# Patient Record
Sex: FEMALE | Race: WHITE | URBAN - METROPOLITAN AREA
[De-identification: names, ages, dates, MRNs, and addresses within clinical notes are randomized per-mention and may not be internally consistent; named-entity substitution may affect disease eponyms.]

---

## 2017-02-03 ENCOUNTER — OPTICAL OFFICE (OUTPATIENT)
Dept: URBAN - METROPOLITAN AREA CLINIC 146 | Facility: CLINIC | Age: 67
Setting detail: OPHTHALMOLOGY
End: 2017-02-03
Payer: COMMERCIAL

## 2017-02-03 ENCOUNTER — RX ONLY (RX ONLY)
Age: 67
End: 2017-02-03

## 2017-02-03 ENCOUNTER — DOCTOR'S OFFICE (OUTPATIENT)
Dept: URBAN - METROPOLITAN AREA CLINIC 137 | Facility: CLINIC | Age: 67
Setting detail: OPHTHALMOLOGY
End: 2017-02-03
Payer: COMMERCIAL

## 2017-02-03 DIAGNOSIS — H25.13: ICD-10-CM

## 2017-02-03 DIAGNOSIS — H52.4: ICD-10-CM

## 2017-02-03 DIAGNOSIS — E11.9: ICD-10-CM

## 2017-02-03 PROCEDURE — V2020 VISION SVCS FRAMES PURCHASES: HCPCS | Performed by: OPHTHALMOLOGY

## 2017-02-03 PROCEDURE — 92004 COMPRE OPH EXAM NEW PT 1/>: CPT | Performed by: OPHTHALMOLOGY

## 2017-02-03 PROCEDURE — V2718 FRESNELL PRISM PRESS-ON LENS: HCPCS | Performed by: OPHTHALMOLOGY

## 2017-02-03 PROCEDURE — V2103 SPHEROCYLINDR 4.00D/12-2.00D: HCPCS | Performed by: OPHTHALMOLOGY

## 2017-02-03 ASSESSMENT — REFRACTION_MANIFEST
OS_VA3: 20/
OD_VA3: 20/
OS_VA1: 20/
OS_VA2: 20/
OD_VA2: 20/
OD_VA3: 20/
OS_VA1: 20/
OU_VA: 20/
OD_VA1: 20/
OD_VA1: 20/
OU_VA: 20/
OD_VA2: 20/
OS_VA3: 20/
OS_VA2: 20/

## 2017-02-03 ASSESSMENT — REFRACTION_CURRENTRX
OS_SPHERE: +3.00
OD_OVR_VA: 20/
OS_VPRISM_DIRECTION: SV
OD_OVR_VA: 20/
OD_VPRISM_DIRECTION: SV
OS_CYLINDER: SPH
OS_OVR_VA: 20/
OD_SPHERE: +3.00
OD_CYLINDER: SPH
OD_OVR_VA: 20/
OS_OVR_VA: 20/
OS_OVR_VA: 20/

## 2017-02-03 ASSESSMENT — REFRACTION_AUTOREFRACTION
OD_CYLINDER: +1.50
OS_CYLINDER: SPH
OS_SPHERE: +3.75
OD_AXIS: 040
OD_SPHERE: +6.50

## 2017-02-03 ASSESSMENT — REFRACTION_OUTSIDERX
OS_VA2: 20/25(J1)
OD_AXIS: 030
OS_SPHERE: +2.75
OD_CYLINDER: +1.00
OS_VA3: 20/
OU_VA: 20/
OD_VA1: 20/70
OS_ADD: +2.75
OD_VA3: 20/
OD_SPHERE: +4.00
OS_CYLINDER: SPH
OD_VA2: 20/25(J1)
OS_VA1: 20/30-1
OD_ADD: +2.75

## 2017-02-03 ASSESSMENT — SPHEQUIV_DERIVED: OD_SPHEQUIV: 7.25

## 2017-02-03 ASSESSMENT — VISUAL ACUITY
OD_BCVA: 20/80
OS_BCVA: 20/200

## 2017-02-03 ASSESSMENT — CONFRONTATIONAL VISUAL FIELD TEST (CVF)
OD_FINDINGS: FULL
OS_FINDINGS: FULL

## 2017-02-27 ENCOUNTER — OPTICAL OFFICE (OUTPATIENT)
Dept: URBAN - METROPOLITAN AREA CLINIC 146 | Facility: CLINIC | Age: 67
Setting detail: OPHTHALMOLOGY
End: 2017-02-27

## 2017-02-27 DIAGNOSIS — H52.4: ICD-10-CM

## 2017-02-27 PROCEDURE — V2020 VISION SVCS FRAMES PURCHASES: HCPCS | Performed by: OPHTHALMOLOGY

## 2017-02-27 PROCEDURE — V2203 LENS SPHCYL BIFOCAL 4.00D/.1: HCPCS | Performed by: OPHTHALMOLOGY

## 2017-02-27 PROCEDURE — V2744 TINT PHOTOCHROMATIC LENS/ES: HCPCS | Performed by: OPHTHALMOLOGY

## 2018-01-05 ENCOUNTER — HOSPITAL ENCOUNTER (EMERGENCY)
Facility: HOSPITAL | Age: 68
Discharge: HOME | End: 2018-01-05
Attending: EMERGENCY MEDICINE | Admitting: EMERGENCY MEDICINE
Payer: MEDICARE

## 2018-01-05 VITALS
SYSTOLIC BLOOD PRESSURE: 161 MMHG | HEART RATE: 69 BPM | DIASTOLIC BLOOD PRESSURE: 69 MMHG | TEMPERATURE: 98.3 F | WEIGHT: 125 LBS | BODY MASS INDEX: 19.58 KG/M2 | OXYGEN SATURATION: 95 % | RESPIRATION RATE: 18 BRPM

## 2018-01-05 DIAGNOSIS — T69.9XXA: Primary | ICD-10-CM

## 2018-01-05 DIAGNOSIS — R68.89: ICD-10-CM

## 2018-01-05 LAB
ALBUMIN SERPL BCP-MCNC: 2.8 G/DL (ref 3.5–5)
ALP SERPL-CCNC: 67 U/L (ref 46–116)
ALT SERPL W P-5'-P-CCNC: 20 U/L (ref 12–78)
ANION GAP SERPL CALCULATED.3IONS-SCNC: 11 MMOL/L (ref 4–13)
AST SERPL W P-5'-P-CCNC: 20 U/L (ref 5–45)
ATRIAL RATE: 60 BPM
BASOPHILS # BLD AUTO: 0.03 THOUSANDS/ΜL (ref 0–0.1)
BASOPHILS NFR BLD AUTO: 0 % (ref 0–1)
BILIRUB SERPL-MCNC: 0.21 MG/DL (ref 0.2–1)
BUN SERPL-MCNC: 17 MG/DL (ref 5–25)
CALCIUM SERPL-MCNC: 6.8 MG/DL (ref 8.3–10.1)
CHLORIDE SERPL-SCNC: 110 MMOL/L (ref 100–108)
CK MB SERPL-MCNC: 4.4 % (ref 0–2.5)
CK MB SERPL-MCNC: 6 NG/ML (ref 0–5)
CK SERPL-CCNC: 136 U/L (ref 26–192)
CO2 SERPL-SCNC: 21 MMOL/L (ref 21–32)
CREAT SERPL-MCNC: 0.64 MG/DL (ref 0.6–1.3)
EOSINOPHIL # BLD AUTO: 0.1 THOUSAND/ΜL (ref 0–0.61)
EOSINOPHIL NFR BLD AUTO: 1 % (ref 0–6)
ERYTHROCYTE [DISTWIDTH] IN BLOOD BY AUTOMATED COUNT: 14.5 % (ref 11.6–15.1)
GFR SERPL CREATININE-BSD FRML MDRD: 94 ML/MIN/1.73SQ M
GLUCOSE SERPL-MCNC: 210 MG/DL (ref 65–140)
HCT VFR BLD AUTO: 38.3 % (ref 34.8–46.1)
HGB BLD-MCNC: 12.5 G/DL (ref 11.5–15.4)
LYMPHOCYTES # BLD AUTO: 1.36 THOUSANDS/ΜL (ref 0.6–4.47)
LYMPHOCYTES NFR BLD AUTO: 8 % (ref 14–44)
MCH RBC QN AUTO: 31.3 PG (ref 26.8–34.3)
MCHC RBC AUTO-ENTMCNC: 32.6 G/DL (ref 31.4–37.4)
MCV RBC AUTO: 96 FL (ref 82–98)
MONOCYTES # BLD AUTO: 0.57 THOUSAND/ΜL (ref 0.17–1.22)
MONOCYTES NFR BLD AUTO: 4 % (ref 4–12)
NEUTROPHILS # BLD AUTO: 14.33 THOUSANDS/ΜL (ref 1.85–7.62)
NEUTS SEG NFR BLD AUTO: 87 % (ref 43–75)
NRBC BLD AUTO-RTO: 0 /100 WBCS
P AXIS: 78 DEGREES
PLATELET # BLD AUTO: 174 THOUSANDS/UL (ref 149–390)
PMV BLD AUTO: 11 FL (ref 8.9–12.7)
POTASSIUM SERPL-SCNC: 3.5 MMOL/L (ref 3.5–5.3)
PR INTERVAL: 178 MS
PROT SERPL-MCNC: 5.6 G/DL (ref 6.4–8.2)
QRS AXIS: 81 DEGREES
QRSD INTERVAL: 84 MS
QT INTERVAL: 412 MS
QTC INTERVAL: 412 MS
RBC # BLD AUTO: 4 MILLION/UL (ref 3.81–5.12)
SODIUM SERPL-SCNC: 142 MMOL/L (ref 136–145)
T WAVE AXIS: 115 DEGREES
VENTRICULAR RATE: 60 BPM
WBC # BLD AUTO: 16.46 THOUSAND/UL (ref 4.31–10.16)

## 2018-01-05 PROCEDURE — 82553 CREATINE MB FRACTION: CPT | Performed by: EMERGENCY MEDICINE

## 2018-01-05 PROCEDURE — 36415 COLL VENOUS BLD VENIPUNCTURE: CPT | Performed by: EMERGENCY MEDICINE

## 2018-01-05 PROCEDURE — 80053 COMPREHEN METABOLIC PANEL: CPT | Performed by: EMERGENCY MEDICINE

## 2018-01-05 PROCEDURE — 99284 EMERGENCY DEPT VISIT MOD MDM: CPT

## 2018-01-05 PROCEDURE — 82550 ASSAY OF CK (CPK): CPT | Performed by: EMERGENCY MEDICINE

## 2018-01-05 PROCEDURE — 85025 COMPLETE CBC W/AUTO DIFF WBC: CPT | Performed by: EMERGENCY MEDICINE

## 2018-01-05 PROCEDURE — 93005 ELECTROCARDIOGRAM TRACING: CPT

## 2018-01-05 RX ORDER — QUETIAPINE FUMARATE 100 MG/1
100 TABLET, FILM COATED ORAL
COMMUNITY

## 2018-01-05 NOTE — ED PROCEDURE NOTE
Procedure  ECG 12 Lead Documentation  Date/Time: 1/5/2018 6:50 PM  Performed by: Byron Neumann by: DEBRA Curry     Indications / Diagnosis:  Cold exposure  ECG reviewed by me, the ED Provider: yes    Patient location:  ED and bedside  Previous ECG:     Previous ECG:  Compared to current  Interpretation:     Interpretation: non-specific    Rate:     ECG rate:  60    ECG rate assessment: normal    Rhythm:     Rhythm: sinus rhythm    Ectopy:     Ectopy: none    QRS:     QRS axis:  Normal    QRS intervals:  Normal  Conduction:     Conduction: normal    ST segments:     ST segments:  Normal  T waves:     T waves: inverted      Inverted:  AVL and aVR

## 2018-01-05 NOTE — ED PROVIDER NOTES
History  Chief Complaint   Patient presents with    Cold Exposure     Pt was found wondering around and police were called  Police called group home who stated pt was out shopping today  EMS states that they do not know the group home name because police spoke with them     74yo femalepmhx schizophrenia, hypothyroid, HTN, DMII, anemia, hypovitaminosis D  presents for evaluation of cold exposure  Patient is a poor historian, appears to have some degree of cognitive dysfunction, resident of [facility]  Patient was found by police wondering outside and appeared to be shivering  Patient currently complains cold feet  On arrival, patient's rectal temp was 96 3F and socks were soaked  She denies chest pain, shortness of breath, or abdominal pain  Prior to Admission Medications   Prescriptions Last Dose Informant Patient Reported? Taking? QUEtiapine (SEROquel) 100 mg tablet   Yes Yes   Sig: Take 100 mg by mouth daily at bedtime   cholecalciferol 1000 UNITS tablet   No Yes   Sig: Take 1 tablet (1,000 Units total) by mouth daily Indications: Vitamin D deficiency  escitalopram (LEXAPRO) 10 mg tablet   No Yes   Sig: Take 1 tablet (10 mg total) by mouth daily Indications: Depression  haloperidol (HALDOL) 5 mg tablet   No Yes   Sig: Take 1 tablet (5 mg total) by mouth daily at bedtime Indications: Schizophrenia    haloperidol decanoate (HALDOL DECANOATE) 50 mg/mL injection   No Yes   Sig: Inject 0 5 mL (25 mg total) into the shoulder, thigh, or buttocks every 28 days Indications: Schizophrenia    levothyroxine 100 mcg tablet   No Yes   Sig: Take 1 tablet (100 mcg total) by mouth daily in the early morning Indications: Underactive Thyroid  metFORMIN (GLUCOPHAGE) 1000 MG tablet   No Yes   Sig: Take 1 tablet (1,000 mg total) by mouth 2 (two) times a day with meals Indications: Type 2 Diabetes     metoprolol tartrate (LOPRESSOR) 25 mg tablet   No Yes   Sig: Take 0 5 tablets (12 5 mg total) by mouth 2 (two) times a day Indications: High Blood Pressure  sitaGLIPtin (JANUVIA) 100 mg tablet   Yes Yes   Sig: Take 100 mg by mouth daily      Facility-Administered Medications: None       Past Medical History:   Diagnosis Date    Hypertension     Hypothyroid 5/8/2016    Hypovitaminosis D 5/10/2016    Iron deficiency anemia 5/11/2016    [facility] 2[facility]61       History reviewed  No pertinent surgical history  Family History   Problem Relation Age of Onset    Family history unknown: Yes     I have reviewed and agree with the history as documented  Social History   Substance Use Topics    Smoking status: Current Every Day Smoker     Types: Cigarettes    Smokeless tobacco: Current User    Alcohol use No        Review of Systems   Unable to perform ROS: Other (Baseline consists of some degree of cognitive dysfunction)       Physical Exam  ED Triage Vitals [01/05/18 1728]   Temperature Pulse Respirations Blood Pressure SpO2   (!) 96 3 °F (35 7 °C) 68 18 138/79 96 %      Temp Source Heart Rate Source Patient Position - Orthostatic VS BP Location FiO2 (%)   Rectal Monitor Sitting Right arm --      Pain Score       No Pain           Orthostatic Vital Signs  Vitals:    01/05/18 1728 01/05/18 1822 01/05/18 1929   BP: 138/79 108/60 161/69   Pulse: 68 72 69   Patient Position - Orthostatic VS: Sitting Lying Sitting       Physical Exam   Constitutional: She is oriented to person, place, and time  She appears well-developed and well-nourished  No distress  Patient is alert and oriented, appears well and nontoxic, in no acute distress   HENT:   Head: Normocephalic and atraumatic  Mouth/Throat: Oropharynx is clear and moist  No oropharyngeal exudate  Eyes: Conjunctivae and EOM are normal  Pupils are equal, round, and reactive to light  Neck: Normal range of motion  Neck supple  Cardiovascular: Normal rate, regular rhythm, normal heart sounds and intact distal pulses  Pulmonary/Chest: Effort normal and breath sounds normal  No respiratory distress  She has no wheezes  She has no rales  Abdominal: Soft  Bowel sounds are normal  She exhibits no distension  There is no tenderness  Musculoskeletal: Normal range of motion  B/l feet are pallor, feel cold, cap refill delayed, full ROM, sensation and pulses intact   Lymphadenopathy:     She has no cervical adenopathy  Neurological: She is alert and oriented to person, place, and time  She displays normal reflexes  No cranial nerve deficit or sensory deficit  She exhibits normal muscle tone  No facial asymmetry noted, CN 2-12 intact, full ROM of upper and lower extremities, muscle strength 5/5 throughout, DTRs normal, sensation intact throughout, negative finger to nose/Crispin, negative Romberg's, negative Babinski, no gait disturbance noted   Skin: Skin is dry  Capillary refill takes 2 to 3 seconds  No rash noted  She is not diaphoretic  No erythema  No pallor  Psychiatric: She has a normal mood and affect  Her behavior is normal  Judgment and thought content normal    Nursing note and vitals reviewed        ED Medications  Medications - No data to display    Diagnostic Studies  Results Reviewed     Procedure Component Value Units Date/Time    CKMB [[id number]]  (Abnormal) Collected:  01/05/18 1745    Lab Status:  Final result Specimen:  Blood from Arm, Left Updated:  01/05/18 1928     CK-MB Index 4 4 (H) %      CK-MB FRACTION 6 0 (H) ng/mL     Comprehensive metabolic panel [[id number]]  (Abnormal) Collected:  01/05/18 1810    Lab Status:  Final result Specimen:  Blood from Arm, Left Updated:  01/05/18 1843     Sodium 142 mmol/L      Potassium 3 5 mmol/L      Chloride 110 (H) mmol/L      CO2 21 mmol/L      Anion Gap 11 mmol/L      BUN 17 mg/dL      Creatinine 0 64 mg/dL      Glucose 210 (H) mg/dL      Calcium 6 8 (L) mg/dL      AST 20 U/L      ALT 20 U/L      Alkaline Phosphatase 67 U/L      Total Protein 5 6 (L) g/dL      Albumin 2 8 (L) g/dL      Total Bilirubin 0 21 mg/dL      eGFR 94 ml/min/1 73sq m     Narrative:         National Kidney Disease Education Program recommendations are as follows:  GFR calculation is accurate only with a steady state creatinine  Chronic Kidney disease less than 60 ml/min/1 73 sq  meters  Kidney failure less than 15 ml/min/1 73 sq  meters  CK Total with Reflex CKMB [[id number]]  (Normal) Collected:  01/05/18 1745    Lab Status:  Final result Specimen:  Blood from Arm, Left Updated:  01/05/18 1836     Total  U/L     CBC and differential [[id number]]  (Abnormal) Collected:  01/05/18 1810    Lab Status:  Final result Specimen:  Blood from Arm, Left Updated:  01/05/18 1826     WBC 16 46 (H) Thousand/uL      RBC 4 00 Million/uL      Hemoglobin 12 5 g/dL      Hematocrit 38 3 %      MCV 96 fL      MCH 31 3 pg      MCHC 32 6 g/dL      RDW 14 5 %      MPV 11 0 fL      Platelets 887 Thousands/uL      nRBC 0 /100 WBCs      Neutrophils Relative 87 (H) %      Lymphocytes Relative 8 (L) %      Monocytes Relative 4 %      Eosinophils Relative 1 %      Basophils Relative 0 %      Neutrophils Absolute 14 33 (H) Thousands/µL      Lymphocytes Absolute 1 36 Thousands/µL      Monocytes Absolute 0 57 Thousand/µL      Eosinophils Absolute 0 10 Thousand/µL      Basophils Absolute 0 03 Thousands/µL                  No orders to display         Procedures  Procedures      Phone Consults  ED Phone Contact    ED Course  ED Course as of Jan 07 1641 Fri Jan 05, 2018   1833 I discussed case with manager Jocelyne at [facility] who confirmed patient does have a baseline of cognitive dysfunction  I informed her, pending lab results, patient will most likely be discharged once she is warm       1906 Rectal temp will be re-checked in half an hour, if above 98F, patient will be discharged back to [facility]            Identification of Seniors at [facility] Most Recent Value   (ISAR) Identification of Seniors at Risk   Before the illness or injury that brought you to the Emergency, did you need someone to help you on a regular basis? 1 Filed at: 01/05/2018 1730   In the last 24 hours, have you needed more help than usual?  0 Filed at: 01/05/2018 1730   Have you been hospitalized for one or more nights during the past 6 months? 0 Filed at: 01/05/2018 1730   In general, do you see well?  0 Filed at: 01/05/2018 1730   In general, do you have serious problems with your memory? 0 Filed at: 01/05/2018 1730   Do you take more than three different medications every day? 1 Filed at: 01/05/2018 1730   ISAR Score  2 Filed at: 01/05/2018 1730                          MDM  Number of Diagnoses or Management Options  Decreased body temperature:   Exposure to environmental cold:   Diagnosis management comments: Impression: 72yo female presents for evaluation of cold exposure  Ddx: hypothermia, rhabdo  Plan: warm fluids, bear hugger, ekg, cbc, bmp, ck    CritCare Time    Disposition  Final diagnoses:   Exposure to environmental cold   Decreased body temperature     Time reflects when diagnosis was documented in both MDM as applicable and the Disposition within this note     Time User Action Codes Description Comment    1/5/2018  7:00 PM Nicholas Cardenas  74  9XXA] Exposure to environmental cold     1/5/2018  7:01 PM Jah Gomes Add [R68 89] Decreased body temperature       ED Disposition     ED Disposition Condition Comment    Discharge  Ant Petty discharge to home/self care      Condition at discharge: Stable        Follow-up Information     Follow up With Specialties Details Why Edwin Frederick MD Family Medicine Go in 3 days As needed, If symptoms worsen [place] Allan [id number]  [phone]          Discharge Medication List as of 1/5/2018  7:02 PM      CONTINUE these medications which have NOT CHANGED    Details   cholecalciferol 1000 UNITS tablet Take 1 tablet (1,000 Units total) by mouth daily Indications: Vitamin D deficiency  , Starting 5/11/2016, Until Discontinued, Print      escitalopram (LEXAPRO) 10 mg tablet Take 1 tablet (10 mg total) by mouth daily Indications: Depression  , Starting 5/11/2016, Until Discontinued, Print      haloperidol (HALDOL) 5 mg tablet Take 1 tablet (5 mg total) by mouth daily at bedtime Indications: Schizophrenia , Starting 5/11/2016, Until Discontinued, Print      haloperidol decanoate (HALDOL DECANOATE) 50 mg/mL injection Inject 0 5 mL (25 mg total) into the shoulder, thigh, or buttocks every 28 days Indications: Schizophrenia , Starting 5/11/2016, Until Discontinued, Print      levothyroxine 100 mcg tablet Take 1 tablet (100 mcg total) by mouth daily in the early morning Indications: Underactive Thyroid  , Starting 5/11/2016, Until Discontinued, Print      metFORMIN (GLUCOPHAGE) 1000 MG tablet Take 1 tablet (1,000 mg total) by mouth 2 (two) times a day with meals Indications: Type 2 Diabetes  , Starting 5/11/2016, Until Discontinued, Print      metoprolol tartrate (LOPRESSOR) 25 mg tablet Take 0 5 tablets (12 5 mg total) by mouth 2 (two) times a day Indications: High Blood Pressure , Starting 5/11/2016, Until Discontinued, Print      QUEtiapine (SEROquel) 100 mg tablet Take 100 mg by mouth daily at bedtime, Historical Med      sitaGLIPtin (JANUVIA) 100 mg tablet Take 100 mg by mouth daily, Historical Med           No discharge procedures on file  ED Provider  Attending physically available and evaluated Nohelia Tyler  RADHA managed the patient along with the ED Attending      Electronically Signed by         Wilber Pinon MD  Resident  01/07/18 5954

## 2018-01-05 NOTE — ED ATTENDING ATTESTATION
Blessing Bennett MD, saw and evaluated the patient  All available labs and X-rays were ordered by me or the resident and have been reviewed by myself  I discussed the patient with the resident / non-physician and agree with the resident's / non-physician practitioner's findings and plan as documented in the resident's / non-physician practicitioner's note, except where noted  At this point, I agree with the current assessment done in the ED  Chief Complaint   Patient presents with    Cold Exposure     Pt was found wondering around and police were called  Police called group home who stated pt was out shopping today  EMS states that they do not know the group home name because police spoke with them     This is a 72-year-old female who is incredibly poor historian  The patient is a resident at [facility]  She was found outside wandering by police, complaining that she was feeling very cold, almost frozen due to the weather  The patient denies any fevers chills nausea vomiting  She does state that her feet hurt her and feel numb  It is unclear how long she was outside  We do not know which group home the patient is going to and the patient does not know for herself  She was seen immediately upon arrival, felt cold to the touch especially in her feet as she was only wearing simple shoes that barely covered her toes  Temperature 96 3° F but is cold to the touch  PMH:  - HTN  - Hypothyroidism  - Vit D deficiency  - DM2  - Schizophrenia  PSH:  - none  Smokes daily  No alcohol/drugs  PE:  Vitals:    01/05/18 1728 01/05/18 1822 01/05/18 1825 01/05/18 1929   BP: 138/79 108/60  161/69   Pulse: 68 72  69   Resp: 18 18 18   Temp: (!) 96 3 °F (35 7 °C)  (!) 97 3 °F (36 3 °C) 98 3 °F (36 8 °C)   TempSrc: Rectal  Rectal Rectal   SpO2: 96% 95%  95%   Weight: 56 7 kg (125 lb)      General: VSS, NAD, awake, alert  Well-nourished, well-developed  Appears stated age  Speaking normally in full sentences     Head: Normocephalic, atraumatic, nontender  Eyes: PERRL, EOM-I  No diplopia  No hyphema  No subconjunctival hemorrhages  Symmetrical lids  ENT: Atraumatic external nose and ears  MMM  No malocclusion  No stridor  Normal phonation  No drooling  Normal swallowing  Neck: Symmetric, trachea midline  No JVD  CV: RRR  +S1/S2  No murmurs or gallops  Peripheral pulses +2 throughout  No chest wall tenderness  Lungs:   Unlabored No retractions  CTAB, lungs sounds equal bilateral    No tachypnea  Abd: +BS, soft, NT/ND    MSK:   FROM   EHL FHL PF DF HF HE 5/5, moving bilaterally equal  In terms of sensation, decreased bilaterally  Plantar aspect has skin changes that are black but looks more like dirt than necrosis  Non-tender  No ulceration  Very cold to the touch  Back:   No rashes  Skin: Dry, intact  Neuro: AAOx3, GCS 15, CN II-XII grossly intact  Motor grossly intact  Psychiatric/Behavioral: Appropriate mood and affect   Exam: deferred  A/P:  - Cold exposure: Rewarm  - Get in touch with facility  - Admit obs if we cannot, not safe for discharge unless in the care of someone  - 13 point ROS was performed and all are normal unless stated in the history above  - Nursing note reviewed  Vitals reviewed  - Orders placed by myself and/or advanced practitioner / resident     - Previous chart was not reviewed  - No language barrier    - History obtained from patient  - There are no limitations to the history obtained  - Critical care time: Not applicable for this patient  Final Diagnosis:  1  Exposure to environmental cold    2   Decreased body temperature        ED Course      Medications - No data to display  No orders to display     Orders Placed This Encounter   Procedures    ED ECG Documentation Only    CK Total with Reflex CKMB    CBC and differential    Comprehensive metabolic panel    CKMB    ECG 12 lead     Labs Reviewed   CBC AND DIFFERENTIAL - Abnormal        Result Value Ref Range Status    WBC 16 46 (*) 4 31 - 10 16 Thousand/uL Final    RBC 4 00  3 81 - 5 12 Million/uL Final    Hemoglobin 12 5  11 5 - 15 4 g/dL Final    Hematocrit 38 3  34 8 - 46 1 % Final    MCV 96  82 - 98 fL Final    MCH 31 3  26 8 - 34 3 pg Final    MCHC 32 6  31 4 - 37 4 g/dL Final    RDW 14 5  11 6 - 15 1 % Final    MPV 11 0  8 9 - 12 7 fL Final    Platelets 178  057 - 390 Thousands/uL Final    nRBC 0  /100 WBCs Final    Neutrophils Relative 87 (*) 43 - 75 % Final    Lymphocytes Relative 8 (*) 14 - 44 % Final    Monocytes Relative 4  4 - 12 % Final    Eosinophils Relative 1  0 - 6 % Final    Basophils Relative 0  0 - 1 % Final    Neutrophils Absolute 14 33 (*) 1 85 - 7 62 Thousands/µL Final    Lymphocytes Absolute 1 36  0 60 - 4 47 Thousands/µL Final    Monocytes Absolute 0 57  0 17 - 1 22 Thousand/µL Final    Eosinophils Absolute 0 10  0 00 - 0 61 Thousand/µL Final    Basophils Absolute 0 03  0 00 - 0 10 Thousands/µL Final   COMPREHENSIVE METABOLIC PANEL - Abnormal     Sodium 142  136 - 145 mmol/L Final    Potassium 3 5  3 5 - 5 3 mmol/L Final    Chloride 110 (*) 100 - 108 mmol/L Final    CO2 21  21 - 32 mmol/L Final    Anion Gap 11  4 - 13 mmol/L Final    BUN 17  5 - 25 mg/dL Final    Creatinine 0 64  0 60 - 1 30 mg/dL Final    Comment: Standardized to IDMS reference method    Glucose 210 (*) 65 - 140 mg/dL Final    Comment:   If the patient is fasting, the ADA then defines impaired fasting glucose as > 100 mg/dL and diabetes as > or equal to 123 mg/dL  Specimen collection should occur prior to Sulfasalazine administration due to the potential for falsely depressed results  Specimen collection should occur prior to Sulfapyridine administration due to the potential for falsely elevated results  Calcium 6 8 (*) 8 3 - 10 1 mg/dL Final    AST 20  5 - 45 U/L Final    Comment:   Specimen collection should occur prior to Sulfasalazine administration due to the potential for falsely depressed results       ALT 20  12 - 78 U/L Final    Comment:   Specimen collection should occur prior to Sulfasalazine and/or Sulfapyridine administration due to the potential for falsely depressed results  Alkaline Phosphatase 67  46 - 116 U/L Final    Total Protein 5 6 (*) 6 4 - 8 2 g/dL Final    Albumin 2 8 (*) 3 5 - 5 0 g/dL Final    Total Bilirubin 0 21  0 20 - 1 00 mg/dL Final    eGFR 94  ml/min/1 73sq m Final    Narrative:     National Kidney Disease Education Program recommendations are as follows:  GFR calculation is accurate only with a steady state creatinine  Chronic Kidney disease less than 60 ml/min/1 73 sq  meters  Kidney failure less than 15 ml/min/1 73 sq  meters  CKMB - Abnormal     CK-MB Index 4 4 (*) 0 0 - 2 5 % Final    CK-MB FRACTION 6 0 (*) 0 0 - 5 0 ng/mL Final   CK - Normal    Total   26 - 192 U/L Final     Time reflects when diagnosis was documented in both MDM as applicable and the Disposition within this note     Time User Action Codes Description Comment    1/5/2018  7:00 PM Nicholas Cifuentes  74  9XXA] Exposure to environmental cold     1/5/2018  7:01 PM Jess Brar Add [R68 89] Decreased body temperature       ED Disposition     ED Disposition Condition Comment    Discharge  Jian Hernandez discharge to home/self care  Condition at discharge: Stable        Follow-up Information     Follow up With Specialties Details Why Anni Carlson MD Family Medicine Go in 3 days As needed, If symptoms worsen [place] [id number]  [phone]          Discharge Medication List as of 1/5/2018  7:02 PM      CONTINUE these medications which have NOT CHANGED    Details   cholecalciferol 1000 UNITS tablet Take 1 tablet (1,000 Units total) by mouth daily Indications: Vitamin D deficiency  , Starting 5/11/2016, Until Discontinued, Print      escitalopram (LEXAPRO) 10 mg tablet Take 1 tablet (10 mg total) by mouth daily Indications: Depression  , Starting 5/11/2016, Until Discontinued, Print haloperidol (HALDOL) 5 mg tablet Take 1 tablet (5 mg total) by mouth daily at bedtime Indications: Schizophrenia , Starting 5/11/2016, Until Discontinued, Print      haloperidol decanoate (HALDOL DECANOATE) 50 mg/mL injection Inject 0 5 mL (25 mg total) into the shoulder, thigh, or buttocks every 28 days Indications: Schizophrenia , Starting 5/11/2016, Until Discontinued, Print      levothyroxine 100 mcg tablet Take 1 tablet (100 mcg total) by mouth daily in the early morning Indications: Underactive Thyroid  , Starting 5/11/2016, Until Discontinued, Print      metFORMIN (GLUCOPHAGE) 1000 MG tablet Take 1 tablet (1,000 mg total) by mouth 2 (two) times a day with meals Indications: Type 2 Diabetes  , Starting 5/11/2016, Until Discontinued, Print      metoprolol tartrate (LOPRESSOR) 25 mg tablet Take 0 5 tablets (12 5 mg total) by mouth 2 (two) times a day Indications: High Blood Pressure , Starting 5/11/2016, Until Discontinued, Print      QUEtiapine (SEROquel) 100 mg tablet Take 100 mg by mouth daily at bedtime, Historical Med      sitaGLIPtin (JANUVIA) 100 mg tablet Take 100 mg by mouth daily, Historical Med           No discharge procedures on file  Prior to Admission Medications   Prescriptions Last Dose Informant Patient Reported? Taking? QUEtiapine (SEROquel) 100 mg tablet   Yes Yes   Sig: Take 100 mg by mouth daily at bedtime   cholecalciferol 1000 UNITS tablet   No Yes   Sig: Take 1 tablet (1,000 Units total) by mouth daily Indications: Vitamin D deficiency  escitalopram (LEXAPRO) 10 mg tablet   No Yes   Sig: Take 1 tablet (10 mg total) by mouth daily Indications: Depression  haloperidol (HALDOL) 5 mg tablet   No Yes   Sig: Take 1 tablet (5 mg total) by mouth daily at bedtime Indications: Schizophrenia    haloperidol decanoate (HALDOL DECANOATE) 50 mg/mL injection   No Yes   Sig: Inject 0 5 mL (25 mg total) into the shoulder, thigh, or buttocks every 28 days Indications: Schizophrenia  levothyroxine 100 mcg tablet   No Yes   Sig: Take 1 tablet (100 mcg total) by mouth daily in the early morning Indications: Underactive Thyroid  metFORMIN (GLUCOPHAGE) 1000 MG tablet   No Yes   Sig: Take 1 tablet (1,000 mg total) by mouth 2 (two) times a day with meals Indications: Type 2 Diabetes  metoprolol tartrate (LOPRESSOR) 25 mg tablet   No Yes   Sig: Take 0 5 tablets (12 5 mg total) by mouth 2 (two) times a day Indications: High Blood Pressure  sitaGLIPtin (JANUVIA) 100 mg tablet   Yes Yes   Sig: Take 100 mg by mouth daily      Facility-Administered Medications: None       Portions of the record may have been created with voice recognition software  Occasional wrong word or "sound a like" substitutions may have occurred due to the inherent limitations of voice recognition software  Read the chart carefully and recognize, using context, where substitutions have occurred      Electronically signed by:  Noni Warren

## 2018-01-05 NOTE — ED NOTES
Pt states that she lives [facility]     [place] [facility]  01/05/18 [phone]

## 2018-01-05 NOTE — ED NOTES
EKG completed at 1747   Viewed and signed by Dr Yuri Watts on chart     Danielle Arambula  01/05/18 6134

## 2018-01-05 NOTE — ED NOTES
Pt came in slip on shoes with wet socks on  Pt states her feet are "frozen"   Pt has no other complaints at this time     Meaghan José RN  01/05/18 7275

## 2018-01-06 NOTE — ED NOTES
As per Dr Frankey Holmes recheck pt temp in half hour  When pt temp is 98 she can be d/c to [facility]       Ino Little RN  01/05/18 5587

## 2018-01-06 NOTE — ED NOTES
Patient being d/c at this time  [facility] director called and per director they will be sending a cab for patient  Patient made aware and in agreement        Facundo Milian RN  01/05/18 0707

## 2019-12-03 ENCOUNTER — HOSPITAL ENCOUNTER (EMERGENCY)
Facility: HOSPITAL | Age: 69
Discharge: HOME | End: 2019-12-03
Attending: EMERGENCY MEDICINE
Payer: MEDICARE

## 2019-12-03 VITALS
RESPIRATION RATE: 20 BRPM | WEIGHT: 130 LBS | TEMPERATURE: 98.3 F | SYSTOLIC BLOOD PRESSURE: 150 MMHG | OXYGEN SATURATION: 99 % | HEART RATE: 88 BPM | BODY MASS INDEX: 20.36 KG/M2 | DIASTOLIC BLOOD PRESSURE: 89 MMHG

## 2019-12-03 DIAGNOSIS — R13.10 DYSPHAGIA: Primary | ICD-10-CM

## 2019-12-03 PROCEDURE — 99284 EMERGENCY DEPT VISIT MOD MDM: CPT | Performed by: EMERGENCY MEDICINE

## 2019-12-03 PROCEDURE — 99285 EMERGENCY DEPT VISIT HI MDM: CPT

## 2019-12-03 NOTE — ED NOTES
After getting medication for patient, patient was in room drinking water without difficulty with Drs Alberto Epley and Farrukh at bedside  Patient had dressed herself and wants to leave  Spoke with Leelee Montague at [facility] who will  patient in about 15 minutes       Bryant Arthur RN  12/03/19 1846

## 2019-12-03 NOTE — ED PROVIDER NOTES
History  Chief Complaint   Patient presents with    Difficulty Swallowing     Swallowed coffe grounds this morning and since havig issues swallowing   Does spit from time to time  No drooling noted  Normally she can not be convinced to come to the hospital but she was willing to come in     HPI   56-year-old woman presents [facility] for subjective dysphagia  Patient has psychiatric and behavioral issues  She frequently spits saliva into a bag  Earlier today patient said that she swallowed a scoop of coffee-grounds  Since then she has been spitting into her bag more frequently  Staff member here with her not sure if she has been tolerating p o   Group home staff were able to convince her to come to the emergency department because she was spitting out so much saliva and said her throat was bothering here  Patient's only complaint to me is pain in her throat with swallowing  She does not feel like she is spitting more than normal   No fever, sore throat, nausea, vomiting, chest pain, shortness of breath, abdominal pain  No known history of endoscopy  Prior to Admission Medications   Prescriptions Last Dose Informant Patient Reported? Taking? QUEtiapine (SEROquel) 100 mg tablet   Yes Yes   Sig: Take 100 mg by mouth daily at bedtime   cholecalciferol 1000 UNITS tablet   No Yes   Sig: Take 1 tablet (1,000 Units total) by mouth daily Indications: Vitamin D deficiency  escitalopram (LEXAPRO) 10 mg tablet   No No   Sig: Take 1 tablet (10 mg total) by mouth daily Indications: Depression     haloperidol (HALDOL) 5 mg tablet   No Yes   Sig: Take 1 tablet (5 mg total) by mouth daily at bedtime Indications: Schizophrenia    haloperidol decanoate (HALDOL DECANOATE) 50 mg/mL injection   No No   Sig: Inject 0 5 mL (25 mg total) into the shoulder, thigh, or buttocks every 28 days Indications: Schizophrenia    levothyroxine 100 mcg tablet   No No   Sig: Take 1 tablet (100 mcg total) by mouth daily in the early morning Indications: Underactive Thyroid  metFORMIN (GLUCOPHAGE) 1000 MG tablet   No No   Sig: Take 1 tablet (1,000 mg total) by mouth 2 (two) times a day with meals Indications: Type 2 Diabetes  metoprolol tartrate (LOPRESSOR) 25 mg tablet   No No   Sig: Take 0 5 tablets (12 5 mg total) by mouth 2 (two) times a day Indications: High Blood Pressure  sitaGLIPtin (JANUVIA) 100 mg tablet   Yes No   Sig: Take 100 mg by mouth daily      Facility-Administered Medications: None       Past Medical History:   Diagnosis Date    Hypertension     Hypothyroid 5/8/2016    Hypovitaminosis D 5/10/2016    Iron deficiency anemia 5/11/2016    [facility] 2/8[facility]66       History reviewed  No pertinent surgical history  Family History   Family history unknown: Yes     I have reviewed and agree with the history as documented  Social History     Tobacco Use    Smoking status: Current Every Day Smoker     Types: Cigarettes    Smokeless tobacco: Current User   Substance Use Topics    Alcohol use: No    Drug use: No        Review of Systems   Constitutional: Negative for chills and fever  HENT: Positive for trouble swallowing  Negative for drooling and sore throat  Respiratory: Negative for shortness of breath  Cardiovascular: Negative for chest pain  Gastrointestinal: Negative for abdominal pain, nausea and vomiting  Psychiatric/Behavioral: Positive for agitation and behavioral problems  Negative for self-injury and suicidal ideas  All other systems reviewed and are negative        Physical Exam  ED Triage Vitals [12/03/19 1422]   Temperature Pulse Respirations Blood Pressure SpO2   98 3 °F (36 8 °C) 91 20 (!) 149/118 100 %      Temp Source Heart Rate Source Patient Position - Orthostatic VS BP Location FiO2 (%)   Oral Monitor Sitting Left arm --      Pain Score       No Pain             Orthostatic Vital Signs  Vitals:    12/03/19 1422 12/03/19 1530   BP: Emi Nithyadonte Daniel 149/118 150/89   Pulse: 91 88   Patient Position - Orthostatic VS: Sitting Sitting       Physical Exam   Constitutional: She is oriented to person, place, and time  She appears well-developed and well-nourished  No distress  Patient speaks with a heavy accent and mumbled voice making her difficult to understand  She occasionally spits small amounts of clear saliva into a bag  HENT:   Head: Normocephalic and atraumatic  No pooled secretions  No pharyngeal erythema  No tonsillar hypertrophy  Uvula midline  Eyes: Pupils are equal, round, and reactive to light  Conjunctivae and EOM are normal  No scleral icterus  Neck: Normal range of motion  Neck supple  No palpable masses in the neck  Cardiovascular: Normal rate and regular rhythm  Exam reveals no gallop and no friction rub  No murmur heard  Pulmonary/Chest: Breath sounds normal  She has no wheezes  She has no rales  Abdominal: Soft  She exhibits no distension  There is no tenderness  There is no rebound and no guarding  Musculoskeletal: Normal range of motion  She exhibits no edema or tenderness  Neurological: She is alert and oriented to person, place, and time  No cranial nerve deficit or sensory deficit  She exhibits normal muscle tone  Skin: Skin is warm and dry  She is not diaphoretic  No erythema  No pallor  Psychiatric:   Pacing  Refuses to sit down  Nursing note and vitals reviewed  ED Medications  Medications - No data to display    Diagnostic Studies  Results Reviewed     None                 No orders to display         Procedures  Procedures      ED Course           Identification of Seniors at Risk      Most Recent Value   (ISAR) Identification of Seniors at Risk   Before the illness or injury that brought you to the Emergency, did you need someone to help you on a regular basis?   1 Filed at: 12/03/2019 3354   In the last 24 hours, have you needed more help than usual?  0 Filed at: 12/03/2019 1429   Have you been hospitalized for one or more nights during the past 6 months? 0 Filed at: 12/03/2019 1422   In general, do you see well? 1 Filed at: 12/03/2019 1420   In general, do you have serious problems with your memory? 0 Filed at: 12/03/2019 1420   Do you take more than three different medications every day? 1 Filed at: 12/03/2019 1425   ISAR Score  3 Filed at: 12/03/2019 1425          [facility]  Number of Diagnoses or Management Options  Dysphagia: new and does not require workup     Amount and/or Complexity of Data Reviewed  Decide to obtain previous medical records or to obtain history from someone other than the patient: yes  Obtain history from someone other than the patient: yes    Patient Progress  Patient progress: resolved     Patient was initially given sips of water but continued to spit out saliva and clear liquid shortly after swallowing  There was initial concern for an esophageal food impaction, possibly secondary to coffee-grounds patient swallowed earlier  Plan was glucagon and GI consult if patient continued to be symptomatic  On reassessment patient is refusing any treatment  She was witnessed to take multiple large sips of water without any subsequent difficulty swallowing or spitting  She has now finished almost the entire cup  She says her throat feels fine and "fixed itself "  She does not want any further evaluation and is refusing to stay  Group home aware that patient will be discharged  Referral information provided for GI follow-up as she would still benefit from nonemergent outpatient endoscopy  Return to the emergency department for any worsening        Disposition  Final diagnoses:   Dysphagia     Time reflects when diagnosis was documented in both MDM as applicable and the Disposition within this note     Time User Action Codes Description Comment    12/3/2019  3:40 PM Aparna Noyola Add [R13 10] Dysphagia       ED Disposition     ED Disposition Condition Date/Time Comment Discharge Good Tue Dec 3, 2019  3:41 PM Chiara Jimenes discharge to [facility]  Follow-up Information     Follow up With Specialties Details Why Michael Perrin MD Family Medicine Call  As needed [address]  [facility] [address]  [phone]      Yimi Valdivia MD Gastroenterology Schedule an appointment as soon as possible for a visit  GI follow-up [address] [place] [address]  [phone]            Discharge Medication List as of 12/3/2019  3:42 PM      CONTINUE these medications which have NOT CHANGED    Details   cholecalciferol 1000 UNITS tablet Take 1 tablet (1,000 Units total) by mouth daily Indications: Vitamin D deficiency  , Starting 5/11/2016, Until Discontinued, Print      haloperidol (HALDOL) 5 mg tablet Take 1 tablet (5 mg total) by mouth daily at bedtime Indications: Schizophrenia , Starting 5/11/2016, Until Discontinued, Print      QUEtiapine (SEROquel) 100 mg tablet Take 100 mg by mouth daily at bedtime, Historical Med      escitalopram (LEXAPRO) 10 mg tablet Take 1 tablet (10 mg total) by mouth daily Indications: Depression  , Starting 5/11/2016, Until Discontinued, Print      haloperidol decanoate (HALDOL DECANOATE) 50 mg/mL injection Inject 0 5 mL (25 mg total) into the shoulder, thigh, or buttocks every 28 days Indications: Schizophrenia , Starting 5/11/2016, Until Discontinued, Print      levothyroxine 100 mcg tablet Take 1 tablet (100 mcg total) by mouth daily in the early morning Indications: Underactive Thyroid  , Starting 5/11/2016, Until Discontinued, Print      metFORMIN (GLUCOPHAGE) 1000 MG tablet Take 1 tablet (1,000 mg total) by mouth 2 (two) times a day with meals Indications: Type 2 Diabetes  , Starting 5/11/2016, Until Discontinued, Print      metoprolol tartrate (LOPRESSOR) 25 mg tablet Take 0 5 tablets (12 5 mg total) by mouth 2 (two) times a day Indications: High Blood Pressure , Starting 5/11/2016, Until Discontinued, Print sitaGLIPtin (JANUVIA) 100 mg tablet Take 100 mg by mouth daily, Historical Med           No discharge procedures on file  ED Provider  Attending physically available and evaluated Lorna Main I managed the patient along with the ED Attending      Electronically Signed by         Jose Camargo MD  12/03/19 2127

## 2019-12-03 NOTE — SOCIAL WORK
CM met with pt at bedside  Pt was unable to communicate to CM  CM asked pt if there was anyone she could contact  Pt said "yes" and gave CM phone # for [facility] [phone]  Pt responded "yes" when CM asked if she lived at the facility  CM contacted [facility] and spoke to [facility] reported that pt does live there, she is independent with adl's/ambulation no DME  Pt has PCP and she follows up with transport from the facility  Pt has language barrier, [facility] reported that they have a hard time using translators  [facility] reported that pt can write and it is legible  [facility] told CM that they can transport back to facility anytime before 6PM, after 6 PM they can authorize a cab for pt  Pt does have supervision with her medications at the facility  Pt takes her psych medications but others she refuses  Pt is encouraged to go to PCP appointments but often refuses medical tx  [facility] reported that pt has suzie eyesight although she refuses eye tx  Pt has supervision and support in the group home  [facility] reported that the pt is quick and they are able to manage her well at the group home  [facility] reported pt can return at d/c  CM requested fax # for additional information to be sent  [facility]; [place]  F: [phone]  P: Sixto Quintana open 24hrs to accept pt back

## 2019-12-03 NOTE — ED ATTENDING ATTESTATION
Kendall Nixon MD, saw and evaluated the patient  All available labs and X-rays were ordered by me or the resident and have been reviewed by myself  I discussed the patient with the resident / non-physician and agree with the resident's / non-physician practitioner's findings and plan as documented in the resident's / non-physician practicitioner's note, except where noted  At this point, I agree with the current assessment done in the ED  I was present during key portions of all procedures performed unless otherwise stated  Chief Complaint   Patient presents with    Difficulty Swallowing     Swallowed coffe grounds this morning and since havig issues swallowing   Does spit from time to time  No drooling noted  Normally she can not be convinced to come to the hospital but she was willing to come in     This is a 80 y/o F from group home ([facility])  She has a tendency to spit  Today, it's worse than normal  She ate coffee grounds (no idea why) but doing it is not unusual   Since then, she is spitting a lot of saliva  Can't swallow  No eating/drinking due to throat pain since this morning  She complains of throat pain and can't swallow  No actual vomiting occurring  That being said, between the resident evaluating the patient and then myself going in to the room, she has had resolution of symptoms and is swallowing normally, drinking multiple gulps of water without issue  Her swallowing weird things (e g  Coffee grounds) isn't unusual    PE:  Vitals:    12/03/19 1422 12/03/19 1530   BP: (!) 149/118 150/89   BP Location: Left arm Right arm   Pulse: 91 88   Resp: 20 20   Temp: 98 3 °F (36 8 °C)    TempSrc: Oral    SpO2: 100% 99%   Weight: 59 kg (130 lb)    General: VS reviewed  Appears in NAD  awake, alert  Well-nourished, well-developed  Appears stated age     Speaking abnormally in full sentences, difficult to understand (baseline per worker from facility)   Head: Normocephalic, atraumatic  Eyes: EOM-I  No diplopia  No hyphema  No subconjunctival hemorrhages  Symmetrical lids  ENT: Atraumatic external nose and ears  MMM  No malocclusion  No stridor  Normal phonation  No drooling  Normal swallowing  Neck: No JVD  CV: No pallor noted  Lungs:   No tachypnea  No respiratory distress  MSK:   FROM spontaneously  Skin: Dry, intact  Neuro: Awake, alert, GCS15, CN II-XII grossly intact  Motor grossly intact  Psychiatric/Behavioral: Appropriate mood and affect   Exam: deferred  A:  - Food bolus? resolved  P:  - resolved  Will DC  F/u GI  - 13 point ROS was performed and all are normal unless stated in the history above  - Nursing note reviewed  Vitals reviewed  - Orders placed by myself and/or advanced practitioner / resident     - Previous chart was reviewed  - No language barrier    - History obtained from patient, staff worker    - There are limitations to the history obtained: I D   - Critical care time: Not applicable for this patient  Code Status: Prior  Advance Directive and Living Will:      Power of :    POLST:      Final Diagnosis:  1  Dysphagia        ED Course as of Dec 04 1250   Tue Dec 03, 2019   1555 Continues to [address]  Medications - No data to display  No orders to display     No orders of the defined types were placed in this encounter  Labs Reviewed - No data to display  Time reflects when diagnosis was documented in both MDM as applicable and the Disposition within this note     Time User Action Codes Description Comment    12/3/2019  3:40 PM Renelda Schaumann Add [R13 10] Dysphagia       ED Disposition     ED Disposition Condition Date/Time Comment    Discharge Good Tue Dec 3, 2019  3:41 PM Gwenetta Bernheim discharge to [facility]  Follow-up Information     Follow up With Specialties Details Why Zuleika Cazares MD Family Medicine Call  As needed [address]  [phone] Yimi Valdivia MD Gastroenterology Schedule an appointment as soon as possible for a visit  GI follow-up [facility] [address]iz Sanders [address] [place] [address]  [phone]          Discharge Medication List as of 12/3/2019  3:42 PM      CONTINUE these medications which have NOT CHANGED    Details   cholecalciferol 1000 UNITS tablet Take 1 tablet (1,000 Units total) by mouth daily Indications: Vitamin D deficiency  , Starting 5/11/2016, Until Discontinued, Print      haloperidol (HALDOL) 5 mg tablet Take 1 tablet (5 mg total) by mouth daily at bedtime Indications: Schizophrenia , Starting 5/11/2016, Until Discontinued, Print      QUEtiapine (SEROquel) 100 mg tablet Take 100 mg by mouth daily at bedtime, Historical Med      escitalopram (LEXAPRO) 10 mg tablet Take 1 tablet (10 mg total) by mouth daily Indications: Depression  , Starting 5/11/2016, Until Discontinued, Print      haloperidol decanoate (HALDOL DECANOATE) 50 mg/mL injection Inject 0 5 mL (25 mg total) into the shoulder, thigh, or buttocks every 28 days Indications: Schizophrenia , Starting 5/11/2016, Until Discontinued, Print      levothyroxine 100 mcg tablet Take 1 tablet (100 mcg total) by mouth daily in the early morning Indications: Underactive Thyroid  , Starting 5/11/2016, Until Discontinued, Print      metFORMIN (GLUCOPHAGE) 1000 MG tablet Take 1 tablet (1,000 mg total) by mouth 2 (two) times a day with meals Indications: Type 2 Diabetes  , Starting 5/11/2016, Until Discontinued, Print      metoprolol tartrate (LOPRESSOR) 25 mg tablet Take 0 5 tablets (12 5 mg total) by mouth 2 (two) times a day Indications: High Blood Pressure , Starting 5/11/2016, Until Discontinued, Print      sitaGLIPtin (JANUVIA) 100 mg tablet Take 100 mg by mouth daily, Historical Med           No discharge procedures on file  Prior to Admission Medications   Prescriptions Last Dose Informant Patient Reported? Taking?    QUEtiapine (SEROquel) 100 mg tablet   Yes Yes   Sig: Take 100 mg by mouth daily at bedtime   cholecalciferol 1000 UNITS tablet   No Yes   Sig: Take 1 tablet (1,000 Units total) by mouth daily Indications: Vitamin D deficiency  escitalopram (LEXAPRO) 10 mg tablet   No No   Sig: Take 1 tablet (10 mg total) by mouth daily Indications: Depression  haloperidol (HALDOL) 5 mg tablet   No Yes   Sig: Take 1 tablet (5 mg total) by mouth daily at bedtime Indications: Schizophrenia    haloperidol decanoate (HALDOL DECANOATE) 50 mg/mL injection   No No   Sig: Inject 0 5 mL (25 mg total) into the shoulder, thigh, or buttocks every 28 days Indications: Schizophrenia    levothyroxine 100 mcg tablet   No No   Sig: Take 1 tablet (100 mcg total) by mouth daily in the early morning Indications: Underactive Thyroid  metFORMIN (GLUCOPHAGE) 1000 MG tablet   No No   Sig: Take 1 tablet (1,000 mg total) by mouth 2 (two) times a day with meals Indications: Type 2 Diabetes  metoprolol tartrate (LOPRESSOR) 25 mg tablet   No No   Sig: Take 0 5 tablets (12 5 mg total) by mouth 2 (two) times a day Indications: High Blood Pressure  sitaGLIPtin (JANUVIA) 100 mg tablet   Yes No   Sig: Take 100 mg by mouth daily      Facility-Administered Medications: None       Portions of the record may have been created with voice recognition software  Occasional wrong word or "sound a like" substitutions may have occurred due to the inherent limitations of voice recognition software  Read the chart carefully and recognize, using context, where substitutions have occurred      Electronically signed by:  Teodora Guthrie

## 2019-12-05 ENCOUNTER — TELEPHONE (OUTPATIENT)
Dept: GASTROENTEROLOGY | Facility: CLINIC | Age: 69
End: 2019-12-05

## 2019-12-05 NOTE — TELEPHONE ENCOUNTER
Patients GI provider:  New pt    Number to return call: [phone]    Reason for call: Mar Jesse from [facility] called wanting to know if pt can be sched for sooner appt for Dysphagia    Scheduled procedure/appointment date if applicable: Appt - 64/63/40

## 2020-12-22 ENCOUNTER — HOSPITAL ENCOUNTER (EMERGENCY)
Facility: HOSPITAL | Age: 70
Discharge: HOME | End: 2020-12-22
Attending: EMERGENCY MEDICINE
Payer: MEDICARE

## 2020-12-22 ENCOUNTER — APPOINTMENT (EMERGENCY)
Dept: RADIOLOGY | Facility: HOSPITAL | Age: 70
End: 2020-12-22
Payer: MEDICARE

## 2020-12-22 VITALS
SYSTOLIC BLOOD PRESSURE: 144 MMHG | DIASTOLIC BLOOD PRESSURE: 88 MMHG | HEART RATE: 84 BPM | RESPIRATION RATE: 17 BRPM | TEMPERATURE: 97.5 F | OXYGEN SATURATION: 97 %

## 2020-12-22 DIAGNOSIS — R79.89 ELEVATED TSH: ICD-10-CM

## 2020-12-22 DIAGNOSIS — R07.9 CHEST PAIN, UNSPECIFIED: Primary | ICD-10-CM

## 2020-12-22 LAB
ANION GAP SERPL CALCULATED.3IONS-SCNC: 7 MMOL/L (ref 4–13)
BASOPHILS # BLD AUTO: 0.04 THOUSANDS/ΜL (ref 0–0.1)
BASOPHILS NFR BLD AUTO: 0 % (ref 0–1)
BUN SERPL-MCNC: 14 MG/DL (ref 6–20)
CALCIUM SERPL-MCNC: 9.5 MG/DL (ref 8.4–10.2)
CHLORIDE SERPL-SCNC: 102 MMOL/L (ref 96–108)
CO2 SERPL-SCNC: 27 MMOL/L (ref 22–33)
CREAT SERPL-MCNC: 0.8 MG/DL (ref 0.4–1.1)
EOSINOPHIL # BLD AUTO: 0.14 THOUSAND/ΜL (ref 0–0.61)
EOSINOPHIL NFR BLD AUTO: 1 % (ref 0–6)
ERYTHROCYTE [DISTWIDTH] IN BLOOD BY AUTOMATED COUNT: 16 % (ref 11.6–15.1)
GFR SERPL CREATININE-BSD FRML MDRD: 76 ML/MIN/1.73SQ M
GLUCOSE SERPL-MCNC: 247 MG/DL (ref 65–140)
HCT VFR BLD AUTO: 41.3 % (ref 34.8–46.1)
HGB BLD-MCNC: 13.4 G/DL (ref 11.5–15.4)
IMM GRANULOCYTES # BLD AUTO: 0.01 THOUSAND/UL (ref 0–0.2)
IMM GRANULOCYTES NFR BLD AUTO: 0 % (ref 0–2)
LYMPHOCYTES # BLD AUTO: 1.3 THOUSANDS/ΜL (ref 0.6–4.47)
LYMPHOCYTES NFR BLD AUTO: 13 % (ref 14–44)
MAGNESIUM SERPL-MCNC: 1.8 MG/DL (ref 1.6–2.6)
MCH RBC QN AUTO: 28.6 PG (ref 26.8–34.3)
MCHC RBC AUTO-ENTMCNC: 32.4 G/DL (ref 31.4–37.4)
MCV RBC AUTO: 88 FL (ref 82–98)
MONOCYTES # BLD AUTO: 0.75 THOUSAND/ΜL (ref 0.17–1.22)
MONOCYTES NFR BLD AUTO: 8 % (ref 4–12)
NEUTROPHILS # BLD AUTO: 7.62 THOUSANDS/ΜL (ref 1.85–7.62)
NEUTS SEG NFR BLD AUTO: 78 % (ref 43–75)
PLATELET # BLD AUTO: 215 THOUSANDS/UL (ref 149–390)
PMV BLD AUTO: 10 FL (ref 8.9–12.7)
POTASSIUM SERPL-SCNC: 4.5 MMOL/L (ref 3.5–5)
RBC # BLD AUTO: 4.69 MILLION/UL (ref 3.81–5.12)
SODIUM SERPL-SCNC: 136 MMOL/L (ref 133–145)
TROPONIN I SERPL-MCNC: <0.03 NG/ML (ref 0–0.07)
TSH SERPL DL<=0.05 MIU/L-ACNC: 15.25 UIU/ML (ref 0.34–5.6)
WBC # BLD AUTO: 9.86 THOUSAND/UL (ref 4.31–10.16)

## 2020-12-22 PROCEDURE — 80048 BASIC METABOLIC PNL TOTAL CA: CPT | Performed by: PHYSICIAN ASSISTANT

## 2020-12-22 PROCEDURE — 84484 ASSAY OF TROPONIN QUANT: CPT | Performed by: PHYSICIAN ASSISTANT

## 2020-12-22 PROCEDURE — 71046 X-RAY EXAM CHEST 2 VIEWS: CPT

## 2020-12-22 PROCEDURE — 83735 ASSAY OF MAGNESIUM: CPT | Performed by: PHYSICIAN ASSISTANT

## 2020-12-22 PROCEDURE — 99285 EMERGENCY DEPT VISIT HI MDM: CPT | Performed by: PHYSICIAN ASSISTANT

## 2020-12-22 PROCEDURE — 99285 EMERGENCY DEPT VISIT HI MDM: CPT

## 2020-12-22 PROCEDURE — 94760 N-INVAS EAR/PLS OXIMETRY 1: CPT

## 2020-12-22 PROCEDURE — 94644 CONT INHLJ TX 1ST HOUR: CPT

## 2020-12-22 PROCEDURE — 93005 ELECTROCARDIOGRAM TRACING: CPT

## 2020-12-22 PROCEDURE — 85025 COMPLETE CBC W/AUTO DIFF WBC: CPT | Performed by: PHYSICIAN ASSISTANT

## 2020-12-22 PROCEDURE — 84443 ASSAY THYROID STIM HORMONE: CPT | Performed by: PHYSICIAN ASSISTANT

## 2020-12-22 PROCEDURE — 36415 COLL VENOUS BLD VENIPUNCTURE: CPT | Performed by: PHYSICIAN ASSISTANT

## 2020-12-22 RX ORDER — SODIUM CHLORIDE FOR INHALATION 0.9 %
3 VIAL, NEBULIZER (ML) INHALATION ONCE
Status: COMPLETED | OUTPATIENT
Start: 2020-12-22 | End: 2020-12-22

## 2020-12-22 RX ORDER — SODIUM CHLORIDE 9 MG/ML
3 INJECTION INTRAVENOUS
Status: DISCONTINUED | OUTPATIENT
Start: 2020-12-22 | End: 2020-12-22 | Stop reason: HOSPADM

## 2020-12-22 RX ORDER — NITROGLYCERIN 0.4 MG/1
0.4 TABLET SUBLINGUAL
Status: DISCONTINUED | OUTPATIENT
Start: 2020-12-22 | End: 2020-12-22 | Stop reason: HOSPADM

## 2020-12-22 RX ORDER — ASPIRIN 81 MG/1
324 TABLET, CHEWABLE ORAL ONCE
Status: COMPLETED | OUTPATIENT
Start: 2020-12-22 | End: 2020-12-22

## 2020-12-22 RX ADMIN — ALBUTEROL SULFATE 10 MG: 2.5 SOLUTION RESPIRATORY (INHALATION) at 11:52

## 2020-12-22 RX ADMIN — ISODIUM CHLORIDE 3 ML: 0.03 SOLUTION RESPIRATORY (INHALATION) at 11:52

## 2020-12-22 RX ADMIN — IPRATROPIUM BROMIDE 1 MG: 0.5 SOLUTION RESPIRATORY (INHALATION) at 11:53

## 2020-12-22 RX ADMIN — ASPIRIN 81 MG CHEWABLE TABLET 324 MG: 81 TABLET CHEWABLE at 12:41

## 2020-12-22 NOTE — DISCHARGE INSTRUCTIONS
You can return to the emergency department at any time if you wish to pursue treatment and evaluation of your chest pain

## 2020-12-22 NOTE — ED NOTES
Pt screaming at me," I want this needle out immediately"  Explained to patient the IV is not a needle but a plastic tube and necessary for her care, she continues to yell at me " Take it out now"  Dr Aria Ventura that if the IV comes out you are refusing any more care  Pt screams "I don't care, take it out", IV removed and patient currently getting dressed without d/c orders       Torito Palma RN  12/22/20 5667

## 2020-12-22 NOTE — ED PROVIDER NOTES
History  Chief Complaint   Patient presents with    Chest Pain     71-year-old female comes in today complaining of left-sided chest pain that has been intermittent for the last 3 weeks  She reports that the pain is sharp and occasionally associated with shortness of breath  She reports a history of chronic bronchitis  She also has a history of a heart attack in 1984  She has a history of hypertension and diabetes  She lives in a group home setting  Reports that the pain does not radiate anywhere  Not associated with any nausea  She denies any fever increased cough  Prior to Admission Medications   Prescriptions Last Dose Informant Patient Reported? Taking? QUEtiapine (SEROquel) 100 mg tablet   Yes No   Sig: Take 100 mg by mouth daily at bedtime   cholecalciferol 1000 UNITS tablet   No No   Sig: Take 1 tablet (1,000 Units total) by mouth daily Indications: Vitamin D deficiency  escitalopram (LEXAPRO) 10 mg tablet   No No   Sig: Take 1 tablet (10 mg total) by mouth daily Indications: Depression  haloperidol (HALDOL) 5 mg tablet   No No   Sig: Take 1 tablet (5 mg total) by mouth daily at bedtime Indications: Schizophrenia    haloperidol decanoate (HALDOL DECANOATE) 50 mg/mL injection   No No   Sig: Inject 0 5 mL (25 mg total) into the shoulder, thigh, or buttocks every 28 days Indications: Schizophrenia    levothyroxine 100 mcg tablet   No No   Sig: Take 1 tablet (100 mcg total) by mouth daily in the early morning Indications: Underactive Thyroid  metFORMIN (GLUCOPHAGE) 1000 MG tablet   No No   Sig: Take 1 tablet (1,000 mg total) by mouth 2 (two) times a day with meals Indications: Type 2 Diabetes  metoprolol tartrate (LOPRESSOR) 25 mg tablet   No No   Sig: Take 0 5 tablets (12 5 mg total) by mouth 2 (two) times a day Indications: High Blood Pressure     sitaGLIPtin (JANUVIA) 100 mg tablet   Yes No   Sig: Take 100 mg by mouth daily      Facility-Administered Medications: None       Past Medical History:   Diagnosis Date    Hypertension     Hypothyroid 5/8/2016    Hypovitaminosis D 5/10/2016    Iron deficiency anemia 5/11/2016    [facility] 1/5[facility]128       History reviewed  No pertinent surgical history  Family History   Family history unknown: Yes     I have reviewed and agree with the history as documented  E-Cigarette/Vaping     E-Cigarette/Vaping Substances     Social History     Tobacco Use    Smoking status: Current Every Day Smoker     Types: Cigarettes    Smokeless tobacco: Current User   Substance Use Topics    Alcohol use: No    Drug use: No       Review of Systems   Constitutional: Negative for fever  HENT: Negative for nosebleeds  Eyes: Negative for redness  Respiratory: Positive for shortness of breath  Cardiovascular: Positive for chest pain  Gastrointestinal: Negative for blood in stool and nausea  Genitourinary: Negative for hematuria  Musculoskeletal: Negative for gait problem  Skin: Negative for rash  Neurological: Negative for dizziness and seizures  Psychiatric/Behavioral: Negative for behavioral problems  Physical Exam  Physical Exam  Constitutional:       Appearance: Normal appearance  HENT:      Head: Normocephalic and atraumatic  Nose: No rhinorrhea  Comments: Few ulcers on the bulb of the nose     Mouth/Throat:      Mouth: Mucous membranes are moist       Comments: Edentulous  Eyes:      Extraocular Movements: Extraocular movements intact  Neck:      Musculoskeletal: Normal range of motion  Cardiovascular:      Rate and Rhythm: Normal rate and regular rhythm  Pulmonary:      Effort: Pulmonary effort is normal  No tachypnea or respiratory distress  Breath sounds: Examination of the left-upper field reveals rhonchi  Decreased breath sounds and rhonchi present  Musculoskeletal: Normal range of motion  Skin:     General: Skin is warm and dry     Neurological: General: No focal deficit present  Mental Status: She is alert  Psychiatric:         Mood and Affect: Mood normal          Behavior: Behavior normal          Vital Signs  ED Triage Vitals [12/22/20 1118]   Temperature Pulse Respirations Blood Pressure SpO2   97 5 °F (36 4 °C) 84 17 144/88 98 %      Temp Source Heart Rate Source Patient Position - Orthostatic VS BP Location FiO2 (%)   Tympanic Monitor Lying Right arm --      Pain Score       --           Vitals:    12/22/20 1118   BP: 144/88   Pulse: 84   Patient Position - Orthostatic VS: Lying         Visual Acuity      ED Medications  Medications   sodium chloride (PF) 0 9 % injection 3 mL (has no administration in time range)   nitroglycerin (NITROSTAT) SL tablet 0 4 mg (has no administration in time range)   sodium chloride 0 9 % bolus 1,000 mL (has no administration in time range)   aspirin chewable tablet 324 mg (324 mg Oral Given 12/22/20 1241)   albuterol inhalation solution 10 mg (10 mg Nebulization Given 12/22/20 1152)     And   ipratropium (ATROVENT) 0 02 % inhalation solution 1 mg (1 mg Nebulization Given 12/22/20 1153)     And   sodium chloride 0 9 % inhalation solution 3 mL (3 mL Nebulization Given 12/22/20 1152)       Diagnostic Studies  Results Reviewed     Procedure Component Value Units Date/Time    Troponin I [[id number]]  (Normal) Collected: 12/22/20 1204    Lab Status: Final result Specimen: Blood from Arm, Right Updated: 12/22/20 1249     Troponin I <0 03 ng/mL     TSH, 3rd generation [[id number]]  (Abnormal) Collected: 12/22/20 1204    Lab Status: Final result Specimen: Blood from Arm, Right Updated: 12/22/20 1243     TSH 3RD GENERATON 15 253 uIU/mL     Narrative:      Patients undergoing fluorescein dye angiography may retain small amounts of fluorescein in the body for 48-72 hours post procedure  Samples containing fluorescein can produce falsely depressed TSH values   If the patient had this procedure,a specimen should be resubmitted post fluorescein clearance        Basic metabolic panel [[id number]]  (Abnormal) Collected: 12/22/20 1204    Lab Status: Final result Specimen: Blood from Arm, Right Updated: 12/22/20 1227     Sodium 136 mmol/L      Potassium 4 5 mmol/L      Chloride 102 mmol/L      CO2 27 mmol/L      ANION GAP 7 mmol/L      BUN 14 mg/dL      Creatinine 0 80 mg/dL      Glucose 247 mg/dL      Calcium 9 5 mg/dL      eGFR 76 ml/min/1 73sq m     Narrative:      [place] guidelines for Chronic Kidney Disease (CKD):     Stage 1 with normal or high GFR (GFR > 90 mL/min/1 73 square meters)    Stage 2 Mild CKD (GFR = 60-89 mL/min/1 73 square meters)    Stage 3A Moderate CKD (GFR = 45-59 mL/min/1 73 square meters)    Stage 3B Moderate CKD (GFR = 30-44 mL/min/1 73 square meters)    Stage 4 Severe CKD (GFR = 15-29 mL/min/1 73 square meters)    Stage 5 End Stage CKD (GFR <15 mL/min/1 73 square meters)  Note: GFR calculation is accurate only with a steady state creatinine    Magnesium [[id number]]  (Normal) Collected: 12/22/20 1204    Lab Status: Final result Specimen: Blood from Arm, Right Updated: 12/22/20 1227     Magnesium 1 8 mg/dL     CBC and differential [[id number]]  (Abnormal) Collected: 12/22/20 1204    Lab Status: Final result Specimen: Blood from Arm, Right Updated: 12/22/20 1210     WBC 9 86 Thousand/uL      RBC 4 69 Million/uL      Hemoglobin 13 4 g/dL      Hematocrit 41 3 %      MCV 88 fL      MCH 28 6 pg      MCHC 32 4 g/dL      RDW 16 0 %      MPV 10 0 fL      Platelets 739 Thousands/uL      Neutrophils Relative 78 %      Immat GRANS % 0 %      Lymphocytes Relative 13 %      Monocytes Relative 8 %      Eosinophils Relative 1 %      Basophils Relative 0 %      Neutrophils Absolute 7 62 Thousands/µL      Immature Grans Absolute 0 01 Thousand/uL      Lymphocytes Absolute 1 30 Thousands/µL      Monocytes Absolute 0 75 Thousand/µL      Eosinophils Absolute 0 14 Thousand/µL      Basophils Absolute 0 04 Thousands/µL     Troponin I repeat in 3 hrs [[id number]]     Lab Status: No result Specimen: Blood                  X-ray chest 2 views   Final Result by Larita Duane, MD (12/22 1202)      No acute cardiopulmonary disease  Workstation performed: [id number]                    Procedures  Procedures         ED Course  ED Course as of Dec 22 1418   Tue Dec 22, 2020   1212 EKG performed at 11:29 a m  Interpreted by me shows normal sinus rhythm with a rate of 74, normal axis, no ectopy, no significant ST elevations                HEART Risk Score      Most Recent Value   Heart Score Risk Calculator   History  0 Filed at: 12/22/2020 1413   ECG  0 Filed at: 12/22/2020 1413   Age  2 Filed at: 12/22/2020 1413   Risk Factors  2 Filed at: 12/22/2020 1413   Troponin  0 Filed at: 12/22/2020 1413   HEART Score  4 Filed at: 12/22/2020 1413                      SBIRT 22yo+      Most Recent Value   SBIRT (23 yo +)   In order to provide better care to our patients, we are screening all of our patients for alcohol and drug use  Would it be okay to ask you these screening questions? Yes Filed at: 12/22/2020 1210   Initial Alcohol Screen: US AUDIT-C    1  How often do you have a drink containing alcohol?  0 Filed at: 12/22/2020 1210   2  How many drinks containing alcohol do you have on a typical day you are drinking? 0 Filed at: 12/22/2020 1210   3a  Male UNDER 65: How often do you have five or more drinks on one occasion? 0 Filed at: 12/22/2020 1210   3b  FEMALE Any Age, or MALE 65+: How often do you have 4 or more drinks on one occassion? 0 Filed at: 12/22/2020 1210   Audit-C Score  0 Filed at: 12/22/2020 1210   FAUSTO: How many times in the past year have you    Used an illegal drug or used a prescription medication for non-medical reasons?   Never Filed at: 12/22/2020 1210                    MDM  Number of Diagnoses or Management Options  Diagnosis management comments: 53-year-old female comes in with left-sided chest pain for 3 weeks  Initial troponin and EKG are negative, however her heart score is a 4  Recommended patient stay for observation, however patient's wishes to have her IV pulled and she no longer wants any medical care  Patient will sign out against medical advice at this time  Disposition  Final diagnoses:   Chest pain, unspecified   Elevated TSH     Time reflects when diagnosis was documented in both MDM as applicable and the Disposition within this note     Time User Action Codes Description Comment    12/22/2020  2:15 PM Douglas Duhamel Add [R07 9] Chest pain, unspecified     12/22/2020  2:15 PM Douglas Duhamel Remove [R07 9] Chest pain, unspecified     12/22/2020  2:15 PM Douglas Duhamel Add [R07 89] Atypical chest pain     12/22/2020  2:15 PM Douglas Duhamel Remove [R07 89] Atypical chest pain     12/22/2020  2:15 PM Douglas Duhamel Add [R07 9] Chest pain, unspecified     12/22/2020  2:15 PM Douglas Duhamel Add [R79 89] Elevated TSH       ED Disposition     ED Disposition Condition Date/Time Comment    KUN Alejandro Dec 22, 2020  2:17 PM Date: 12/22/2020  Patient: Neris Hernandez  Admitted: 12/22/2020 11:13 AM  Attending Provider: Florence Rae MD    Neris Hernandez or her authorized caregiver has made the decision for the patient to leave the emergency department against the advice of  the emergency department staff  She or her authorized caregiver has been informed and understands the inherent risks, including death, heart attack  She or her authorized caregiver has decided to accept the responsibility for this decision  Ruben preston and all necessary parties have been advised that she may return for further evaluation or treatment  Her condition at time of discharge was stable    Neris Hernandez had current vital signs as follows:  /88 (BP Location: Right arm)   Pulse 84    Temp 97 5 °F (36 4 °C) (Tympanic)   Resp 17         Follow-up Information     Follow up With Specialties Details Why José Miguel Claros MD Family Medicine Call in 1 day Your thyroid hormone was elevated today, you need your levothyroxine adjusted  You should also have follow-up with a cardiologist regarding your chest pain since you have a history of a heart attack  [address]  [phone]            Patient's Medications   Discharge Prescriptions    No medications on file     No discharge procedures on file      PDMP Review     None          ED Provider  Electronically Signed by           Jaquelin Austin PA-C  12/22/20 6054

## 2020-12-22 NOTE — ED NOTES
Pt ripped dressing off IV site, arm started to bleed, dripping on floor  Patient looking at me screaming, "Get me tape now", Explained to the patient that she should of left dressing on till the wound clotted  Patient insisting to leave, provider made aware        Azalea Vaughan RN  12/22/20 0359

## 2020-12-23 LAB
ATRIAL RATE: 74 BPM
P AXIS: 72 DEGREES
PR INTERVAL: 162 MS
QRS AXIS: 55 DEGREES
QRSD INTERVAL: 85 MS
QT INTERVAL: 407 MS
QTC INTERVAL: 452 MS
T WAVE AXIS: 63 DEGREES
VENTRICULAR RATE: 74 BPM

## 2020-12-23 PROCEDURE — 93010 ELECTROCARDIOGRAM REPORT: CPT | Performed by: INTERNAL MEDICINE

## 2023-12-18 ENCOUNTER — APPOINTMENT (EMERGENCY)
Dept: RADIOLOGY | Facility: HOSPITAL | Age: 73
End: 2023-12-18
Payer: MEDICARE

## 2023-12-18 ENCOUNTER — HOSPITAL ENCOUNTER (EMERGENCY)
Facility: HOSPITAL | Age: 73
Discharge: HOME | End: 2023-12-18
Attending: EMERGENCY MEDICINE | Admitting: EMERGENCY MEDICINE
Payer: MEDICARE

## 2023-12-18 VITALS
HEART RATE: 78 BPM | OXYGEN SATURATION: 97 % | DIASTOLIC BLOOD PRESSURE: 77 MMHG | RESPIRATION RATE: 18 BRPM | TEMPERATURE: 97.7 F | SYSTOLIC BLOOD PRESSURE: 163 MMHG

## 2023-12-18 DIAGNOSIS — S52.509A DISTAL RADIUS FRACTURE: Primary | ICD-10-CM

## 2023-12-18 LAB
ATRIAL RATE: 71 BPM
P AXIS: 74 DEGREES
PR INTERVAL: 170 MS
QRS AXIS: 78 DEGREES
QRSD INTERVAL: 84 MS
QT INTERVAL: 382 MS
QTC INTERVAL: 415 MS
T WAVE AXIS: 114 DEGREES
VENTRICULAR RATE: 71 BPM

## 2023-12-18 PROCEDURE — 25605 CLTX DST RDL FX/EPHYS SEP W/: CPT | Performed by: EMERGENCY MEDICINE

## 2023-12-18 PROCEDURE — 73110 X-RAY EXAM OF WRIST: CPT

## 2023-12-18 PROCEDURE — 72125 CT NECK SPINE W/O DYE: CPT

## 2023-12-18 PROCEDURE — 90715 TDAP VACCINE 7 YRS/> IM: CPT

## 2023-12-18 PROCEDURE — 73100 X-RAY EXAM OF WRIST: CPT

## 2023-12-18 PROCEDURE — 73070 X-RAY EXAM OF ELBOW: CPT

## 2023-12-18 PROCEDURE — 70450 CT HEAD/BRAIN W/O DYE: CPT

## 2023-12-18 PROCEDURE — 90471 IMMUNIZATION ADMIN: CPT

## 2023-12-18 PROCEDURE — NC001 PR NO CHARGE

## 2023-12-18 PROCEDURE — G1004 CDSM NDSC: HCPCS

## 2023-12-18 PROCEDURE — 99284 EMERGENCY DEPT VISIT MOD MDM: CPT

## 2023-12-18 PROCEDURE — 73090 X-RAY EXAM OF FOREARM: CPT

## 2023-12-18 PROCEDURE — NC001 PR NO CHARGE: Performed by: ORTHOPAEDIC SURGERY

## 2023-12-18 PROCEDURE — 99284 EMERGENCY DEPT VISIT MOD MDM: CPT | Performed by: EMERGENCY MEDICINE

## 2023-12-18 PROCEDURE — 93005 ELECTROCARDIOGRAM TRACING: CPT

## 2023-12-18 RX ORDER — ROPIVACAINE HYDROCHLORIDE 5 MG/ML
20 INJECTION, SOLUTION EPIDURAL; INFILTRATION; PERINEURAL ONCE
Status: COMPLETED | OUTPATIENT
Start: 2023-12-18 | End: 2023-12-18

## 2023-12-18 RX ADMIN — TETANUS TOXOID, REDUCED DIPHTHERIA TOXOID AND ACELLULAR PERTUSSIS VACCINE, ADSORBED 0.5 ML: 5; 2.5; 8; 8; 2.5 SUSPENSION INTRAMUSCULAR at 16:08

## 2023-12-18 RX ADMIN — ROPIVACAINE HYDROCHLORIDE 20 ML: 5 INJECTION, SOLUTION EPIDURAL; INFILTRATION; PERINEURAL at 17:23

## 2023-12-18 NOTE — ED PROVIDER NOTES
History  Chief Complaint   Patient presents with    Fall     Per ems pt was walking to the store and slipped and fell forward hitting face and right wrist on sidewalk, -LOC -thinners, ems called [facility] where pt is from and [facility] reported this is her speech is at baseline. Pt denies dizziness      73-year-old female with no pertinent past medical history presents emergency department due to wrist pain after a fall.  She was walking in the store when she slipped, fell forward and struck her head against the ground as well as her right wrist on the sidewalk.  She had immediate pain in the right wrist.  She denies any LOC or blood thinners.  She otherwise is a poor historian and does not provide further complaints at this time.    Prior to Admission Medications   Prescriptions Last Dose Informant Patient Reported? Taking?   QUEtiapine (SEROquel) 100 mg tablet   Yes No   Sig: Take 100 mg by mouth daily at bedtime   cholecalciferol 1000 UNITS tablet   No No   Sig: Take 1 tablet (1,000 Units total) by mouth daily Indications: Vitamin D deficiency.   escitalopram (LEXAPRO) 10 mg tablet   No No   Sig: Take 1 tablet (10 mg total) by mouth daily Indications: Depression.   haloperidol (HALDOL) 5 mg tablet   No No   Sig: Take 1 tablet (5 mg total) by mouth daily at bedtime Indications: Schizophrenia.   haloperidol decanoate (HALDOL DECANOATE) 50 mg/mL injection   No No   Sig: Inject 0.5 mL (25 mg total) into the shoulder, thigh, or buttocks every 28 days Indications: Schizophrenia.   levothyroxine 100 mcg tablet   No No   Sig: Take 1 tablet (100 mcg total) by mouth daily in the early morning Indications: Underactive Thyroid.   metFORMIN (GLUCOPHAGE) 1000 MG tablet   No No   Sig: Take 1 tablet (1,000 mg total) by mouth 2 (two) times a day with meals Indications: Type 2 Diabetes.   metoprolol tartrate (LOPRESSOR) 25 mg tablet   No No   Sig: Take 0.5 tablets (12.5 mg total) by mouth 2 (two) times a day Indications:  High Blood Pressure.   sitaGLIPtin (JANUVIA) 100 mg tablet   Yes No   Sig: Take 100 mg by mouth daily      Facility-Administered Medications: None       Past Medical History:   Diagnosis Date    Hypertension     Hypothyroid 5/8/2016    Hypovitaminosis D 5/10/2016    Iron deficiency anemia 5/11/2016    Type 2 diabetes mellitus without complication (HCC) 5/8/2016       History reviewed. No pertinent surgical history.    Family History   Family history unknown: Yes     I have reviewed and agree with the history as documented.    E-Cigarette/Vaping     E-Cigarette/Vaping Substances     Social History     Tobacco Use    Smoking status: Every Day     Types: Cigarettes    Smokeless tobacco: Current   Substance Use Topics    Alcohol use: No    Drug use: No        Review of Systems   All other systems reviewed and are negative.      Physical Exam  ED Triage Vitals   Temperature Pulse Respirations Blood Pressure SpO2   12/18/23 1421 12/18/23 1418 12/18/23 1418 12/18/23 1418 12/18/23 1418   97.7 °F (36.5 °C) 69 18 154/72 99 %      Temp Source Heart Rate Source Patient Position - Orthostatic VS BP Location FiO2 (%)   12/18/23 1421 -- -- -- --   Oral          Pain Score       --                    Orthostatic Vital Signs  Vitals:    12/18/23 1530 12/18/23 1600 12/18/23 1630 12/18/23 1745   BP: (!) 178/79 150/69 163/77    Pulse: 76 76 78 78       Physical Exam  Vitals and nursing note reviewed.   Constitutional:       General: She is not in acute distress.     Appearance: She is well-developed.   HENT:      Head: Normocephalic and atraumatic.   Eyes:      Conjunctiva/sclera: Conjunctivae normal.   Cardiovascular:      Rate and Rhythm: Normal rate and regular rhythm.      Heart sounds: No murmur heard.  Pulmonary:      Effort: Pulmonary effort is normal. No respiratory distress.      Breath sounds: Normal breath sounds.   Abdominal:      Palpations: Abdomen is soft.      Tenderness: There is no abdominal tenderness.    Musculoskeletal:         General: Swelling and deformity present.      Cervical back: Neck supple.      Comments: Right wrist   Skin:     General: Skin is warm and dry.      Capillary Refill: Capillary refill takes less than 2 seconds.   Neurological:      Mental Status: She is alert.   Psychiatric:         Mood and Affect: Mood normal.         ED Medications  Medications   tetanus-diphtheria-acellular pertussis (BOOSTRIX) IM injection 0.5 mL (0.5 mL Intramuscular Given 12/18/23 1608)   ropivacaine (NAROPIN) 0.5 % injection 20 mL (20 mL Infiltration Given by Other 12/18/23 1723)       Diagnostic Studies  Results Reviewed       None                   XR elbow 2 vw right   Final Result by Zachariah Thurman DO (12/19 0946)      No acute fracture or dislocation within limitations of two-view elbow imaging and overlying splint material.         Resident: BARON WYATT I, the attending radiologist, have reviewed the images and agree with the final report above.      Workstation performed: [id number]         XR wrist 2 vw right   Final Result by Zachariah Thurman DO (12/19 0943)      Unchanged alignment of distal radius fracture compared with post reduction imaging.            Resident: BARON WYATT I, the attending radiologist, have reviewed the images and agree with the final report above.      Workstation performed: [id number]         XR wrist 2 vw right   Final Result by Zachariah Thurman DO (12/19 0933)      Improved alignment of distal radius fracture as above.            Resident: BARON WYATT I, the attending radiologist, have reviewed the images and agree with the final report above.      Workstation performed: [id number]         XR wrist 3+ views RIGHT   Final Result by Zachariah Thurman DO (12/18 0957)      No significant change in alignment of impacted, posteriorly displaced distal radius fracture.            Workstation performed: [id number]         XR forearm 2  "views RIGHT   ED Interpretation by Vargas Powers MD (12/18 1726)   Dorsally displaced Distal radius fracture      Final Result by Zachariah Thurman DO (12/18 1739)      Dorsally displaced distal radius fracture.            Workstation performed: [id number]         CT head without contrast   Final Result by Hugo Lara MD (12/18 8229)      No acute intracranial abnormality.  Chronic microangiopathic changes.                  Workstation performed: [id number]         CT cervical spine without contrast   Final Result by Hugo Lara MD (12/18 3953)      No cervical spine fracture or traumatic malalignment.                  Workstation performed: [id number]               Procedures  Orthopedic injury treatment    Date/Time: 12/18/2023 4:44 PM    Performed by: Vargas Powers MD  Authorized by: Vargas Powers MD    Patient Location:  ED  [place] Protocol:  Procedure performed by: (Kay)  Consent: Verbal consent obtained.  Risks and benefits: risks, benefits and alternatives were discussed  Consent given by: patient  Time out: Immediately prior to procedure a \"time out\" was called to verify the correct patient, procedure, equipment, support staff and site/side marked as required.  Patient understanding: patient states understanding of the procedure being performed  Radiology Images displayed and confirmed. If images not available, report reviewed: imaging studies available  Required items: required blood products, implants, devices, and special equipment available  Patient identity confirmed: verbally with patient    Injury location:  Wrist  Location details:  Right wrist  Injury type:  Fracture  Fracture type: distal radius    Fracture type: distal radius    Neurovascular status: Neurovascularly intact    Distal perfusion: normal    Neurological function: normal    Range of motion: normal    Anesthesia:  Hematoma block  Local anesthetic: ropivacaine.  Anesthetic total (ml):  " 10  Manipulation performed?: Yes    Skeletal traction used?: Yes    Reduction successful?: No    Immobilization:  Splint  Splint type:  Thumb spica  Supplies used:  Ortho-Glass  Neurovascular status: Neurovascularly intact    Distal perfusion: normal    Neurological function: normal    Range of motion: normal     Attempted reduction twice with no improvement on xray. Will consult ortho        ED Course  ED Course as of 12/20/23 1647   Mon Dec 18, 2023   1606 CTH/C-spine negative                                       Medical Decision Making  73-year-old female present emergency department due to fall with head strike and right wrist deformity concerning for fracture versus dislocation.  X-ray confirming distal radius fracture.  Attempted to reduce in the emergency department without improvement on follow-up x-ray.  Consulted orthopedics who also attempted reduction and then splinted the wrist with some residual deformity.  CT head and C-spine obtained to rule out intracranial injury or neck injury.  Patient discharged with recommendations to follow-up with orthopedics, remain in splint, symptomatic control with OTC meds.  Discharged with return precautions.    Amount and/or Complexity of Data Reviewed  Radiology: ordered and independent interpretation performed.    Risk  Prescription drug management.          Disposition  Final diagnoses:   Distal radius fracture     Time reflects when diagnosis was documented in both MDM as applicable and the Disposition within this note       Time User Action Codes Description Comment    12/18/2023  5:51 PM Vargas Powers Add [S52.509A] Distal radius fracture           ED Disposition       ED Disposition   Discharge    Condition   Stable    Date/Time   Mon Dec 18, 2023  7:08 PM    Comment   Joslyn Cantu discharge to home/self care.                   Follow-up Information       Follow up With Specialties Details Why Contact Info Additional Information    [facility]  [facility] [place] [facility]   [address]  [facility] [id number] [phone] [facility] [place], [address] [facility], [place], [place], [id number] [phone]  Use Entrance A             Discharge Medication List as of 12/18/2023  7:08 PM        CONTINUE these medications which have NOT CHANGED    Details   cholecalciferol 1000 UNITS tablet Take 1 tablet (1,000 Units total) by mouth daily Indications: Vitamin D deficiency., Starting 5/11/2016, Until Discontinued, Print      escitalopram (LEXAPRO) 10 mg tablet Take 1 tablet (10 mg total) by mouth daily Indications: Depression., Starting 5/11/2016, Until Discontinued, Print      haloperidol (HALDOL) 5 mg tablet Take 1 tablet (5 mg total) by mouth daily at bedtime Indications: Schizophrenia., Starting 5/11/2016, Until Discontinued, Print      haloperidol decanoate (HALDOL DECANOATE) 50 mg/mL injection Inject 0.5 mL (25 mg total) into the shoulder, thigh, or buttocks every 28 days Indications: Schizophrenia., Starting 5/11/2016, Until Discontinued, Print      levothyroxine 100 mcg tablet Take 1 tablet (100 mcg total) by mouth daily in the early morning Indications: Underactive Thyroid., Starting 5/11/2016, Until Discontinued, Print      metFORMIN (GLUCOPHAGE) 1000 MG tablet Take 1 tablet (1,000 mg total) by mouth 2 (two) times a day with meals Indications: Type 2 Diabetes., Starting 5/11/2016, Until Discontinued, Print      metoprolol tartrate (LOPRESSOR) 25 mg tablet Take 0.5 tablets (12.5 mg total) by mouth 2 (two) times a day Indications: High Blood Pressure., Starting 5/11/2016, Until Discontinued, Print      QUEtiapine (SEROquel) 100 mg tablet Take 100 mg by mouth daily at bedtime, Historical Med      sitaGLIPtin (JANUVIA) 100 mg tablet Take 100 mg by mouth daily, Historical Med               PDMP Review       None             ED Provider  Attending physically available and evaluated Joslyn Cantu. I  managed the patient along with the ED Attending.    Electronically Signed by           Vargas Powers MD  12/20/23 0906

## 2023-12-18 NOTE — ED ATTENDING ATTESTATION
I, Ken Villanueva MD, saw and evaluated the patient. I have discussed the patient with the resident and agree with the resident's findings, Plan of Care, and MDM as documented in the resident's note, except where noted. All available labs and Radiology studies were reviewed.  I was present for key portions of any procedure(s) performed by the resident and I was immediately available to provide assistance.    At this point I agree with the current assessment done in the Emergency Department.  I have conducted an independent evaluation of this patient a history and physical is as follows:    74 yo female with a history of schizophrenia, hypothyroidism, HTN, and DM brought to the ED by EMS for evaluation s/p a mechanical fall. The patient slipped on a wet floor while walking into a store and fell forward, striking her face and right wrist on the ground. No LOC. She is not on any blood thinners. The patient is only complaining of pain in the wrist. (+) Several small abrasions to the bridge of the nose. No neck or back pain. (+) Garbled speech but this is baseline per medical record. She denies dizziness, lightheadedness, and headache. Last tetanus unknown. No other injuries or complaints.    ROS: per resident physician note    Gen: NAD, AA&Ox3  HEENT: PERRL, EOMI, no hemotympanum  Neck: supple, no midline bony tenderness   CV: RRR  Lungs: CTA B/L  Abdomen: soft, NT/ND  Right Wrist: (+) mild diffuse ttp, (+) deformity, (+) minimal swelling, pulses/sensation intact  Neuro: 5/5 strength all extremities, sensation grossly intact  Skin: (+) small abrasion/skin avulsion to bridge of nose    ED Course  The patient is well appearing with stable vital signs. (+) External signs of facial trauma and right wrist deformity. Fall seems entirely mechanical. Garbled speech is at baseline per staff at her assisted living facility. Will obtain CT head, CT cervical spine, and x-ray of the right forearm. She declined pain medications.  Tetanus updated, will continue to monitor in the ED. Disposition/further treatment per imaging and reassessment.      Critical Care Time  Procedures

## 2023-12-19 ENCOUNTER — TELEPHONE (OUTPATIENT)
Age: 73
End: 2023-12-19

## 2023-12-19 NOTE — TELEPHONE ENCOUNTER
Caller: Antonio/[facility]    Doctor: Virgilio/Audra    Reason for call: Supervisor asked her to call to verify that it was ok to wait until 1/5/24 for appt? Verified w/Ginny that Dr Zhu stated 2 week f/u    Call back#: [phone]

## 2023-12-19 NOTE — ED NOTES
[facility] called by this RN. Staff stated a staff member named haresh will be coming to  pt upon discharge.      Yandy Mart RN  12/18/23 1919

## 2023-12-19 NOTE — CONSULTS
Orthopedics   Joslyn Cantu 73 y.o. female MRN: [id number]  Unit/Bed#: Cat Scan      Chief Complaint:   right wrist pain    HPI:   73 y.o. right hand dominant female status post mechanical fall complaining of right wrist pain. Patient was reportedly walking to the store when she had a mechanical fall whereby she tripped over a curb and fell onto her outstretched right hand. Denies headstrike, Denies LOC, Does not take blood thinners. Pain is sharp in character, Located right wrist, acute in onset, constant in duration, severe in intensity. Exacerbating factors ROM, remitting factors rest. No open wounds noted. Patient with incomprehensible speech at baseline. She lives in [facility] group [facility] at [facility]. PMH significant for HTN, hypothyroidism, T2DM.       Review Of Systems:   Skin: See HPI  Neuro: See HPI  Musculoskeletal: See HPI  14 point review of systems negative except as stated above     Past Medical History:   Past Medical History:   Diagnosis Date    Hypertension     Hypothyroid 5/8/2016    Hypovitaminosis D 5/10/2016    Iron deficiency anemia 5/11/2016    Type 2 diabetes mellitus without complication (HCC) 5/8/2016       Past Surgical History:   History reviewed. No pertinent surgical history.    Family History:  Family history reviewed and non-contributory  Family History   Family history unknown: Yes       Social History:  Social History     Socioeconomic History    Marital status: Single     Spouse name: None    Number of children: None    Years of education: None    Highest education level: None   Occupational History    None   Tobacco Use    Smoking status: Every Day     Types: Cigarettes    Smokeless tobacco: Current   Substance and Sexual Activity    Alcohol use: No    Drug use: No    Sexual activity: Not Currently   Other Topics Concern    None   Social History Narrative    None     Social Determinants of Health     Financial Resource Strain: Not on file   Food Insecurity: Not on file   Transportation  Needs: Not on file   Physical Activity: Not on file   Stress: Not on file   Social Connections: Not on file   Intimate Partner Violence: Not on file   Housing Stability: Not on file       Allergies:   No Known Allergies        Labs:  0   Lab Value Date/Time    HCT 41.3 12/22/2020 1204    HCT 38.3 01/05/2018 1810    HCT 35.1 05/12/2016 0649    HCT 40.1 04/15/2014 0627    HCT 39.2 04/12/2014 0703    HCT 35.9 04/11/2014 2228    HGB 13.4 12/22/2020 1204    HGB 12.5 01/05/2018 1810    HGB 10.8 (L) 05/12/2016 0649    HGB 12.8 04/15/2014 0627    HGB 12.6 04/12/2014 0703    HGB 11.7 04/11/2014 2228    WBC 9.86 12/22/2020 1204    WBC 16.46 (H) 01/05/2018 1810    WBC 12.84 (H) 05/12/2016 0649    WBC 10.45 (H) 04/15/2014 0627    WBC 10.62 (H) 04/12/2014 0703    WBC 15.10 (H) 04/11/2014 2228       Meds:  No current facility-administered medications for this encounter.    Current Outpatient Medications:     cholecalciferol 1000 UNITS tablet, Take 1 tablet (1,000 Units total) by mouth daily Indications: Vitamin D deficiency., Disp: 30 tablet, Rfl: 0    escitalopram (LEXAPRO) 10 mg tablet, Take 1 tablet (10 mg total) by mouth daily Indications: Depression., Disp: 30 tablet, Rfl: 1    haloperidol (HALDOL) 5 mg tablet, Take 1 tablet (5 mg total) by mouth daily at bedtime Indications: Schizophrenia., Disp: 30 tablet, Rfl: 1    haloperidol decanoate (HALDOL DECANOATE) 50 mg/mL injection, Inject 0.5 mL (25 mg total) into the shoulder, thigh, or buttocks every 28 days Indications: Schizophrenia., Disp: 0.5 mL, Rfl: 1    levothyroxine 100 mcg tablet, Take 1 tablet (100 mcg total) by mouth daily in the early morning Indications: Underactive Thyroid., Disp: 30 tablet, Rfl: 0    metFORMIN (GLUCOPHAGE) 1000 MG tablet, Take 1 tablet (1,000 mg total) by mouth 2 (two) times a day with meals Indications: Type 2 Diabetes., Disp: 60 tablet, Rfl: 0    metoprolol tartrate (LOPRESSOR) 25 mg tablet, Take 0.5 tablets (12.5 mg total) by mouth 2 (two)  "times a day Indications: High Blood Pressure., Disp: 30 tablet, Rfl: 0    QUEtiapine (SEROquel) 100 mg tablet, Take 100 mg by mouth daily at bedtime, Disp: , Rfl:     sitaGLIPtin (JANUVIA) 100 mg tablet, Take 100 mg by mouth daily, Disp: , Rfl:     Blood Culture:   No results found for: \"BLOODCX\"    Wound Culture:   No results found for: \"WOUNDCULT\"    Ins and Outs:  No intake/output data recorded.          Physical Exam:   /77   Pulse 78   Temp 97.7 °F (36.5 °C) (Oral)   Resp 18   SpO2 97%   Gen: No acute distress, resting comfortably in bed  HEENT: Eyes clear, moist mucus membranes, hearing intact  Respiratory: No audible wheezing or stridor  Cardiovascular: Well Perfused peripherally, 2+ distal pulse  Abdomen: nondistended, no peritoneal signs  Musculoskeletal: right upper extremity  Skin intact  Obvious deformity  Moderate edema to affected area   Tender to palpation over forearm  Sensation intact to median, radial, and ulnar distributions - patient seems to say yes when asked if she can feel touch in these distributions  Motor intact to ain/pin/m/r/u  Finger tips warm and well perfused, 2+ radial and ulnar pulse  Musculature is soft and compressible, no pain with passive stretch    Radiology:   I personally reviewed the films.  X-rays AP/Lateral and oblique views right wrist shows distal radius fracture with shortening and dorsal angulation.    Procedure: Distal radius reduction and splint application    A hematoma block was given with 20cc of 1% lidocaine without epi. Once adequate anesthesia was obtained, a gentle closed reduction maneuver was performed and pt was placed in a well padded sugartong splint. Pt tolerated the procedure well and was neurovascularly intact both pre and post procedure. Post reduction orthogonal x rays will be reviewed upon completion    Assessment:  73 y.o.female S/P mechanical fall with right distal radius fracture s/p closed reduction.    Plan:   Non weight bearing right " upper extremity in sugartong splint  Discharged from ED with oral pain meds and instruction to f/u with hand surgery in 1 weeks   Instructed to return to ED if new numbness or tingling in fingers, pain that is out of control despite oral pain meds, or if fingers turn pale and cold  Analgesics for pain   There is no height or weight on file to calculate BMI.   Dispo: Ok for discharge from ortho perspective    Rosa Dickinson MD

## 2023-12-20 ENCOUNTER — HOSPITAL ENCOUNTER (EMERGENCY)
Facility: HOSPITAL | Age: 73
Discharge: HOME | End: 2023-12-21
Attending: EMERGENCY MEDICINE
Payer: MEDICARE

## 2023-12-20 ENCOUNTER — TELEPHONE (OUTPATIENT)
Age: 73
End: 2023-12-20

## 2023-12-20 VITALS
RESPIRATION RATE: 20 BRPM | SYSTOLIC BLOOD PRESSURE: 125 MMHG | TEMPERATURE: 98.3 F | HEART RATE: 73 BPM | DIASTOLIC BLOOD PRESSURE: 60 MMHG | OXYGEN SATURATION: 98 %

## 2023-12-20 DIAGNOSIS — S52.501A DISTAL RADIUS FRACTURE, RIGHT: Primary | ICD-10-CM

## 2023-12-20 PROCEDURE — 99283 EMERGENCY DEPT VISIT LOW MDM: CPT

## 2023-12-20 RX ORDER — OXYCODONE HYDROCHLORIDE 5 MG/1
5 TABLET ORAL ONCE
Status: COMPLETED | OUTPATIENT
Start: 2023-12-21 | End: 2023-12-21

## 2023-12-20 NOTE — TELEPHONE ENCOUNTER
Caller: [facility]    Doctor: Miquel    Reason for call: Patient was seen in ED 12/18/23 and a cast placed on patient's right arm, due for follow-up 1/5/24. Patient care facility called to indicate that patient has been experiencing discomfort around cast.    Nurse on staff examined patient and indicated the sharp edges of the cast are digging into patient's skin harshly, causing pain and discomfort.  She also noted swelling around the cast and by the fingers.  Nurse on staff concerned about circulation.    Facility would appreciate a call back to see if she can be seen for a follow-up sooner, or how to address the situation concerning the cast.    Call back#: [phone]

## 2023-12-21 ENCOUNTER — APPOINTMENT (EMERGENCY)
Dept: RADIOLOGY | Facility: HOSPITAL | Age: 73
End: 2023-12-21
Payer: MEDICARE

## 2023-12-21 ENCOUNTER — OFFICE VISIT (OUTPATIENT)
Dept: OBGYN CLINIC | Facility: CLINIC | Age: 73
End: 2023-12-21
Payer: COMMERCIAL

## 2023-12-21 VITALS — WEIGHT: 130 LBS | HEIGHT: 67 IN | BODY MASS INDEX: 20.4 KG/M2

## 2023-12-21 DIAGNOSIS — S52.509A DISTAL RADIUS FRACTURE: ICD-10-CM

## 2023-12-21 PROCEDURE — 99203 OFFICE O/P NEW LOW 30 MIN: CPT | Performed by: SURGERY

## 2023-12-21 PROCEDURE — 73110 X-RAY EXAM OF WRIST: CPT

## 2023-12-21 RX ADMIN — OXYCODONE HYDROCHLORIDE 5 MG: 5 TABLET ORAL at 00:02

## 2023-12-21 NOTE — ED PROVIDER NOTES
Chief Complaint   Patient presents with    Arm Pain     Pt coming from [facility] c/o R arm pain. Pt states she broke her R arm sometime last week and it has not been healing properly and has been increasing pain.     History of Present Illness and Review of Systems   This is a 73 y.o. female with PMH significant for intellectual disability coming in today with complaint of arm pain.  2 days ago, the patient experienced a fall and had a distal right radius fracture.  She was reduced and splinted successfully and recommended outpatient Ortho follow-up in 2 weeks.  She now represents because she is having intractable pain to her right arm.  Reports associated swelling as well.  Remainder of history is limited as the patients baseline verbal status and intellectual disability    Chart review notable for call from her group home to the Ortho facility concerns for swelling and pain at the site.    Past Medical, Past Surgical History:    has a past medical history of Hypertension, Hypothyroid (5/8/2016), Hypovitaminosis D (5/10/2016), Iron deficiency anemia (5/11/2016), and Type 2 diabetes mellitus without complication (HCC) (5/8/2016).   has no past surgical history on file.     Allergies:   No Known Allergies    Social and Family History:     Social History     Substance and Sexual Activity   Alcohol Use No     Social History     Tobacco Use   Smoking Status Every Day    Types: Cigarettes   Smokeless Tobacco Current     Social History     Substance and Sexual Activity   Drug Use No       Physical Examination     Vitals:    12/20/23 2332   BP: 125/60   BP Location: Left arm   Pulse: 73   Resp: 20   Temp: 98.3 °F (36.8 °C)   TempSrc: Oral   SpO2: 98%       Physical Exam  Vitals and nursing note reviewed.   Constitutional:       General: She is not in acute distress.     Appearance: She is well-developed.   HENT:      Head: Normocephalic and atraumatic.   Eyes:      Conjunctiva/sclera: Conjunctivae normal.    Cardiovascular:      Rate and Rhythm: Normal rate and regular rhythm.      Heart sounds: No murmur heard.  Pulmonary:      Effort: Pulmonary effort is normal. No respiratory distress.      Breath sounds: Normal breath sounds.   Abdominal:      Palpations: Abdomen is soft.      Tenderness: There is no abdominal tenderness.   Musculoskeletal:         General: Swelling and tenderness present.      Cervical back: Neck supple.      Comments: Ecchymoses and swelling noted to the right wrist, unable to determine sensory deficits given the patient's baseline status, compartments are soft, strong radial pulse appreciated   Skin:     General: Skin is warm and dry.      Capillary Refill: Capillary refill takes less than 2 seconds.   Neurological:      Mental Status: She is alert.   Psychiatric:         Mood and Affect: Mood normal.           Procedures   Splint application    Date/Time: 12/21/2023 1:30 AM    Performed by: Miguel Lennon MD  Authorized by: Miguel Lennon MD  Universal Protocol:  Procedure performed by: (Amos Patrick)  Consent: The procedure was performed in an emergent situation.  Required items: required blood products, implants, devices, and special equipment available  Patient identity confirmed: arm band    Pre-procedure details:     Sensation:  Unchanged  Procedure details:     Laterality:  Right    Location:  Wrist    Wrist:  R wrist    Splint type:  Sugar tong    Supplies:  Cotton padding, 2 layer wrap, fiberglass and sling        MDM:   Medical Decision Making  Joslyn Cantu is a 73 y.o. who presents with complaints of wrist pain    Vital signs are stable, physical exam shows edema noted distally to the right wrist with associated swelling, otherwise neurovascular intact and she does have evidence of bony injury    Dx: Clinically appears related to septic compression secondary to her splints, her compartments are soft, doubtful to have compartment syndrome or neurovascular compromise.   Will evaluate for anatomic alignment of her radius, likely related to her fractured bone.    Plan: Plain films, potentially Ortho consultation, pain control    Reassessment: Ultimately the patient had minimal displacement from her previous plain films upon my review.  Therefore reapplied sugar-tong splint which occurred without complication.  Recommended outpatient follow-up and advised on return precautions.  Discharged without complication.      Amount and/or Complexity of Data Reviewed  Radiology: ordered.    Risk  Prescription drug management.        - Reviewed relevant past office visits/hospitalizations/procedures  -Obtained pertinent history that influenced decision making from the pts caregiver    ED Course as of 12/21/23 0156   Thu Dec 21, 2023   0100 Reviewed x-ray films, shows only mild displacement, no indication for further reduction at this point.  Will place in a looser fitting sugar-tong splint and recommend outpatient Ortho follow-up.   0135 Splinted without complication   0135 Will send back to facility      Final Dispo   Final Diagnosis:  1. Distal radius fracture, right      Time reflects when diagnosis was documented in both MDM as applicable and the Disposition within this note       Time User Action Codes Description Comment    12/21/2023  1:36 AM Miguel Lennon Add [S52.501A] Distal radius fracture, right           ED Disposition       ED Disposition   Discharge    Condition   Stable    Date/Time   u Dec 21, 2023  1:35 AM    Comment   Joslyn Cantu discharge to home/self care.                   Follow-up Information    None       Medications   oxyCODONE (ROXICODONE) IR tablet 5 mg (5 mg Oral Given 12/21/23 0002)       Risk Stratification Tools                Orders Placed This Encounter   Procedures    Splint application    XR wrist 3+ views RIGHT    Apply sling       Labs:   Labs Reviewed - No data to display    Imaging:     XR wrist 3+ views RIGHT    (Results Pending)      All details of  "the evaluation and treatment plan were made clear and additionally all questions and concerns were addressed while under my care.    Portions of the record may have been created with voice recognition software. Occasional wrong word or \"sound a like\" substitutions may have occurred due to the inherent limitations of voice recognition software. Read the chart carefully and recognize, using context, where substitutions have occurred.    The attending physician physically available and evaluated the above patient alongside myself.      Miguel Lennon MD  12/21/23 0156    "

## 2023-12-21 NOTE — TELEPHONE ENCOUNTER
Notified mom of results and recommendations. Mom verbalized understanding.   Patient was already seen by Dr. Quintana today.

## 2023-12-21 NOTE — ED ATTENDING ATTESTATION
12/20/2023  I, Stas Patrick MD, saw and evaluated the patient. I have discussed the patient with the resident/non-physician practitioner and agree with the resident's/non-physician practitioner's findings, Plan of Care, and MDM as documented in the resident's/non-physician practitioner's note, except where noted. All available labs and Radiology studies were reviewed.  I was present for key portions of any procedure(s) performed by the resident/non-physician practitioner and I was immediately available to provide assistance.       At this point I agree with the current assessment done in the Emergency Department.  I have conducted an independent evaluation of this patient a history and physical is as follows:    73-year-old female presents from [facility] for evaluation of right wrist pain and swelling.  Patient was seen here on 12/18 and diagnosed with a distal radius fracture which was reduced and splinted.  She has been having worsening pain per her report as well as associated swelling.  History is limited given patient's intellectual disability.    On exam, patient was comfortably in bed in no acute distress, head is normocephalic atraumatic, pupils equal round reactive to light, neck is supple without meningismus signs, heart is regular rate and rhythm with intact distal pulses, no increased work of breathing, respiratory distress, or stridor.  There is swelling and ecchymosis in the right hand, palpable pulses present, compartments are soft.    Symptoms may be secondary to worsening displacement of the fracture, the splint being too tight, a pressure point from the splint.  Low suspicion for compartment syndrome.  Will remove the splint, get x-ray, and likely resplinted.    X-ray appears stable per my interpretation compared to prior x-ray.  Patient was placed in a new sugar-tong splint and tolerated the procedure well without complication.  Continues to have intact pulses and is able to move the fingers.   Sensation appears to be intact although is limited given intellectual disability.  Patient will be discharged to follow-up with orthopedics        ED Course         Critical Care Time  Procedures

## 2023-12-21 NOTE — TELEPHONE ENCOUNTER
Pt was seen in the ER yesterday. Cast was taken off, xrays obtained, and new cast applied.   Will keep pt scheduled for previous f/u on 1/5.

## 2023-12-21 NOTE — DISCHARGE INSTRUCTIONS
You need to follow-up with an orthopedic surgeon.  Return to the ER for any worsening pain or swelling or numbness

## 2024-01-01 ENCOUNTER — ANESTHESIA EVENT (EMERGENCY)
Dept: RADIOLOGY | Facility: HOSPITAL | Age: 74
DRG: 034 | End: 2024-01-01
Payer: MEDICARE

## 2024-01-01 ENCOUNTER — APPOINTMENT (EMERGENCY)
Dept: RADIOLOGY | Facility: HOSPITAL | Age: 74
DRG: 034 | End: 2024-01-01
Payer: MEDICARE

## 2024-01-01 ENCOUNTER — APPOINTMENT (INPATIENT)
Dept: RADIOLOGY | Facility: HOSPITAL | Age: 74
DRG: 034 | End: 2024-01-01
Payer: MEDICARE

## 2024-01-01 ENCOUNTER — APPOINTMENT (INPATIENT)
Dept: NEUROLOGY | Facility: CLINIC | Age: 74
DRG: 034 | End: 2024-01-01
Payer: MEDICARE

## 2024-01-01 ENCOUNTER — APPOINTMENT (INPATIENT)
Dept: NON INVASIVE DIAGNOSTICS | Facility: HOSPITAL | Age: 74
DRG: 034 | End: 2024-01-01
Payer: MEDICARE

## 2024-01-01 ENCOUNTER — ANESTHESIA EVENT (OUTPATIENT)
Dept: ANESTHESIOLOGY | Facility: HOSPITAL | Age: 74
End: 2024-01-01

## 2024-01-01 ENCOUNTER — TELEPHONE (OUTPATIENT)
Dept: NEUROSURGERY | Facility: CLINIC | Age: 74
End: 2024-01-01

## 2024-01-01 ENCOUNTER — ANESTHESIA (EMERGENCY)
Dept: RADIOLOGY | Facility: HOSPITAL | Age: 74
DRG: 034 | End: 2024-01-01
Payer: MEDICARE

## 2024-01-01 ENCOUNTER — HOSPITAL ENCOUNTER (INPATIENT)
Facility: HOSPITAL | Age: 74
LOS: 5 days | DRG: 034 | End: 2024-10-30
Attending: EMERGENCY MEDICINE | Admitting: PSYCHIATRY & NEUROLOGY
Payer: MEDICARE

## 2024-01-01 ENCOUNTER — ANESTHESIA (OUTPATIENT)
Dept: ANESTHESIOLOGY | Facility: HOSPITAL | Age: 74
End: 2024-01-01

## 2024-01-01 VITALS
HEIGHT: 62 IN | HEART RATE: 158 BPM | SYSTOLIC BLOOD PRESSURE: 136 MMHG | WEIGHT: 136.47 LBS | TEMPERATURE: 97.2 F | BODY MASS INDEX: 25.11 KG/M2 | RESPIRATION RATE: 13 BRPM | OXYGEN SATURATION: 76 % | DIASTOLIC BLOOD PRESSURE: 89 MMHG

## 2024-01-01 DIAGNOSIS — I63.412 CEREBRAL INFARCTION DUE TO EMBOLISM OF LEFT MIDDLE CEREBRAL ARTERY (HCC): ICD-10-CM

## 2024-01-01 DIAGNOSIS — F20.9 SCHIZOPHRENIA, UNSPECIFIED TYPE (HCC): Chronic | ICD-10-CM

## 2024-01-01 DIAGNOSIS — R94.31 ST ELEVATION: ICD-10-CM

## 2024-01-01 DIAGNOSIS — I63.9 STROKE (HCC): Primary | ICD-10-CM

## 2024-01-01 LAB
2HR DELTA HS TROPONIN: -114 NG/L
2HR DELTA HS TROPONIN: 2029 NG/L
2HR DELTA HS TROPONIN: 517 NG/L
4HR DELTA HS TROPONIN: 844 NG/L
ALBUMIN SERPL BCG-MCNC: 3 G/DL (ref 3.5–5)
ALBUMIN SERPL BCG-MCNC: 3.3 G/DL (ref 3.5–5)
ALBUMIN SERPL BCG-MCNC: 3.5 G/DL (ref 3.5–5)
ALL TARGETS: NOT DETECTED
ALL TARGETS: NOT DETECTED
ALP SERPL-CCNC: 70 U/L (ref 34–104)
ALP SERPL-CCNC: 72 U/L (ref 34–104)
ALP SERPL-CCNC: 83 U/L (ref 34–104)
ALT SERPL W P-5'-P-CCNC: 25 U/L (ref 7–52)
ALT SERPL W P-5'-P-CCNC: 26 U/L (ref 7–52)
ALT SERPL W P-5'-P-CCNC: 27 U/L (ref 7–52)
AMMONIA PLAS-SCNC: 40 UMOL/L (ref 18–72)
ANION GAP SERPL CALCULATED.3IONS-SCNC: 10 MMOL/L (ref 4–13)
ANION GAP SERPL CALCULATED.3IONS-SCNC: 13 MMOL/L (ref 4–13)
ANION GAP SERPL CALCULATED.3IONS-SCNC: 4 MMOL/L (ref 4–13)
ANION GAP SERPL CALCULATED.3IONS-SCNC: 5 MMOL/L (ref 4–13)
ANION GAP SERPL CALCULATED.3IONS-SCNC: 6 MMOL/L (ref 4–13)
ANION GAP SERPL CALCULATED.3IONS-SCNC: 6 MMOL/L (ref 4–13)
ANION GAP SERPL CALCULATED.3IONS-SCNC: 7 MMOL/L (ref 4–13)
ANION GAP SERPL CALCULATED.3IONS-SCNC: 8 MMOL/L (ref 4–13)
ANION GAP SERPL CALCULATED.3IONS-SCNC: 9 MMOL/L (ref 4–13)
AORTIC ROOT: 2.3 CM
AORTIC VALVE MEAN VELOCITY: 11.6 M/S
APICAL FOUR CHAMBER EJECTION FRACTION: 35 %
APTT PPP: 21 SECONDS (ref 23–34)
AST SERPL W P-5'-P-CCNC: 34 U/L (ref 13–39)
AST SERPL W P-5'-P-CCNC: 36 U/L (ref 13–39)
AST SERPL W P-5'-P-CCNC: 37 U/L (ref 13–39)
ATRIAL RATE: 118 BPM
ATRIAL RATE: 135 BPM
ATRIAL RATE: 64 BPM
ATRIAL RATE: 81 BPM
ATRIAL RATE: 83 BPM
ATRIAL RATE: 87 BPM
AV AREA BY CONTINUOUS VTI: 1.8 CM2
AV AREA PEAK VELOCITY: 1.6 CM2
AV LVOT MEAN GRADIENT: 1 MMHG
AV LVOT PEAK GRADIENT: 3 MMHG
AV MEAN GRADIENT: 6 MMHG
AV PEAK GRADIENT: 12 MMHG
AV VALVE AREA: 1.76 CM2
AV VELOCITY RATIO: 0.51
BACTERIA BLD CULT: ABNORMAL
BACTERIA BLD CULT: ABNORMAL
BACTERIA SPT RESP CULT: NO GROWTH
BACTERIA UR QL AUTO: ABNORMAL /HPF
BACTERIA UR QL AUTO: ABNORMAL /HPF
BACTERIA UR QL AUTO: NORMAL /HPF
BASE EX.OXY STD BLDV CALC-SCNC: 58.9 % (ref 60–80)
BASE EX.OXY STD BLDV CALC-SCNC: 75 % (ref 60–80)
BASE EXCESS BLDA CALC-SCNC: -6.3 MMOL/L
BASE EXCESS BLDA CALC-SCNC: -7.6 MMOL/L
BASE EXCESS BLDA CALC-SCNC: -7.8 MMOL/L
BASE EXCESS BLDA CALC-SCNC: 1 MMOL/L (ref -2–3)
BASE EXCESS BLDV CALC-SCNC: -3.9 MMOL/L
BASE EXCESS BLDV CALC-SCNC: -7.2 MMOL/L
BASOPHILS # BLD AUTO: 0.03 THOUSANDS/ÂΜL (ref 0–0.1)
BASOPHILS # BLD AUTO: 0.04 THOUSANDS/ÂΜL (ref 0–0.1)
BASOPHILS # BLD AUTO: 0.05 THOUSANDS/ÂΜL (ref 0–0.1)
BASOPHILS NFR BLD AUTO: 0 % (ref 0–1)
BILIRUB DIRECT SERPL-MCNC: 0 MG/DL (ref 0–0.2)
BILIRUB DIRECT SERPL-MCNC: 0.11 MG/DL (ref 0–0.2)
BILIRUB SERPL-MCNC: 0.25 MG/DL (ref 0.2–1)
BILIRUB SERPL-MCNC: 0.29 MG/DL (ref 0.2–1)
BILIRUB SERPL-MCNC: 0.31 MG/DL (ref 0.2–1)
BILIRUB UR QL STRIP: NEGATIVE
BSA FOR ECHO PROCEDURE: 1.6 M2
BUN SERPL-MCNC: 10 MG/DL (ref 5–25)
BUN SERPL-MCNC: 11 MG/DL (ref 5–25)
BUN SERPL-MCNC: 11 MG/DL (ref 5–25)
BUN SERPL-MCNC: 12 MG/DL (ref 5–25)
BUN SERPL-MCNC: 12 MG/DL (ref 5–25)
BUN SERPL-MCNC: 17 MG/DL (ref 5–25)
BUN SERPL-MCNC: 7 MG/DL (ref 5–25)
BUN SERPL-MCNC: 8 MG/DL (ref 5–25)
BUN SERPL-MCNC: 9 MG/DL (ref 5–25)
CA-I BLD-SCNC: 1.11 MMOL/L (ref 1.12–1.32)
CA-I BLD-SCNC: 1.2 MMOL/L (ref 1.12–1.32)
CA-I BLD-SCNC: 1.4 MMOL/L (ref 1.12–1.32)
CALCIUM ALBUM COR SERPL-MCNC: 8.6 MG/DL (ref 8.3–10.1)
CALCIUM SERPL-MCNC: 7.3 MG/DL (ref 8.4–10.2)
CALCIUM SERPL-MCNC: 7.5 MG/DL (ref 8.4–10.2)
CALCIUM SERPL-MCNC: 7.6 MG/DL (ref 8.4–10.2)
CALCIUM SERPL-MCNC: 7.7 MG/DL (ref 8.4–10.2)
CALCIUM SERPL-MCNC: 7.8 MG/DL (ref 8.4–10.2)
CALCIUM SERPL-MCNC: 7.9 MG/DL (ref 8.4–10.2)
CALCIUM SERPL-MCNC: 8 MG/DL (ref 8.4–10.2)
CALCIUM SERPL-MCNC: 8 MG/DL (ref 8.4–10.2)
CALCIUM SERPL-MCNC: 8.1 MG/DL (ref 8.4–10.2)
CALCIUM SERPL-MCNC: 8.3 MG/DL (ref 8.4–10.2)
CALCIUM SERPL-MCNC: 8.4 MG/DL (ref 8.4–10.2)
CALCIUM SERPL-MCNC: 8.5 MG/DL (ref 8.4–10.2)
CALCIUM SERPL-MCNC: 8.5 MG/DL (ref 8.4–10.2)
CALCIUM SERPL-MCNC: 8.6 MG/DL (ref 8.4–10.2)
CALCIUM SERPL-MCNC: 8.8 MG/DL (ref 8.4–10.2)
CALCIUM SERPL-MCNC: 9 MG/DL (ref 8.4–10.2)
CALCIUM SERPL-MCNC: 9.1 MG/DL (ref 8.4–10.2)
CALCIUM SERPL-MCNC: 9.2 MG/DL (ref 8.4–10.2)
CARDIAC TROPONIN I PNL SERPL HS: 1373 NG/L
CARDIAC TROPONIN I PNL SERPL HS: 1487 NG/L
CARDIAC TROPONIN I PNL SERPL HS: 2643 NG/L
CARDIAC TROPONIN I PNL SERPL HS: 2674 NG/L (ref 8–18)
CARDIAC TROPONIN I PNL SERPL HS: 3206 NG/L
CARDIAC TROPONIN I PNL SERPL HS: 3723 NG/L
CARDIAC TROPONIN I PNL SERPL HS: 4050 NG/L
CARDIAC TROPONIN I PNL SERPL HS: 614 NG/L
CHLORIDE SERPL-SCNC: 111 MMOL/L (ref 96–108)
CHLORIDE SERPL-SCNC: 111 MMOL/L (ref 96–108)
CHLORIDE SERPL-SCNC: 112 MMOL/L (ref 96–108)
CHLORIDE SERPL-SCNC: 113 MMOL/L (ref 96–108)
CHLORIDE SERPL-SCNC: 119 MMOL/L (ref 96–108)
CHLORIDE SERPL-SCNC: 120 MMOL/L (ref 96–108)
CHLORIDE SERPL-SCNC: 121 MMOL/L (ref 96–108)
CHLORIDE SERPL-SCNC: 122 MMOL/L (ref 96–108)
CHLORIDE SERPL-SCNC: 124 MMOL/L (ref 96–108)
CHLORIDE SERPL-SCNC: 125 MMOL/L (ref 96–108)
CHLORIDE SERPL-SCNC: 126 MMOL/L (ref 96–108)
CHLORIDE SERPL-SCNC: 127 MMOL/L (ref 96–108)
CHLORIDE SERPL-SCNC: 127 MMOL/L (ref 96–108)
CHLORIDE SERPL-SCNC: 130 MMOL/L (ref 96–108)
CHLORIDE SERPL-SCNC: 131 MMOL/L (ref 96–108)
CHLORIDE SERPL-SCNC: 135 MMOL/L (ref 96–108)
CHLORIDE SERPL-SCNC: 141 MMOL/L (ref 96–108)
CHLORIDE SERPL-SCNC: 98 MMOL/L (ref 96–108)
CHOLEST SERPL-MCNC: 113 MG/DL
CK SERPL-CCNC: 172 U/L (ref 26–192)
CLARITY UR: CLEAR
CO2 SERPL-SCNC: 20 MMOL/L (ref 21–32)
CO2 SERPL-SCNC: 21 MMOL/L (ref 21–32)
CO2 SERPL-SCNC: 22 MMOL/L (ref 21–32)
CO2 SERPL-SCNC: 23 MMOL/L (ref 21–32)
CO2 SERPL-SCNC: 24 MMOL/L (ref 21–32)
CO2 SERPL-SCNC: 25 MMOL/L (ref 21–32)
CO2 SERPL-SCNC: 26 MMOL/L (ref 21–32)
CO2 SERPL-SCNC: 26 MMOL/L (ref 21–32)
CO2 SERPL-SCNC: 27 MMOL/L (ref 21–32)
CO2 SERPL-SCNC: 28 MMOL/L (ref 21–32)
COLOR UR: ABNORMAL
COLOR UR: ABNORMAL
COLOR UR: COLORLESS
CORTIS SERPL-MCNC: 28.3 UG/DL
CREAT SERPL-MCNC: 0.35 MG/DL (ref 0.6–1.3)
CREAT SERPL-MCNC: 0.41 MG/DL (ref 0.6–1.3)
CREAT SERPL-MCNC: 0.42 MG/DL (ref 0.6–1.3)
CREAT SERPL-MCNC: 0.43 MG/DL (ref 0.6–1.3)
CREAT SERPL-MCNC: 0.45 MG/DL (ref 0.6–1.3)
CREAT SERPL-MCNC: 0.47 MG/DL (ref 0.6–1.3)
CREAT SERPL-MCNC: 0.47 MG/DL (ref 0.6–1.3)
CREAT SERPL-MCNC: 0.48 MG/DL (ref 0.6–1.3)
CREAT SERPL-MCNC: 0.5 MG/DL (ref 0.6–1.3)
CREAT SERPL-MCNC: 0.52 MG/DL (ref 0.6–1.3)
CREAT SERPL-MCNC: 0.53 MG/DL (ref 0.6–1.3)
CREAT SERPL-MCNC: 0.53 MG/DL (ref 0.6–1.3)
CREAT SERPL-MCNC: 0.57 MG/DL (ref 0.6–1.3)
CREAT SERPL-MCNC: 0.6 MG/DL (ref 0.6–1.3)
CREAT SERPL-MCNC: 0.62 MG/DL (ref 0.6–1.3)
CREAT SERPL-MCNC: 0.63 MG/DL (ref 0.6–1.3)
CREAT SERPL-MCNC: 0.66 MG/DL (ref 0.6–1.3)
CREAT SERPL-MCNC: 0.78 MG/DL (ref 0.6–1.3)
DOP CALC AO PEAK VEL: 1.7 M/S
DOP CALC AO VTI: 26.41 CM
DOP CALC LVOT AREA: 3.14 CM2
DOP CALC LVOT CARDIAC INDEX: 3.48 L/MIN/M2
DOP CALC LVOT CARDIAC OUTPUT: 5.61 L/MIN
DOP CALC LVOT DIAMETER: 2 CM
DOP CALC LVOT PEAK VEL VTI: 14.77 CM
DOP CALC LVOT PEAK VEL: 0.87 M/S
DOP CALC LVOT STROKE INDEX: 29.8 ML/M2
DOP CALC LVOT STROKE VOLUME: 48
EOSINOPHIL # BLD AUTO: 0.03 THOUSAND/ÂΜL (ref 0–0.61)
EOSINOPHIL # BLD AUTO: 0.04 THOUSAND/ÂΜL (ref 0–0.61)
EOSINOPHIL # BLD AUTO: 0.32 THOUSAND/ÂΜL (ref 0–0.61)
EOSINOPHIL NFR BLD AUTO: 0 % (ref 0–6)
EOSINOPHIL NFR BLD AUTO: 0 % (ref 0–6)
EOSINOPHIL NFR BLD AUTO: 2 % (ref 0–6)
ERYTHROCYTE [DISTWIDTH] IN BLOOD BY AUTOMATED COUNT: 13.2 % (ref 11.6–15.1)
ERYTHROCYTE [DISTWIDTH] IN BLOOD BY AUTOMATED COUNT: 13.4 % (ref 11.6–15.1)
ERYTHROCYTE [DISTWIDTH] IN BLOOD BY AUTOMATED COUNT: 14 % (ref 11.6–15.1)
ERYTHROCYTE [DISTWIDTH] IN BLOOD BY AUTOMATED COUNT: 14.6 % (ref 11.6–15.1)
ERYTHROCYTE [DISTWIDTH] IN BLOOD BY AUTOMATED COUNT: 14.7 % (ref 11.6–15.1)
ERYTHROCYTE [DISTWIDTH] IN BLOOD BY AUTOMATED COUNT: 14.8 % (ref 11.6–15.1)
EST. AVERAGE GLUCOSE BLD GHB EST-MCNC: 206 MG/DL
ETHANOL SERPL-MCNC: <10 MG/DL
FLUAV RNA RESP QL NAA+PROBE: NEGATIVE
FLUBV RNA RESP QL NAA+PROBE: NEGATIVE
FRACTIONAL SHORTENING: 17 (ref 28–44)
GFR SERPL CREATININE-BSD FRML MDRD: 100 ML/MIN/1.73SQ M
GFR SERPL CREATININE-BSD FRML MDRD: 101 ML/MIN/1.73SQ M
GFR SERPL CREATININE-BSD FRML MDRD: 102 ML/MIN/1.73SQ M
GFR SERPL CREATININE-BSD FRML MDRD: 103 ML/MIN/1.73SQ M
GFR SERPL CREATININE-BSD FRML MDRD: 109 ML/MIN/1.73SQ M
GFR SERPL CREATININE-BSD FRML MDRD: 76 ML/MIN/1.73SQ M
GFR SERPL CREATININE-BSD FRML MDRD: 88 ML/MIN/1.73SQ M
GFR SERPL CREATININE-BSD FRML MDRD: 89 ML/MIN/1.73SQ M
GFR SERPL CREATININE-BSD FRML MDRD: 90 ML/MIN/1.73SQ M
GFR SERPL CREATININE-BSD FRML MDRD: 91 ML/MIN/1.73SQ M
GFR SERPL CREATININE-BSD FRML MDRD: 92 ML/MIN/1.73SQ M
GFR SERPL CREATININE-BSD FRML MDRD: 95 ML/MIN/1.73SQ M
GFR SERPL CREATININE-BSD FRML MDRD: 97 ML/MIN/1.73SQ M
GFR SERPL CREATININE-BSD FRML MDRD: 98 ML/MIN/1.73SQ M
GLUCOSE SERPL-MCNC: 100 MG/DL (ref 65–140)
GLUCOSE SERPL-MCNC: 104 MG/DL (ref 65–140)
GLUCOSE SERPL-MCNC: 106 MG/DL (ref 65–140)
GLUCOSE SERPL-MCNC: 106 MG/DL (ref 65–140)
GLUCOSE SERPL-MCNC: 121 MG/DL (ref 65–140)
GLUCOSE SERPL-MCNC: 124 MG/DL (ref 65–140)
GLUCOSE SERPL-MCNC: 132 MG/DL (ref 65–140)
GLUCOSE SERPL-MCNC: 136 MG/DL (ref 65–140)
GLUCOSE SERPL-MCNC: 141 MG/DL (ref 65–140)
GLUCOSE SERPL-MCNC: 147 MG/DL (ref 65–140)
GLUCOSE SERPL-MCNC: 148 MG/DL (ref 65–140)
GLUCOSE SERPL-MCNC: 150 MG/DL (ref 65–140)
GLUCOSE SERPL-MCNC: 152 MG/DL (ref 65–140)
GLUCOSE SERPL-MCNC: 153 MG/DL (ref 65–140)
GLUCOSE SERPL-MCNC: 156 MG/DL (ref 65–140)
GLUCOSE SERPL-MCNC: 157 MG/DL (ref 65–140)
GLUCOSE SERPL-MCNC: 161 MG/DL (ref 65–140)
GLUCOSE SERPL-MCNC: 163 MG/DL (ref 65–140)
GLUCOSE SERPL-MCNC: 163 MG/DL (ref 65–140)
GLUCOSE SERPL-MCNC: 164 MG/DL (ref 65–140)
GLUCOSE SERPL-MCNC: 167 MG/DL (ref 65–140)
GLUCOSE SERPL-MCNC: 169 MG/DL (ref 65–140)
GLUCOSE SERPL-MCNC: 179 MG/DL (ref 65–140)
GLUCOSE SERPL-MCNC: 180 MG/DL (ref 65–140)
GLUCOSE SERPL-MCNC: 182 MG/DL (ref 65–140)
GLUCOSE SERPL-MCNC: 184 MG/DL (ref 65–140)
GLUCOSE SERPL-MCNC: 186 MG/DL (ref 65–140)
GLUCOSE SERPL-MCNC: 188 MG/DL (ref 65–140)
GLUCOSE SERPL-MCNC: 190 MG/DL (ref 65–140)
GLUCOSE SERPL-MCNC: 204 MG/DL (ref 65–140)
GLUCOSE SERPL-MCNC: 210 MG/DL (ref 65–140)
GLUCOSE SERPL-MCNC: 213 MG/DL (ref 65–140)
GLUCOSE SERPL-MCNC: 214 MG/DL (ref 65–140)
GLUCOSE SERPL-MCNC: 216 MG/DL (ref 65–140)
GLUCOSE SERPL-MCNC: 216 MG/DL (ref 65–140)
GLUCOSE SERPL-MCNC: 220 MG/DL (ref 65–140)
GLUCOSE SERPL-MCNC: 225 MG/DL (ref 65–140)
GLUCOSE SERPL-MCNC: 227 MG/DL (ref 65–140)
GLUCOSE SERPL-MCNC: 234 MG/DL (ref 65–140)
GLUCOSE SERPL-MCNC: 242 MG/DL (ref 65–140)
GLUCOSE SERPL-MCNC: 246 MG/DL (ref 65–140)
GLUCOSE SERPL-MCNC: 247 MG/DL (ref 65–140)
GLUCOSE SERPL-MCNC: 249 MG/DL (ref 65–140)
GLUCOSE SERPL-MCNC: 254 MG/DL (ref 65–140)
GLUCOSE SERPL-MCNC: 256 MG/DL (ref 65–140)
GLUCOSE SERPL-MCNC: 265 MG/DL (ref 65–140)
GLUCOSE SERPL-MCNC: 266 MG/DL (ref 65–140)
GLUCOSE SERPL-MCNC: 266 MG/DL (ref 65–140)
GLUCOSE SERPL-MCNC: 274 MG/DL (ref 65–140)
GLUCOSE SERPL-MCNC: 276 MG/DL (ref 65–140)
GLUCOSE SERPL-MCNC: 276 MG/DL (ref 65–140)
GLUCOSE SERPL-MCNC: 285 MG/DL (ref 65–140)
GLUCOSE SERPL-MCNC: 294 MG/DL (ref 65–140)
GLUCOSE SERPL-MCNC: 310 MG/DL (ref 65–140)
GLUCOSE SERPL-MCNC: 312 MG/DL (ref 65–140)
GLUCOSE SERPL-MCNC: 318 MG/DL (ref 65–140)
GLUCOSE SERPL-MCNC: 321 MG/DL (ref 65–140)
GLUCOSE SERPL-MCNC: 325 MG/DL (ref 65–140)
GLUCOSE SERPL-MCNC: 327 MG/DL (ref 65–140)
GLUCOSE SERPL-MCNC: 39 MG/DL (ref 65–140)
GLUCOSE SERPL-MCNC: 409 MG/DL (ref 65–140)
GLUCOSE SERPL-MCNC: 485 MG/DL (ref 65–140)
GLUCOSE SERPL-MCNC: 505 MG/DL (ref 65–140)
GLUCOSE SERPL-MCNC: 79 MG/DL (ref 65–140)
GLUCOSE SERPL-MCNC: 82 MG/DL (ref 65–140)
GLUCOSE SERPL-MCNC: 87 MG/DL (ref 65–140)
GLUCOSE SERPL-MCNC: 91 MG/DL (ref 65–140)
GLUCOSE SERPL-MCNC: 93 MG/DL (ref 65–140)
GLUCOSE SERPL-MCNC: 96 MG/DL (ref 65–140)
GLUCOSE UR STRIP-MCNC: ABNORMAL MG/DL
GLUCOSE UR STRIP-MCNC: ABNORMAL MG/DL
GLUCOSE UR STRIP-MCNC: NEGATIVE MG/DL
GRAM STN SPEC: ABNORMAL
GRAM STN SPEC: ABNORMAL
GRAM STN SPEC: NORMAL
HBA1C MFR BLD: 8.8 %
HCO3 BLDA-SCNC: 18.3 MMOL/L (ref 22–28)
HCO3 BLDA-SCNC: 18.4 MMOL/L (ref 22–28)
HCO3 BLDA-SCNC: 20 MMOL/L (ref 22–28)
HCO3 BLDA-SCNC: 25.8 MMOL/L (ref 22–28)
HCO3 BLDV-SCNC: 20.7 MMOL/L (ref 24–30)
HCO3 BLDV-SCNC: 21.2 MMOL/L (ref 24–30)
HCT VFR BLD AUTO: 27.7 % (ref 34.8–46.1)
HCT VFR BLD AUTO: 30.8 % (ref 34.8–46.1)
HCT VFR BLD AUTO: 31.1 % (ref 34.8–46.1)
HCT VFR BLD AUTO: 31.8 % (ref 34.8–46.1)
HCT VFR BLD AUTO: 32.9 % (ref 34.8–46.1)
HCT VFR BLD AUTO: 40 % (ref 34.8–46.1)
HCT VFR BLD CALC: 27 % (ref 34.8–46.1)
HDLC SERPL-MCNC: 43 MG/DL
HGB BLD-MCNC: 10.5 G/DL (ref 11.5–15.4)
HGB BLD-MCNC: 12.6 G/DL (ref 11.5–15.4)
HGB BLD-MCNC: 8.7 G/DL (ref 11.5–15.4)
HGB BLD-MCNC: 9.3 G/DL (ref 11.5–15.4)
HGB BLD-MCNC: 9.4 G/DL (ref 11.5–15.4)
HGB BLD-MCNC: 9.6 G/DL (ref 11.5–15.4)
HGB BLDA-MCNC: 9.2 G/DL (ref 11.5–15.4)
HGB UR QL STRIP.AUTO: ABNORMAL
HGB UR QL STRIP.AUTO: NEGATIVE
HGB UR QL STRIP.AUTO: NEGATIVE
IMM GRANULOCYTES # BLD AUTO: 0.08 THOUSAND/UL (ref 0–0.2)
IMM GRANULOCYTES # BLD AUTO: 0.1 THOUSAND/UL (ref 0–0.2)
IMM GRANULOCYTES # BLD AUTO: 0.14 THOUSAND/UL (ref 0–0.2)
IMM GRANULOCYTES NFR BLD AUTO: 1 % (ref 0–2)
INR PPP: 0.94 (ref 0.85–1.19)
INTERVENTRICULAR SEPTUM IN DIASTOLE (PARASTERNAL SHORT AXIS VIEW): 0.6 CM
INTERVENTRICULAR SEPTUM: 0.6 CM (ref 0.6–1.1)
KETONES UR STRIP-MCNC: NEGATIVE MG/DL
LAAS-AP2: 17.8 CM2
LAAS-AP4: 14.8 CM2
LACTATE SERPL-SCNC: 1.3 MMOL/L (ref 0.5–2)
LACTATE SERPL-SCNC: 2.5 MMOL/L (ref 0.5–2)
LACTATE SERPL-SCNC: 3 MMOL/L (ref 0.5–2)
LACTATE SERPL-SCNC: 3.2 MMOL/L (ref 0.5–2)
LDLC SERPL CALC-MCNC: 45 MG/DL (ref 0–100)
LEFT ATRIUM SIZE: 3.6 CM
LEFT ATRIUM VOLUME (MOD BIPLANE): 50 ML
LEFT ATRIUM VOLUME INDEX (MOD BIPLANE): 31.1 ML/M2
LEFT INTERNAL DIMENSION IN SYSTOLE: 4.3 CM (ref 2.1–4)
LEFT VENTRICLE DIASTOLIC VOLUME (MOD BIPLANE): 127 ML
LEFT VENTRICLE DIASTOLIC VOLUME INDEX (MOD BIPLANE): 79.4 ML/M2
LEFT VENTRICLE SYSTOLIC VOLUME (MOD BIPLANE): 84 ML
LEFT VENTRICLE SYSTOLIC VOLUME INDEX (MOD BIPLANE): 52.5 ML/M2
LEFT VENTRICULAR INTERNAL DIMENSION IN DIASTOLE: 5.2 CM (ref 3.5–6)
LEFT VENTRICULAR POSTERIOR WALL IN END DIASTOLE: 0.9 CM
LEFT VENTRICULAR STROKE VOLUME: 45 ML
LEUKOCYTE ESTERASE UR QL STRIP: ABNORMAL
LEUKOCYTE ESTERASE UR QL STRIP: ABNORMAL
LEUKOCYTE ESTERASE UR QL STRIP: NEGATIVE
LEVETIRACETAM SERPL-MCNC: 19.5 UG/ML (ref 12–46)
LV EF: 34 %
LVSV (TEICH): 45 ML
LYMPHOCYTES # BLD AUTO: 1.67 THOUSANDS/ÂΜL (ref 0.6–4.47)
LYMPHOCYTES # BLD AUTO: 2.23 THOUSANDS/ÂΜL (ref 0.6–4.47)
LYMPHOCYTES # BLD AUTO: 2.26 THOUSANDS/ÂΜL (ref 0.6–4.47)
LYMPHOCYTES NFR BLD AUTO: 10 % (ref 14–44)
LYMPHOCYTES NFR BLD AUTO: 13 % (ref 14–44)
LYMPHOCYTES NFR BLD AUTO: 15 % (ref 14–44)
MAGNESIUM SERPL-MCNC: 1.9 MG/DL (ref 1.9–2.7)
MAGNESIUM SERPL-MCNC: 2 MG/DL (ref 1.9–2.7)
MAGNESIUM SERPL-MCNC: 2.1 MG/DL (ref 1.9–2.7)
MAGNESIUM SERPL-MCNC: 2.3 MG/DL (ref 1.9–2.7)
MAGNESIUM SERPL-MCNC: 2.8 MG/DL (ref 1.9–2.7)
MCH RBC QN AUTO: 30.2 PG (ref 26.8–34.3)
MCH RBC QN AUTO: 30.3 PG (ref 26.8–34.3)
MCH RBC QN AUTO: 30.6 PG (ref 26.8–34.3)
MCH RBC QN AUTO: 30.8 PG (ref 26.8–34.3)
MCH RBC QN AUTO: 31 PG (ref 26.8–34.3)
MCH RBC QN AUTO: 31 PG (ref 26.8–34.3)
MCHC RBC AUTO-ENTMCNC: 29.6 G/DL (ref 31.4–37.4)
MCHC RBC AUTO-ENTMCNC: 30.2 G/DL (ref 31.4–37.4)
MCHC RBC AUTO-ENTMCNC: 30.9 G/DL (ref 31.4–37.4)
MCHC RBC AUTO-ENTMCNC: 31.4 G/DL (ref 31.4–37.4)
MCHC RBC AUTO-ENTMCNC: 31.5 G/DL (ref 31.4–37.4)
MCHC RBC AUTO-ENTMCNC: 31.9 G/DL (ref 31.4–37.4)
MCV RBC AUTO: 101 FL (ref 82–98)
MCV RBC AUTO: 105 FL (ref 82–98)
MCV RBC AUTO: 96 FL (ref 82–98)
MCV RBC AUTO: 97 FL (ref 82–98)
MCV RBC AUTO: 98 FL (ref 82–98)
MCV RBC AUTO: 99 FL (ref 82–98)
MONOCYTES # BLD AUTO: 1.2 THOUSAND/ÂΜL (ref 0.17–1.22)
MONOCYTES # BLD AUTO: 1.33 THOUSAND/ÂΜL (ref 0.17–1.22)
MONOCYTES # BLD AUTO: 1.85 THOUSAND/ÂΜL (ref 0.17–1.22)
MONOCYTES NFR BLD AUTO: 11 % (ref 4–12)
MONOCYTES NFR BLD AUTO: 8 % (ref 4–12)
MONOCYTES NFR BLD AUTO: 8 % (ref 4–12)
MRSA NOSE QL CULT: NORMAL
MUCOUS THREADS UR QL AUTO: ABNORMAL
MV E'TISSUE VEL-SEP: 10 CM/S
NEUTROPHILS # BLD AUTO: 10.86 THOUSANDS/ÂΜL (ref 1.85–7.62)
NEUTROPHILS # BLD AUTO: 13.06 THOUSANDS/ÂΜL (ref 1.85–7.62)
NEUTROPHILS # BLD AUTO: 13.3 THOUSANDS/ÂΜL (ref 1.85–7.62)
NEUTS SEG NFR BLD AUTO: 74 % (ref 43–75)
NEUTS SEG NFR BLD AUTO: 78 % (ref 43–75)
NEUTS SEG NFR BLD AUTO: 78 % (ref 43–75)
NITRITE UR QL STRIP: NEGATIVE
NON-SQ EPI CELLS URNS QL MICRO: ABNORMAL /HPF
NON-SQ EPI CELLS URNS QL MICRO: ABNORMAL /HPF
NON-SQ EPI CELLS URNS QL MICRO: NORMAL /HPF
NRBC BLD AUTO-RTO: 0 /100 WBCS
O2 CT BLDA-SCNC: 16.2 ML/DL (ref 16–23)
O2 CT BLDA-SCNC: 17.3 ML/DL (ref 16–23)
O2 CT BLDA-SCNC: 17.4 ML/DL (ref 16–23)
O2 CT BLDV-SCNC: 10.6 ML/DL
O2 CT BLDV-SCNC: 8.6 ML/DL
OSMOLALITY UR/SERPL-RTO: 288 MMOL/KG (ref 282–298)
OSMOLALITY UR/SERPL-RTO: 288 MMOL/KG (ref 282–298)
OSMOLALITY UR/SERPL-RTO: 295 MMOL/KG (ref 282–298)
OSMOLALITY UR/SERPL-RTO: 302 MMOL/KG (ref 282–298)
OSMOLALITY UR/SERPL-RTO: 304 MMOL/KG (ref 282–298)
OSMOLALITY UR/SERPL-RTO: 315 MMOL/KG (ref 282–298)
OSMOLALITY UR/SERPL-RTO: 316 MMOL/KG (ref 282–298)
OSMOLALITY UR/SERPL-RTO: 316 MMOL/KG (ref 282–298)
OSMOLALITY UR/SERPL-RTO: 317 MMOL/KG (ref 282–298)
OSMOLALITY UR/SERPL-RTO: 319 MMOL/KG (ref 282–298)
OSMOLALITY UR/SERPL-RTO: 328 MMOL/KG (ref 282–298)
OSMOLALITY UR/SERPL-RTO: 328 MMOL/KG (ref 282–298)
OSMOLALITY UR/SERPL-RTO: 329 MMOL/KG (ref 282–298)
OSMOLALITY UR/SERPL-RTO: 341 MMOL/KG (ref 282–298)
OSMOLALITY UR/SERPL-RTO: 341 MMOL/KG (ref 282–298)
OSMOLALITY UR/SERPL-RTO: 345 MMOL/KG (ref 282–298)
OSMOLALITY UR/SERPL-RTO: 356 MMOL/KG (ref 282–298)
OSMOLALITY UR/SERPL-RTO: 364 MMOL/KG (ref 282–298)
OSMOLALITY UR: 73 MMOL/KG
OXYHGB MFR BLDA: 96.1 % (ref 94–97)
OXYHGB MFR BLDA: 96.5 % (ref 94–97)
OXYHGB MFR BLDA: 97.9 % (ref 94–97)
P AXIS: 49 DEGREES
P AXIS: 72 DEGREES
P AXIS: 75 DEGREES
P AXIS: 76 DEGREES
P AXIS: 82 DEGREES
P AXIS: 85 DEGREES
PA ADP BLD-ACNC: 186 PRU (ref 194–418)
PCO2 BLD: 27 MMOL/L (ref 21–32)
PCO2 BLD: 41.9 MM HG (ref 36–44)
PCO2 BLDA: 33.8 MM HG (ref 36–44)
PCO2 BLDA: 38.5 MM HG (ref 36–44)
PCO2 BLDA: 50.3 MM HG (ref 36–44)
PCO2 BLDV: 35.7 MM HG (ref 42–50)
PCO2 BLDV: 57.8 MM HG (ref 42–50)
PH BLD: 7.4 [PH] (ref 7.35–7.45)
PH BLDA: 7.22 [PH] (ref 7.35–7.45)
PH BLDA: 7.29 [PH] (ref 7.35–7.45)
PH BLDA: 7.35 [PH] (ref 7.35–7.45)
PH BLDV: 7.18 [PH] (ref 7.3–7.4)
PH BLDV: 7.38 [PH] (ref 7.3–7.4)
PH UR STRIP.AUTO: 5 [PH]
PH UR STRIP.AUTO: 5.5 [PH]
PH UR STRIP.AUTO: 6 [PH]
PHOSPHATE SERPL-MCNC: 2 MG/DL (ref 2.3–4.1)
PHOSPHATE SERPL-MCNC: 2.2 MG/DL (ref 2.3–4.1)
PHOSPHATE SERPL-MCNC: 2.6 MG/DL (ref 2.3–4.1)
PHOSPHATE SERPL-MCNC: 3.3 MG/DL (ref 2.3–4.1)
PHOSPHATE SERPL-MCNC: 3.3 MG/DL (ref 2.3–4.1)
PLATELET # BLD AUTO: 132 THOUSANDS/UL (ref 149–390)
PLATELET # BLD AUTO: 134 THOUSANDS/UL (ref 149–390)
PLATELET # BLD AUTO: 158 THOUSANDS/UL (ref 149–390)
PLATELET # BLD AUTO: 171 THOUSANDS/UL (ref 149–390)
PLATELET # BLD AUTO: 178 THOUSANDS/UL (ref 149–390)
PLATELET # BLD AUTO: 243 THOUSANDS/UL (ref 149–390)
PMV BLD AUTO: 10.4 FL (ref 8.9–12.7)
PMV BLD AUTO: 10.6 FL (ref 8.9–12.7)
PMV BLD AUTO: 11 FL (ref 8.9–12.7)
PMV BLD AUTO: 11 FL (ref 8.9–12.7)
PMV BLD AUTO: 11.5 FL (ref 8.9–12.7)
PMV BLD AUTO: 11.8 FL (ref 8.9–12.7)
PO2 BLD: 103 MM HG (ref 75–129)
PO2 BLDA: 100.1 MM HG (ref 75–129)
PO2 BLDA: 113 MM HG (ref 75–129)
PO2 BLDA: 126 MM HG (ref 75–129)
PO2 BLDV: 40.1 MM HG (ref 35–45)
PO2 BLDV: 41.1 MM HG (ref 35–45)
POTASSIUM BLD-SCNC: 3.4 MMOL/L (ref 3.5–5.3)
POTASSIUM SERPL-SCNC: 2.9 MMOL/L (ref 3.5–5.3)
POTASSIUM SERPL-SCNC: 3.3 MMOL/L (ref 3.5–5.3)
POTASSIUM SERPL-SCNC: 3.5 MMOL/L (ref 3.5–5.3)
POTASSIUM SERPL-SCNC: 3.6 MMOL/L (ref 3.5–5.3)
POTASSIUM SERPL-SCNC: 3.6 MMOL/L (ref 3.5–5.3)
POTASSIUM SERPL-SCNC: 3.7 MMOL/L (ref 3.5–5.3)
POTASSIUM SERPL-SCNC: 3.7 MMOL/L (ref 3.5–5.3)
POTASSIUM SERPL-SCNC: 3.8 MMOL/L (ref 3.5–5.3)
POTASSIUM SERPL-SCNC: 3.9 MMOL/L (ref 3.5–5.3)
POTASSIUM SERPL-SCNC: 3.9 MMOL/L (ref 3.5–5.3)
POTASSIUM SERPL-SCNC: 4.1 MMOL/L (ref 3.5–5.3)
POTASSIUM SERPL-SCNC: 4.2 MMOL/L (ref 3.5–5.3)
POTASSIUM SERPL-SCNC: 4.3 MMOL/L (ref 3.5–5.3)
POTASSIUM SERPL-SCNC: 4.5 MMOL/L (ref 3.5–5.3)
POTASSIUM SERPL-SCNC: 4.5 MMOL/L (ref 3.5–5.3)
POTASSIUM SERPL-SCNC: 4.8 MMOL/L (ref 3.5–5.3)
POTASSIUM SERPL-SCNC: 5.7 MMOL/L (ref 3.5–5.3)
PR INTERVAL: 138 MS
PR INTERVAL: 150 MS
PR INTERVAL: 163 MS
PR INTERVAL: 172 MS
PR INTERVAL: 175 MS
PR INTERVAL: 179 MS
PROT SERPL-MCNC: 5.4 G/DL (ref 6.4–8.4)
PROT SERPL-MCNC: 5.6 G/DL (ref 6.4–8.4)
PROT SERPL-MCNC: 5.7 G/DL (ref 6.4–8.4)
PROT UR STRIP-MCNC: ABNORMAL MG/DL
PROT UR STRIP-MCNC: ABNORMAL MG/DL
PROT UR STRIP-MCNC: NEGATIVE MG/DL
PROTHROMBIN TIME: 12.9 SECONDS (ref 12.3–15)
PS VENT FIO2: 30
PS VENT PEEP: 6
QRS AXIS: 104 DEGREES
QRS AXIS: 105 DEGREES
QRS AXIS: 105 DEGREES
QRS AXIS: 107 DEGREES
QRS AXIS: 116 DEGREES
QRS AXIS: 78 DEGREES
QRSD INTERVAL: 82 MS
QRSD INTERVAL: 83 MS
QRSD INTERVAL: 88 MS
QRSD INTERVAL: 88 MS
QRSD INTERVAL: 92 MS
QRSD INTERVAL: 96 MS
QT INTERVAL: 308 MS
QT INTERVAL: 350 MS
QT INTERVAL: 408 MS
QT INTERVAL: 417 MS
QT INTERVAL: 442 MS
QT INTERVAL: 450 MS
QTC INTERVAL: 456 MS
QTC INTERVAL: 462 MS
QTC INTERVAL: 490 MS
QTC INTERVAL: 490 MS
QTC INTERVAL: 491 MS
QTC INTERVAL: 523 MS
RBC # BLD AUTO: 2.81 MILLION/UL (ref 3.81–5.12)
RBC # BLD AUTO: 3.03 MILLION/UL (ref 3.81–5.12)
RBC # BLD AUTO: 3.04 MILLION/UL (ref 3.81–5.12)
RBC # BLD AUTO: 3.17 MILLION/UL (ref 3.81–5.12)
RBC # BLD AUTO: 3.41 MILLION/UL (ref 3.81–5.12)
RBC # BLD AUTO: 4.17 MILLION/UL (ref 3.81–5.12)
RBC #/AREA URNS AUTO: ABNORMAL /HPF
RBC #/AREA URNS AUTO: ABNORMAL /HPF
RBC #/AREA URNS AUTO: NORMAL /HPF
RIGHT ATRIUM AREA SYSTOLE A4C: 9.2 CM2
RIGHT VENTRICLE ID DIMENSION: 2.9 CM
RSV RNA RESP QL NAA+PROBE: NEGATIVE
SAO2 % BLD FROM PO2: 98 % (ref 60–85)
SARS-COV-2 RNA RESP QL NAA+PROBE: NEGATIVE
SIMV VENT INSPIRED AIR FIO2: 30
SIMV VENT PEEP: 6
SIMV VENT TIDAL VOLUME: 500
SIMV VENT: ABNORMAL
SL CV LEFT ATRIUM LENGTH A2C: 5.3 CM
SL CV LV EF: 30
SL CV PED ECHO LEFT VENTRICLE DIASTOLIC VOLUME (MOD BIPLANE) 2D: 128 ML
SL CV PED ECHO LEFT VENTRICLE SYSTOLIC VOLUME (MOD BIPLANE) 2D: 83 ML
SODIUM 24H UR-SCNC: 18 MOL/L
SODIUM BLD-SCNC: 172 MMOL/L (ref 136–145)
SODIUM SERPL-SCNC: 132 MMOL/L (ref 135–147)
SODIUM SERPL-SCNC: 139 MMOL/L (ref 135–147)
SODIUM SERPL-SCNC: 140 MMOL/L (ref 135–147)
SODIUM SERPL-SCNC: 142 MMOL/L (ref 135–147)
SODIUM SERPL-SCNC: 142 MMOL/L (ref 135–147)
SODIUM SERPL-SCNC: 146 MMOL/L (ref 135–147)
SODIUM SERPL-SCNC: 147 MMOL/L (ref 135–147)
SODIUM SERPL-SCNC: 149 MMOL/L (ref 135–147)
SODIUM SERPL-SCNC: 150 MMOL/L (ref 135–147)
SODIUM SERPL-SCNC: 152 MMOL/L (ref 135–147)
SODIUM SERPL-SCNC: 152 MMOL/L (ref 135–147)
SODIUM SERPL-SCNC: 153 MMOL/L (ref 135–147)
SODIUM SERPL-SCNC: 154 MMOL/L (ref 135–147)
SODIUM SERPL-SCNC: 156 MMOL/L (ref 135–147)
SODIUM SERPL-SCNC: 161 MMOL/L (ref 135–147)
SODIUM SERPL-SCNC: 163 MMOL/L (ref 135–147)
SODIUM SERPL-SCNC: 169 MMOL/L (ref 135–147)
SODIUM SERPL-SCNC: 173 MMOL/L (ref 135–147)
SP GR UR STRIP.AUTO: 1 (ref 1–1.03)
SP GR UR STRIP.AUTO: 1.02 (ref 1–1.03)
SP GR UR STRIP.AUTO: >=1.05 (ref 1–1.03)
SPECIMEN SOURCE: ABNORMAL
T WAVE AXIS: -85 DEGREES
T WAVE AXIS: 265 DEGREES
T WAVE AXIS: 266 DEGREES
T WAVE AXIS: 75 DEGREES
T WAVE AXIS: 79 DEGREES
T WAVE AXIS: 88 DEGREES
T3FREE SERPL-MCNC: 2.78 PG/ML (ref 2.5–3.9)
T4 FREE SERPL-MCNC: 1.99 NG/DL (ref 0.61–1.12)
TRICUSPID ANNULAR PLANE SYSTOLIC EXCURSION: 1.7 CM
TRIGL SERPL-MCNC: 125 MG/DL
TSH SERPL DL<=0.05 MIU/L-ACNC: 0.01 UIU/ML (ref 0.45–4.5)
UROBILINOGEN UR STRIP-ACNC: <2 MG/DL
VENT - PS: ABNORMAL
VENT SIMV: 14
VENTRICULAR RATE: 118 BPM
VENTRICULAR RATE: 135 BPM
VENTRICULAR RATE: 64 BPM
VENTRICULAR RATE: 81 BPM
VENTRICULAR RATE: 83 BPM
VENTRICULAR RATE: 87 BPM
WBC # BLD AUTO: 12.74 THOUSAND/UL (ref 4.31–10.16)
WBC # BLD AUTO: 14.76 THOUSAND/UL (ref 4.31–10.16)
WBC # BLD AUTO: 14.77 THOUSAND/UL (ref 4.31–10.16)
WBC # BLD AUTO: 16.79 THOUSAND/UL (ref 4.31–10.16)
WBC # BLD AUTO: 17.04 THOUSAND/UL (ref 4.31–10.16)
WBC # BLD AUTO: 20.95 THOUSAND/UL (ref 4.31–10.16)
WBC #/AREA URNS AUTO: ABNORMAL /HPF
WBC #/AREA URNS AUTO: ABNORMAL /HPF
WBC #/AREA URNS AUTO: NORMAL /HPF

## 2024-01-01 PROCEDURE — 82140 ASSAY OF AMMONIA: CPT | Performed by: PSYCHIATRY & NEUROLOGY

## 2024-01-01 PROCEDURE — 94640 AIRWAY INHALATION TREATMENT: CPT

## 2024-01-01 PROCEDURE — 99232 SBSQ HOSP IP/OBS MODERATE 35: CPT | Performed by: INTERNAL MEDICINE

## 2024-01-01 PROCEDURE — 99291 CRITICAL CARE FIRST HOUR: CPT | Performed by: PSYCHIATRY & NEUROLOGY

## 2024-01-01 PROCEDURE — 94760 N-INVAS EAR/PLS OXIMETRY 1: CPT

## 2024-01-01 PROCEDURE — 87070 CULTURE OTHR SPECIMN AEROBIC: CPT

## 2024-01-01 PROCEDURE — 95715 VEEG EA 12-26HR INTMT MNTR: CPT

## 2024-01-01 PROCEDURE — 84100 ASSAY OF PHOSPHORUS: CPT | Performed by: REGISTERED NURSE

## 2024-01-01 PROCEDURE — 3E06317 INTRODUCTION OF OTHER THROMBOLYTIC INTO CENTRAL ARTERY, PERCUTANEOUS APPROACH: ICD-10-PCS | Performed by: NEUROLOGICAL SURGERY

## 2024-01-01 PROCEDURE — 87040 BLOOD CULTURE FOR BACTERIA: CPT

## 2024-01-01 PROCEDURE — 36224 PLACE CATH CAROTD ART: CPT

## 2024-01-01 PROCEDURE — 83520 IMMUNOASSAY QUANT NOS NONAB: CPT | Performed by: PSYCHIATRY & NEUROLOGY

## 2024-01-01 PROCEDURE — 99233 SBSQ HOSP IP/OBS HIGH 50: CPT | Performed by: PSYCHIATRY & NEUROLOGY

## 2024-01-01 PROCEDURE — NC001 PR NO CHARGE: Performed by: REGISTERED NURSE

## 2024-01-01 PROCEDURE — 82948 REAGENT STRIP/BLOOD GLUCOSE: CPT

## 2024-01-01 PROCEDURE — 82805 BLOOD GASES W/O2 SATURATION: CPT | Performed by: EMERGENCY MEDICINE

## 2024-01-01 PROCEDURE — 99291 CRITICAL CARE FIRST HOUR: CPT | Performed by: EMERGENCY MEDICINE

## 2024-01-01 PROCEDURE — 95720 EEG PHY/QHP EA INCR W/VEEG: CPT | Performed by: STUDENT IN AN ORGANIZED HEALTH CARE EDUCATION/TRAINING PROGRAM

## 2024-01-01 PROCEDURE — 96374 THER/PROPH/DIAG INJ IV PUSH: CPT

## 2024-01-01 PROCEDURE — 70496 CT ANGIOGRAPHY HEAD: CPT

## 2024-01-01 PROCEDURE — 84100 ASSAY OF PHOSPHORUS: CPT

## 2024-01-01 PROCEDURE — NC001 PR NO CHARGE: Performed by: PHYSICAL MEDICINE & REHABILITATION

## 2024-01-01 PROCEDURE — 87081 CULTURE SCREEN ONLY: CPT | Performed by: REGISTERED NURSE

## 2024-01-01 PROCEDURE — 82330 ASSAY OF CALCIUM: CPT

## 2024-01-01 PROCEDURE — 99291 CRITICAL CARE FIRST HOUR: CPT

## 2024-01-01 PROCEDURE — 99024 POSTOP FOLLOW-UP VISIT: CPT | Performed by: NEUROLOGICAL SURGERY

## 2024-01-01 PROCEDURE — 0042T HB CT PERFUSION W/CONTRAST CBF: CPT

## 2024-01-01 PROCEDURE — 70450 CT HEAD/BRAIN W/O DYE: CPT

## 2024-01-01 PROCEDURE — 93306 TTE W/DOPPLER COMPLETE: CPT | Performed by: INTERNAL MEDICINE

## 2024-01-01 PROCEDURE — 84484 ASSAY OF TROPONIN QUANT: CPT

## 2024-01-01 PROCEDURE — 80076 HEPATIC FUNCTION PANEL: CPT

## 2024-01-01 PROCEDURE — 80048 BASIC METABOLIC PNL TOTAL CA: CPT

## 2024-01-01 PROCEDURE — 82803 BLOOD GASES ANY COMBINATION: CPT

## 2024-01-01 PROCEDURE — 87205 SMEAR GRAM STAIN: CPT

## 2024-01-01 PROCEDURE — 71275 CT ANGIOGRAPHY CHEST: CPT

## 2024-01-01 PROCEDURE — 83605 ASSAY OF LACTIC ACID: CPT

## 2024-01-01 PROCEDURE — NC001 PR NO CHARGE: Performed by: EMERGENCY MEDICINE

## 2024-01-01 PROCEDURE — 83735 ASSAY OF MAGNESIUM: CPT

## 2024-01-01 PROCEDURE — 93005 ELECTROCARDIOGRAM TRACING: CPT

## 2024-01-01 PROCEDURE — C1769 GUIDE WIRE: HCPCS

## 2024-01-01 PROCEDURE — 85025 COMPLETE CBC W/AUTO DIFF WBC: CPT

## 2024-01-01 PROCEDURE — 94664 DEMO&/EVAL PT USE INHALER: CPT

## 2024-01-01 PROCEDURE — 85027 COMPLETE CBC AUTOMATED: CPT

## 2024-01-01 PROCEDURE — 61635 INTRACRAN ANGIOPLSTY W/STENT: CPT

## 2024-01-01 PROCEDURE — 71045 X-RAY EXAM CHEST 1 VIEW: CPT

## 2024-01-01 PROCEDURE — 94003 VENT MGMT INPAT SUBQ DAY: CPT

## 2024-01-01 PROCEDURE — 4A133B1 MONITORING OF ARTERIAL PRESSURE, PERIPHERAL, PERCUTANEOUS APPROACH: ICD-10-PCS | Performed by: PSYCHIATRY & NEUROLOGY

## 2024-01-01 PROCEDURE — C1760 CLOSURE DEV, VASC: HCPCS

## 2024-01-01 PROCEDURE — 84443 ASSAY THYROID STIM HORMONE: CPT

## 2024-01-01 PROCEDURE — 87077 CULTURE AEROBIC IDENTIFY: CPT | Performed by: REGISTERED NURSE

## 2024-01-01 PROCEDURE — B41F1ZZ FLUOROSCOPY OF RIGHT LOWER EXTREMITY ARTERIES USING LOW OSMOLAR CONTRAST: ICD-10-PCS | Performed by: NEUROLOGICAL SURGERY

## 2024-01-01 PROCEDURE — NC001 PR NO CHARGE: Performed by: PSYCHIATRY & NEUROLOGY

## 2024-01-01 PROCEDURE — C1894 INTRO/SHEATH, NON-LASER: HCPCS

## 2024-01-01 PROCEDURE — 82805 BLOOD GASES W/O2 SATURATION: CPT | Performed by: PSYCHIATRY & NEUROLOGY

## 2024-01-01 PROCEDURE — 037L3DZ DILATION OF LEFT INTERNAL CAROTID ARTERY WITH INTRALUMINAL DEVICE, PERCUTANEOUS APPROACH: ICD-10-PCS | Performed by: NEUROLOGICAL SURGERY

## 2024-01-01 PROCEDURE — 85576 BLOOD PLATELET AGGREGATION: CPT | Performed by: NURSE PRACTITIONER

## 2024-01-01 PROCEDURE — 99292 CRITICAL CARE ADDL 30 MIN: CPT | Performed by: PSYCHIATRY & NEUROLOGY

## 2024-01-01 PROCEDURE — 84439 ASSAY OF FREE THYROXINE: CPT

## 2024-01-01 PROCEDURE — 81001 URINALYSIS AUTO W/SCOPE: CPT

## 2024-01-01 PROCEDURE — 82533 TOTAL CORTISOL: CPT

## 2024-01-01 PROCEDURE — 70498 CT ANGIOGRAPHY NECK: CPT

## 2024-01-01 PROCEDURE — 85014 HEMATOCRIT: CPT

## 2024-01-01 PROCEDURE — 83930 ASSAY OF BLOOD OSMOLALITY: CPT

## 2024-01-01 PROCEDURE — 82077 ASSAY SPEC XCP UR&BREATH IA: CPT | Performed by: PSYCHIATRY & NEUROLOGY

## 2024-01-01 PROCEDURE — 5A2204Z RESTORATION OF CARDIAC RHYTHM, SINGLE: ICD-10-PCS | Performed by: PSYCHIATRY & NEUROLOGY

## 2024-01-01 PROCEDURE — 85027 COMPLETE CBC AUTOMATED: CPT | Performed by: REGISTERED NURSE

## 2024-01-01 PROCEDURE — 36223 PLACE CATH CAROTID/INOM ART: CPT

## 2024-01-01 PROCEDURE — 76937 US GUIDE VASCULAR ACCESS: CPT | Performed by: REGISTERED NURSE

## 2024-01-01 PROCEDURE — 03HY32Z INSERTION OF MONITORING DEVICE INTO UPPER ARTERY, PERCUTANEOUS APPROACH: ICD-10-PCS | Performed by: EMERGENCY MEDICINE

## 2024-01-01 PROCEDURE — 80053 COMPREHEN METABOLIC PANEL: CPT | Performed by: REGISTERED NURSE

## 2024-01-01 PROCEDURE — 94002 VENT MGMT INPAT INIT DAY: CPT

## 2024-01-01 PROCEDURE — 82947 ASSAY GLUCOSE BLOOD QUANT: CPT

## 2024-01-01 PROCEDURE — 83735 ASSAY OF MAGNESIUM: CPT | Performed by: REGISTERED NURSE

## 2024-01-01 PROCEDURE — 4A133J1 MONITORING OF ARTERIAL PULSE, PERIPHERAL, PERCUTANEOUS APPROACH: ICD-10-PCS | Performed by: PSYCHIATRY & NEUROLOGY

## 2024-01-01 PROCEDURE — 85730 THROMBOPLASTIN TIME PARTIAL: CPT

## 2024-01-01 PROCEDURE — 82805 BLOOD GASES W/O2 SATURATION: CPT | Performed by: REGISTERED NURSE

## 2024-01-01 PROCEDURE — 95712 VEEG 2-12 HR INTMT MNTR: CPT

## 2024-01-01 PROCEDURE — 5A1955Z RESPIRATORY VENTILATION, GREATER THAN 96 CONSECUTIVE HOURS: ICD-10-PCS | Performed by: EMERGENCY MEDICINE

## 2024-01-01 PROCEDURE — 84100 ASSAY OF PHOSPHORUS: CPT | Performed by: EMERGENCY MEDICINE

## 2024-01-01 PROCEDURE — C1887 CATHETER, GUIDING: HCPCS

## 2024-01-01 PROCEDURE — 84484 ASSAY OF TROPONIN QUANT: CPT | Performed by: REGISTERED NURSE

## 2024-01-01 PROCEDURE — 95700 EEG CONT REC W/VID EEG TECH: CPT

## 2024-01-01 PROCEDURE — 36556 INSERT NON-TUNNEL CV CATH: CPT | Performed by: REGISTERED NURSE

## 2024-01-01 PROCEDURE — 80061 LIPID PANEL: CPT | Performed by: REGISTERED NURSE

## 2024-01-01 PROCEDURE — 84295 ASSAY OF SERUM SODIUM: CPT

## 2024-01-01 PROCEDURE — 4A133B1 MONITORING OF ARTERIAL PRESSURE, PERIPHERAL, PERCUTANEOUS APPROACH: ICD-10-PCS | Performed by: EMERGENCY MEDICINE

## 2024-01-01 PROCEDURE — C8929 TTE W OR WO FOL WCON,DOPPLER: HCPCS

## 2024-01-01 PROCEDURE — 83605 ASSAY OF LACTIC ACID: CPT | Performed by: PSYCHIATRY & NEUROLOGY

## 2024-01-01 PROCEDURE — 99223 1ST HOSP IP/OBS HIGH 75: CPT | Performed by: PSYCHIATRY & NEUROLOGY

## 2024-01-01 PROCEDURE — B3141ZZ FLUOROSCOPY OF LEFT COMMON CAROTID ARTERY USING LOW OSMOLAR CONTRAST: ICD-10-PCS | Performed by: NEUROLOGICAL SURGERY

## 2024-01-01 PROCEDURE — 99223 1ST HOSP IP/OBS HIGH 75: CPT | Performed by: INTERNAL MEDICINE

## 2024-01-01 PROCEDURE — 02HV33Z INSERTION OF INFUSION DEVICE INTO SUPERIOR VENA CAVA, PERCUTANEOUS APPROACH: ICD-10-PCS | Performed by: EMERGENCY MEDICINE

## 2024-01-01 PROCEDURE — 85610 PROTHROMBIN TIME: CPT

## 2024-01-01 PROCEDURE — 99255 IP/OBS CONSLTJ NEW/EST HI 80: CPT | Performed by: NEUROLOGICAL SURGERY

## 2024-01-01 PROCEDURE — 37216 TRANSCATH STENT CCA W/O EPS: CPT | Performed by: NEUROLOGICAL SURGERY

## 2024-01-01 PROCEDURE — 61645 PERQ ART M-THROMBECT &/NFS: CPT | Performed by: NEUROLOGICAL SURGERY

## 2024-01-01 PROCEDURE — 3E03317 INTRODUCTION OF OTHER THROMBOLYTIC INTO PERIPHERAL VEIN, PERCUTANEOUS APPROACH: ICD-10-PCS

## 2024-01-01 PROCEDURE — 0241U HB NFCT DS VIR RESP RNA 4 TRGT: CPT

## 2024-01-01 PROCEDURE — 83935 ASSAY OF URINE OSMOLALITY: CPT

## 2024-01-01 PROCEDURE — 93010 ELECTROCARDIOGRAM REPORT: CPT | Performed by: INTERNAL MEDICINE

## 2024-01-01 PROCEDURE — 83036 HEMOGLOBIN GLYCOSYLATED A1C: CPT | Performed by: REGISTERED NURSE

## 2024-01-01 PROCEDURE — 36620 INSERTION CATHETER ARTERY: CPT | Performed by: PSYCHIATRY & NEUROLOGY

## 2024-01-01 PROCEDURE — 36620 INSERTION CATHETER ARTERY: CPT | Performed by: REGISTERED NURSE

## 2024-01-01 PROCEDURE — 87040 BLOOD CULTURE FOR BACTERIA: CPT | Performed by: REGISTERED NURSE

## 2024-01-01 PROCEDURE — 84300 ASSAY OF URINE SODIUM: CPT

## 2024-01-01 PROCEDURE — 84481 FREE ASSAY (FT-3): CPT | Performed by: PSYCHIATRY & NEUROLOGY

## 2024-01-01 PROCEDURE — 74174 CTA ABD&PLVS W/CONTRAST: CPT

## 2024-01-01 PROCEDURE — 4A133J1 MONITORING OF ARTERIAL PULSE, PERIPHERAL, PERCUTANEOUS APPROACH: ICD-10-PCS | Performed by: EMERGENCY MEDICINE

## 2024-01-01 PROCEDURE — 03HY32Z INSERTION OF MONITORING DEVICE INTO UPPER ARTERY, PERCUTANEOUS APPROACH: ICD-10-PCS | Performed by: PSYCHIATRY & NEUROLOGY

## 2024-01-01 PROCEDURE — B3171ZZ FLUOROSCOPY OF LEFT INTERNAL CAROTID ARTERY USING LOW OSMOLAR CONTRAST: ICD-10-PCS | Performed by: NEUROLOGICAL SURGERY

## 2024-01-01 PROCEDURE — 87154 CUL TYP ID BLD PTHGN 6+ TRGT: CPT | Performed by: REGISTERED NURSE

## 2024-01-01 PROCEDURE — 83735 ASSAY OF MAGNESIUM: CPT | Performed by: EMERGENCY MEDICINE

## 2024-01-01 PROCEDURE — 82550 ASSAY OF CK (CPK): CPT

## 2024-01-01 PROCEDURE — 81001 URINALYSIS AUTO W/SCOPE: CPT | Performed by: REGISTERED NURSE

## 2024-01-01 PROCEDURE — C1876 STENT, NON-COA/NON-COV W/DEL: HCPCS

## 2024-01-01 PROCEDURE — 84132 ASSAY OF SERUM POTASSIUM: CPT

## 2024-01-01 RX ORDER — SODIUM CHLORIDE 3 G/100ML
250 INJECTION, SOLUTION INTRAVENOUS ONCE
Status: COMPLETED | OUTPATIENT
Start: 2024-01-01 | End: 2024-01-01

## 2024-01-01 RX ORDER — 3% SODIUM CHLORIDE 3 G/100ML
40 INJECTION, SOLUTION INTRAVENOUS CONTINUOUS
Status: DISCONTINUED | OUTPATIENT
Start: 2024-01-01 | End: 2024-01-01

## 2024-01-01 RX ORDER — MAGNESIUM SULFATE HEPTAHYDRATE 40 MG/ML
2 INJECTION, SOLUTION INTRAVENOUS ONCE
Status: COMPLETED | OUTPATIENT
Start: 2024-01-01 | End: 2024-01-01

## 2024-01-01 RX ORDER — POTASSIUM CHLORIDE 20MEQ/15ML
40 LIQUID (ML) ORAL ONCE
Status: COMPLETED | OUTPATIENT
Start: 2024-01-01 | End: 2024-01-01

## 2024-01-01 RX ORDER — DOBUTAMINE HYDROCHLORIDE 200 MG/100ML
10 INJECTION INTRAVENOUS CONTINUOUS
Status: DISCONTINUED | OUTPATIENT
Start: 2024-01-01 | End: 2024-01-01

## 2024-01-01 RX ORDER — INSULIN LISPRO 100 [IU]/ML
2-12 INJECTION, SOLUTION INTRAVENOUS; SUBCUTANEOUS EVERY 6 HOURS SCHEDULED
Status: DISCONTINUED | OUTPATIENT
Start: 2024-01-01 | End: 2024-01-01

## 2024-01-01 RX ORDER — SODIUM CHLORIDE, SODIUM GLUCONATE, SODIUM ACETATE, POTASSIUM CHLORIDE, MAGNESIUM CHLORIDE, SODIUM PHOSPHATE, DIBASIC, AND POTASSIUM PHOSPHATE .53; .5; .37; .037; .03; .012; .00082 G/100ML; G/100ML; G/100ML; G/100ML; G/100ML; G/100ML; G/100ML
2000 INJECTION, SOLUTION INTRAVENOUS ONCE
Status: COMPLETED | OUTPATIENT
Start: 2024-01-01 | End: 2024-01-01

## 2024-01-01 RX ORDER — ONDANSETRON 2 MG/ML
4 INJECTION INTRAMUSCULAR; INTRAVENOUS EVERY 6 HOURS PRN
Status: DISCONTINUED | OUTPATIENT
Start: 2024-01-01 | End: 2024-01-01 | Stop reason: HOSPADM

## 2024-01-01 RX ORDER — NOREPINEPHRINE BITARTRATE 1 MG/ML
INJECTION, SOLUTION INTRAVENOUS
Status: DISPENSED
Start: 2024-01-01 | End: 2024-01-01

## 2024-01-01 RX ORDER — LEVETIRACETAM 500 MG/5ML
750 INJECTION, SOLUTION, CONCENTRATE INTRAVENOUS EVERY 12 HOURS
Status: DISCONTINUED | OUTPATIENT
Start: 2024-01-01 | End: 2024-01-01

## 2024-01-01 RX ORDER — MAGNESIUM SULFATE HEPTAHYDRATE 40 MG/ML
INJECTION, SOLUTION INTRAVENOUS
Status: DISPENSED
Start: 2024-01-01 | End: 2024-01-01

## 2024-01-01 RX ORDER — LEVETIRACETAM 500 MG/5ML
1500 INJECTION, SOLUTION, CONCENTRATE INTRAVENOUS ONCE
Status: COMPLETED | OUTPATIENT
Start: 2024-01-01 | End: 2024-01-01

## 2024-01-01 RX ORDER — ACETAMINOPHEN 325 MG/1
650 TABLET ORAL EVERY 6 HOURS PRN
Status: DISCONTINUED | OUTPATIENT
Start: 2024-01-01 | End: 2024-01-01

## 2024-01-01 RX ORDER — LEVOTHYROXINE SODIUM 75 UG/1
150 TABLET ORAL
Status: DISCONTINUED | OUTPATIENT
Start: 2024-01-01 | End: 2024-01-01

## 2024-01-01 RX ORDER — METOPROLOL SUCCINATE 25 MG/1
12.5 TABLET, EXTENDED RELEASE ORAL DAILY
Status: DISCONTINUED | OUTPATIENT
Start: 2024-01-01 | End: 2024-01-01

## 2024-01-01 RX ORDER — SODIUM CHLORIDE 9 MG/ML
75 INJECTION, SOLUTION INTRAVENOUS CONTINUOUS
Status: DISCONTINUED | OUTPATIENT
Start: 2024-01-01 | End: 2024-01-01

## 2024-01-01 RX ORDER — CLOPIDOGREL BISULFATE 75 MG/1
300 TABLET ORAL ONCE
Status: COMPLETED | OUTPATIENT
Start: 2024-01-01 | End: 2024-01-01

## 2024-01-01 RX ORDER — SCOLOPAMINE TRANSDERMAL SYSTEM 1 MG/1
1 PATCH, EXTENDED RELEASE TRANSDERMAL
Status: DISCONTINUED | OUTPATIENT
Start: 2024-01-01 | End: 2024-01-01 | Stop reason: HOSPADM

## 2024-01-01 RX ORDER — CLOPIDOGREL BISULFATE 75 MG/1
75 TABLET ORAL DAILY
Status: DISCONTINUED | OUTPATIENT
Start: 2024-01-01 | End: 2024-01-01

## 2024-01-01 RX ORDER — PROPOFOL 10 MG/ML
5-50 INJECTION, EMULSION INTRAVENOUS
Status: DISCONTINUED | OUTPATIENT
Start: 2024-01-01 | End: 2024-01-01

## 2024-01-01 RX ORDER — POTASSIUM CHLORIDE 14.9 MG/ML
20 INJECTION INTRAVENOUS ONCE
Status: COMPLETED | OUTPATIENT
Start: 2024-01-01 | End: 2024-01-01

## 2024-01-01 RX ORDER — CALCIUM GLUCONATE 20 MG/ML
2 INJECTION, SOLUTION INTRAVENOUS ONCE
Status: COMPLETED | OUTPATIENT
Start: 2024-01-01 | End: 2024-01-01

## 2024-01-01 RX ORDER — ASPIRIN 325 MG
325 TABLET ORAL DAILY
Status: DISCONTINUED | OUTPATIENT
Start: 2024-01-01 | End: 2024-01-01

## 2024-01-01 RX ORDER — NOREPINEPHRINE BITARTRATE 1 MG/ML
INJECTION, SOLUTION INTRAVENOUS
Status: COMPLETED
Start: 2024-01-01 | End: 2024-01-01

## 2024-01-01 RX ORDER — IODIXANOL 320 MG/ML
200 INJECTION, SOLUTION INTRAVASCULAR
Status: COMPLETED | OUTPATIENT
Start: 2024-01-01 | End: 2024-01-01

## 2024-01-01 RX ORDER — PHENYLEPHRINE HYDROCHLORIDE 10 MG/ML
INJECTION INTRAVENOUS
Status: DISCONTINUED
Start: 2024-01-01 | End: 2024-01-01 | Stop reason: HOSPADM

## 2024-01-01 RX ORDER — INSULIN LISPRO 100 [IU]/ML
8 INJECTION, SOLUTION INTRAVENOUS; SUBCUTANEOUS 3 TIMES DAILY
Status: DISCONTINUED | OUTPATIENT
Start: 2024-01-01 | End: 2024-01-01

## 2024-01-01 RX ORDER — ATORVASTATIN CALCIUM 40 MG/1
40 TABLET, FILM COATED ORAL EVERY EVENING
Status: DISCONTINUED | OUTPATIENT
Start: 2024-01-01 | End: 2024-01-01

## 2024-01-01 RX ORDER — LEVALBUTEROL INHALATION SOLUTION 1.25 MG/3ML
1.25 SOLUTION RESPIRATORY (INHALATION)
Status: DISCONTINUED | OUTPATIENT
Start: 2024-01-01 | End: 2024-01-01

## 2024-01-01 RX ORDER — LORAZEPAM 2 MG/ML
1 INJECTION INTRAMUSCULAR
Status: DISCONTINUED | OUTPATIENT
Start: 2024-01-01 | End: 2024-01-01

## 2024-01-01 RX ORDER — CHLORHEXIDINE GLUCONATE ORAL RINSE 1.2 MG/ML
15 SOLUTION DENTAL EVERY 12 HOURS SCHEDULED
Status: DISCONTINUED | OUTPATIENT
Start: 2024-01-01 | End: 2024-01-01

## 2024-01-01 RX ORDER — LORAZEPAM 2 MG/ML
2 INJECTION INTRAMUSCULAR ONCE
Status: COMPLETED | OUTPATIENT
Start: 2024-01-01 | End: 2024-01-01

## 2024-01-01 RX ORDER — ROCURONIUM BROMIDE 10 MG/ML
INJECTION, SOLUTION INTRAVENOUS AS NEEDED
Status: DISCONTINUED | OUTPATIENT
Start: 2024-01-01 | End: 2024-01-01

## 2024-01-01 RX ORDER — HEPARIN SODIUM 5000 [USP'U]/ML
5000 INJECTION, SOLUTION INTRAVENOUS; SUBCUTANEOUS EVERY 8 HOURS SCHEDULED
Status: DISCONTINUED | OUTPATIENT
Start: 2024-01-01 | End: 2024-01-01

## 2024-01-01 RX ORDER — EPTIFIBATIDE 0.75 MG/ML
INJECTION, SOLUTION INTRAVENOUS
Status: COMPLETED | OUTPATIENT
Start: 2024-01-01 | End: 2024-01-01

## 2024-01-01 RX ORDER — FENTANYL CITRATE 50 UG/ML
50 INJECTION, SOLUTION INTRAMUSCULAR; INTRAVENOUS
Status: DISCONTINUED | OUTPATIENT
Start: 2024-01-01 | End: 2024-01-01

## 2024-01-01 RX ORDER — LORAZEPAM 2 MG/ML
2 INJECTION INTRAMUSCULAR
Status: DISCONTINUED | OUTPATIENT
Start: 2024-01-01 | End: 2024-01-01 | Stop reason: HOSPADM

## 2024-01-01 RX ORDER — LEVALBUTEROL INHALATION SOLUTION 1.25 MG/3ML
SOLUTION RESPIRATORY (INHALATION)
Status: COMPLETED
Start: 2024-01-01 | End: 2024-01-01

## 2024-01-01 RX ORDER — SODIUM CHLORIDE, SODIUM LACTATE, POTASSIUM CHLORIDE, CALCIUM CHLORIDE 600; 310; 30; 20 MG/100ML; MG/100ML; MG/100ML; MG/100ML
75 INJECTION, SOLUTION INTRAVENOUS CONTINUOUS
Status: DISCONTINUED | OUTPATIENT
Start: 2024-01-01 | End: 2024-01-01

## 2024-01-01 RX ORDER — LISINOPRIL 10 MG/1
10 TABLET ORAL DAILY
Status: DISCONTINUED | OUTPATIENT
Start: 2024-01-01 | End: 2024-01-01

## 2024-01-01 RX ORDER — LABETALOL HYDROCHLORIDE 5 MG/ML
10 INJECTION, SOLUTION INTRAVENOUS EVERY 4 HOURS PRN
Status: DISCONTINUED | OUTPATIENT
Start: 2024-01-01 | End: 2024-01-01

## 2024-01-01 RX ORDER — INSULIN LISPRO 100 [IU]/ML
1-5 INJECTION, SOLUTION INTRAVENOUS; SUBCUTANEOUS EVERY 6 HOURS SCHEDULED
Status: DISCONTINUED | OUTPATIENT
Start: 2024-01-01 | End: 2024-01-01

## 2024-01-01 RX ORDER — LORAZEPAM 2 MG/ML
2 INJECTION INTRAMUSCULAR
Status: DISCONTINUED | OUTPATIENT
Start: 2024-01-01 | End: 2024-01-01

## 2024-01-01 RX ORDER — LEVALBUTEROL INHALATION SOLUTION 0.63 MG/3ML
0.31 SOLUTION RESPIRATORY (INHALATION) EVERY 6 HOURS PRN
Status: DISCONTINUED | OUTPATIENT
Start: 2024-01-01 | End: 2024-01-01

## 2024-01-01 RX ORDER — MAGNESIUM SULFATE HEPTAHYDRATE 40 MG/ML
4 INJECTION, SOLUTION INTRAVENOUS ONCE
Status: COMPLETED | OUTPATIENT
Start: 2024-01-01 | End: 2024-01-01

## 2024-01-01 RX ORDER — INSULIN GLARGINE 100 [IU]/ML
20 INJECTION, SOLUTION SUBCUTANEOUS EVERY MORNING
Status: DISCONTINUED | OUTPATIENT
Start: 2024-01-01 | End: 2024-01-01

## 2024-01-01 RX ORDER — AMIODARONE HYDROCHLORIDE 150 MG/3ML
INJECTION, SOLUTION INTRAVENOUS
Status: COMPLETED
Start: 2024-01-01 | End: 2024-01-01

## 2024-01-01 RX ORDER — ACETAMINOPHEN 325 MG/1
650 TABLET ORAL EVERY 6 HOURS PRN
Status: DISCONTINUED | OUTPATIENT
Start: 2024-01-01 | End: 2024-01-01 | Stop reason: HOSPADM

## 2024-01-01 RX ORDER — SODIUM CHLORIDE, SODIUM GLUCONATE, SODIUM ACETATE, POTASSIUM CHLORIDE, MAGNESIUM CHLORIDE, SODIUM PHOSPHATE, DIBASIC, AND POTASSIUM PHOSPHATE .53; .5; .37; .037; .03; .012; .00082 G/100ML; G/100ML; G/100ML; G/100ML; G/100ML; G/100ML; G/100ML
75 INJECTION, SOLUTION INTRAVENOUS CONTINUOUS
Status: DISCONTINUED | OUTPATIENT
Start: 2024-01-01 | End: 2024-01-01

## 2024-01-01 RX ORDER — FENTANYL CITRATE 50 UG/ML
INJECTION, SOLUTION INTRAMUSCULAR; INTRAVENOUS AS NEEDED
Status: DISCONTINUED | OUTPATIENT
Start: 2024-01-01 | End: 2024-01-01

## 2024-01-01 RX ORDER — LIDOCAINE HYDROCHLORIDE 10 MG/ML
INJECTION, SOLUTION EPIDURAL; INFILTRATION; INTRACAUDAL; PERINEURAL AS NEEDED
Status: COMPLETED | OUTPATIENT
Start: 2024-01-01 | End: 2024-01-01

## 2024-01-01 RX ORDER — LORAZEPAM 2 MG/ML
INJECTION INTRAMUSCULAR
Status: COMPLETED
Start: 2024-01-01 | End: 2024-01-01

## 2024-01-01 RX ORDER — AMOXICILLIN 250 MG
2 CAPSULE ORAL 2 TIMES DAILY
Status: DISCONTINUED | OUTPATIENT
Start: 2024-01-01 | End: 2024-01-01

## 2024-01-01 RX ORDER — EPTIFIBATIDE 0.75 MG/ML
0.5 INJECTION, SOLUTION INTRAVENOUS CONTINUOUS
Status: DISCONTINUED | OUTPATIENT
Start: 2024-01-01 | End: 2024-01-01

## 2024-01-01 RX ORDER — POTASSIUM CHLORIDE 14.9 MG/ML
INJECTION INTRAVENOUS
Status: DISCONTINUED
Start: 2024-01-01 | End: 2024-01-01 | Stop reason: HOSPADM

## 2024-01-01 RX ORDER — ALBUMIN HUMAN 50 G/1000ML
25 SOLUTION INTRAVENOUS ONCE
Status: COMPLETED | OUTPATIENT
Start: 2024-01-01 | End: 2024-01-01

## 2024-01-01 RX ORDER — POTASSIUM CHLORIDE 29.8 MG/ML
40 INJECTION INTRAVENOUS ONCE
Status: COMPLETED | OUTPATIENT
Start: 2024-01-01 | End: 2024-01-01

## 2024-01-01 RX ORDER — POTASSIUM CHLORIDE 20MEQ/15ML
LIQUID (ML) ORAL
Status: DISCONTINUED
Start: 2024-01-01 | End: 2024-01-01 | Stop reason: HOSPADM

## 2024-01-01 RX ORDER — EPTIFIBATIDE 2 MG/ML
INJECTION, SOLUTION INTRAVENOUS
Status: COMPLETED | OUTPATIENT
Start: 2024-01-01 | End: 2024-01-01

## 2024-01-01 RX ORDER — HALOPERIDOL 5 MG/1
5 TABLET ORAL
Status: DISCONTINUED | OUTPATIENT
Start: 2024-01-01 | End: 2024-01-01

## 2024-01-01 RX ADMIN — EPINEPHRINE 8 MCG/MIN: 1 INJECTION INTRAMUSCULAR; INTRAVENOUS; SUBCUTANEOUS at 12:10

## 2024-01-01 RX ADMIN — HEPARIN SODIUM 5000 UNITS: 5000 INJECTION INTRAVENOUS; SUBCUTANEOUS at 22:03

## 2024-01-01 RX ADMIN — ATORVASTATIN CALCIUM 40 MG: 40 TABLET, FILM COATED ORAL at 17:04

## 2024-01-01 RX ADMIN — NOREPINEPHRINE BITARTRATE 16 MCG: 1 INJECTION, SOLUTION, CONCENTRATE INTRAVENOUS at 21:56

## 2024-01-01 RX ADMIN — HEPARIN SODIUM 5000 UNITS: 5000 INJECTION INTRAVENOUS; SUBCUTANEOUS at 05:30

## 2024-01-01 RX ADMIN — CLOPIDOGREL BISULFATE 75 MG: 75 TABLET, FILM COATED ORAL at 08:48

## 2024-01-01 RX ADMIN — POTASSIUM CHLORIDE 40 MEQ: 29.8 INJECTION, SOLUTION INTRAVENOUS at 00:29

## 2024-01-01 RX ADMIN — IOHEXOL 75 ML: 350 INJECTION, SOLUTION INTRAVENOUS at 20:52

## 2024-01-01 RX ADMIN — IPRATROPIUM BROMIDE 0.5 MG: 0.5 SOLUTION RESPIRATORY (INHALATION) at 20:13

## 2024-01-01 RX ADMIN — INSULIN LISPRO 6 UNITS: 100 INJECTION, SOLUTION INTRAVENOUS; SUBCUTANEOUS at 00:13

## 2024-01-01 RX ADMIN — LORAZEPAM 2 MG: 2 INJECTION INTRAMUSCULAR; INTRAVENOUS at 12:51

## 2024-01-01 RX ADMIN — SODIUM CHLORIDE 250 ML: 3 INJECTION, SOLUTION INTRAVENOUS at 03:29

## 2024-01-01 RX ADMIN — SODIUM CHLORIDE 250 ML: 3 INJECTION, SOLUTION INTRAVENOUS at 12:43

## 2024-01-01 RX ADMIN — AMIODARONE HYDROCHLORIDE 1 MG/MIN: 50 INJECTION, SOLUTION INTRAVENOUS at 05:52

## 2024-01-01 RX ADMIN — POTASSIUM CHLORIDE 20 MEQ: 14.9 INJECTION, SOLUTION INTRAVENOUS at 05:29

## 2024-01-01 RX ADMIN — LEVETIRACETAM 1500 MG: 100 INJECTION, SOLUTION INTRAVENOUS at 12:50

## 2024-01-01 RX ADMIN — NOREPINEPHRINE BITARTRATE 32 MCG: 1 INJECTION, SOLUTION, CONCENTRATE INTRAVENOUS at 21:58

## 2024-01-01 RX ADMIN — HEPARIN SODIUM 5000 UNITS: 5000 INJECTION INTRAVENOUS; SUBCUTANEOUS at 13:29

## 2024-01-01 RX ADMIN — SENNOSIDES AND DOCUSATE SODIUM 2 TABLET: 50; 8.6 TABLET ORAL at 08:48

## 2024-01-01 RX ADMIN — EPTIFIBATIDE 11466 MCG: 2 INJECTION INTRAVENOUS at 22:14

## 2024-01-01 RX ADMIN — LEVETIRACETAM 750 MG: 100 INJECTION, SOLUTION INTRAVENOUS at 00:30

## 2024-01-01 RX ADMIN — PERFLUTREN 0.4 ML/MIN: 6.52 INJECTION, SUSPENSION INTRAVENOUS at 14:38

## 2024-01-01 RX ADMIN — EPTIFIBATIDE 0.5 MCG/KG/MIN: 0.75 INJECTION INTRAVENOUS at 00:14

## 2024-01-01 RX ADMIN — SODIUM CHLORIDE 3 UNITS/HR: 9 INJECTION, SOLUTION INTRAVENOUS at 02:20

## 2024-01-01 RX ADMIN — Medication 16 MG: at 21:11

## 2024-01-01 RX ADMIN — IPRATROPIUM BROMIDE 0.5 MG: 0.5 SOLUTION RESPIRATORY (INHALATION) at 13:48

## 2024-01-01 RX ADMIN — SODIUM CHLORIDE, SODIUM LACTATE, POTASSIUM CHLORIDE, AND CALCIUM CHLORIDE 75 ML/HR: .6; .31; .03; .02 INJECTION, SOLUTION INTRAVENOUS at 15:21

## 2024-01-01 RX ADMIN — CEFTRIAXONE SODIUM 1000 MG: 10 INJECTION, POWDER, FOR SOLUTION INTRAVENOUS at 13:29

## 2024-01-01 RX ADMIN — IODIXANOL 108 ML: 320 INJECTION, SOLUTION INTRAVASCULAR at 23:22

## 2024-01-01 RX ADMIN — PHENYLEPHRINE HYDROCHLORIDE 50 MCG/MIN: 10 INJECTION INTRAVENOUS at 21:51

## 2024-01-01 RX ADMIN — LEVETIRACETAM 750 MG: 100 INJECTION, SOLUTION INTRAVENOUS at 01:04

## 2024-01-01 RX ADMIN — MAGNESIUM SULFATE HEPTAHYDRATE 2 G: 40 INJECTION, SOLUTION INTRAVENOUS at 09:55

## 2024-01-01 RX ADMIN — PHENYLEPHRINE HYDROCHLORIDE 160 MCG/MIN: 50 INJECTION INTRAVENOUS at 11:31

## 2024-01-01 RX ADMIN — LEVALBUTEROL HYDROCHLORIDE 1.25 MG: 1.25 SOLUTION RESPIRATORY (INHALATION) at 13:48

## 2024-01-01 RX ADMIN — LEVOTHYROXINE SODIUM 150 MCG: 75 TABLET ORAL at 05:30

## 2024-01-01 RX ADMIN — CHLORHEXIDINE GLUCONATE 0.12% ORAL RINSE 15 ML: 1.2 LIQUID ORAL at 22:23

## 2024-01-01 RX ADMIN — NOREPINEPHRINE BITARTRATE 4 MCG/MIN: 1 SOLUTION INTRAVENOUS at 06:08

## 2024-01-01 RX ADMIN — EPTIFIBATIDE 0.5 MCG/KG/MIN: 0.75 INJECTION INTRAVENOUS at 22:18

## 2024-01-01 RX ADMIN — ASPIRIN 325 MG ORAL TABLET 325 MG: 325 PILL ORAL at 08:31

## 2024-01-01 RX ADMIN — POTASSIUM CHLORIDE 40 MEQ: 1.5 SOLUTION ORAL at 05:28

## 2024-01-01 RX ADMIN — ACETAMINOPHEN 650 MG: 325 TABLET, FILM COATED ORAL at 03:03

## 2024-01-01 RX ADMIN — CHLORHEXIDINE GLUCONATE 0.12% ORAL RINSE 15 ML: 1.2 LIQUID ORAL at 08:47

## 2024-01-01 RX ADMIN — HEPARIN SODIUM 5000 UNITS: 5000 INJECTION INTRAVENOUS; SUBCUTANEOUS at 22:04

## 2024-01-01 RX ADMIN — LEVETIRACETAM 750 MG: 100 INJECTION, SOLUTION INTRAVENOUS at 00:12

## 2024-01-01 RX ADMIN — SODIUM CHLORIDE 60 ML/HR: 3 INJECTION, SOLUTION INTRAVENOUS at 20:22

## 2024-01-01 RX ADMIN — SENNOSIDES AND DOCUSATE SODIUM 2 TABLET: 50; 8.6 TABLET ORAL at 08:31

## 2024-01-01 RX ADMIN — PROPOFOL 10 MCG/KG/MIN: 10 INJECTION, EMULSION INTRAVENOUS at 12:42

## 2024-01-01 RX ADMIN — SENNOSIDES AND DOCUSATE SODIUM 2 TABLET: 50; 8.6 TABLET ORAL at 17:04

## 2024-01-01 RX ADMIN — SODIUM CHLORIDE 75 ML/HR: 0.9 INJECTION, SOLUTION INTRAVENOUS at 03:01

## 2024-01-01 RX ADMIN — CHLORHEXIDINE GLUCONATE 0.12% ORAL RINSE 15 ML: 1.2 LIQUID ORAL at 20:23

## 2024-01-01 RX ADMIN — VASOPRESSIN 2 UNITS: 20 INJECTION INTRAVENOUS at 22:42

## 2024-01-01 RX ADMIN — IPRATROPIUM BROMIDE 0.5 MG: 0.5 SOLUTION RESPIRATORY (INHALATION) at 13:46

## 2024-01-01 RX ADMIN — POTASSIUM CHLORIDE 40 MEQ: 1.5 SOLUTION ORAL at 22:22

## 2024-01-01 RX ADMIN — LEVETIRACETAM 750 MG: 100 INJECTION, SOLUTION INTRAVENOUS at 00:33

## 2024-01-01 RX ADMIN — LEVALBUTEROL HYDROCHLORIDE 1.25 MG: 1.25 SOLUTION RESPIRATORY (INHALATION) at 19:13

## 2024-01-01 RX ADMIN — CEFTRIAXONE SODIUM 1000 MG: 10 INJECTION, POWDER, FOR SOLUTION INTRAVENOUS at 13:56

## 2024-01-01 RX ADMIN — SENNOSIDES AND DOCUSATE SODIUM 2 TABLET: 50; 8.6 TABLET ORAL at 08:47

## 2024-01-01 RX ADMIN — DEXTROSE 1000 ML: 5 SOLUTION INTRAVENOUS at 22:40

## 2024-01-01 RX ADMIN — ACETAMINOPHEN 650 MG: 325 TABLET, FILM COATED ORAL at 08:31

## 2024-01-01 RX ADMIN — CHLORHEXIDINE GLUCONATE 0.12% ORAL RINSE 15 ML: 1.2 LIQUID ORAL at 10:00

## 2024-01-01 RX ADMIN — SODIUM CHLORIDE 75 ML/HR: 0.9 INJECTION, SOLUTION INTRAVENOUS at 00:14

## 2024-01-01 RX ADMIN — IPRATROPIUM BROMIDE 0.5 MG: 0.5 SOLUTION RESPIRATORY (INHALATION) at 07:38

## 2024-01-01 RX ADMIN — AMIODARONE HYDROCHLORIDE 150 MG: 50 INJECTION, SOLUTION INTRAVENOUS at 01:45

## 2024-01-01 RX ADMIN — SENNOSIDES AND DOCUSATE SODIUM 2 TABLET: 50; 8.6 TABLET ORAL at 18:37

## 2024-01-01 RX ADMIN — ALBUMIN (HUMAN) 25 G: 12.5 INJECTION, SOLUTION INTRAVENOUS at 05:42

## 2024-01-01 RX ADMIN — ROCURONIUM BROMIDE 50 MG: 10 INJECTION, SOLUTION INTRAVENOUS at 21:52

## 2024-01-01 RX ADMIN — IPRATROPIUM BROMIDE 0.5 MG: 0.5 SOLUTION RESPIRATORY (INHALATION) at 01:03

## 2024-01-01 RX ADMIN — LEVOTHYROXINE SODIUM 150 MCG: 75 TABLET ORAL at 05:10

## 2024-01-01 RX ADMIN — CHLORHEXIDINE GLUCONATE 0.12% ORAL RINSE 15 ML: 1.2 LIQUID ORAL at 09:55

## 2024-01-01 RX ADMIN — METOPROLOL SUCCINATE 12.5 MG: 25 TABLET, EXTENDED RELEASE ORAL at 08:31

## 2024-01-01 RX ADMIN — CLOPIDOGREL BISULFATE 75 MG: 75 TABLET, FILM COATED ORAL at 08:47

## 2024-01-01 RX ADMIN — POTASSIUM CHLORIDE 40 MEQ: 1.5 SOLUTION ORAL at 00:29

## 2024-01-01 RX ADMIN — LEVETIRACETAM 750 MG: 100 INJECTION, SOLUTION INTRAVENOUS at 13:17

## 2024-01-01 RX ADMIN — MAGNESIUM SULFATE HEPTAHYDRATE 2 G: 40 INJECTION, SOLUTION INTRAVENOUS at 00:35

## 2024-01-01 RX ADMIN — LEVALBUTEROL HYDROCHLORIDE 1.25 MG: 1.25 SOLUTION RESPIRATORY (INHALATION) at 01:03

## 2024-01-01 RX ADMIN — AMIODARONE HYDROCHLORIDE 150 MG: 50 INJECTION, SOLUTION INTRAVENOUS at 02:30

## 2024-01-01 RX ADMIN — CHLORHEXIDINE GLUCONATE 0.12% ORAL RINSE 15 ML: 1.2 LIQUID ORAL at 21:17

## 2024-01-01 RX ADMIN — FENTANYL CITRATE 50 MCG: 50 INJECTION INTRAMUSCULAR; INTRAVENOUS at 20:23

## 2024-01-01 RX ADMIN — LEVOTHYROXINE SODIUM 150 MCG: 75 TABLET ORAL at 05:28

## 2024-01-01 RX ADMIN — SENNOSIDES AND DOCUSATE SODIUM 2 TABLET: 50; 8.6 TABLET ORAL at 18:28

## 2024-01-01 RX ADMIN — ATORVASTATIN CALCIUM 40 MG: 40 TABLET, FILM COATED ORAL at 18:57

## 2024-01-01 RX ADMIN — CEFTRIAXONE SODIUM 1000 MG: 10 INJECTION, POWDER, FOR SOLUTION INTRAVENOUS at 14:23

## 2024-01-01 RX ADMIN — SODIUM CHLORIDE 250 ML: 3 INJECTION, SOLUTION INTRAVENOUS at 13:37

## 2024-01-01 RX ADMIN — LEVALBUTEROL HYDROCHLORIDE 1.25 MG: 1.25 SOLUTION RESPIRATORY (INHALATION) at 07:38

## 2024-01-01 RX ADMIN — IPRATROPIUM BROMIDE 0.5 MG: 0.5 SOLUTION RESPIRATORY (INHALATION) at 01:19

## 2024-01-01 RX ADMIN — SENNOSIDES AND DOCUSATE SODIUM 2 TABLET: 50; 8.6 TABLET ORAL at 09:55

## 2024-01-01 RX ADMIN — NOREPINEPHRINE BITARTRATE 40 MCG/MIN: 1 INJECTION, SOLUTION, CONCENTRATE INTRAVENOUS at 12:51

## 2024-01-01 RX ADMIN — INSULIN LISPRO 6 UNITS: 100 INJECTION, SOLUTION INTRAVENOUS; SUBCUTANEOUS at 18:22

## 2024-01-01 RX ADMIN — CHLORHEXIDINE GLUCONATE 0.12% ORAL RINSE 15 ML: 1.2 LIQUID ORAL at 20:41

## 2024-01-01 RX ADMIN — NOREPINEPHRINE BITARTRATE 2 MCG/MIN: 1 INJECTION, SOLUTION, CONCENTRATE INTRAVENOUS at 17:00

## 2024-01-01 RX ADMIN — NOREPINEPHRINE BITARTRATE: 1 INJECTION, SOLUTION, CONCENTRATE INTRAVENOUS at 21:30

## 2024-01-01 RX ADMIN — INSULIN LISPRO 6 UNITS: 100 INJECTION, SOLUTION INTRAVENOUS; SUBCUTANEOUS at 06:04

## 2024-01-01 RX ADMIN — ATORVASTATIN CALCIUM 40 MG: 40 TABLET, FILM COATED ORAL at 00:35

## 2024-01-01 RX ADMIN — CHLORHEXIDINE GLUCONATE 0.12% ORAL RINSE 15 ML: 1.2 LIQUID ORAL at 00:00

## 2024-01-01 RX ADMIN — IPRATROPIUM BROMIDE 0.5 MG: 0.5 SOLUTION RESPIRATORY (INHALATION) at 07:07

## 2024-01-01 RX ADMIN — CLOPIDOGREL BISULFATE 75 MG: 75 TABLET, FILM COATED ORAL at 08:32

## 2024-01-01 RX ADMIN — HEPARIN SODIUM 5000 UNITS: 5000 INJECTION INTRAVENOUS; SUBCUTANEOUS at 05:28

## 2024-01-01 RX ADMIN — CEFTRIAXONE SODIUM 1000 MG: 10 INJECTION, POWDER, FOR SOLUTION INTRAVENOUS at 14:45

## 2024-01-01 RX ADMIN — SODIUM CHLORIDE 6 UNITS/HR: 9 INJECTION, SOLUTION INTRAVENOUS at 00:24

## 2024-01-01 RX ADMIN — LORAZEPAM 2 MG: 2 INJECTION INTRAMUSCULAR at 12:51

## 2024-01-01 RX ADMIN — ACETAMINOPHEN 650 MG: 325 TABLET, FILM COATED ORAL at 19:58

## 2024-01-01 RX ADMIN — ACETAMINOPHEN 650 MG: 325 TABLET, FILM COATED ORAL at 17:04

## 2024-01-01 RX ADMIN — VASOPRESSIN 0.04 UNITS/MIN: 20 INJECTION INTRAVENOUS at 10:26

## 2024-01-01 RX ADMIN — HEPARIN SODIUM 5000 UNITS: 5000 INJECTION INTRAVENOUS; SUBCUTANEOUS at 05:37

## 2024-01-01 RX ADMIN — LEVETIRACETAM 750 MG: 100 INJECTION, SOLUTION INTRAVENOUS at 12:46

## 2024-01-01 RX ADMIN — HALOPERIDOL 5 MG: 5 TABLET ORAL at 22:22

## 2024-01-01 RX ADMIN — SODIUM CHLORIDE 40 ML/HR: 3 INJECTION, SOLUTION INTRAVENOUS at 05:47

## 2024-01-01 RX ADMIN — NOREPINEPHRINE BITARTRATE 7 MCG/MIN: 1 INJECTION, SOLUTION, CONCENTRATE INTRAVENOUS at 04:45

## 2024-01-01 RX ADMIN — SODIUM CHLORIDE 30 ML/HR: 3 INJECTION, SOLUTION INTRAVENOUS at 15:43

## 2024-01-01 RX ADMIN — INSULIN LISPRO 4 UNITS: 100 INJECTION, SOLUTION INTRAVENOUS; SUBCUTANEOUS at 14:00

## 2024-01-01 RX ADMIN — FENTANYL CITRATE 50 MCG: 50 INJECTION INTRAMUSCULAR; INTRAVENOUS at 04:45

## 2024-01-01 RX ADMIN — IPRATROPIUM BROMIDE 0.5 MG: 0.5 SOLUTION RESPIRATORY (INHALATION) at 02:59

## 2024-01-01 RX ADMIN — IPRATROPIUM BROMIDE 0.5 MG: 0.5 SOLUTION RESPIRATORY (INHALATION) at 08:37

## 2024-01-01 RX ADMIN — SENNOSIDES AND DOCUSATE SODIUM 2 TABLET: 50; 8.6 TABLET ORAL at 18:57

## 2024-01-01 RX ADMIN — DEXTROSE 1000 ML: 5 SOLUTION INTRAVENOUS at 08:38

## 2024-01-01 RX ADMIN — CALCIUM GLUCONATE 2 G: 20 INJECTION, SOLUTION INTRAVENOUS at 05:38

## 2024-01-01 RX ADMIN — FENTANYL CITRATE 50 MCG: 50 INJECTION INTRAMUSCULAR; INTRAVENOUS at 21:17

## 2024-01-01 RX ADMIN — AMIODARONE HYDROCHLORIDE 0.5 MG/MIN: 50 INJECTION, SOLUTION INTRAVENOUS at 11:56

## 2024-01-01 RX ADMIN — NOREPINEPHRINE BITARTRATE 48 MCG: 1 INJECTION, SOLUTION, CONCENTRATE INTRAVENOUS at 22:36

## 2024-01-01 RX ADMIN — ATORVASTATIN CALCIUM 40 MG: 40 TABLET, FILM COATED ORAL at 18:28

## 2024-01-01 RX ADMIN — LEVALBUTEROL HYDROCHLORIDE 1.25 MG: 1.25 SOLUTION RESPIRATORY (INHALATION) at 20:13

## 2024-01-01 RX ADMIN — ASPIRIN 325 MG ORAL TABLET 325 MG: 325 PILL ORAL at 08:47

## 2024-01-01 RX ADMIN — SODIUM CHLORIDE 1000 ML: 0.9 INJECTION, SOLUTION INTRAVENOUS at 00:14

## 2024-01-01 RX ADMIN — IOHEXOL 75 ML: 350 INJECTION, SOLUTION INTRAVENOUS at 20:50

## 2024-01-01 RX ADMIN — LEVALBUTEROL HYDROCHLORIDE 1.25 MG: 1.25 SOLUTION RESPIRATORY (INHALATION) at 07:07

## 2024-01-01 RX ADMIN — SODIUM PHOSPHATE, MONOBASIC, MONOHYDRATE AND SODIUM PHOSPHATE, DIBASIC, ANHYDROUS 30 MMOL: 142; 276 INJECTION, SOLUTION INTRAVENOUS at 08:07

## 2024-01-01 RX ADMIN — ASPIRIN 325 MG ORAL TABLET 325 MG: 325 PILL ORAL at 19:23

## 2024-01-01 RX ADMIN — METOPROLOL SUCCINATE 12.5 MG: 25 TABLET, EXTENDED RELEASE ORAL at 08:47

## 2024-01-01 RX ADMIN — IOHEXOL 40 ML: 350 INJECTION, SOLUTION INTRAVENOUS at 21:32

## 2024-01-01 RX ADMIN — LEVOTHYROXINE SODIUM 150 MCG: 75 TABLET ORAL at 05:38

## 2024-01-01 RX ADMIN — SODIUM CHLORIDE, SODIUM GLUCONATE, SODIUM ACETATE, POTASSIUM CHLORIDE, MAGNESIUM CHLORIDE, SODIUM PHOSPHATE, DIBASIC, AND POTASSIUM PHOSPHATE 2000 ML: .53; .5; .37; .037; .03; .012; .00082 INJECTION, SOLUTION INTRAVENOUS at 13:42

## 2024-01-01 RX ADMIN — LIDOCAINE HYDROCHLORIDE 10 ML: 10 INJECTION, SOLUTION EPIDURAL; INFILTRATION; INTRACAUDAL; PERINEURAL at 21:51

## 2024-01-01 RX ADMIN — IPRATROPIUM BROMIDE 0.5 MG: 0.5 SOLUTION RESPIRATORY (INHALATION) at 19:13

## 2024-01-01 RX ADMIN — LEVALBUTEROL HYDROCHLORIDE 1.25 MG: 1.25 SOLUTION RESPIRATORY (INHALATION) at 08:37

## 2024-01-01 RX ADMIN — HEPARIN SODIUM 5000 UNITS: 5000 INJECTION INTRAVENOUS; SUBCUTANEOUS at 22:22

## 2024-01-01 RX ADMIN — INSULIN GLARGINE 20 UNITS: 100 INJECTION, SOLUTION SUBCUTANEOUS at 08:28

## 2024-01-01 RX ADMIN — CHLORHEXIDINE GLUCONATE 0.12% ORAL RINSE 15 ML: 1.2 LIQUID ORAL at 08:32

## 2024-01-01 RX ADMIN — INSULIN LISPRO 8 UNITS: 100 INJECTION, SOLUTION INTRAVENOUS; SUBCUTANEOUS at 08:29

## 2024-01-01 RX ADMIN — ATORVASTATIN CALCIUM 40 MG: 40 TABLET, FILM COATED ORAL at 18:37

## 2024-01-01 RX ADMIN — SODIUM CHLORIDE 4 UNITS/HR: 9 INJECTION, SOLUTION INTRAVENOUS at 12:26

## 2024-01-01 RX ADMIN — HEPARIN SODIUM 5000 UNITS: 5000 INJECTION INTRAVENOUS; SUBCUTANEOUS at 14:45

## 2024-01-01 RX ADMIN — DOBUTAMINE HYDROCHLORIDE 10 MCG/KG/MIN: 200 INJECTION INTRAVENOUS at 13:14

## 2024-01-01 RX ADMIN — INSULIN GLARGINE 20 UNITS: 100 INJECTION, SOLUTION SUBCUTANEOUS at 11:33

## 2024-01-01 RX ADMIN — MAGNESIUM SULFATE HEPTAHYDRATE 4 G: 40 INJECTION, SOLUTION INTRAVENOUS at 01:29

## 2024-01-01 RX ADMIN — HEPARIN SODIUM 5000 UNITS: 5000 INJECTION INTRAVENOUS; SUBCUTANEOUS at 14:23

## 2024-01-01 RX ADMIN — ASPIRIN 325 MG ORAL TABLET 325 MG: 325 PILL ORAL at 08:48

## 2024-01-01 RX ADMIN — LEVETIRACETAM 750 MG: 100 INJECTION, SOLUTION INTRAVENOUS at 11:38

## 2024-01-01 RX ADMIN — CHLORHEXIDINE GLUCONATE 0.12% ORAL RINSE 15 ML: 1.2 LIQUID ORAL at 08:48

## 2024-01-01 RX ADMIN — NOREPINEPHRINE BITARTRATE 32 MCG: 1 INJECTION, SOLUTION, CONCENTRATE INTRAVENOUS at 22:03

## 2024-01-01 RX ADMIN — POTASSIUM CHLORIDE 40 MEQ: 1.5 SOLUTION ORAL at 03:20

## 2024-01-01 RX ADMIN — LEVALBUTEROL HYDROCHLORIDE 1.25 MG: 1.25 SOLUTION RESPIRATORY (INHALATION) at 13:46

## 2024-01-01 RX ADMIN — SODIUM CHLORIDE 9 UNITS/HR: 9 INJECTION, SOLUTION INTRAVENOUS at 20:47

## 2024-01-01 RX ADMIN — LEVALBUTEROL HYDROCHLORIDE 1.25 MG: 1.25 SOLUTION RESPIRATORY (INHALATION) at 01:19

## 2024-01-01 RX ADMIN — DESMOPRESSIN ACETATE 24.8 MCG: 4 SOLUTION INTRAVENOUS at 22:37

## 2024-01-01 RX ADMIN — SODIUM CHLORIDE 40 ML/HR: 3 INJECTION, SOLUTION INTRAVENOUS at 10:51

## 2024-01-01 RX ADMIN — ACETAMINOPHEN 650 MG: 325 TABLET, FILM COATED ORAL at 00:40

## 2024-01-01 RX ADMIN — LEVOTHYROXINE SODIUM 150 MCG: 75 TABLET ORAL at 05:03

## 2024-01-01 RX ADMIN — FENTANYL CITRATE 100 MCG: 50 INJECTION INTRAMUSCULAR; INTRAVENOUS at 22:32

## 2024-01-01 RX ADMIN — METOPROLOL SUCCINATE 12.5 MG: 25 TABLET, EXTENDED RELEASE ORAL at 15:00

## 2024-01-01 RX ADMIN — NOREPINEPHRINE BITARTRATE 5 MCG/MIN: 1 INJECTION, SOLUTION, CONCENTRATE INTRAVENOUS at 22:03

## 2024-01-01 RX ADMIN — AMIODARONE HYDROCHLORIDE 1 MG/MIN: 50 INJECTION, SOLUTION INTRAVENOUS at 11:40

## 2024-01-01 RX ADMIN — CLOPIDOGREL BISULFATE 300 MG: 75 TABLET ORAL at 19:23

## 2024-01-01 RX ADMIN — LEVALBUTEROL HYDROCHLORIDE 1.25 MG: 1.25 SOLUTION RESPIRATORY (INHALATION) at 02:59

## 2024-01-01 RX ADMIN — SENNOSIDES AND DOCUSATE SODIUM 2 TABLET: 50; 8.6 TABLET ORAL at 10:00

## 2024-03-22 ENCOUNTER — HOSPITAL ENCOUNTER (EMERGENCY)
Facility: HOSPITAL | Age: 74
Discharge: HOME | DRG: 291 | End: 2024-03-22
Attending: EMERGENCY MEDICINE
Payer: MEDICARE

## 2024-03-22 VITALS
DIASTOLIC BLOOD PRESSURE: 94 MMHG | HEART RATE: 90 BPM | OXYGEN SATURATION: 98 % | TEMPERATURE: 98.8 F | RESPIRATION RATE: 18 BRPM | SYSTOLIC BLOOD PRESSURE: 146 MMHG

## 2024-03-22 DIAGNOSIS — Z91.148 NONCOMPLIANCE WITH MEDICATIONS: Primary | ICD-10-CM

## 2024-03-22 LAB — GLUCOSE SERPL-MCNC: 139 MG/DL (ref 65–140)

## 2024-03-22 PROCEDURE — 99284 EMERGENCY DEPT VISIT MOD MDM: CPT | Performed by: EMERGENCY MEDICINE

## 2024-03-22 PROCEDURE — 93005 ELECTROCARDIOGRAM TRACING: CPT

## 2024-03-22 PROCEDURE — 99285 EMERGENCY DEPT VISIT HI MDM: CPT

## 2024-03-22 PROCEDURE — 82948 REAGENT STRIP/BLOOD GLUCOSE: CPT

## 2024-03-22 RX ORDER — HALOPERIDOL 5 MG/1
5 TABLET ORAL ONCE
Qty: 1 TABLET | Refills: 0 | Status: COMPLETED | OUTPATIENT
Start: 2024-03-22 | End: 2024-03-22

## 2024-03-22 RX ORDER — ACETAMINOPHEN 325 MG/1
650 TABLET ORAL ONCE
Status: COMPLETED | OUTPATIENT
Start: 2024-03-22 | End: 2024-03-22

## 2024-03-22 RX ORDER — IBUPROFEN 400 MG/1
400 TABLET ORAL ONCE
Status: COMPLETED | OUTPATIENT
Start: 2024-03-22 | End: 2024-03-22

## 2024-03-22 RX ADMIN — ACETAMINOPHEN 650 MG: 325 TABLET, FILM COATED ORAL at 15:11

## 2024-03-22 RX ADMIN — HALOPERIDOL 5 MG: 5 TABLET ORAL at 15:11

## 2024-03-22 RX ADMIN — IBUPROFEN 400 MG: 400 TABLET, FILM COATED ORAL at 15:11

## 2024-03-22 NOTE — DISCHARGE INSTRUCTIONS
You were seen in the ED for medication noncompliance. You were given a dose of haloperidol in the ED. Please follow up with primary care doctor to restart your antipsychotic medications.

## 2024-03-22 NOTE — ED PROVIDER NOTES
"History  Chief Complaint   Patient presents with   • Altered Mental Status     Wheezing for 2 days, caregiver states she notices a decline in mental status.  Pt usually able to communicate.  Pt refusing medications at facility ( haldol, seroquel, vitamin d)     HPI  Patient is 73-year-old female with history of schizophrenia hypertension, hypothyroidism, diabetes, lives at independent living facility for psychiatric disorders presenting with facility staff for medication request.  Per staff patient was asking for haloperidol 5 mg which she has been refusing for several weeks, so prescribing provider discontinued medication .  Patient requested to come to the ED to get her haloperidol.  Staff member also reports patient had been wheezing a few days ago which resolved.  Staff reports due to medication noncompliance for dystonia it has become more difficult to understand patient.  Patient attempting to verbalize wants and needs but very difficult to understand due to baseline speech difficulties.  Patient asking for \"5 mg haloperidol, Tylenol and ibuprofen\".  Patient also seeing other things but very difficult understand.  Patient poor historian given speech barrier.       Prior to Admission Medications   Prescriptions Last Dose Informant Patient Reported? Taking?   QUEtiapine (SEROquel) 100 mg tablet   Yes No   Sig: Take 100 mg by mouth daily at bedtime   cholecalciferol 1000 UNITS tablet   No No   Sig: Take 1 tablet (1,000 Units total) by mouth daily Indications: Vitamin D deficiency.   escitalopram (LEXAPRO) 10 mg tablet   No No   Sig: Take 1 tablet (10 mg total) by mouth daily Indications: Depression.   haloperidol (HALDOL) 5 mg tablet   No No   Sig: Take 1 tablet (5 mg total) by mouth daily at bedtime Indications: Schizophrenia.   haloperidol decanoate (HALDOL DECANOATE) 50 mg/mL injection   No No   Sig: Inject 0.5 mL (25 mg total) into the shoulder, thigh, or buttocks every 28 days Indications: Schizophrenia. "   levothyroxine 100 mcg tablet   No No   Sig: Take 1 tablet (100 mcg total) by mouth daily in the early morning Indications: Underactive Thyroid.   metFORMIN (GLUCOPHAGE) 1000 MG tablet   No No   Sig: Take 1 tablet (1,000 mg total) by mouth 2 (two) times a day with meals Indications: Type 2 Diabetes.   metoprolol tartrate (LOPRESSOR) 25 mg tablet   No No   Sig: Take 0.5 tablets (12.5 mg total) by mouth 2 (two) times a day Indications: High Blood Pressure.   sitaGLIPtin (JANUVIA) 100 mg tablet   Yes No   Sig: Take 100 mg by mouth daily      Facility-Administered Medications: None       Past Medical History:   Diagnosis Date   • Hypertension    • Hypothyroid 5/8/2016   • Hypovitaminosis D 5/10/2016   • Iron deficiency anemia 5/11/2016   • Type 2 diabetes mellitus without complication (HCC) 5/8/2016       No past surgical history on file.    Family History   Family history unknown: Yes     I have reviewed and agree with the history as documented.    E-Cigarette/Vaping     E-Cigarette/Vaping Substances     Social History     Tobacco Use   • Smoking status: Every Day     Types: Cigarettes   • Smokeless tobacco: Current   Substance Use Topics   • Alcohol use: No   • Drug use: No        Review of Systems   Reason unable to perform ROS: Baseline language difficulties.       Physical Exam  ED Triage Vitals   Temperature Pulse Respirations Blood Pressure SpO2   03/22/24 1340 03/22/24 1340 03/22/24 1340 03/22/24 1340 03/22/24 1340   98.8 °F (37.1 °C) 90 18 146/94 98 %      Temp Source Heart Rate Source Patient Position - Orthostatic VS BP Location FiO2 (%)   03/22/24 1340 03/22/24 1340 -- -- --   Temporal Monitor         Pain Score       03/22/24 1511       Med Not Given for Pain - for MAR use only             Orthostatic Vital Signs  Vitals:    03/22/24 1340   BP: 146/94   Pulse: 90       Physical Exam  HENT:      Head: Normocephalic and atraumatic.      Mouth/Throat:      Mouth: Mucous membranes are moist.   Cardiovascular:       Rate and Rhythm: Normal rate.   Pulmonary:      Effort: Pulmonary effort is normal. No respiratory distress.   Skin:     General: Skin is warm and dry.   Neurological:      Mental Status: She is alert.      Comments: Speech garbled, baseline per staff member    Psychiatric:      Comments: Mildly agitated       ED Medications  Medications   haloperidol (HALDOL) tablet 5 mg (5 mg Oral Given 3/22/24 1511)   acetaminophen (TYLENOL) tablet 650 mg (650 mg Oral Given 3/22/24 1511)   ibuprofen (MOTRIN) tablet 400 mg (400 mg Oral Given 3/22/24 1511)       Diagnostic Studies  Results Reviewed       Procedure Component Value Units Date/Time    Fingerstick Glucose (POCT) [[id number]]  (Normal) Collected: 03/22/24 1352    Lab Status: Final result Specimen: Blood Updated: 03/22/24 1353     POC Glucose 139 mg/dl                    No orders to display         Procedures  Procedures      ED Course  ED Course as of 03/24/24 1517   Fri Mar 22, 2024   1354 POC Glucose: 139                             SBIRT 20yo+      Flowsheet Row Most Recent Value   Initial Alcohol Screen: US AUDIT-C     1. How often do you have a drink containing alcohol? 0 Filed at: 03/22/2024 1342   2. How many drinks containing alcohol do you have on a typical day you are drinking?  0 Filed at: 03/22/2024 1342   3a. Male UNDER 65: How often do you have five or more drinks on one occasion? 0 Filed at: 03/22/2024 1342   3b. FEMALE Any Age, or MALE 65+: How often do you have 4 or more drinks on one occassion? 0 Filed at: 03/22/2024 1342   Audit-C Score 0 Filed at: 03/22/2024 1342   FAUSTO: How many times in the past year have you...    Used an illegal drug or used a prescription medication for non-medical reasons? Never Filed at: 03/22/2024 1342                  Medical Decision Making  Amount and/or Complexity of Data Reviewed  Labs:  Decision-making details documented in ED Course.    Risk  OTC drugs.  Prescription drug management.      Patient is 73 y.[facility]. female  [facility] hypertension, hypothyroidism, diabetes, lives at independent living facility for psychiatric disorders presenting with facility staff for medication request. . See history and physical documented above.   Vitals***    Differential diagnosis included but not limited to ***. Plan ***    View ED course above for further discussion on patient workup.     On review of previous records ***.    All labs reviewed and utilized in the medical decision making process  All radiology studies independently viewed by me and interpreted by the radiologist.  I reviewed all testing with the patient.     Upon re-evaluation ***.    ***    Disposition  Final diagnoses:   Noncompliance with medications     Time reflects when diagnosis was documented in both MDM as applicable and the Disposition within this note       Time User Action Codes Description Comment    3/22/2024  4:33 PM Melisa Guadalupe Add [Z91.148] Noncompliance with medications           ED Disposition       ED Disposition   Discharge    Condition   Stable    Date/Time   Fri Mar 22, 2024 9747    Comment   Joslyn Cantu discharge to home/self care.                   Follow-up Information       Follow up With Specialties Details Why Contact Info    Natalia Elizondo MD Internal Medicine   [address] [place] [id number]  [phone]              Discharge Medication List as of 3/22/2024  4:34 PM        CONTINUE these medications which have NOT CHANGED    Details   cholecalciferol 1000 UNITS tablet Take 1 tablet (1,000 Units total) by mouth daily Indications: Vitamin D deficiency., Starting 5/11/2016, Until Discontinued, Print      escitalopram (LEXAPRO) 10 mg tablet Take 1 tablet (10 mg total) by mouth daily Indications: Depression., Starting 5/11/2016, Until Discontinued, Print      haloperidol (HALDOL) 5 mg tablet Take 1 tablet (5 mg total) by mouth daily at bedtime Indications: Schizophrenia., Starting 5/11/2016, Until  Discontinued, Print      haloperidol decanoate (HALDOL DECANOATE) 50 mg/mL injection Inject 0.5 mL (25 mg total) into the shoulder, thigh, or buttocks every 28 days Indications: Schizophrenia., Starting 5/11/2016, Until Discontinued, Print      levothyroxine 100 mcg tablet Take 1 tablet (100 mcg total) by mouth daily in the early morning Indications: Underactive Thyroid., Starting 5/11/2016, Until Discontinued, Print      metFORMIN (GLUCOPHAGE) 1000 MG tablet Take 1 tablet (1,000 mg total) by mouth 2 (two) times a day with meals Indications: Type 2 Diabetes., Starting 5/11/2016, Until Discontinued, Print      metoprolol tartrate (LOPRESSOR) 25 mg tablet Take 0.5 tablets (12.5 mg total) by mouth 2 (two) times a day Indications: High Blood Pressure., Starting 5/11/2016, Until Discontinued, Print      QUEtiapine (SEROquel) 100 mg tablet Take 100 mg by mouth daily at bedtime, Historical Med      sitaGLIPtin (JANUVIA) 100 mg tablet Take 100 mg by mouth daily, Historical Med           No discharge procedures on file.    PDMP Review       None             ED Provider  Attending physically available and evaluated Joslyn Cantu. I managed the patient along with the ED Attending.    Electronically Signed by         Attending.    Electronically Signed by           Melisa Guadalupe,   03/25/24 4913

## 2024-03-23 LAB
ATRIAL RATE: 91 BPM
P AXIS: 79 DEGREES
PR INTERVAL: 176 MS
QRS AXIS: 97 DEGREES
QRSD INTERVAL: 78 MS
QT INTERVAL: 424 MS
QTC INTERVAL: 521 MS
T WAVE AXIS: 93 DEGREES
VENTRICULAR RATE: 91 BPM

## 2024-03-23 PROCEDURE — 93010 ELECTROCARDIOGRAM REPORT: CPT | Performed by: INTERNAL MEDICINE

## 2024-03-24 ENCOUNTER — HOSPITAL ENCOUNTER (INPATIENT)
Facility: HOSPITAL | Age: 74
LOS: 1 days | Discharge: HOME | DRG: 291 | End: 2024-03-26
Attending: EMERGENCY MEDICINE | Admitting: STUDENT IN AN ORGANIZED HEALTH CARE EDUCATION/TRAINING PROGRAM
Payer: MEDICARE

## 2024-03-24 ENCOUNTER — APPOINTMENT (EMERGENCY)
Dept: RADIOLOGY | Facility: HOSPITAL | Age: 74
DRG: 291 | End: 2024-03-24
Payer: MEDICARE

## 2024-03-24 DIAGNOSIS — J18.9 PNEUMONIA: Primary | ICD-10-CM

## 2024-03-24 DIAGNOSIS — D50.9 IRON DEFICIENCY ANEMIA, UNSPECIFIED IRON DEFICIENCY ANEMIA TYPE: ICD-10-CM

## 2024-03-24 DIAGNOSIS — F20.9 SCHIZOPHRENIA, UNSPECIFIED TYPE (HCC): Chronic | ICD-10-CM

## 2024-03-24 DIAGNOSIS — R06.02 SHORTNESS OF BREATH: ICD-10-CM

## 2024-03-24 DIAGNOSIS — E03.9 HYPOTHYROID: ICD-10-CM

## 2024-03-24 DIAGNOSIS — I50.31 ACUTE DIASTOLIC CHF (CONGESTIVE HEART FAILURE) (HCC): ICD-10-CM

## 2024-03-24 PROBLEM — J96.01 ACUTE RESPIRATORY FAILURE WITH HYPOXIA (HCC): Status: ACTIVE | Noted: 2024-03-24

## 2024-03-24 LAB
ANION GAP SERPL CALCULATED.3IONS-SCNC: 10 MMOL/L (ref 4–13)
ATRIAL RATE: 77 BPM
BASOPHILS # BLD AUTO: 0.06 THOUSANDS/ÂΜL (ref 0–0.1)
BASOPHILS NFR BLD AUTO: 1 % (ref 0–1)
BNP SERPL-MCNC: 493 PG/ML (ref 0–100)
BUN SERPL-MCNC: 13 MG/DL (ref 5–25)
CALCIUM SERPL-MCNC: 8.7 MG/DL (ref 8.4–10.2)
CHLORIDE SERPL-SCNC: 98 MMOL/L (ref 96–108)
CO2 SERPL-SCNC: 23 MMOL/L (ref 21–32)
CREAT SERPL-MCNC: 0.61 MG/DL (ref 0.6–1.3)
EOSINOPHIL # BLD AUTO: 0.23 THOUSAND/ÂΜL (ref 0–0.61)
EOSINOPHIL NFR BLD AUTO: 2 % (ref 0–6)
ERYTHROCYTE [DISTWIDTH] IN BLOOD BY AUTOMATED COUNT: 18.2 % (ref 11.6–15.1)
FLUAV RNA RESP QL NAA+PROBE: NEGATIVE
FLUBV RNA RESP QL NAA+PROBE: NEGATIVE
GFR SERPL CREATININE-BSD FRML MDRD: 91 ML/MIN/1.73SQ M
GLUCOSE SERPL-MCNC: 153 MG/DL (ref 65–140)
GLUCOSE SERPL-MCNC: 184 MG/DL (ref 65–140)
GLUCOSE SERPL-MCNC: 216 MG/DL (ref 65–140)
GLUCOSE SERPL-MCNC: 248 MG/DL (ref 65–140)
GLUCOSE SERPL-MCNC: 262 MG/DL (ref 65–140)
HCT VFR BLD AUTO: 30.5 % (ref 34.8–46.1)
HGB BLD-MCNC: 8.5 G/DL (ref 11.5–15.4)
IMM GRANULOCYTES # BLD AUTO: 0.05 THOUSAND/UL (ref 0–0.2)
IMM GRANULOCYTES NFR BLD AUTO: 0 % (ref 0–2)
LYMPHOCYTES # BLD AUTO: 2.19 THOUSANDS/ÂΜL (ref 0.6–4.47)
LYMPHOCYTES NFR BLD AUTO: 18 % (ref 14–44)
MCH RBC QN AUTO: 20.6 PG (ref 26.8–34.3)
MCHC RBC AUTO-ENTMCNC: 27.9 G/DL (ref 31.4–37.4)
MCV RBC AUTO: 74 FL (ref 82–98)
MONOCYTES # BLD AUTO: 1.16 THOUSAND/ÂΜL (ref 0.17–1.22)
MONOCYTES NFR BLD AUTO: 10 % (ref 4–12)
NEUTROPHILS # BLD AUTO: 8.4 THOUSANDS/ÂΜL (ref 1.85–7.62)
NEUTS SEG NFR BLD AUTO: 69 % (ref 43–75)
NRBC BLD AUTO-RTO: 0 /100 WBCS
P AXIS: 69 DEGREES
PLATELET # BLD AUTO: 308 THOUSANDS/UL (ref 149–390)
PMV BLD AUTO: 10.5 FL (ref 8.9–12.7)
POTASSIUM SERPL-SCNC: 4.3 MMOL/L (ref 3.5–5.3)
PR INTERVAL: 168 MS
PROCALCITONIN SERPL-MCNC: <0.05 NG/ML
QRS AXIS: 100 DEGREES
QRSD INTERVAL: 86 MS
QT INTERVAL: 396 MS
QTC INTERVAL: 448 MS
RBC # BLD AUTO: 4.13 MILLION/UL (ref 3.81–5.12)
RSV RNA RESP QL NAA+PROBE: NEGATIVE
SARS-COV-2 RNA RESP QL NAA+PROBE: NEGATIVE
SODIUM SERPL-SCNC: 131 MMOL/L (ref 135–147)
T WAVE AXIS: 70 DEGREES
VENTRICULAR RATE: 77 BPM
WBC # BLD AUTO: 12.09 THOUSAND/UL (ref 4.31–10.16)

## 2024-03-24 PROCEDURE — 93005 ELECTROCARDIOGRAM TRACING: CPT

## 2024-03-24 PROCEDURE — 82948 REAGENT STRIP/BLOOD GLUCOSE: CPT

## 2024-03-24 PROCEDURE — 80048 BASIC METABOLIC PNL TOTAL CA: CPT

## 2024-03-24 PROCEDURE — 99291 CRITICAL CARE FIRST HOUR: CPT | Performed by: EMERGENCY MEDICINE

## 2024-03-24 PROCEDURE — 71046 X-RAY EXAM CHEST 2 VIEWS: CPT

## 2024-03-24 PROCEDURE — 94640 AIRWAY INHALATION TREATMENT: CPT

## 2024-03-24 PROCEDURE — 94760 N-INVAS EAR/PLS OXIMETRY 1: CPT

## 2024-03-24 PROCEDURE — 83880 ASSAY OF NATRIURETIC PEPTIDE: CPT

## 2024-03-24 PROCEDURE — 96367 TX/PROPH/DG ADDL SEQ IV INF: CPT

## 2024-03-24 PROCEDURE — 97166 OT EVAL MOD COMPLEX 45 MIN: CPT

## 2024-03-24 PROCEDURE — 93010 ELECTROCARDIOGRAM REPORT: CPT | Performed by: INTERNAL MEDICINE

## 2024-03-24 PROCEDURE — 36415 COLL VENOUS BLD VENIPUNCTURE: CPT

## 2024-03-24 PROCEDURE — 97162 PT EVAL MOD COMPLEX 30 MIN: CPT

## 2024-03-24 PROCEDURE — 92610 EVALUATE SWALLOWING FUNCTION: CPT

## 2024-03-24 PROCEDURE — 0241U HB NFCT DS VIR RESP RNA 4 TRGT: CPT

## 2024-03-24 PROCEDURE — 96365 THER/PROPH/DIAG IV INF INIT: CPT

## 2024-03-24 PROCEDURE — 99223 1ST HOSP IP/OBS HIGH 75: CPT | Performed by: INTERNAL MEDICINE

## 2024-03-24 PROCEDURE — 85025 COMPLETE CBC W/AUTO DIFF WBC: CPT

## 2024-03-24 PROCEDURE — 84145 PROCALCITONIN (PCT): CPT

## 2024-03-24 PROCEDURE — 94664 DEMO&/EVAL PT USE INHALER: CPT

## 2024-03-24 PROCEDURE — 99285 EMERGENCY DEPT VISIT HI MDM: CPT

## 2024-03-24 RX ORDER — FUROSEMIDE 10 MG/ML
40 INJECTION INTRAMUSCULAR; INTRAVENOUS ONCE
Status: COMPLETED | OUTPATIENT
Start: 2024-03-24 | End: 2024-03-24

## 2024-03-24 RX ORDER — INSULIN LISPRO 100 [IU]/ML
1-5 INJECTION, SOLUTION INTRAVENOUS; SUBCUTANEOUS
Status: DISCONTINUED | OUTPATIENT
Start: 2024-03-24 | End: 2024-03-24

## 2024-03-24 RX ORDER — IBUPROFEN 400 MG/1
400 TABLET ORAL EVERY 6 HOURS PRN
Status: DISCONTINUED | OUTPATIENT
Start: 2024-03-24 | End: 2024-03-26 | Stop reason: HOSPADM

## 2024-03-24 RX ORDER — QUETIAPINE FUMARATE 100 MG/1
100 TABLET, FILM COATED ORAL
Status: DISCONTINUED | OUTPATIENT
Start: 2024-03-24 | End: 2024-03-25

## 2024-03-24 RX ORDER — LEVOTHYROXINE SODIUM 0.1 MG/1
100 TABLET ORAL
Status: DISCONTINUED | OUTPATIENT
Start: 2024-03-24 | End: 2024-03-25

## 2024-03-24 RX ORDER — INSULIN LISPRO 100 [IU]/ML
1-5 INJECTION, SOLUTION INTRAVENOUS; SUBCUTANEOUS
Status: DISCONTINUED | OUTPATIENT
Start: 2024-03-24 | End: 2024-03-26 | Stop reason: HOSPADM

## 2024-03-24 RX ORDER — INSULIN LISPRO 100 [IU]/ML
1-5 INJECTION, SOLUTION INTRAVENOUS; SUBCUTANEOUS
Status: DISCONTINUED | OUTPATIENT
Start: 2024-03-25 | End: 2024-03-26 | Stop reason: HOSPADM

## 2024-03-24 RX ORDER — ACETYLCYSTEINE 200 MG/ML
3 SOLUTION ORAL; RESPIRATORY (INHALATION)
Status: DISCONTINUED | OUTPATIENT
Start: 2024-03-24 | End: 2024-03-24

## 2024-03-24 RX ORDER — ALBUTEROL SULFATE 90 UG/1
2 AEROSOL, METERED RESPIRATORY (INHALATION) EVERY 4 HOURS PRN
Status: DISCONTINUED | OUTPATIENT
Start: 2024-03-24 | End: 2024-03-26 | Stop reason: HOSPADM

## 2024-03-24 RX ORDER — IPRATROPIUM BROMIDE AND ALBUTEROL SULFATE .5; 3 MG/3ML; MG/3ML
1 SOLUTION RESPIRATORY (INHALATION) ONCE
Status: COMPLETED | OUTPATIENT
Start: 2024-03-24 | End: 2024-03-24

## 2024-03-24 RX ORDER — GUAIFENESIN 600 MG/1
600 TABLET, EXTENDED RELEASE ORAL 2 TIMES DAILY
Status: DISCONTINUED | OUTPATIENT
Start: 2024-03-24 | End: 2024-03-26 | Stop reason: HOSPADM

## 2024-03-24 RX ORDER — ESCITALOPRAM OXALATE 10 MG/1
10 TABLET ORAL DAILY
Status: DISCONTINUED | OUTPATIENT
Start: 2024-03-24 | End: 2024-03-25

## 2024-03-24 RX ORDER — HALOPERIDOL 5 MG/1
5 TABLET ORAL
Status: DISCONTINUED | OUTPATIENT
Start: 2024-03-24 | End: 2024-03-25

## 2024-03-24 RX ORDER — DOXYCYCLINE HYCLATE 100 MG/1
100 CAPSULE ORAL EVERY 12 HOURS SCHEDULED
Status: DISCONTINUED | OUTPATIENT
Start: 2024-03-24 | End: 2024-03-25

## 2024-03-24 RX ORDER — SODIUM CHLORIDE FOR INHALATION 0.9 %
12 VIAL, NEBULIZER (ML) INHALATION ONCE
Status: COMPLETED | OUTPATIENT
Start: 2024-03-24 | End: 2024-03-24

## 2024-03-24 RX ORDER — LEVALBUTEROL INHALATION SOLUTION 1.25 MG/3ML
1.25 SOLUTION RESPIRATORY (INHALATION)
Status: DISCONTINUED | OUTPATIENT
Start: 2024-03-24 | End: 2024-03-26 | Stop reason: HOSPADM

## 2024-03-24 RX ORDER — NICOTINE 21 MG/24HR
1 PATCH, TRANSDERMAL 24 HOURS TRANSDERMAL DAILY
Status: DISCONTINUED | OUTPATIENT
Start: 2024-03-24 | End: 2024-03-25

## 2024-03-24 RX ADMIN — IPRATROPIUM BROMIDE 0.5 MG: 0.5 SOLUTION RESPIRATORY (INHALATION) at 13:38

## 2024-03-24 RX ADMIN — HALOPERIDOL 5 MG: 5 TABLET ORAL at 21:40

## 2024-03-24 RX ADMIN — IPRATROPIUM BROMIDE 1 MG: 0.5 SOLUTION RESPIRATORY (INHALATION) at 03:59

## 2024-03-24 RX ADMIN — IBUPROFEN 400 MG: 400 TABLET, FILM COATED ORAL at 15:55

## 2024-03-24 RX ADMIN — ALBUTEROL SULFATE 2 PUFF: 90 AEROSOL, METERED RESPIRATORY (INHALATION) at 19:31

## 2024-03-24 RX ADMIN — QUETIAPINE FUMARATE 100 MG: 100 TABLET ORAL at 21:40

## 2024-03-24 RX ADMIN — GUAIFENESIN 600 MG: 600 TABLET, EXTENDED RELEASE ORAL at 09:13

## 2024-03-24 RX ADMIN — FUROSEMIDE 40 MG: 10 INJECTION, SOLUTION INTRAMUSCULAR; INTRAVENOUS at 19:27

## 2024-03-24 RX ADMIN — Medication 12.5 MG: at 17:50

## 2024-03-24 RX ADMIN — INSULIN LISPRO 2 UNITS: 100 INJECTION, SOLUTION INTRAVENOUS; SUBCUTANEOUS at 09:13

## 2024-03-24 RX ADMIN — ESCITALOPRAM OXALATE 10 MG: 10 TABLET ORAL at 09:12

## 2024-03-24 RX ADMIN — DOXYCYCLINE 100 MG: 100 CAPSULE ORAL at 09:12

## 2024-03-24 RX ADMIN — ALBUTEROL SULFATE 5 MG: 2.5 SOLUTION RESPIRATORY (INHALATION) at 03:59

## 2024-03-24 RX ADMIN — LEVOTHYROXINE SODIUM 100 MCG: 100 TABLET ORAL at 09:12

## 2024-03-24 RX ADMIN — ISODIUM CHLORIDE 12 ML: 0.03 SOLUTION RESPIRATORY (INHALATION) at 04:47

## 2024-03-24 RX ADMIN — IPRATROPIUM BROMIDE 1 MG: 0.5 SOLUTION RESPIRATORY (INHALATION) at 04:47

## 2024-03-24 RX ADMIN — ACETYLCYSTEINE 600 MG: 200 SOLUTION ORAL; RESPIRATORY (INHALATION) at 07:22

## 2024-03-24 RX ADMIN — NICOTINE 1 PATCH: 21 PATCH, EXTENDED RELEASE TRANSDERMAL at 09:12

## 2024-03-24 RX ADMIN — AZITHROMYCIN MONOHYDRATE 500 MG: 500 INJECTION, POWDER, LYOPHILIZED, FOR SOLUTION INTRAVENOUS at 05:35

## 2024-03-24 RX ADMIN — LEVALBUTEROL HYDROCHLORIDE 1.25 MG: 1.25 SOLUTION RESPIRATORY (INHALATION) at 07:22

## 2024-03-24 RX ADMIN — Medication 12.5 MG: at 09:13

## 2024-03-24 RX ADMIN — GUAIFENESIN 600 MG: 600 TABLET, EXTENDED RELEASE ORAL at 21:40

## 2024-03-24 RX ADMIN — IPRATROPIUM BROMIDE 0.5 MG: 0.5 SOLUTION RESPIRATORY (INHALATION) at 07:22

## 2024-03-24 RX ADMIN — FUROSEMIDE 40 MG: 10 INJECTION, SOLUTION INTRAMUSCULAR; INTRAVENOUS at 12:55

## 2024-03-24 RX ADMIN — DOXYCYCLINE 100 MG: 100 CAPSULE ORAL at 21:40

## 2024-03-24 RX ADMIN — CEFTRIAXONE SODIUM 2000 MG: 10 INJECTION, POWDER, FOR SOLUTION INTRAVENOUS at 05:16

## 2024-03-24 RX ADMIN — LEVALBUTEROL HYDROCHLORIDE 1.25 MG: 1.25 SOLUTION RESPIRATORY (INHALATION) at 13:38

## 2024-03-24 RX ADMIN — ALBUTEROL SULFATE 10 MG: 2.5 SOLUTION RESPIRATORY (INHALATION) at 04:47

## 2024-03-24 NOTE — ASSESSMENT & PLAN NOTE
Lab Results   Component Value Date    HGBA1C 9.2 (H) 03/05/2024       Recent Labs     03/22/24  1352 03/24/24  0716   POCGLU 139 262*         Blood Sugar Average: Last 72 hrs:  (P) 262    Sliding scale insulin while hospitalized  Holding PTA metformin

## 2024-03-24 NOTE — QUICK NOTE
Subjective: Seen and examined. No acute events.     Objective:   Gen: alert, no acute distress   CV: RRR, no murmurs, rubs, gallops  Pulm: bilateral wheezing   Extremities: bilateral 2+ pitting edema.     A/P  Will continue abx to cover for possible PNA given leukocytosis. Appears to be volume overloaded. Will give 40mg IV lasix dose now and reassess. F/u ECHO. Consider cardiology consult pending ECHO results. Wean O2 as tolerating   Please see H&P from 3/24 @ 7:47am for further assessment and plan

## 2024-03-24 NOTE — ASSESSMENT & PLAN NOTE
Lab Results   Component Value Date    HGBA1C 9.2 (H) 03/05/2024       Recent Labs     03/22/24  1352 03/24/24  0716   POCGLU 139 262*       Blood Sugar Average: Last 72 hrs:  (P) 262    Sliding scale insulin while hospitalized  Holding PTA Januvia

## 2024-03-24 NOTE — ED NOTES
This RN spoke to patients group home about current status, if there any further updates on discharge or admission to the hospital, call [facility] at [phone].     Lata Lam RN  03/24/24 5475

## 2024-03-24 NOTE — ASSESSMENT & PLAN NOTE
Continue PTA Lexapro, Seroquel and Haldol  Case management consultation for assistance with finding out where patient is currently living as well as if she has any relatives/contacts as she has no listed contacts and due to patient's underlying schizophrenia she is unable to tell me any of these details  Reviewed patient's chart it appears in 2021 she was living at a group home

## 2024-03-24 NOTE — ED PROVIDER NOTES
History  Chief Complaint   Patient presents with    Shortness of Breath     Pt coming from home c/o SOB, denies any CP.     HPI  73-year-old female with history of COPD, schizophrenia, hypothyroidism, hypertension presents to the ED for evaluation of shortness of breath.  History is extremely limited as it is difficult for patient to communicate.    Prior to Admission Medications   Prescriptions Last Dose Informant Patient Reported? Taking?   QUEtiapine (SEROquel) 100 mg tablet   Yes No   Sig: Take 100 mg by mouth daily at bedtime   cholecalciferol 1000 UNITS tablet   No No   Sig: Take 1 tablet (1,000 Units total) by mouth daily Indications: Vitamin D deficiency.   escitalopram (LEXAPRO) 10 mg tablet   No No   Sig: Take 1 tablet (10 mg total) by mouth daily Indications: Depression.   haloperidol (HALDOL) 5 mg tablet   No No   Sig: Take 1 tablet (5 mg total) by mouth daily at bedtime Indications: Schizophrenia.   haloperidol decanoate (HALDOL DECANOATE) 50 mg/mL injection   No No   Sig: Inject 0.5 mL (25 mg total) into the shoulder, thigh, or buttocks every 28 days Indications: Schizophrenia.   levothyroxine 100 mcg tablet   No No   Sig: Take 1 tablet (100 mcg total) by mouth daily in the early morning Indications: Underactive Thyroid.   metFORMIN (GLUCOPHAGE) 1000 MG tablet   No No   Sig: Take 1 tablet (1,000 mg total) by mouth 2 (two) times a day with meals Indications: Type 2 Diabetes.   metoprolol tartrate (LOPRESSOR) 25 mg tablet   No No   Sig: Take 0.5 tablets (12.5 mg total) by mouth 2 (two) times a day Indications: High Blood Pressure.   sitaGLIPtin (JANUVIA) 100 mg tablet   Yes No   Sig: Take 100 mg by mouth daily      Facility-Administered Medications: None       Past Medical History:   Diagnosis Date    Hypertension     Hypothyroid 5/8/2016    Hypovitaminosis D 5/10/2016    Iron deficiency anemia 5/11/2016    Type 2 diabetes mellitus without complication (HCC) 5/8/2016       History reviewed. No pertinent  surgical history.    Family History   Family history unknown: Yes     I have reviewed and agree with the history as documented.    E-Cigarette/Vaping     E-Cigarette/Vaping Substances     Social History     Tobacco Use    Smoking status: Every Day     Types: Cigarettes    Smokeless tobacco: Current   Substance Use Topics    Alcohol use: No    Drug use: No        Review of Systems   Unable to perform ROS: Other (Patient has no teeth and it is impossible to understand patient)       Physical Exam  ED Triage Vitals [03/24/24 0259]   Temperature Pulse Respirations Blood Pressure SpO2   98.3 °F (36.8 °C) 91 20 166/85 96 %      Temp Source Heart Rate Source Patient Position - Orthostatic VS BP Location FiO2 (%)   Oral Monitor Lying Right arm --      Pain Score       --             Orthostatic Vital Signs  Vitals:    03/24/24 0259 03/24/24 0445 03/24/24 0704   BP: 166/85 (!) 193/109 123/71   Pulse: 91 (!) 110    Patient Position - Orthostatic VS: Lying Lying Lying       Physical Exam  Constitutional:       Appearance: She is not toxic-appearing.   HENT:      Mouth/Throat:      Mouth: Mucous membranes are moist.      Pharynx: Oropharynx is clear.      Comments: Absent dentition  Eyes:      Extraocular Movements: Extraocular movements intact.      Conjunctiva/sclera: Conjunctivae normal.   Cardiovascular:      Rate and Rhythm: Normal rate and regular rhythm.      Pulses: Normal pulses.      Heart sounds: Normal heart sounds.   Pulmonary:      Effort: Respiratory distress present.      Breath sounds: Wheezing and rhonchi present.   Abdominal:      General: There is no distension.      Palpations: Abdomen is soft.      Tenderness: There is no abdominal tenderness.   Musculoskeletal:      Cervical back: Normal range of motion and neck supple.      Right lower leg: No edema.      Left lower leg: No edema.   Skin:     General: Skin is warm and dry.      Capillary Refill: Capillary refill takes less than 2 seconds.   Neurological:       General: No focal deficit present.      Mental Status: She is alert. Mental status is at baseline.         ED Medications  Medications   haloperidol (HALDOL) tablet 5 mg (has no administration in time range)   escitalopram (LEXAPRO) tablet 10 mg (has no administration in time range)   levothyroxine tablet 100 mcg (has no administration in time range)   metoprolol tartrate (LOPRESSOR) partial tablet 12.5 mg (has no administration in time range)   QUEtiapine (SEROquel) tablet 100 mg (has no administration in time range)   nicotine (NICODERM CQ) 21 mg/24 hr TD 24 hr patch 1 patch (has no administration in time range)   guaiFENesin (MUCINEX) 12 hr tablet 600 mg (has no administration in time range)   ceftriaxone (ROCEPHIN) 1 g/50 mL in dextrose IVPB (has no administration in time range)   doxycycline hyclate (VIBRAMYCIN) capsule 100 mg (has no administration in time range)   levalbuterol (XOPENEX) inhalation solution 1.25 mg (has no administration in time range)   ipratropium (ATROVENT) 0.02 % inhalation solution 0.5 mg (has no administration in time range)   acetylcysteine (MUCOMYST) 200 mg/mL inhalation solution 600 mg (has no administration in time range)   insulin lispro (HumALOG/ADMELOG) 100 units/mL subcutaneous injection 1-5 Units (has no administration in time range)   insulin lispro (HumALOG/ADMELOG) 100 units/mL subcutaneous injection 1-5 Units (has no administration in time range)   ipratropium-albuterol (FOR EMS ONLY) (DUO-NEB) 0.5-2.5 mg/3 mL inhalation solution 3 mL (0 mL Does not apply Given to EMS 3/24/24 0409)   albuterol inhalation solution 5 mg (5 mg Nebulization Given 3/24/24 0359)   ipratropium (ATROVENT) 0.02 % inhalation solution 1 mg (1 mg Nebulization Given 3/24/24 0359)   albuterol inhalation solution 10 mg (10 mg Nebulization Given 3/24/24 0447)   ipratropium (ATROVENT) 0.02 % inhalation solution 1 mg (1 mg Nebulization Given 3/24/24 0447)   sodium chloride 0.9 % inhalation solution 12 mL  (12 mL Nebulization Given 3/24/24 0447)   ceftriaxone (ROCEPHIN) 2 g/50 mL in dextrose IVPB (0 mg Intravenous Stopped 3/24/24 0535)   azithromycin (ZITHROMAX) 500 mg in sodium chloride 0.9% 250mL IVPB 500 mg (500 mg Intravenous New Bag 3/24/24 0535)       Diagnostic Studies  Results Reviewed       Procedure Component Value Units Date/Time    Procalcitonin [[id number]]  (Normal) Collected: 03/24/24 0400    Lab Status: Final result Specimen: Blood from Arm, Left Updated: 03/24/24 0501     Procalcitonin <0.05 ng/ml     FLU/RSV/COVID - if FLU/RSV clinically relevant [[id number]]  (Normal) Collected: 03/24/24 0400    Lab Status: Final result Specimen: Nares from Nose Updated: 03/24/24 0458     SARS-CoV-2 Negative     INFLUENZA A PCR Negative     INFLUENZA B PCR Negative     RSV PCR Negative    Narrative:      FOR PEDIATRIC PATIENTS - copy/paste COVID Guidelines URL to browser: [email or web address]    SARS-CoV-2 assay is a Nucleic Acid Amplification assay intended for the  qualitative detection of nucleic acid from SARS-CoV-2 in nasopharyngeal  swabs. Results are for the presumptive identification of SARS-CoV-2 RNA.    Positive results are indicative of infection with SARS-CoV-2, the virus  causing COVID-19, but do not rule out bacterial infection or co-infection  with other viruses. Laboratories within the [place] and its  territories are required to report all positive results to the appropriate  public health authorities. Negative results do not preclude SARS-CoV-2  infection and should not be used as the sole basis for treatment or other  patient management decisions. Negative results must be combined with  clinical observations, patient history, and epidemiological information.  This test has not been FDA cleared or approved.    This test has been authorized by FDA under an Emergency Use Authorization  (EUA). This test is only authorized for the duration of  time the  declaration that circumstances exist justifying the authorization of the  emergency use of an in vitro diagnostic tests for detection of SARS-CoV-2  virus and/or diagnosis of COVID-19 infection under section 564(b)(1) of  the Act, 21 U.S.C. 360bbb-3(b)(1), unless the authorization is terminated  or revoked sooner. The test has been validated but independent review by FDA  and CLIA is pending.    Test performed using [email or web address]: This RT-PCR assay targets N2,  a region unique to SARS-CoV-2. A conserved region in the E-gene was chosen  for pan-Sarbecovirus detection which includes SARS-CoV-2.    According to CMS-2020-01-R, this platform meets the definition of high-throughput technology.    Basic metabolic panel [[id number]]  (Abnormal) Collected: 03/24/24 0400    Lab Status: Final result Specimen: Blood from Arm, Left Updated: 03/24/24 0443     Sodium 131 mmol/L      Potassium 4.3 mmol/L      Chloride 98 mmol/L      CO2 23 mmol/L      ANION GAP 10 mmol/L      BUN 13 mg/dL      Creatinine 0.61 mg/dL      Glucose 248 mg/dL      Calcium 8.7 mg/dL      eGFR 91 ml/min/1.73sq m     Narrative:      National Kidney Disease Foundation guidelines for Chronic Kidney Disease (CKD):     Stage 1 with normal or high GFR (GFR > 90 mL/min/1.73 square meters)    Stage 2 Mild CKD (GFR = 60-89 mL/min/1.73 square meters)    Stage 3A Moderate CKD (GFR = 45-59 mL/min/1.73 square meters)    Stage 3B Moderate CKD (GFR = 30-44 mL/min/1.73 square meters)    Stage 4 Severe CKD (GFR = 15-29 mL/min/1.73 square meters)    Stage 5 End Stage CKD (GFR <15 mL/min/1.73 square meters)  Note: GFR calculation is accurate only with a steady state creatinine    CBC and differential [[id number]]  (Abnormal) Collected: 03/24/24 0400    Lab Status: Final result Specimen: Blood from Arm, Left Updated: 03/24/24 0422     WBC 12.09 Thousand/uL      RBC 4.13 Million/uL      Hemoglobin 8.5 g/dL      Hematocrit 30.5 %      MCV 74 fL      MCH 20.6 pg       MCHC 27.9 g/dL      RDW 18.2 %      MPV 10.5 fL      Platelets 308 Thousands/uL      nRBC 0 /100 WBCs      Neutrophils Relative 69 %      Immature Grans % 0 %      Lymphocytes Relative 18 %      Monocytes Relative 10 %      Eosinophils Relative 2 %      Basophils Relative 1 %      Neutrophils Absolute 8.40 Thousands/µL      Absolute Immature Grans 0.05 Thousand/uL      Absolute Lymphocytes 2.19 Thousands/µL      Absolute Monocytes 1.16 Thousand/µL      Eosinophils Absolute 0.23 Thousand/µL      Basophils Absolute 0.06 Thousands/µL                    XR chest 2 views   ED Interpretation by Ihsan Aldana DO (03/24 0503)   Right lower lobe opacity.            Procedures  Procedures      ED Course  ED Course as of 03/24/24 0709   Sun Mar 24, 2024   0503 XR chest 2 views  Will treat as bacterial pneumonia.   0605 Discussed case with admitting team agrees to place patient in observation.                                       Medical Decision Making  73-year-old female presents with shortness of breath.  History limited by patient's inability to communicate.  Patient with normal vital signs on presentation.  Patient appears to be in respiratory distress.  HEENT exam significant only for absent dentition.  Heart sounds are normal with regular rate and rhythm.  Lungs with wheezing throughout as well as some rhonchi.  Abdomen is benign.  Distal pulses are equal and intact.  Capillary refill is normal.  There is no lower extremity edema.  Concern for pneumonia, fluid overload, viral illness, COPD exacerbation.  I will get CBC, BMP, procalcitonin, chest x-ray and treat with albuterol and ipratropium nebulizer.    Please see ED course for additional MDM.    Amount and/or Complexity of Data Reviewed  Labs: ordered.  Radiology: ordered and independent interpretation performed. Decision-making details documented in ED Course.    Risk  Prescription drug management.  Decision regarding  hospitalization.          Disposition  Final diagnoses:   Shortness of breath   Pneumonia     Time reflects when diagnosis was documented in both MDM as applicable and the Disposition within this note       Time User Action Codes Description Comment    3/24/2024  5:19 AM Ihsan Aldana Add [R06.02] Shortness of breath     3/24/2024  5:19 AM Ihsan Aldana Add [J18.9] Pneumonia     3/24/2024  5:19 AM Ihsan Aldana Modify [R06.02] Shortness of breath     3/24/2024  5:19 AM Ihsan Aldana Modify [J18.9] Pneumonia           ED Disposition       ED Disposition   Admit    Condition   Stable    Date/Time   Sun Mar 24, 2024  5:33 AM    Comment   Case was discussed with medicine and the patient's admission status was agreed to be Admission Status: inpatient.               Follow-up Information    None         Current Discharge Medication List        CONTINUE these medications which have NOT CHANGED    Details   cholecalciferol 1000 UNITS tablet Take 1 tablet (1,000 Units total) by mouth daily Indications: Vitamin D deficiency.  Qty: 30 tablet, Refills: 0    Associated Diagnoses: Hypovitaminosis D      escitalopram (LEXAPRO) 10 mg tablet Take 1 tablet (10 mg total) by mouth daily Indications: Depression.  Qty: 30 tablet, Refills: 1    Associated Diagnoses: Schizophrenia, unspecified type (HCC)      haloperidol (HALDOL) 5 mg tablet Take 1 tablet (5 mg total) by mouth daily at bedtime Indications: Schizophrenia.  Qty: 30 tablet, Refills: 1    Associated Diagnoses: Schizophrenia, unspecified type (HCC)      haloperidol decanoate (HALDOL DECANOATE) 50 mg/mL injection Inject 0.5 mL (25 mg total) into the shoulder, thigh, or buttocks every 28 days Indications: Schizophrenia.  Qty: 0.5 mL, Refills: 1    Associated Diagnoses: Schizophrenia, unspecified type (HCC)      levothyroxine 100 mcg tablet Take 1 tablet (100 mcg total) by mouth daily in the early morning Indications: Underactive Thyroid.  Qty: 30 tablet,  Refills: 0    Associated Diagnoses: Hypothyroid      metFORMIN (GLUCOPHAGE) 1000 MG tablet Take 1 tablet (1,000 mg total) by mouth 2 (two) times a day with meals Indications: Type 2 Diabetes.  Qty: 60 tablet, Refills: 0    Associated Diagnoses: Type 2 diabetes mellitus without complication (HCC)      metoprolol tartrate (LOPRESSOR) 25 mg tablet Take 0.5 tablets (12.5 mg total) by mouth 2 (two) times a day Indications: High Blood Pressure.  Qty: 30 tablet, Refills: 0    Associated Diagnoses: Hypertension      QUEtiapine (SEROquel) 100 mg tablet Take 100 mg by mouth daily at bedtime      sitaGLIPtin (JANUVIA) 100 mg tablet Take 100 mg by mouth daily           No discharge procedures on file.    PDMP Review       None             ED Provider  Attending physically available and evaluated Joslyn Cantu. I managed the patient along with the ED Attending.    Electronically Signed by           Ihsan Aldana DO  03/24/24 6190

## 2024-03-24 NOTE — OCCUPATIONAL THERAPY NOTE
Occupational Therapy Evaluation     Patient Name: Joslyn Cantu  Today's Date: 3/24/2024  Problem List  Principal Problem:    Acute respiratory failure with hypoxia (HCC)  Active Problems:    Schizophrenia (HCC)    Hypothyroid    Type 2 diabetes mellitus without complication (HCC)    Past Medical History  Past Medical History:   Diagnosis Date    Hypertension     Hypothyroid 5/8/2016    Hypovitaminosis D 5/10/2016    Iron deficiency anemia 5/11/2016    Type 2 diabetes mellitus without complication (HCC) 5/8/2016     Past Surgical History  History reviewed. No pertinent surgical history.      03/24/24 1055   OT Last Visit   OT Visit Date 03/24/24   Note Type   Note type Orthotic Management/Training   Pain Assessment   Pain Assessment Tool FLACC   Pain Rating: FLACC (Rest) - Face 0   Pain Rating: FLACC (Rest) - Legs 0   Pain Rating: FLACC (Rest) - Activity 0   Pain Rating: FLACC (Rest) - Cry 0   Pain Rating: FLACC (Rest) - Consolability 0   Score: FLACC (Rest) 0   Pain Rating: FLACC (Activity) - Face 0   Pain Rating: FLACC (Activity) - Legs 0   Pain Rating: FLACC (Activity) - Activity 0   Pain Rating: FLACC (Activity) - Cry 0   Pain Rating: FLACC (Activity) - Consolability 0   Score: FLACC (Activity) 0   Restrictions/Precautions   Weight Bearing Precautions Per Order No   Other Precautions Bed Alarm;Fall Risk;Impulsive   Home Living   Type of Home Other (Comment)  ([facility])   Additional Comments pt unable to provide information re: home living situation/setup   Prior Function   Level of [place] Independent with ADLs;Independent with functional mobility;Needs assistance with IADLS   IADLs Family/Friend/Other provides transportation;Family/Friend/Other provides meals;Family/Friend/Other provides medication management   Falls in the last 6 months 0   Lifestyle   Autonomy I adls and mobility- has support for iadls   Reciprocal Relationships supportive group home staff   Service to Others not  "working/retired   Intrinsic Gratification sedentary - does not ID any interests   Subjective   Subjective \"No\"   ADL   Eating Assistance 5  Supervision/Setup   Grooming Assistance 5  Supervision/Setup   UB Bathing Assistance 5  Supervision/Setup   LB Bathing Assistance 5  Supervision/Setup   UB Dressing Assistance 5  Supervision/Setup   LB Dressing Assistance 5  Supervision/Setup   Toileting Assistance  5  Supervision/Setup   Additional Comments limited cooperation with tasks - observed pt moving around room, climbing in/oob, toileting self, adjusting footwear   Bed Mobility   Sit to Supine 5  Supervision   Transfers   Sit to Stand 5  Supervision   Stand to Sit 5  Supervision   Functional Mobility   Functional Mobility 5  Supervision   Additional Comments moves quickly and impulsively at times - declined walking outside of room   Balance   Static Sitting Fair +   Dynamic Sitting Fair +   Static Standing Fair   Dynamic Standing Fair   Ambulatory Fair -   Cognition   Arousal/Participation Alert;Responsive   Attention Attends with cues to redirect   Orientation Level Oriented to person  (unable to further assess as pt did not respond to many questions)   Following Commands Follows one step commands with increased time or repetition   Comments limited cooperation   Assessment   Limitation Decreased Safe judgement during ADL;Decreased endurance;Decreased self-care trans;Decreased high-level ADLs   Prognosis Good;Fair   Assessment Pt is a 73 y.o. female who was admitted to [facility] on 3/24/2024 with Acute respiratory failure with hypoxia (HCC) . Patient  has a past medical history of Hypertension, Hypothyroid, Hypovitaminosis D, Iron deficiency anemia, and Type 2 diabetes mellitus without complication (HCC). At baseline pt was completing adls and mobility independently - has support for iadls PRN. Pt lives in [facility]. Currently pt requires sba for overall ADLS and sba for functional " mobility/transfers. Pt currently presents with impairments in the following categories -difficulty performing IADLS , limited insight into deficits, compliance, and health management  activity tolerance, endurance, standing balance/tolerance, insight, safety , judgement , attention , task initiation , task termination , communication, and interpersonal skills. These impairments, as well as pt's fatigue, SOB, impulsivity, and risk for falls  limit pt's ability to safely engage in all baseline areas of occupation, includingfunctional mobility/transfers, community mobility, house maintenance, medication management, meal prep, cleaning, social participation , and leisure activities  From OT standpoint, recommend Level IV resources upon D/C. No immediate acute OT needs indicated at this time as pt is functioning close to baseline and has adequate support available at group home to provide supervision - d/c from caseload   Goals   Patient Goals none stated   Plan   OT Frequency Eval only   Discharge Recommendation   Rehab Resource Intensity Level, OT No post-acute rehabilitation needs   AM-PAC Daily Activity Inpatient   Lower Body Dressing 3   Bathing 3   Toileting 3   Upper Body Dressing 3   Grooming 4   Eating 4   Daily Activity Raw Score 20   Daily Activity Standardized Score (Calc for Raw Score >=11) 42.03   AM-PAC Applied Cognition Inpatient   Following a Speech/Presentation 2   Understanding Ordinary Conversation 3   Taking Medications 2   Remembering Where Things Are Placed or Put Away 3   Remembering List of 4-5 Errands 2   Taking Care of Complicated Tasks 2   Applied Cognition Raw Score 14   Applied Cognition Standardized Score 32.02   End of Consult   Education Provided Yes   Patient Position at End of Consult Supine;Bed/Chair alarm activated;All needs within reach   Nurse Communication Nurse aware of consult       The patient's raw score on the AM-PAC Daily Activity Inpatient Short Form is 20. A raw score of  greater than or equal to 19 suggests the patient may benefit from discharge to home. Please refer to the recommendation of the Occupational Therapist for safe discharge planning.    Dionna Patino

## 2024-03-24 NOTE — RESPIRATORY THERAPY NOTE
RT Protocol Note  Joslyn Cantu 73 y.o. female MRN: [id number]  Unit/Bed#: CW2 213-01 Encounter: [id number]    Assessment    Active Problems:  There are no active Hospital Problems.      Home Pulmonary Medications:  none       Past Medical History:   Diagnosis Date    Hypertension     Hypothyroid 5/8/2016    Hypovitaminosis D 5/10/2016    Iron deficiency anemia 5/11/2016    Type 2 diabetes mellitus without complication (HCC) 5/8/2016     Social History     Socioeconomic History    Marital status: Single     Spouse name: None    Number of children: None    Years of education: None    Highest education level: None   Occupational History    None   Tobacco Use    Smoking status: Every Day     Types: Cigarettes    Smokeless tobacco: Current   Substance and Sexual Activity    Alcohol use: No    Drug use: No    Sexual activity: Not Currently   Other Topics Concern    None   Social History Narrative    None     Social Determinants of Health     Financial Resource Strain: Not on file   Food Insecurity: Not on file   Transportation Needs: Not on file   Physical Activity: Not on file   Stress: Not on file   Social Connections: Not on file   Intimate Partner Violence: Not on file   Housing Stability: Not on file       Subjective         Objective    Physical Exam:   Assessment Type: During-treatment  General Appearance: Awake  Respiratory Pattern: Dyspnea at rest  Chest Assessment: Chest expansion symmetrical  Bilateral Breath Sounds: Expiratory wheezes  O2 Device: NC    Vitals:  Blood pressure 123/71, pulse (!) 110, temperature (!) 97.3 °F (36.3 °C), temperature source Oral, resp. rate (!) 26, SpO2 99%.          Imaging and other studies: I have personally reviewed pertinent reports.      O2 Device: NC     Plan    Respiratory Plan: Mild Distress pathway        Resp Comments: (P) Pt presents with SOB. Pt is a current smoker but is unable to recall how many packs a day. Has no pulm hx and takes no resp meds at home. Dyspnea  present at rest along with bilateral exp wheezes. Will continue with xopenex/atrovent TID and continue to monitor.

## 2024-03-24 NOTE — CASE MANAGEMENT
Case Management Assessment    Patient name Joslyn Cantu  Location 2 213/CW2 213-01 MRN [id number]  : 1950 Date 3/24/2024       Current Admission Date: 3/24/2024  Current Admission Diagnosis:Acute respiratory failure with hypoxia (HCC)   Patient Active Problem List    Diagnosis Date Noted    Acute respiratory failure with hypoxia (HCC) 2024    Iron deficiency anemia 2016    Hypovitaminosis D 05/10/2016    Hypothyroid 2016    Hypertension 2016    Type 2 diabetes mellitus without complication (HCC) 2016    Schizophrenia (HCC) 2016      LOS (days): 0  Geometric Mean LOS (GMLOS) (days):   Days to GMLOS:     OBJECTIVE:              Current admission status: Observation       Preferred Pharmacy:   [facility] [place], [place] - [address]  [address]  [place] [address]  Phone: [phone] Fax: [phone]    Primary Care Provider: Natalia Elizondo MD    Primary Insurance: [employer] [facility]  Secondary Insurance: [place] [employer] AND [employer] [facility]    ASSESSMENT:  Active Health Care Proxies    There are no active Health Care Proxies on file.            Obs Notice Signed: 24         Patient Information  Admitted from:: Facility  Mental Status: Confused  During Assessment patient was accompanied by: Not accompanied during assessment  Assessment information provided by:: Other - please comment (group home)  Support Systems: Other (Comment) (group home)  Type of Current Residence: retirement    Activities of Daily Living Prior to Admission  Functional Status: Independent  Completes ADLs independently?: Yes  Ambulates independently?: Yes  Does patient use assisted devices?: No  Does patient currently own DME?: No         Patient Information Continued  Income Source: SSI/SSD  Does patient have prescription coverage?: Yes  Does patient have a history of substance abuse?: No  Does patient have a history of Mental Health  Diagnosis?: Yes (schizophrenia)  Is patient receiving treatment for mental health?: Yes  Has patient received inpatient treatment related to mental health in the last 2 years?: No         Means of Transportation  Means of Transport to [facility]:: Other (Comment) (group home)    Called [facility] & s/w Aida. [phone].  Confirmed pt is from there. Is independent mostly there with adl's & ambulation. Has had some falls in the recent months.her They are in the process of getting MA-51 done & appointment is in May to see if pt qualifies haeys more care. They transport pt & can accept back when medically cleared.

## 2024-03-24 NOTE — ASSESSMENT & PLAN NOTE
Patient resides at group home  Aida is her caretaker  Reviewed med list with Aida  Patient currently taking Haldol 10 mg at bedtime, though Aida states patient has not taken this in a while  Patient does not have a POA or guardian and makes her own decisions  Patient refusing meds and procedures today.  Nursing was able to get morning meds for patient, but patient is refusing echocardiogram.  Group home confirms that patient refuses things often.  She can be agitated/belligerent at times as well.

## 2024-03-24 NOTE — H&P
[facility]  H&P  Name: Joslyn Cantu 73 y.o. female I MRN: [id number]  Unit/Bed#: CW2 213-01 I Date of Admission: 3/24/2024   Date of Service: 3/24/2024 I Hospital Day: 0      Assessment/Plan   * Acute respiratory failure with hypoxia (HCC)  Assessment & Plan  Presented from home with reported shortness of breath  Requiring 2 L to maintain sats greater than 90%  Reviewed patient's chart and unable to find any previous diagnoses related to underlying lung issues although on exam patient with a raspiness as well as mild wheezing and do suspect patient has some underlying COPD/asthma but due to her underlying schizophrenia it is very difficult for her to tell me and patient has no listed contacts  Patient was given several nebulizer treatments in the ER but still with lung sounding junky as well as mild wheezing and requiring 2 L  Chest x-ray reviewed and on my initial review does appear that she may have underlying lung disease related to COPD/emphysema as well as possible developing right lower lobe infiltrate  Procalcitonin unremarkable, COVID/flu/RSV unremarkable  Admit to medicine.  Etiology of patient's respiratory failure currently thought secondary to possible COPD/emphysema as well as possible developing right lower lobe pneumonia.  We will put on scheduled nebulizers but given patient's underlying schizophrenia somewhat hesitant to start systemic steroids for now.  With regards to possible developing right lower lobe pneumonia in the setting of underlying schizophrenia as well as poor dentition, consideration for an aspiration pneumonia as such we will put on ceftriaxone as well as doxycycline.  We will put on a mechanical soft diet with nectar thick liquids and we will consult speech for swallow eval.  Aspiration precautions.  We will additionally consult case management for assistance on finding out patient's social situation as this will provide us with a contact to  find out more about patient.  Respiratory protocol.     Type 2 diabetes mellitus without complication (HCC)  Assessment & Plan  Lab Results   Component Value Date    HGBA1C 9.2 (H) 03/05/2024       Recent Labs     03/22/24  1352 03/24/24  0716   POCGLU 139 262*       Blood Sugar Average: Last 72 hrs:  (P) 262    Sliding scale insulin while hospitalized  Holding PTA Januvia    Hypothyroid  Assessment & Plan  Continue PTA levothyroxine    Schizophrenia (HCC)  Assessment & Plan  Continue PTA Lexapro, Seroquel and Haldol  Case management consultation for assistance with finding out where patient is currently living as well as if she has any relatives/contacts as she has no listed contacts and due to patient's underlying schizophrenia she is unable to tell me any of these details  Reviewed patient's chart it appears in 2021 she was living at a group home             VTE Prophylaxis:  sequential compression device and foot pump applied   Code Status: Level 1 - Full Code       Anticipated Length of Stay:  Patient will be admitted on an Observation basis with an anticipated length of stay of  < 2 midnights.   Justification for Hospital Stay: Please see detailed plans noted above.    Chief Complaint:     Shortness of breath  History of Present Illness:  Joslyn Cantu is a 73 y.o. female who has past medical history significant for schizophrenia, diabetes, hypothyroidism, obstructive lung disease?  Who presented to [facility] ER on the early morning hours of 3/24 with symptoms of shortness of breath that per review of chart as well as discussion with the ER began last night.  Likely due to patient's underlying schizophrenia as well as poor dentition, patient is unable to provide history when asked and patient has no listed contacts.  In the ER, patient was given several nebulizer treatments as well as treatment for possible developing pneumonia without significant improvement and was requiring 2 L oxygen and ER  requesting admission.      Review of Systems: Unable to be performed given patient's history    Past Medical and Surgical History:   Past Medical History:   Diagnosis Date    Hypertension     Hypothyroid 5/8/2016    Hypovitaminosis D 5/10/2016    Iron deficiency anemia 5/11/2016    Type 2 diabetes mellitus without complication (HCC) 5/8/2016     History reviewed. No pertinent surgical history.    Meds/Allergies:  Medications Prior to Admission   Medication Sig Dispense Refill Last Dose    cholecalciferol 1000 UNITS tablet Take 1 tablet (1,000 Units total) by mouth daily Indications: Vitamin D deficiency. 30 tablet 0     escitalopram (LEXAPRO) 10 mg tablet Take 1 tablet (10 mg total) by mouth daily Indications: Depression. 30 tablet 1     haloperidol (HALDOL) 5 mg tablet Take 1 tablet (5 mg total) by mouth daily at bedtime Indications: Schizophrenia. 30 tablet 1     haloperidol decanoate (HALDOL DECANOATE) 50 mg/mL injection Inject 0.5 mL (25 mg total) into the shoulder, thigh, or buttocks every 28 days Indications: Schizophrenia. 0.5 mL 1     levothyroxine 100 mcg tablet Take 1 tablet (100 mcg total) by mouth daily in the early morning Indications: Underactive Thyroid. 30 tablet 0     metFORMIN (GLUCOPHAGE) 1000 MG tablet Take 1 tablet (1,000 mg total) by mouth 2 (two) times a day with meals Indications: Type 2 Diabetes. 60 tablet 0     metoprolol tartrate (LOPRESSOR) 25 mg tablet Take 0.5 tablets (12.5 mg total) by mouth 2 (two) times a day Indications: High Blood Pressure. 30 tablet 0     QUEtiapine (SEROquel) 100 mg tablet Take 100 mg by mouth daily at bedtime       sitaGLIPtin (JANUVIA) 100 mg tablet Take 100 mg by mouth daily          Allergies: No Known Allergies    History:  Marital Status: Single     Substance Use History:   Social History     Substance and Sexual Activity   Alcohol Use No     Social History     Tobacco Use   Smoking Status Every Day    Types: Cigarettes   Smokeless Tobacco Current      Social History     Substance and Sexual Activity   Drug Use No       Family History:  Family History   Family history unknown: Yes       Physical Exam:     Vitals:   Blood Pressure: 123/71 (03/24/24 0704)  Pulse: (!) 110 (03/24/24 0445)  Temperature: (!) 97.3 °F (36.3 °C) (03/24/24 0704)  Temp Source: Oral (03/24/24 0704)  Respirations: (!) 26 (03/24/24 0445)  SpO2: 99 % (03/24/24 0445)    Constitutional: Awake, appears chronically ill  Eyes:  EOMI, No scleral icterus   HENT:   Poor dentition and lacking numerous teeth  Respiratory: Noted wheezing, rhonchi  Cardiovascular: Tachycardia, no murmurs   GI:  Soft, nondistended, no guarding   :  No costovertebral angle tenderness   Musculoskeletal:  no tenderness, no deformities. Back- no tenderness  Integument:  no jaundice, no rash   Neurologic:  Awake, no obvious focal deficits      Lab Results: I have personally reviewed pertinent reports.  , I have personally reviewed pertinent films in PACS, and I have personally reviewed pertinent films in PACS with a Radiologist.    Results from last 7 days   Lab Units 03/24/24  0400   WBC Thousand/uL 12.09*   HEMOGLOBIN g/dL 8.5*   HEMATOCRIT % 30.5*   PLATELETS Thousands/uL 308   NEUTROS PCT % 69   LYMPHS PCT % 18   MONOS PCT % 10   EOS PCT % 2     Results from last 7 days   Lab Units 03/24/24  0400   POTASSIUM mmol/L 4.3   CHLORIDE mmol/L 98   CO2 mmol/L 23   BUN mg/dL 13   CREATININE mg/dL 0.61   CALCIUM mg/dL 8.7             Imaging: I have personally reviewed pertinent reports.  , I have personally reviewed pertinent films in PACS, and I have personally reviewed pertinent films in PACS with a Radiologist.    No results found.    Total time for visit, including counseling/coordination of care: 45 minutes. Greater than 50% of this total time spent on direct patient counseling and coorination of care.     Epic Records Reviewed as well as Records in Care Everywhere    ** Please Note: Dragon 360 Dictation voice to text  software was used in the creation of this document. **

## 2024-03-24 NOTE — ASSESSMENT & PLAN NOTE
Presented from home with reported shortness of breath  Requiring 2 L to maintain sats greater than 90%  Reviewed patient's chart and unable to find any previous diagnoses related to underlying lung issues although on exam patient with a raspiness as well as mild wheezing and do suspect patient has some underlying COPD/asthma but due to her underlying schizophrenia it is very difficult for her to tell me and patient has no listed contacts  Patient was given several nebulizer treatments in the ER but still with lung sounding junky as well as mild wheezing and requiring 2 L  Chest x-ray reviewed and on my initial review does appear that she may have underlying lung disease related to COPD/emphysema as well as possible developing right lower lobe infiltrate  Procalcitonin unremarkable, COVID/flu/RSV unremarkable  Admit to medicine.  Etiology of patient's respiratory failure currently thought secondary to possible COPD/emphysema as well as possible developing right lower lobe pneumonia.  We will put on scheduled nebulizers but given patient's underlying schizophrenia somewhat hesitant to start systemic steroids for now.  With regards to possible developing right lower lobe pneumonia in the setting of underlying schizophrenia as well as poor dentition, consideration for an aspiration pneumonia as such we will put on ceftriaxone as well as doxycycline.  We will put on a mechanical soft diet with nectar thick liquids and we will consult speech for swallow eval.  Aspiration precautions.  We will additionally consult case management for assistance on finding out patient's social situation as this will provide us with a contact to find out more about patient.  Respiratory protocol.

## 2024-03-24 NOTE — PHYSICAL THERAPY NOTE
Physical Therapy Evaluation    Patient's Name: Joslyn Cantu    Admitting Diagnosis  Shortness of breath [R06.02]  Pneumonia [J18.9]    Problem List  Patient Active Problem List   Diagnosis    Schizophrenia (HCC)    Hypothyroid    Hypertension    Type 2 diabetes mellitus without complication (HCC)    Hypovitaminosis D    Iron deficiency anemia    Acute respiratory failure with hypoxia (HCC)       Past Medical History  Past Medical History:   Diagnosis Date    Hypertension     Hypothyroid 5/8/2016    Hypovitaminosis D 5/10/2016    Iron deficiency anemia 5/11/2016    Type 2 diabetes mellitus without complication (HCC) 5/8/2016       Past Surgical History  History reviewed. No pertinent surgical history.     03/24/24 1102   PT Last Visit   PT Visit Date 03/24/24   Note Type   Note type Evaluation   Pain Assessment   Pain Assessment Tool FLACC   Pain Rating: FLACC (Rest) - Face 0   Pain Rating: FLACC (Rest) - Legs 0   Pain Rating: FLACC (Rest) - Activity 0   Pain Rating: FLACC (Rest) - Cry 0   Pain Rating: FLACC (Rest) - Consolability 0   Score: FLACC (Rest) 0   Pain Rating: FLACC (Activity) - Face 0   Pain Rating: FLACC (Activity) - Legs 0   Pain Rating: FLACC (Activity) - Activity 0   Pain Rating: FLACC (Activity) - Cry 0   Pain Rating: FLACC (Activity) - Consolability 0   Score: FLACC (Activity) 0   Restrictions/Precautions   Weight Bearing Precautions Per Order No   Other Precautions Bed Alarm;Fall Risk;Impulsive   Home Living   Type of Home   (group home [facility])   Additional Comments Pt unable to provide SH.   Prior Function   Level of [place] Independent with ADLs;Independent with functional mobility;Needs assistance with IADLS   IADLs Family/Friend/Other provides transportation;Family/Friend/Other provides meals;Family/Friend/Other provides medication management   General   Family/Caregiver Present No   Cognition   Orientation Level Oriented to person  (unable to assess further as pt did not respond  "to many questions)   Comments limited cooperation, able to respond w/ yes/no, most of speech nonsensical   RLE Assessment   RLE Assessment WFL  (based on gait examination)   LLE Assessment   LLE Assessment WFL  (based on gait examination)   Bed Mobility   Sit to Supine 5  Supervision   Additional Comments Pt greeted walking out of bathroom.   Transfers   Sit to Stand 5  Supervision   Stand to Sit 5  Supervision   Additional Comments no AD   Ambulation/Elevation   Gait pattern   (steady gait)   Gait Assistance 5  Supervision   Assistive Device None   Distance 20'   Balance   Static Sitting Fair +   Dynamic Sitting Fair +   Static Standing Fair   Dynamic Standing Fair   Ambulatory Fair -   Endurance Deficit   Endurance Deficit Yes   Endurance Deficit Description however more limited due to impaired cooperation, +wheezing, unable to obtain accurate SpO2 saturation, O2 2L on examination   Activity Tolerance   Activity Tolerance   (session limited by cooperation)   Medical Staff Made Aware FENG Albert   Nurse Made Aware yes - cleared for therapy   Assessment   Assessment Pt is 73 y.o. female seen for a PT evaluation s/p admit to [facility] on 3/24/2024. Pt presenting w/ SOB. Per most recent Hospitalist note \"respiratory failure currently thought secondary to possible COPD/emphysema as well as possible developing right lower lobe PNA.\" Of note pt w/ PMH significant for schizophrenia. Please see above for other active problem list / PMH.     PT now consulted to assess functional mobility and needs for safe d/c planning. Prior to admission, pt was living in a group home, I w/ ambulation w/o AD.     Currently pt is S for bed skills; S for functional transfers; S for ambulation w/o AD.     As pt is near functional baseline there are no further skilled acute PT needs. Will d/c from caseload.   Goals   Patient Goals none expressed   Plan   PT Frequency   (d/c PT)   Discharge Recommendation   Rehab Resource " Intensity Level, PT No post-acute rehabilitation needs   AM-PAC Basic Mobility Inpatient   Turning in Flat Bed Without Bedrails 4   Lying on Back to Sitting on Edge of Flat Bed Without Bedrails 4   Moving Bed to Chair 4   Standing Up From Chair Using Arms 4   Walk in Room 4   Climb 3-5 Stairs With Railing 4   Basic Mobility Inpatient Raw Score 24   Basic Mobility Standardized Score 57.68   [place] Highest Level Of Mobility   JH-HLM Goal 8: Walk 250 feet or more   JH-HLM Achieved 6: Walk 10 steps or more   End of Consult   Patient Position at End of Consult Supine;Bed/Chair alarm activated;All needs within reach  (all lines in tact)       Melisa Bass, PT, DPT

## 2024-03-24 NOTE — SPEECH THERAPY NOTE
Speech-Language Pathology Bedside Swallow Evaluation    Patient Name: Joslyn Cantu    Today's Date: 3/24/2024     Problem List  Principal Problem:    Acute respiratory failure with hypoxia (HCC)  Active Problems:    Schizophrenia (HCC)    Hypothyroid    Type 2 diabetes mellitus without complication (HCC)    Past Medical History  Past Medical History:   Diagnosis Date    Hypertension     Hypothyroid 5/8/2016    Hypovitaminosis D 5/10/2016    Iron deficiency anemia 5/11/2016    Type 2 diabetes mellitus without complication (HCC) 5/8/2016     Past Surgical History  History reviewed. No pertinent surgical history.      Summary  Assessment limited by poor pt participation and increased agitation with attempts to engage. Pt took sips of thin (coffee) and HTL (juice) when observed from a distance. Impulsive rate of intake noted and slight vocal wetness detected with thins. No overt coughing with PO. Pt refused all solid foods offered (pudding, toast, oatmeal). Spoke with Aida from pt's group home who is very familiar with pt. She reported pt eats all foods but is in agreement with [facility] soft as this may be easier for her d/t lack of dentition. Unable to definitively r/o aspiration and pt is not likely to tolerate MBS. Consider remaining on current diet (2/NTL) vs resuming thin liquids based on overall medical condition/pulmonary health. Please reach out with any questions or concerns.     Risk/s for Aspiration: at least mild    Recommended Diet: mechanically altered/level 2 diet and nectar thick liquids vs resuming thin liquids if no change/improvement in chest imaging  Recommended Form of Meds:  as tolerated    Aspiration precautions and swallowing strategies: Pt does not appear to benefit from cueing or be receptive to safe swallow strategy recommendations   Other Recommendations: Continue frequent oral care      Current Medical Status per H&P 3/24/24  Joslyn Cantu is a 73 y.o. female who has past medical history  significant for schizophrenia, diabetes, hypothyroidism, obstructive lung disease?  Who presented to [facility] ER on the early morning hours of 3/24 with symptoms of shortness of breath that per review of chart as well as discussion with the ER began last night.  Likely due to patient's underlying schizophrenia as well as poor dentition, patient is unable to provide history when asked and patient has no listed contacts.  In the ER, patient was given several nebulizer treatments as well as treatment for possible developing pneumonia without significant improvement and was requiring 2 L oxygen and ER requesting admission.     Special Studies:  CXR 3/24/24 IMPRESSION:  Diffuse interstitial pulmonary edema with small right pleural effusion.    Social/Education/Vocational Hx:  Pt lives in group home    Swallow Information   Current Risks for Dysphagia & Aspiration:  suspicion for aspiration PNA  Current Symptoms/Concerns:  per discussion with staff at group home, pt sometimes gest worked up when she is eating which leads to coughing  Current Diet: mechanically altered/level 2 diet and nectar thick liquids (ordered as precaution on admission)  Baseline Diet: group home director reported pt is on a regular diet but often benefits from softer foods      Baseline Assessment   Behavior/Cognition: alert, restless  Speech/Language Status: not able/willing to to follow commands, frequent yelling out, dysarthric and minimally intelligible speech  Patient Positioning: upright in bed  Pain Status/Interventions/Response to Interventions: No report of or nonverbal indications of pain.       Swallow Mechanism Exam  Facial: symmetrical  Labial: unable to test 2/2 limited command following  Lingual: unable to test 2/2 limited command following  Velum: unable to visualize  Mandible: adequate ROM  Dentition: edentulous  Vocal quality:clear/adequate   Volitional Cough: unable to initiate volitional cough   Respiratory Status: on  2L O2     Consistencies Assessed and Performance   Consistencies Administered: thin liquids and nectar thick, pt refused all other items offered.     Oral Stage: mild and suspected decreased control of liquids with rapid intake. Unable to assess solids however pt is edentulous.     Pharyngeal Stage: Suspect mild, impulsive rate of intake and vocal wetness with thin. Unable to palpate swallow d/t pt agitation with close proximity.      Esophageal Concerns: none reported      Summary and Recommendations (see above)    Results Reviewed with: RN     Treatment Recommended: No additional ST f/u recommended at this time as pt would be unlikely to participate in MBS and is not receptive to intervention.

## 2024-03-24 NOTE — ASSESSMENT & PLAN NOTE
Presented from home with reported shortness of breath  Requiring 2 L to maintain sats greater than 90% - no O2 at baseline   No formal PFTs found in chart however sounded wheezy on exam   CXR with evidence of interstitial pulmonary edema and small R pleural effusion   Procalcitonin unremarkable, COVID/flu/RSV unremarkable  BNP elevated   Possible CHF vs PNA vs. COPD/emphysema  Continue nebs   1 dose 40mg IV lasix given with improvement, will give another dose today   Echo ordered, patient refused today.  Discussed with patient's caretaker Aida, she may be more agreeable if she is present.  Aida will be here around 10 AM tomorrow to be present for echo.  Nursing to coordinate with echo tech.  Will monitor off antibiotics given negative procalcitonin.  Patient afebrile  Consult speech for eval  Respiratory protocol

## 2024-03-25 ENCOUNTER — APPOINTMENT (OUTPATIENT)
Dept: NON INVASIVE DIAGNOSTICS | Facility: HOSPITAL | Age: 74
DRG: 291 | End: 2024-03-25
Payer: MEDICARE

## 2024-03-25 LAB
ATRIAL RATE: 92 BPM
GLUCOSE SERPL-MCNC: 100 MG/DL (ref 65–140)
GLUCOSE SERPL-MCNC: 161 MG/DL (ref 65–140)
GLUCOSE SERPL-MCNC: 196 MG/DL (ref 65–140)
GLUCOSE SERPL-MCNC: 367 MG/DL (ref 65–140)
P AXIS: 71 DEGREES
PR INTERVAL: 148 MS
QRS AXIS: 99 DEGREES
QRSD INTERVAL: 82 MS
QT INTERVAL: 368 MS
QTC INTERVAL: 457 MS
T WAVE AXIS: 69 DEGREES
VENTRICULAR RATE: 93 BPM

## 2024-03-25 PROCEDURE — 82948 REAGENT STRIP/BLOOD GLUCOSE: CPT

## 2024-03-25 PROCEDURE — 99232 SBSQ HOSP IP/OBS MODERATE 35: CPT | Performed by: PHYSICIAN ASSISTANT

## 2024-03-25 PROCEDURE — 94760 N-INVAS EAR/PLS OXIMETRY 1: CPT

## 2024-03-25 PROCEDURE — 94640 AIRWAY INHALATION TREATMENT: CPT

## 2024-03-25 PROCEDURE — 93010 ELECTROCARDIOGRAM REPORT: CPT | Performed by: INTERNAL MEDICINE

## 2024-03-25 RX ORDER — ENOXAPARIN SODIUM 100 MG/ML
40 INJECTION SUBCUTANEOUS
Status: DISCONTINUED | OUTPATIENT
Start: 2024-03-25 | End: 2024-03-26 | Stop reason: HOSPADM

## 2024-03-25 RX ORDER — LEVOTHYROXINE SODIUM 0.12 MG/1
125 TABLET ORAL
Status: DISCONTINUED | OUTPATIENT
Start: 2024-03-26 | End: 2024-03-26 | Stop reason: HOSPADM

## 2024-03-25 RX ORDER — FUROSEMIDE 10 MG/ML
40 INJECTION INTRAMUSCULAR; INTRAVENOUS ONCE
Status: COMPLETED | OUTPATIENT
Start: 2024-03-25 | End: 2024-03-25

## 2024-03-25 RX ORDER — HALOPERIDOL 10 MG/1
10 TABLET ORAL
Status: DISCONTINUED | OUTPATIENT
Start: 2024-03-25 | End: 2024-03-26 | Stop reason: HOSPADM

## 2024-03-25 RX ORDER — IBUPROFEN 400 MG/1
400 TABLET ORAL EVERY 6 HOURS PRN
Status: ON HOLD | COMMUNITY

## 2024-03-25 RX ADMIN — IBUPROFEN 400 MG: 400 TABLET, FILM COATED ORAL at 13:10

## 2024-03-25 RX ADMIN — Medication 12.5 MG: at 08:11

## 2024-03-25 RX ADMIN — ESCITALOPRAM OXALATE 10 MG: 10 TABLET ORAL at 08:11

## 2024-03-25 RX ADMIN — LEVALBUTEROL HYDROCHLORIDE 1.25 MG: 1.25 SOLUTION RESPIRATORY (INHALATION) at 20:50

## 2024-03-25 RX ADMIN — GUAIFENESIN 600 MG: 600 TABLET, EXTENDED RELEASE ORAL at 21:26

## 2024-03-25 RX ADMIN — IPRATROPIUM BROMIDE 0.5 MG: 0.5 SOLUTION RESPIRATORY (INHALATION) at 20:50

## 2024-03-25 RX ADMIN — DOXYCYCLINE 100 MG: 100 CAPSULE ORAL at 08:11

## 2024-03-25 RX ADMIN — HALOPERIDOL 10 MG: 10 TABLET ORAL at 22:32

## 2024-03-25 RX ADMIN — FUROSEMIDE 40 MG: 10 INJECTION, SOLUTION INTRAMUSCULAR; INTRAVENOUS at 12:29

## 2024-03-25 RX ADMIN — CEFTRIAXONE SODIUM 1000 MG: 10 INJECTION, POWDER, FOR SOLUTION INTRAVENOUS at 06:03

## 2024-03-25 RX ADMIN — INSULIN LISPRO 4 UNITS: 100 INJECTION, SOLUTION INTRAVENOUS; SUBCUTANEOUS at 11:19

## 2024-03-25 RX ADMIN — GUAIFENESIN 600 MG: 600 TABLET, EXTENDED RELEASE ORAL at 08:11

## 2024-03-25 RX ADMIN — LEVOTHYROXINE SODIUM 100 MCG: 100 TABLET ORAL at 06:03

## 2024-03-25 RX ADMIN — INSULIN LISPRO 1 UNITS: 100 INJECTION, SOLUTION INTRAVENOUS; SUBCUTANEOUS at 21:26

## 2024-03-25 RX ADMIN — IBUPROFEN 400 MG: 400 TABLET, FILM COATED ORAL at 21:32

## 2024-03-25 NOTE — ED ATTENDING ATTESTATION
3/24/2024  I, Stas Patrick MD, saw and evaluated the patient. I have discussed the patient with the resident/non-physician practitioner and agree with the resident's/non-physician practitioner's findings, Plan of Care, and MDM as documented in the resident's/non-physician practitioner's note, except where noted. All available labs and Radiology studies were reviewed.  I was present for key portions of any procedure(s) performed by the resident/non-physician practitioner and I was immediately available to provide assistance.       At this point I agree with the current assessment done in the Emergency Department.  I have conducted an independent evaluation of this patient a history and physical is as follows:    73-year-old female with a history of COPD, schizophrenia who presents to the emergency department via EMS for evaluation of shortness of breath.  Patient received DuoNeb prior to arrival.  On initial evaluation, patient is awake and alert, but is difficult to understand.  She does follow commands.  She is tachypneic with decreased breath sounds and occasional wheezing bilaterally.  She denies any chest pain, abdominal pain, nausea or vomiting.  No fevers or chills.    On exam, patient is in mild acute respiratory distress, head is normocephalic atraumatic, pupils equal round reactive to light, poor dentition, neck is supple without meningismus signs, heart is regular rate and rhythm with intact distal pulses, patient is tachypneic with wheezing and decreased breath sounds bilaterally.  Abdomen soft, nontender nondistended.  No peripheral edema.  No calf tenderness.    Differential diagnosis includes but is not limited to acute COPD exacerbation, pneumonia, pleural effusion, viral illness.  Plan to check labs, EKG, will treat with additional breathing treatments.    Chest x-ray concerning for possible right lower lobe pneumonia.  Patient treated with antibiotics, as she does have new oxygen requirement,  patient will be admitted to medicine for further management and evaluation.    ED Course         Critical Care Time  CriticalCare Time    Date/Time: 3/24/2024 6:00 AM    Performed by: Stas Patrick MD  Authorized by: Stas Patrick MD    Critical care provider statement:     Critical care time (minutes):  32    Critical care time was exclusive of:  Separately billable procedures and treating other patients and teaching time    Critical care was necessary to treat or prevent imminent or life-threatening deterioration of the following conditions:  Respiratory failure    Critical care was time spent personally by me on the following activities:  Obtaining history from patient or surrogate, development of treatment plan with patient or surrogate, evaluation of patient's response to treatment, examination of patient, ordering and performing treatments and interventions, ordering and review of laboratory studies, ordering and review of radiographic studies, re-evaluation of patient's condition and review of old charts

## 2024-03-25 NOTE — UTILIZATION REVIEW
Initial Clinical Review    OBSERVATION WRITTEN 3/24/24 @ 0604 CONVERTED TO INPATIENT ADMISSION 3/25/24 @ 1133 DUE TO FURTHER DIAGNOSTIC WORKUP REQUIRED FOR ACUTE RESPIRATORY FAILURE WITH HYPOXIA, REQUIRING AT LEAST A 2 MIDNIGHT STAY.    Admission: Date/Time/Statement:   Admission Orders (From admission, onward)       Ordered        03/25/24 1133  Inpatient Admission  Once            03/24/24 0604  Place in Observation  Once                          Orders Placed This Encounter   Procedures    Inpatient Admission     Standing Status:   Standing     Number of Occurrences:   1     Order Specific Question:   Level of Care     Answer:   Med Surg [16]     Order Specific Question:   Estimated length of stay     Answer:   More than 2 Midnights     Order Specific Question:   Certification     Answer:   I certify that inpatient services are medically necessary for this patient for a duration of greater than two midnights. See H&P and MD Progress Notes for additional information about the patient's course of treatment.     ED Arrival Information       Expected   -    Arrival   3/24/2024 02:51    Acuity   Urgent              Means of arrival   Ambulance    Escorted by   [facility]    Service   Hospitalist    Admission type   Emergency              Arrival complaint   Shortness of Breath             Chief Complaint   Patient presents with    Shortness of Breath     Pt coming from home c/o SOB, denies any CP.       Initial Presentation: 73 y.o. female who presented to [facility] ED via EMS initially admitted Observation status then converted to Inpatient due to Acute respiratory failure with hypoxia.  Presented due to shortness of breath since evening prior. Likely due to patient's underlying schizophrenia as well as poor dentition, patient is unable to provide history when asked and patient has no listed contacts.  In the ED, patient was given several nebulizer treatments as well as treatment for possible  developing pneumonia without significant improvement and was requiring 2 L oxygen. PMH: schizophrenia, diabetes, hypothyroidism, questionable obstructive lung disease  Plan: med surg, scheduled neb txs, monitor O2 sats and provide supplemental O2, start IV ceftriaxone, speech consult for swallow eval, PT OT eval, CM consult for dipso planning. Hold home DM mets and start accuchecks w/ SSI.     3/25/2024 Inpatient Admission:  1 dose 40mg IV lasix given with improvement, will give another dose today, Echo ordered, pt refused, will possible agree to Echo tomorrow. Continue to monitor off abx, speech consult, accuchecks w/ SSI.     Day 3: Has surpassed a 2nd midnight with active treatments and services. Echo completed. Monitoring of resp status, supplemental O2 prn, PT OT ST, CM following, accuchecks w/ SSI.     ED Triage Vitals   Temperature Pulse Respirations Blood Pressure SpO2   03/24/24 0259 03/24/24 0259 03/24/24 0259 03/24/24 0259 03/24/24 0259   98.3 °F (36.8 °C) 91 20 166/85 96 %      Temp Source Heart Rate Source Patient Position - Orthostatic VS BP Location FiO2 (%)   03/24/24 0259 03/24/24 0259 03/24/24 0259 03/24/24 0259 --   Oral Monitor Lying Right arm       Pain Score       03/25/24 0900       No Pain          Wt Readings from Last 1 Encounters:   03/26/24 59 kg (130 lb)     Additional Vital Signs:   Date/Time Temp Pulse Resp BP MAP (mmHg) SpO2 Calculated FIO2 (%) - Nasal Cannula Nasal Cannula O2 Flow Rate (L/min) O2 Device Patient Position - Orthostatic VS   03/26/24 1152 -- 74 -- 121/65 -- -- -- -- -- --   03/26/24 0715 -- -- -- -- -- 94 % -- -- -- --   03/25/24 2100 -- -- -- -- -- -- -- -- None (Room air) --   03/25/24 2050 -- -- -- -- -- 97 % -- -- -- --   03/25/24 0800 -- -- -- -- -- -- -- -- Nasal cannula --   03/24/24 21:43:18 -- 82 -- 121/65 84 92 % -- -- -- --   03/24/24 2138 -- -- -- -- -- 92 % 28 2 L/min Nasal cannula --   03/24/24 07:04:23 97.3 °F (36.3 °C) Abnormal  20 Abnormal  20 123/71 88  -- 28 2 L/min Nasal cannula Lying   03/24/24 0445 -- 110 Abnormal  26 Abnormal  193/109 Abnormal  143 99 % -- -- Aerosol mask Lying   03/24/24 0259 98.3 °F (36.8 °C) 91 20 166/85 119 96 % -- -- None (Room air) Lying     Pertinent Labs/Diagnostic Test Results:   3/26 ECHO:      Left Ventricle: Left ventricular cavity size is normal. Wall thickness is increased. The left ventricular ejection fraction is 40%. Systolic function is moderately reduced. There is moderate global hypokinesis. Diastolic function is moderately abnormal, consistent with grade II (pseudonormal) relaxation.    Right Ventricle: Right ventricular cavity size is normal. Systolic function is normal.    Left Atrium: The atrium is moderately dilated (42-48 mL/m2).    Atrial Septum: The septum bows into the right atrium, suggesting increased left atrial pressure.    Aortic Valve: The aortic valve is trileaflet. The leaflets are moderately thickened. The leaflets are moderately calcified. There is moderately reduced mobility. There is moderate stenosis. The aortic valve mean gradient is 6 mmHg. The dimensionless velocity index is 0.39. The aortic valve area is 1.14 cm2. The stroke volume index is 19.00 ml/m2. The aortic valve velocity is increased due to stenosis but lower than expected due to the presence of decreased flow.    Mitral Valve: There is mild regurgitation.    Tricuspid Valve: There is moderate regurgitation. The right ventricular systolic pressure is mildly elevated. The estimated right ventricular systolic pressure is 40.00 mmHg.    Pericardium: There is a right pleural effusion.     XR chest 2 views   ED Interpretation by Baron Aldana DO (03/24 0508)   Right lower lobe opacity.      Final Result by Zachariah Thurman DO (03/24 1111)      Diffuse interstitial pulmonary edema with small right pleural effusion.      The study was marked in EPIC for immediate notification.      Resident: BARON WYATT I, the attending  radiologist, have reviewed the images and agree with the final report above.      Workstation performed: [id number]           Results from last 7 days   Lab Units 03/24/24  0400   SARS-COV-2  Negative     Results from last 7 days   Lab Units 03/26/24  0625 03/24/24  0400   WBC Thousand/uL 10.69* 12.09*   HEMOGLOBIN g/dL 8.6* 8.5*   HEMATOCRIT % 30.7* 30.5*   PLATELETS Thousands/uL 302 308   NEUTROS ABS Thousands/µL  --  8.40*         Results from last 7 days   Lab Units 03/26/24  0625 03/24/24  0400   SODIUM mmol/L 132* 131*   POTASSIUM mmol/L 4.3 4.3   CHLORIDE mmol/L 96 98   CO2 mmol/L 27 23   ANION GAP mmol/L 9 10   BUN mg/dL 16 13   CREATININE mg/dL 0.71 0.61   EGFR ml/min/1.73sq m 85 91   CALCIUM mg/dL 8.6 8.7         Results from last 7 days   Lab Units 03/26/24  1153 03/26/24  0621 03/25/24  2100 03/25/24  1621 03/25/24  1115 03/25/24  0616 03/24/24  2140 03/24/24  1656 03/24/24  1129 03/24/24  0716 03/22/24  1352   POC GLUCOSE mg/dl 202* 192* 196* 100 367* 161* 153* 216* 184* 262* 139     Results from last 7 days   Lab Units 03/26/24  0625 03/24/24  0400   GLUCOSE RANDOM mg/dL 145* 248*         Results from last 7 days   Lab Units 03/24/24  0400   PROCALCITONIN ng/ml <0.05       Results from last 7 days   Lab Units 03/24/24  0400   BNP pg/mL 493*       Results from last 7 days   Lab Units 03/24/24  0400   INFLUENZA A PCR  Negative   INFLUENZA B PCR  Negative   RSV PCR  Negative     ED Treatment:   Medication Administration from 03/24/2024 0250 to 03/24/2024 0638         Date/Time Order Dose Route Action     03/24/2024 0409 EDT ipratropium-albuterol (FOR EMS ONLY) (DUO-NEB) 0.5-2.5 mg/3 mL inhalation solution 3 mL 0 mL Does not apply Given to EMS     03/24/2024 0359 EDT albuterol inhalation solution 5 mg 5 mg Nebulization Given     03/24/2024 0359 EDT ipratropium (ATROVENT) 0.02 % inhalation solution 1 mg 1 mg Nebulization Given     03/24/2024 0447 EDT albuterol inhalation solution 10 mg 10 mg Nebulization  Given     03/24/2024 0447 EDT ipratropium (ATROVENT) 0.02 % inhalation solution 1 mg 1 mg Nebulization Given     03/24/2024 0447 EDT sodium chloride 0.9 % inhalation solution 12 mL 12 mL Nebulization Given     03/24/2024 0516 EDT ceftriaxone (ROCEPHIN) 2 g/50 mL in dextrose IVPB 2,000 mg Intravenous New Bag     03/24/2024 0535 EDT azithromycin (ZITHROMAX) 500 mg in sodium chloride 0.9% 250mL IVPB 500 mg 500 mg Intravenous New Bag          Past Medical History:   Diagnosis Date    Hypertension     Hypothyroid 5/8/2016    Hypovitaminosis D 5/10/2016    Iron deficiency anemia 5/11/2016    Type 2 diabetes mellitus without complication (HCC) 5/8/2016     Present on Admission:   Schizophrenia (HCC)   Type 2 diabetes mellitus without complication (HCC)   Hypothyroid   Acute respiratory failure with hypoxia (HCC)   Iron deficiency anemia      Admitting Diagnosis: Shortness of breath [R06.02]  Pneumonia [J18.9]  Age/Sex: 73 y.o. female  Admission Orders:  Scheduled Medications:  acetaminophen, 650 mg, Oral, HS  enoxaparin, 40 mg, Subcutaneous, Q24H JORGE LUIS  guaiFENesin, 600 mg, Oral, BID  haloperidol, 10 mg, Oral, HS  [START ON 3/27/2024] ibuprofen, 200 mg, Oral, Daily  insulin lispro, 1-5 Units, Subcutaneous, TID AC  insulin lispro, 1-5 Units, Subcutaneous, HS  ipratropium, 0.5 mg, Nebulization, TID  levalbuterol, 1.25 mg, Nebulization, TID  levothyroxine, 125 mcg, Oral, Early Morning      Continuous IV Infusions: none     PRN Meds:  albuterol, 2 puff, Inhalation, Q4H PRN  ibuprofen, 400 mg, Oral, Q6H PRN        IP CONSULT TO CASE MANAGEMENT    Network Utilization Review Department  ATTENTION: Please call with any questions or concerns to [phone] and carefully listen to the prompts so that you are directed to the right person. All voicemails are confidential.   For Discharge needs, contact Care Management DC Support Team at [phone] opt. 2  Send all requests for admission clinical reviews, approved or denied  determinations and any other requests to dedicated fax number below belonging to the campus where the patient is receiving treatment. List of dedicated fax numbers for the Facilities:  FACILITY NAME UR FAX NUMBER   ADMISSION DENIALS (Administrative/Medical Necessity) [phone]   DISCHARGE SUPPORT TEAM (NETWORK) [phone]   PARENT CHILD HEALTH (Maternity/NICU/Pediatrics) [phone]   [facility] [phone]   [facility] [phone]   [facility] [phone]   [facility] [phone]   [facility] [phone]   [facility] [phone]   [facility] [phone]   [facility] [phone]   [facility] [phone]   [facility] [phone]   [facility] [phone]   [facility] [phone]

## 2024-03-25 NOTE — PROGRESS NOTES
[facility]  Progress Note  Name: Joslyn Cantu I  MRN: [id number]  Unit/Bed#: CW2 213-01 I Date of Admission: 3/24/2024   Date of Service: 3/25/2024 I Hospital Day: 0    Assessment/Plan   Type 2 diabetes mellitus without complication (HCC)  Assessment & Plan  Lab Results   Component Value Date    HGBA1C 9.2 (H) 03/05/2024       Recent Labs     03/22/24  1352 03/24/24  0716   POCGLU 139 262*         Blood Sugar Average: Last 72 hrs:  (P) 262    Sliding scale insulin while hospitalized  Holding PTA metformin    Hypothyroid  Assessment & Plan  Continue PTA levothyroxine    Schizophrenia (HCC)  Assessment & Plan  Patient resides at group home  Aida is her caretaker  Reviewed med list with Aida  Patient currently taking Haldol 10 mg at bedtime, though Aida states patient has not taken this in a while  Patient does not have a POA or guardian and makes her own decisions  Patient refusing meds and procedures today.  Nursing was able to get morning meds for patient, but patient is refusing echocardiogram.  Group home confirms that patient refuses things often.  She can be agitated/belligerent at times as well.    * Acute respiratory failure with hypoxia (HCC)  Assessment & Plan  Presented from home with reported shortness of breath  Requiring 2 L to maintain sats greater than 90% - no O2 at baseline   No formal PFTs found in chart however sounded wheezy on exam   CXR with evidence of interstitial pulmonary edema and small R pleural effusion   Procalcitonin unremarkable, COVID/flu/RSV unremarkable  BNP elevated   Possible CHF vs PNA vs. COPD/emphysema  Continue nebs   1 dose 40mg IV lasix given with improvement, will give another dose today   Echo ordered, patient refused today.  Discussed with patient's caretaker Aida, she may be more agreeable if she is present.  Aida will be here around 10 AM tomorrow to be present for echo.  Nursing to coordinate with echo  tech.  Will monitor off antibiotics given negative procalcitonin.  Patient afebrile  Consult speech for eval  Respiratory protocol              VTE Pharmacologic Prophylaxis: VTE Score: 4 Moderate Risk (Score 3-4) - Pharmacological DVT Prophylaxis Ordered: enoxaparin (Lovenox).    Mobility:   Basic Mobility Inpatient Raw Score: 24  JH-HLM Goal: 8: Walk 250 feet or more  JH-HLM Achieved: 7: Walk 25 feet or more  JH-HLM Goal achieved. Continue to encourage appropriate mobility.    Patient Centered Rounds: I performed bedside rounds with nursing staff today.   Discussions with Specialists or Other Care Team Provider: case management    Education and Discussions with Family / Patient: Updated  (Aida - care giver at group home) via phone.    Total Time Spent on Date of Encounter in care of patient: 45 mins. This time was spent on one or more of the following: performing physical exam; counseling and coordination of care; obtaining or reviewing history; documenting in the medical record; reviewing/ordering tests, medications or procedures; communicating with other healthcare professionals and discussing with patient's family/caregivers.    Current Length of Stay: 0 day(s)  Current Patient Status: Inpatient   Certification Statement: The patient, admitted on an observation basis, will now require > 2 midnight hospital stay due to IV lasix and echo  Discharge Plan: Anticipate discharge in 24-48 hrs to group home.    Code Status: Level 1 - Full Code    Subjective:   Patient seen and examined.  She is agitated and not participating in conversation at this time.  Patient was just agitated with nursing as they were trying to give her some meds and nursing also reports that patient refused her echocardiogram earlier today.    Objective:     Vitals:   No data recorded.    HR:  [82] 82  BP: (121)/(65) 121/65  SpO2:  [92 %] 92 %  There is no height or weight on file to calculate BMI.     Input and Output Summary  (last 24 hours):     Intake/Output Summary (Last 24 hours) at 3/25/2024 1144  Last data filed at 3/24/2024 1500  Gross per 24 hour   Intake 260 ml   Output --   Net 260 ml       Physical Exam:   Physical Exam  Constitutional:       Appearance: Normal appearance.   Cardiovascular:      Rate and Rhythm: Normal rate and regular rhythm.      Heart sounds: No murmur heard.  Pulmonary:      Effort: Pulmonary effort is normal.      Comments: Poor inspiratory effort and cooperation with exam.  Coarse breath sounds bilaterally  Abdominal:      General: Bowel sounds are normal. There is no distension.      Palpations: Abdomen is soft.      Tenderness: There is no abdominal tenderness.   Skin:     General: Skin is warm and dry.   Neurological:      General: No focal deficit present.      Mental Status: She is alert.   Psychiatric:         Behavior: Behavior is agitated.         Judgment: Judgment is impulsive.          Additional Data:     Labs:  Results from last 7 days   Lab Units 03/24/24  0400   WBC Thousand/uL 12.09*   HEMOGLOBIN g/dL 8.5*   HEMATOCRIT % 30.5*   PLATELETS Thousands/uL 308   NEUTROS PCT % 69   LYMPHS PCT % 18   MONOS PCT % 10   EOS PCT % 2     Results from last 7 days   Lab Units 03/24/24  0400   SODIUM mmol/L 131*   POTASSIUM mmol/L 4.3   CHLORIDE mmol/L 98   CO2 mmol/L 23   BUN mg/dL 13   CREATININE mg/dL 0.61   ANION GAP mmol/L 10   CALCIUM mg/dL 8.7   GLUCOSE RANDOM mg/dL 248*         Results from last 7 days   Lab Units 03/25/24  1115 03/25/24  0616 03/24/24  2140 03/24/24  1656 03/24/24  1129 03/24/24  0716 03/22/24  1352   POC GLUCOSE mg/dl 367* 161* 153* 216* 184* 262* 139         Results from last 7 days   Lab Units 03/24/24  0400   PROCALCITONIN ng/ml <0.05       Lines/Drains:  Invasive Devices       Peripheral Intravenous Line  Duration             Peripheral IV 03/24/24 Left;Ventral (anterior) Forearm 1 day                          Imaging: Reviewed radiology reports from this admission  including: chest xray    Recent Cultures (last 7 days):         Last 24 Hours Medication List:   Current Facility-Administered Medications   Medication Dose Route Frequency Provider Last Rate    albuterol  2 puff Inhalation Q4H PRN Precious Helms MD      furosemide  40 mg Intravenous Once Narda Cedeno PA-C      guaiFENesin  600 mg Oral BID Manolo Dickinson DO      haloperidol  10 mg Oral HS Narda Cedeno PA-C      ibuprofen  400 mg Oral Q6H PRN Gabby Wong PA-C      insulin lispro  1-5 Units Subcutaneous TID AC Gabby Wong PA-C      insulin lispro  1-5 Units Subcutaneous HS Gabby Wong PA-C      ipratropium  0.5 mg Nebulization TID Manolo Dickinson DO      levalbuterol  1.25 mg Nebulization TID Manolo Dickinson DO      [START ON 3/26/2024] levothyroxine  125 mcg Oral Early Morning Narda Cedeno PA-C          Today, Patient Was Seen By: Narda Cedeno PA-C    **Please Note: This note may have been constructed using a voice recognition system.**

## 2024-03-25 NOTE — PLAN OF CARE
Problem: CARDIOVASCULAR - ADULT  Goal: Absence of cardiac dysrhythmias or at baseline rhythm  Description: INTERVENTIONS:  - Continuous cardiac monitoring, vital signs, obtain 12 lead EKG if ordered  - Administer antiarrhythmic and heart rate control medications as ordered  - Monitor electrolytes and administer replacement therapy as ordered  Outcome: Progressing     Problem: RESPIRATORY - ADULT  Goal: Achieves optimal ventilation and oxygenation  Description: INTERVENTIONS:  - Assess for changes in respiratory status  - Assess for changes in mentation and behavior  - Position to facilitate oxygenation and minimize respiratory effort  - Oxygen administered by appropriate delivery if ordered  - Initiate smoking cessation education as indicated  - Encourage broncho-pulmonary hygiene including cough, deep breathe, Incentive Spirometry  - Assess the need for suctioning and aspirate as needed  - Assess and instruct to report SOB or any respiratory difficulty  - Respiratory Therapy support as indicated  Outcome: Progressing

## 2024-03-26 ENCOUNTER — APPOINTMENT (INPATIENT)
Dept: NON INVASIVE DIAGNOSTICS | Facility: HOSPITAL | Age: 74
DRG: 291 | End: 2024-03-26
Payer: MEDICARE

## 2024-03-26 VITALS
SYSTOLIC BLOOD PRESSURE: 121 MMHG | RESPIRATION RATE: 20 BRPM | BODY MASS INDEX: 20.4 KG/M2 | DIASTOLIC BLOOD PRESSURE: 65 MMHG | WEIGHT: 130 LBS | OXYGEN SATURATION: 94 % | TEMPERATURE: 97.3 F | HEART RATE: 74 BPM | HEIGHT: 67 IN

## 2024-03-26 PROBLEM — I50.31 ACUTE DIASTOLIC CHF (CONGESTIVE HEART FAILURE) (HCC): Status: ACTIVE | Noted: 2024-03-26

## 2024-03-26 LAB
ANION GAP SERPL CALCULATED.3IONS-SCNC: 9 MMOL/L (ref 4–13)
AORTIC VALVE MEAN VELOCITY: 11 M/S
APICAL FOUR CHAMBER EJECTION FRACTION: 39 %
ASCENDING AORTA: 2.1 CM
AV AREA BY CONTINUOUS VTI: 1 CM2
AV AREA PEAK VELOCITY: 1 CM2
AV LVOT MEAN GRADIENT: 1 MMHG
AV LVOT PEAK GRADIENT: 2 MMHG
AV MEAN GRADIENT: 6 MMHG
AV PEAK GRADIENT: 13 MMHG
AV VALVE AREA: 1.14 CM2
AV VELOCITY RATIO: 0.39
BSA FOR ECHO PROCEDURE: 1.68 M2
BUN SERPL-MCNC: 16 MG/DL (ref 5–25)
CALCIUM SERPL-MCNC: 8.6 MG/DL (ref 8.4–10.2)
CHLORIDE SERPL-SCNC: 96 MMOL/L (ref 96–108)
CO2 SERPL-SCNC: 27 MMOL/L (ref 21–32)
CREAT SERPL-MCNC: 0.71 MG/DL (ref 0.6–1.3)
DOP CALC AO PEAK VEL: 1.79 M/S
DOP CALC AO VTI: 31.21 CM
DOP CALC LVOT AREA: 2.83 CM2
DOP CALC LVOT CARDIAC INDEX: 1.41 L/MIN/M2
DOP CALC LVOT CARDIAC OUTPUT: 2.37 L/MIN
DOP CALC LVOT DIAMETER: 1.9 CM
DOP CALC LVOT PEAK VEL VTI: 12.54 CM
DOP CALC LVOT PEAK VEL: 0.7 M/S
DOP CALC LVOT STROKE INDEX: 19 ML/M2
DOP CALC LVOT STROKE VOLUME: 35.54 CM3
E WAVE DECELERATION TIME: 192 MS
E/A RATIO: 2.49
ERYTHROCYTE [DISTWIDTH] IN BLOOD BY AUTOMATED COUNT: 18.3 % (ref 11.6–15.1)
FERRITIN SERPL-MCNC: 7 NG/ML (ref 11–307)
FOLATE SERPL-MCNC: >22.3 NG/ML
GFR SERPL CREATININE-BSD FRML MDRD: 85 ML/MIN/1.73SQ M
GLUCOSE SERPL-MCNC: 145 MG/DL (ref 65–140)
GLUCOSE SERPL-MCNC: 192 MG/DL (ref 65–140)
GLUCOSE SERPL-MCNC: 202 MG/DL (ref 65–140)
HCT VFR BLD AUTO: 30.7 % (ref 34.8–46.1)
HGB BLD-MCNC: 8.6 G/DL (ref 11.5–15.4)
IRON SATN MFR SERPL: 3 % (ref 15–50)
IRON SERPL-MCNC: 10 UG/DL (ref 50–212)
LAAS-AP2: 24.3 CM2
LAAS-AP4: 24.1 CM2
LEFT ATRIUM VOLUME (MOD BIPLANE): 80 ML
LEFT ATRIUM VOLUME INDEX (MOD BIPLANE): 47.6 ML/M2
LEFT VENTRICLE DIASTOLIC VOLUME (MOD BIPLANE): 97 ML
LEFT VENTRICLE DIASTOLIC VOLUME INDEX (MOD BIPLANE): 57.7 ML/M2
LEFT VENTRICLE SYSTOLIC VOLUME (MOD BIPLANE): 56 ML
LEFT VENTRICLE SYSTOLIC VOLUME INDEX (MOD BIPLANE): 33.3 ML/M2
LV EF: 42 %
MCH RBC QN AUTO: 20.2 PG (ref 26.8–34.3)
MCHC RBC AUTO-ENTMCNC: 28 G/DL (ref 31.4–37.4)
MCV RBC AUTO: 72 FL (ref 82–98)
MV E'TISSUE VEL-SEP: 7 CM/S
MV PEAK A VEL: 0.47 M/S
MV PEAK E VEL: 117 CM/S
MV STENOSIS PRESSURE HALF TIME: 56 MS
MV VALVE AREA P 1/2 METHOD: 3.93
PLATELET # BLD AUTO: 302 THOUSANDS/UL (ref 149–390)
PMV BLD AUTO: 10.7 FL (ref 8.9–12.7)
POTASSIUM SERPL-SCNC: 4.3 MMOL/L (ref 3.5–5.3)
RA PRESSURE ESTIMATED: 8 MMHG
RBC # BLD AUTO: 4.25 MILLION/UL (ref 3.81–5.12)
RIGHT ATRIUM AREA SYSTOLE A4C: 12.5 CM2
RIGHT VENTRICLE ID DIMENSION: 3.1 CM
RV PSP: 40 MMHG
SL CV LEFT ATRIUM LENGTH A2C: 6.4 CM
SL CV LV EF: 40
SODIUM SERPL-SCNC: 132 MMOL/L (ref 135–147)
TIBC SERPL-MCNC: 336 UG/DL (ref 250–450)
TR MAX PG: 32 MMHG
TR PEAK VELOCITY: 2.9 M/S
TRICUSPID ANNULAR PLANE SYSTOLIC EXCURSION: 1.8 CM
TRICUSPID VALVE PEAK REGURGITATION VELOCITY: 2.85 M/S
UIBC SERPL-MCNC: 326 UG/DL (ref 155–355)
VIT B12 SERPL-MCNC: 538 PG/ML (ref 180–914)
WBC # BLD AUTO: 10.69 THOUSAND/UL (ref 4.31–10.16)

## 2024-03-26 PROCEDURE — 82728 ASSAY OF FERRITIN: CPT | Performed by: PHYSICIAN ASSISTANT

## 2024-03-26 PROCEDURE — 93306 TTE W/DOPPLER COMPLETE: CPT

## 2024-03-26 PROCEDURE — 83550 IRON BINDING TEST: CPT | Performed by: PHYSICIAN ASSISTANT

## 2024-03-26 PROCEDURE — 82948 REAGENT STRIP/BLOOD GLUCOSE: CPT

## 2024-03-26 PROCEDURE — 93306 TTE W/DOPPLER COMPLETE: CPT | Performed by: INTERNAL MEDICINE

## 2024-03-26 PROCEDURE — 85027 COMPLETE CBC AUTOMATED: CPT | Performed by: PHYSICIAN ASSISTANT

## 2024-03-26 PROCEDURE — 94760 N-INVAS EAR/PLS OXIMETRY 1: CPT

## 2024-03-26 PROCEDURE — 82607 VITAMIN B-12: CPT | Performed by: PHYSICIAN ASSISTANT

## 2024-03-26 PROCEDURE — 94640 AIRWAY INHALATION TREATMENT: CPT

## 2024-03-26 PROCEDURE — 83540 ASSAY OF IRON: CPT | Performed by: PHYSICIAN ASSISTANT

## 2024-03-26 PROCEDURE — 94664 DEMO&/EVAL PT USE INHALER: CPT

## 2024-03-26 PROCEDURE — 80048 BASIC METABOLIC PNL TOTAL CA: CPT | Performed by: PHYSICIAN ASSISTANT

## 2024-03-26 PROCEDURE — 99239 HOSP IP/OBS DSCHRG MGMT >30: CPT | Performed by: PHYSICIAN ASSISTANT

## 2024-03-26 PROCEDURE — 82746 ASSAY OF FOLIC ACID SERUM: CPT | Performed by: PHYSICIAN ASSISTANT

## 2024-03-26 RX ORDER — LEVOTHYROXINE SODIUM 0.1 MG/1
125 TABLET ORAL
Status: ON HOLD
Start: 2024-03-26

## 2024-03-26 RX ORDER — ACETAMINOPHEN 325 MG/1
650 TABLET ORAL
Status: DISCONTINUED | OUTPATIENT
Start: 2024-03-26 | End: 2024-03-26 | Stop reason: HOSPADM

## 2024-03-26 RX ORDER — HALOPERIDOL 5 MG/1
10 TABLET ORAL
Status: ON HOLD
Start: 2024-03-26

## 2024-03-26 RX ORDER — FUROSEMIDE 20 MG/1
20 TABLET ORAL DAILY
Qty: 30 TABLET | Refills: 0 | Status: ON HOLD | OUTPATIENT
Start: 2024-03-26

## 2024-03-26 RX ORDER — FERROUS SULFATE 324(65)MG
324 TABLET, DELAYED RELEASE (ENTERIC COATED) ORAL
Qty: 60 TABLET | Refills: 0 | Status: ON HOLD | OUTPATIENT
Start: 2024-03-26

## 2024-03-26 RX ORDER — IBUPROFEN 400 MG/1
200 TABLET ORAL DAILY
Status: DISCONTINUED | OUTPATIENT
Start: 2024-03-27 | End: 2024-03-26 | Stop reason: HOSPADM

## 2024-03-26 RX ORDER — LISINOPRIL 2.5 MG/1
2.5 TABLET ORAL DAILY
Qty: 30 TABLET | Refills: 0 | Status: ON HOLD | OUTPATIENT
Start: 2024-03-26

## 2024-03-26 RX ADMIN — LEVALBUTEROL HYDROCHLORIDE 1.25 MG: 1.25 SOLUTION RESPIRATORY (INHALATION) at 07:15

## 2024-03-26 RX ADMIN — IRON SUCROSE 300 MG: 20 INJECTION, SOLUTION INTRAVENOUS at 12:06

## 2024-03-26 RX ADMIN — INSULIN LISPRO 1 UNITS: 100 INJECTION, SOLUTION INTRAVENOUS; SUBCUTANEOUS at 07:58

## 2024-03-26 RX ADMIN — ENOXAPARIN SODIUM 40 MG: 40 INJECTION SUBCUTANEOUS at 08:02

## 2024-03-26 RX ADMIN — GUAIFENESIN 600 MG: 600 TABLET, EXTENDED RELEASE ORAL at 08:00

## 2024-03-26 RX ADMIN — IPRATROPIUM BROMIDE 0.5 MG: 0.5 SOLUTION RESPIRATORY (INHALATION) at 07:15

## 2024-03-26 RX ADMIN — IBUPROFEN 400 MG: 400 TABLET, FILM COATED ORAL at 10:50

## 2024-03-26 RX ADMIN — LEVOTHYROXINE SODIUM 125 MCG: 125 TABLET ORAL at 05:49

## 2024-03-26 RX ADMIN — INSULIN LISPRO 1 UNITS: 100 INJECTION, SOLUTION INTRAVENOUS; SUBCUTANEOUS at 12:06

## 2024-03-26 NOTE — ASSESSMENT & PLAN NOTE
Patient has a history of iron deficiency anemia  Patient has not been taking her iron supplementation  Hemoglobin 8.6.  Ferritin 7.  Iron saturation 3.  B12 538.  Folate greater than 22.3  Attempted to give patient a dose of IV Venofer, but she pulled her IV out and would not allow repeat IV access.  Recommend restarting oral iron supplementation  Will place a referral for hematology follow-up.  Can consider Venofer outpatient if patient is agreeable

## 2024-03-26 NOTE — DISCHARGE SUMMARY
[facility]  Discharge- Joslyn Cantu 1950, 73 y.o. female MRN: [id number]  Unit/Bed#: -01 Encounter: [id number]  Primary Care Provider: Natalia Elizondo MD   Date and time admitted to hospital: 3/24/2024  2:55 AM    * Acute respiratory failure with hypoxia (HCC)  Assessment & Plan  Presented from home with reported shortness of breath  Requiring 2 L to maintain sats greater than 90% - no O2 at baseline   No formal PFTs found in chart however sounded wheezy on exam   CXR with evidence of interstitial pulmonary edema and small R pleural effusion   Procalcitonin unremarkable, COVID/flu/RSV unremarkable  BNP elevated   Echo shows grade 2 diastolic dysfunction  Suspect symptoms secondary to acute diastolic congestive heart failure  Improved with IV Lasix  Stable off antibiotics.  See plan below    Acute diastolic CHF (congestive heart failure) (HCC)  Assessment & Plan  Wt Readings from Last 3 Encounters:   03/26/24 59 kg (130 lb)   12/21/23 59 kg (130 lb)   12/03/19 59 kg (130 lb)     Echo shows grade 2 diastolic dysfunction as well as moderate valvular disease and global hypokinesis  Will start Lasix 20 mg daily and lisinopril 2.5 mg daily at discharge  Will place an ambulatory referral for cardiology follow-up  Situation difficult because patient is not always consistent with taking her medications and refuses procedures often.  As a result, will treat conservatively with medications to start.  Discussed medical plan and medication plan with group home staff (C[facility]) so that they can encourage patient to take medications regularly and follow-up with specialist.          Type 2 diabetes mellitus without complication (HCC)  Assessment & Plan  Lab Results   Component Value Date    HGBA1C 9.2 (H) 03/05/2024       Recent Labs     03/25/24  1621 03/25/24  2100 03/26/24  0621 03/26/24  1153   POCGLU 100 196* 192* 202*         Blood Sugar Average: Last 72 hrs:  (P)  203.3    Sliding scale insulin while hospitalized  Holding PTA metformin - resume at discharge    Hypothyroid  Assessment & Plan  Continue PTA levothyroxine    Schizophrenia (HCC)  Assessment & Plan  Patient resides at group home  Aida is her caretaker  Reviewed med list with Aida  Patient currently taking Haldol 10 mg at bedtime, though Aida states patient has not taken this in a while  Patient does not have a POA or guardian and makes her own decisions  [facility] confirms that patient refuses things often.  She can be agitated/belligerent at times as well.    Iron deficiency anemia  Assessment & Plan  Patient has a history of iron deficiency anemia  Patient has not been taking her iron supplementation  Hemoglobin 8.6.  Ferritin 7.  Iron saturation 3.  B12 538.  Folate greater than 22.3  Attempted to give patient a dose of IV Venofer, but she pulled her IV out and would not allow repeat IV access.  Recommend restarting oral iron supplementation  Will place a referral for hematology follow-up.  Can consider Venofer outpatient if patient is agreeable      Medical Problems       Resolved Problems  Date Reviewed: 3/26/2024   None       Discharging Physician / Practitioner: Narda Cedeno PA-C  PCP: Natalia Elizondo MD  Admission Date:   Admission Orders (From admission, onward)       Ordered        03/25/24 1133  Inpatient Admission  Once            03/24/24 0604  Place in Observation  Once                          Discharge Date: 03/26/24    Consultations During Hospital Stay:  none    Procedures Performed:     CXR  Diffuse interstitial pulmonary edema with small right pleural effusion.     Echocardiogram    Left Ventricle: Left ventricular cavity size is normal. Wall thickness is increased. The left ventricular ejection fraction is 40%. Systolic function is moderately reduced. There is moderate global hypokinesis. Diastolic function is moderately abnormal, consistent with grade II (pseudonormal)  relaxation.    Right Ventricle: Right ventricular cavity size is normal. Systolic function is normal.    Left Atrium: The atrium is moderately dilated (42-48 mL/m2).    Atrial Septum: The septum bows into the right atrium, suggesting increased left atrial pressure.    Aortic Valve: The aortic valve is trileaflet. The leaflets are moderately thickened. The leaflets are moderately calcified. There is moderately reduced mobility. There is moderate stenosis. The aortic valve mean gradient is 6 mmHg. The dimensionless velocity index is 0.39. The aortic valve area is 1.14 cm2. The stroke volume index is 19.00 ml/m2. The aortic valve velocity is increased due to stenosis but lower than expected due to the presence of decreased flow.    Mitral Valve: There is mild regurgitation.    Tricuspid Valve: There is moderate regurgitation. The right ventricular systolic pressure is mildly elevated. The estimated right ventricular systolic pressure is 40.00 mmHg.    Pericardium: There is a right pleural effusion.    Significant Findings / Test Results:   See above    Incidental Findings:   none     Test Results Pending at Discharge (will require follow up):   none     Outpatient Tests Requested:  BMP in 1 week    Complications:  none    Reason for Admission: SOB and wheezing    Hospital Course:   Joslyn Cantu is a 73 y.o. female patient who originally presented to the hospital on 3/24/2024 due to shortness of breath and wheezing.  She initially was started on antibiotics, but pneumonia was ruled out.  Chest x-ray was consistent with pulmonary edema.  She was started on IV Lasix with improvement of her symptoms.  Echocardiogram was consistent with grade 2 diastolic dysfunction.  Therefore it was felt patient's shortness of breath was secondary to acute diastolic congestive heart failure.  Will discharge her on Lasix 20 mg daily and lisinopril 2.5 mg daily.  She will need a BMP in 7 to 10 days.  I will also place an ambulatory referral  "to cardiology for follow-up.  Favor conservative management for now given the fact that patient does not always take her medications consistently and often refuses procedures.  Patient also noted to be anemic.  Her ferritin and iron are low.  She has a history of iron deficiency anemia, but stopped taking treatments per her caregiver.  Attempted to give patient Venofer, but she pulled out her IV.  Will restart the patient on oral iron supplementation and will place a referral to hematology.  Patient will return to her group home in stable condition.    Please see above list of diagnoses and related plan for additional information.     Condition at Discharge: good    Discharge Day Visit / Exam:   Subjective: Patient yelling for coffee.  She is upset that she did not get her Motrin this morning.  She is upset that she was on a thickened liquid diet.  Vitals: Blood Pressure: 121/65 (03/26/24 1152)  Pulse: 74 (03/26/24 1152)  Temperature: (!) 97.3 °F (36.3 °C) (03/24/24 0704)  Temp Source: Oral (03/24/24 0704)  Respirations: 20 (03/24/24 0704)  Height: 5' 7\" (170.2 cm) (03/26/24 1152)  Weight - Scale: 59 kg (130 lb) (03/26/24 1152)  SpO2: 94 % (03/26/24 0715)  Exam:   Physical Exam  Vitals and nursing note reviewed.   Constitutional:       General: She is not in acute distress.     Appearance: She is well-developed.   HENT:      Head: Normocephalic and atraumatic.   Eyes:      Conjunctiva/sclera: Conjunctivae normal.   Cardiovascular:      Rate and Rhythm: Normal rate and regular rhythm.      Heart sounds: No murmur heard.  Pulmonary:      Effort: Pulmonary effort is normal. No respiratory distress.      Breath sounds: Normal breath sounds.   Abdominal:      Palpations: Abdomen is soft.      Tenderness: There is no abdominal tenderness.   Musculoskeletal:         General: No swelling.      Cervical back: Neck supple.   Skin:     General: Skin is warm and dry.   Neurological:      Mental Status: She is alert. "   Psychiatric:         Mood and Affect: Mood normal.         Behavior: Behavior is agitated.         Judgment: Judgment is impulsive.      Comments: Patient yells when spoken to. Difficult to understand at times.          Discussion with Family:  Discussed plan of care with group home employees Cresenc[facility].     Discharge instructions/Information to patient and family:   See after visit summary for information provided to patient and family.      Provisions for Follow-Up Care:  See after visit summary for information related to follow-up care and any pertinent home health orders.      Mobility at time of Discharge:   Basic Mobility Inpatient Raw Score: 22  JH-HLM Goal: 7: Walk 25 feet or more  JH-HLM Achieved: 7: Walk 25 feet or more  HLM Goal achieved. Continue to encourage appropriate mobility.     Disposition:   Other: Group Home    Planned Readmission: none     Discharge Statement:  I spent 45 minutes discharging the patient. This time was spent on the day of discharge. I had direct contact with the patient on the day of discharge. Greater than 50% of the total time was spent examining patient, answering all patient questions, arranging and discussing plan of care with patient as well as directly providing post-discharge instructions.  Additional time then spent on discharge activities.    Discharge Medications:  See after visit summary for reconciled discharge medications provided to patient and/or family.      **Please Note: This note may have been constructed using a voice recognition system**

## 2024-03-26 NOTE — ASSESSMENT & PLAN NOTE
Patient resides at group home  Aida is her caretaker  Reviewed med list with Aida  Patient currently taking Haldol 10 mg at bedtime, though Aida states patient has not taken this in a while  Patient does not have a POA or guardian and makes her own decisions  [facility] confirms that patient refuses things often.  She can be agitated/belligerent at times as well.

## 2024-03-26 NOTE — ASSESSMENT & PLAN NOTE
Wt Readings from Last 3 Encounters:   03/26/24 59 kg (130 lb)   12/21/23 59 kg (130 lb)   12/03/19 59 kg (130 lb)     Echo shows grade 2 diastolic dysfunction as well as moderate valvular disease and global hypokinesis  Will start Lasix 20 mg daily and lisinopril 2.5 mg daily at discharge  Will place an ambulatory referral for cardiology follow-up  Situation difficult because patient is not always consistent with taking her medications and refuses procedures often.  As a result, will treat conservatively with medications to start.  Discussed medical plan and medication plan with group home staff ([facility]) so that they can encourage patient to take medications regularly and follow-up with specialist.

## 2024-03-26 NOTE — NURSING NOTE
DC instructions gone over with Aida from [facility] who came to  pt.  Scripts sent in to [facility]

## 2024-03-26 NOTE — CASE MANAGEMENT
Case Management Discharge Planning Note    Patient name Joslyn Cantu  Location /-01 MRN [id number]  : 1950 Date 3/26/2024       Current Admission Date: 3/24/2024  Current Admission Diagnosis:Acute respiratory failure with hypoxia (HCC)   Patient Active Problem List    Diagnosis Date Noted    Acute diastolic CHF (congestive heart failure) (HCC) 2024    Acute respiratory failure with hypoxia (HCC) 2024    Iron deficiency anemia 2016    Hypovitaminosis D 05/10/2016    Hypothyroid 2016    Hypertension 2016    Type 2 diabetes mellitus without complication (HCC) 2016    Schizophrenia (HCC) 2016      LOS (days): 1  Geometric Mean LOS (GMLOS) (days):   Days to GMLOS:     OBJECTIVE:  Risk of Unplanned Readmission Score: 11.62         Current admission status: Inpatient   Preferred Pharmacy:   [facility] [address]  [address] [facility] [address]  Phone: [phone] Fax: [phone]    [facility] [address]   [address] [facility] [address]  Phone: [phone] Fax: [phone]    Primary Care Provider: Natalia Elizondo MD    Primary Insurance: [employer]ARE [facility]  Secondary Insurance: [place] [employer] AND [employer] [facility]    DISCHARGE DETAILS:                 Spoke to Khoi at [facility] patient can return tonight staff will  between 4pm and 5pm.     ROSE then received ca call from Rayray Cesar at [facility] wanting medical information on the patient. Rose explained that due to hospital policy CM could not release information however, a full report was given by the Physician Assistant to Khoi at [facility].    Later Aida called from [facility] and aksed that all orders be faxed to [facility]. CM did fax d/c orders to [facility]

## 2024-03-26 NOTE — ASSESSMENT & PLAN NOTE
Lab Results   Component Value Date    HGBA1C 9.2 (H) 03/05/2024       Recent Labs     03/25/24  1621 03/25/24  2100 03/26/24  0621 03/26/24  1153   POCGLU 100 196* 192* 202*         Blood Sugar Average: Last 72 hrs:  (P) 203.3    Sliding scale insulin while hospitalized  Holding PTA metformin - resume at discharge

## 2024-03-26 NOTE — PLAN OF CARE
Problem: CARDIOVASCULAR - ADULT  Goal: Maintains optimal cardiac output and hemodynamic stability  Description: INTERVENTIONS:  - Monitor I/O, vital signs and rhythm  - Monitor for S/S and trends of decreased cardiac output  - Administer and titrate ordered vasoactive medications to optimize hemodynamic stability  - Assess quality of pulses, skin color and temperature  - Assess for signs of decreased coronary artery perfusion  - Instruct patient to report change in severity of symptoms  Outcome: Progressing     Problem: RESPIRATORY - ADULT  Goal: Achieves optimal ventilation and oxygenation  Description: INTERVENTIONS:  - Assess for changes in respiratory status  - Assess for changes in mentation and behavior  - Position to facilitate oxygenation and minimize respiratory effort  - Oxygen administered by appropriate delivery if ordered  - Initiate smoking cessation education as indicated  - Encourage broncho-pulmonary hygiene including cough, deep breathe, Incentive Spirometry  - Assess the need for suctioning and aspirate as needed  - Assess and instruct to report SOB or any respiratory difficulty  - Respiratory Therapy support as indicated  Outcome: Progressing     Problem: MUSCULOSKELETAL - ADULT  Goal: Maintain or return mobility to safest level of function  Description: INTERVENTIONS:  - Assess patient's ability to carry out ADLs; assess patient's baseline for ADL function and identify physical deficits which impact ability to perform ADLs (bathing, care of mouth/teeth, toileting, grooming, dressing, etc.)  - Assess/evaluate cause of self-care deficits   - Assess range of motion  - Assess patient's mobility  - Assess patient's need for assistive devices and provide as appropriate  - Encourage maximum independence but intervene and supervise when necessary  - Involve family in performance of ADLs  - Assess for home care needs following discharge   - Consider OT consult to assist with ADL evaluation and planning  for discharge  - Provide patient education as appropriate  Outcome: Progressing

## 2024-03-26 NOTE — ASSESSMENT & PLAN NOTE
Presented from home with reported shortness of breath  Requiring 2 L to maintain sats greater than 90% - no O2 at baseline   No formal PFTs found in chart however sounded wheezy on exam   CXR with evidence of interstitial pulmonary edema and small R pleural effusion   Procalcitonin unremarkable, COVID/flu/RSV unremarkable  BNP elevated   Echo shows grade 2 diastolic dysfunction  Suspect symptoms secondary to acute diastolic congestive heart failure  Improved with IV Lasix  Stable off antibiotics.  See plan below

## 2024-03-27 ENCOUNTER — TELEPHONE (OUTPATIENT)
Dept: HEMATOLOGY ONCOLOGY | Facility: CLINIC | Age: 74
End: 2024-03-27

## 2024-03-27 NOTE — TELEPHONE ENCOUNTER
Appointment Change  Cancel, Reschedule, Change to Virtual      Who are you speaking with? [facility]   If it is not the patient, is the caller listed on the communication consent form? N/A   Which provider is the appointment scheduled with? YUMI Rocha   When was the original appointment scheduled?    Please list date and time 5/1/24 1:00pm   At which location is the appointment scheduled to take place? [place]   Was the appointment rescheduled?     Was the appointment changed from an in person visit to a virtual visit?    If so, please list the details of the change. 5/3/24 1:00pm   What is the reason for the appointment change? Schedule conflict       Was STAR transport scheduled? No   Does STAR transport need to be scheduled for the new visit (if applicable) No   Does the patient need an infusion appointment rescheduled? No   Does the patient have an upcoming infusion appointment scheduled? If so, when? No   Is the patient undergoing chemotherapy? No   For appointments cancelled with less than 24 hours:  Was the no-show policy reviewed? Yes

## 2024-03-30 ENCOUNTER — HOSPITAL ENCOUNTER (INPATIENT)
Facility: HOSPITAL | Age: 74
LOS: 3 days | Discharge: HOME | DRG: 291 | End: 2024-04-02
Attending: EMERGENCY MEDICINE | Admitting: INTERNAL MEDICINE
Payer: MEDICARE

## 2024-03-30 ENCOUNTER — APPOINTMENT (EMERGENCY)
Dept: RADIOLOGY | Facility: HOSPITAL | Age: 74
DRG: 291 | End: 2024-03-30
Payer: MEDICARE

## 2024-03-30 DIAGNOSIS — I50.9 CHF EXACERBATION (HCC): Primary | ICD-10-CM

## 2024-03-30 DIAGNOSIS — R06.00 DYSPNEA: ICD-10-CM

## 2024-03-30 DIAGNOSIS — I50.31 ACUTE DIASTOLIC CHF (CONGESTIVE HEART FAILURE) (HCC): ICD-10-CM

## 2024-03-30 DIAGNOSIS — F20.9 SCHIZOPHRENIA, UNSPECIFIED TYPE (HCC): Chronic | ICD-10-CM

## 2024-03-30 PROBLEM — I50.21 ACUTE HFREF (HEART FAILURE WITH REDUCED EJECTION FRACTION) (HCC): Status: ACTIVE | Noted: 2024-03-26

## 2024-03-30 LAB
2HR DELTA HS TROPONIN: 0 NG/L
ALBUMIN SERPL BCP-MCNC: 3.6 G/DL (ref 3.5–5)
ALP SERPL-CCNC: 88 U/L (ref 34–104)
ALT SERPL W P-5'-P-CCNC: 28 U/L (ref 7–52)
ANION GAP SERPL CALCULATED.3IONS-SCNC: 6 MMOL/L (ref 4–13)
AST SERPL W P-5'-P-CCNC: 29 U/L (ref 13–39)
BASOPHILS # BLD AUTO: 0.08 THOUSANDS/ÂΜL (ref 0–0.1)
BASOPHILS NFR BLD AUTO: 1 % (ref 0–1)
BILIRUB SERPL-MCNC: 0.27 MG/DL (ref 0.2–1)
BNP SERPL-MCNC: 873 PG/ML (ref 0–100)
BUN SERPL-MCNC: 7 MG/DL (ref 5–25)
CALCIUM SERPL-MCNC: 8.5 MG/DL (ref 8.4–10.2)
CARDIAC TROPONIN I PNL SERPL HS: 25 NG/L
CARDIAC TROPONIN I PNL SERPL HS: 25 NG/L
CHLORIDE SERPL-SCNC: 101 MMOL/L (ref 96–108)
CO2 SERPL-SCNC: 25 MMOL/L (ref 21–32)
CREAT SERPL-MCNC: 0.54 MG/DL (ref 0.6–1.3)
EOSINOPHIL # BLD AUTO: 0.2 THOUSAND/ÂΜL (ref 0–0.61)
EOSINOPHIL NFR BLD AUTO: 1 % (ref 0–6)
ERYTHROCYTE [DISTWIDTH] IN BLOOD BY AUTOMATED COUNT: 20.4 % (ref 11.6–15.1)
GFR SERPL CREATININE-BSD FRML MDRD: 95 ML/MIN/1.73SQ M
GLUCOSE SERPL-MCNC: 136 MG/DL (ref 65–140)
HCT VFR BLD AUTO: 31.3 % (ref 34.8–46.1)
HGB BLD-MCNC: 8.6 G/DL (ref 11.5–15.4)
IMM GRANULOCYTES # BLD AUTO: 0.08 THOUSAND/UL (ref 0–0.2)
IMM GRANULOCYTES NFR BLD AUTO: 1 % (ref 0–2)
LYMPHOCYTES # BLD AUTO: 2.03 THOUSANDS/ÂΜL (ref 0.6–4.47)
LYMPHOCYTES NFR BLD AUTO: 14 % (ref 14–44)
MCH RBC QN AUTO: 21.3 PG (ref 26.8–34.3)
MCHC RBC AUTO-ENTMCNC: 27.5 G/DL (ref 31.4–37.4)
MCV RBC AUTO: 78 FL (ref 82–98)
MONOCYTES # BLD AUTO: 1.26 THOUSAND/ÂΜL (ref 0.17–1.22)
MONOCYTES NFR BLD AUTO: 9 % (ref 4–12)
NEUTROPHILS # BLD AUTO: 10.51 THOUSANDS/ÂΜL (ref 1.85–7.62)
NEUTS SEG NFR BLD AUTO: 74 % (ref 43–75)
NRBC BLD AUTO-RTO: 0 /100 WBCS
PLATELET # BLD AUTO: 314 THOUSANDS/UL (ref 149–390)
PMV BLD AUTO: 10.3 FL (ref 8.9–12.7)
POTASSIUM SERPL-SCNC: 4.4 MMOL/L (ref 3.5–5.3)
PROCALCITONIN SERPL-MCNC: <0.05 NG/ML
PROT SERPL-MCNC: 6.3 G/DL (ref 6.4–8.4)
RBC # BLD AUTO: 4.03 MILLION/UL (ref 3.81–5.12)
SODIUM SERPL-SCNC: 132 MMOL/L (ref 135–147)
WBC # BLD AUTO: 14.16 THOUSAND/UL (ref 4.31–10.16)

## 2024-03-30 PROCEDURE — 80053 COMPREHEN METABOLIC PANEL: CPT

## 2024-03-30 PROCEDURE — 83880 ASSAY OF NATRIURETIC PEPTIDE: CPT

## 2024-03-30 PROCEDURE — 36415 COLL VENOUS BLD VENIPUNCTURE: CPT

## 2024-03-30 PROCEDURE — 94640 AIRWAY INHALATION TREATMENT: CPT

## 2024-03-30 PROCEDURE — 99291 CRITICAL CARE FIRST HOUR: CPT | Performed by: EMERGENCY MEDICINE

## 2024-03-30 PROCEDURE — 85025 COMPLETE CBC W/AUTO DIFF WBC: CPT

## 2024-03-30 PROCEDURE — 93005 ELECTROCARDIOGRAM TRACING: CPT

## 2024-03-30 PROCEDURE — 99223 1ST HOSP IP/OBS HIGH 75: CPT | Performed by: INTERNAL MEDICINE

## 2024-03-30 PROCEDURE — 84484 ASSAY OF TROPONIN QUANT: CPT

## 2024-03-30 PROCEDURE — 99285 EMERGENCY DEPT VISIT HI MDM: CPT

## 2024-03-30 PROCEDURE — 71045 X-RAY EXAM CHEST 1 VIEW: CPT

## 2024-03-30 PROCEDURE — 84145 PROCALCITONIN (PCT): CPT | Performed by: INTERNAL MEDICINE

## 2024-03-30 RX ORDER — LEVOTHYROXINE SODIUM 0.07 MG/1
150 TABLET ORAL
Status: DISCONTINUED | OUTPATIENT
Start: 2024-03-31 | End: 2024-03-30

## 2024-03-30 RX ORDER — LISINOPRIL 2.5 MG/1
2.5 TABLET ORAL DAILY
Status: DISCONTINUED | OUTPATIENT
Start: 2024-03-31 | End: 2024-03-31

## 2024-03-30 RX ORDER — OLANZAPINE 10 MG/2ML
INJECTION, POWDER, FOR SOLUTION INTRAMUSCULAR
Status: COMPLETED
Start: 2024-03-30 | End: 2024-03-30

## 2024-03-30 RX ORDER — INSULIN LISPRO 100 [IU]/ML
1-5 INJECTION, SOLUTION INTRAVENOUS; SUBCUTANEOUS
Status: DISCONTINUED | OUTPATIENT
Start: 2024-03-31 | End: 2024-04-02 | Stop reason: HOSPADM

## 2024-03-30 RX ORDER — HALOPERIDOL 5 MG/1
5 TABLET ORAL
Status: DISCONTINUED | OUTPATIENT
Start: 2024-03-31 | End: 2024-04-02 | Stop reason: HOSPADM

## 2024-03-30 RX ORDER — OLANZAPINE 10 MG/2ML
5 INJECTION, POWDER, FOR SOLUTION INTRAMUSCULAR ONCE AS NEEDED
Status: COMPLETED | OUTPATIENT
Start: 2024-03-30 | End: 2024-03-30

## 2024-03-30 RX ORDER — FERROUS SULFATE 325(65) MG
325 TABLET ORAL 2 TIMES DAILY WITH MEALS
Status: DISCONTINUED | OUTPATIENT
Start: 2024-03-31 | End: 2024-04-02 | Stop reason: HOSPADM

## 2024-03-30 RX ORDER — FUROSEMIDE 10 MG/ML
20 INJECTION INTRAMUSCULAR; INTRAVENOUS ONCE
Status: DISCONTINUED | OUTPATIENT
Start: 2024-03-30 | End: 2024-03-30

## 2024-03-30 RX ORDER — INSULIN LISPRO 100 [IU]/ML
1-5 INJECTION, SOLUTION INTRAVENOUS; SUBCUTANEOUS
Status: DISCONTINUED | OUTPATIENT
Start: 2024-03-30 | End: 2024-04-02 | Stop reason: HOSPADM

## 2024-03-30 RX ORDER — WATER 10 ML/10ML
INJECTION INTRAMUSCULAR; INTRAVENOUS; SUBCUTANEOUS
Status: COMPLETED
Start: 2024-03-30 | End: 2024-03-30

## 2024-03-30 RX ORDER — HALOPERIDOL 10 MG/1
10 TABLET ORAL
Status: DISCONTINUED | OUTPATIENT
Start: 2024-03-31 | End: 2024-03-30

## 2024-03-30 RX ORDER — ENOXAPARIN SODIUM 100 MG/ML
40 INJECTION SUBCUTANEOUS DAILY
Status: DISCONTINUED | OUTPATIENT
Start: 2024-03-31 | End: 2024-04-02 | Stop reason: HOSPADM

## 2024-03-30 RX ORDER — LEVOTHYROXINE SODIUM 0.12 MG/1
125 TABLET ORAL
Status: DISCONTINUED | OUTPATIENT
Start: 2024-03-31 | End: 2024-04-02 | Stop reason: HOSPADM

## 2024-03-30 RX ORDER — IBUPROFEN 400 MG/1
400 TABLET ORAL ONCE
Status: COMPLETED | OUTPATIENT
Start: 2024-03-30 | End: 2024-03-30

## 2024-03-30 RX ORDER — HALOPERIDOL 5 MG/1
5 TABLET ORAL ONCE
Status: COMPLETED | OUTPATIENT
Start: 2024-03-30 | End: 2024-03-30

## 2024-03-30 RX ORDER — ONDANSETRON 2 MG/ML
4 INJECTION INTRAMUSCULAR; INTRAVENOUS EVERY 6 HOURS PRN
Status: DISCONTINUED | OUTPATIENT
Start: 2024-03-30 | End: 2024-04-02 | Stop reason: HOSPADM

## 2024-03-30 RX ORDER — ACETAMINOPHEN 325 MG/1
650 TABLET ORAL EVERY 6 HOURS PRN
Status: DISCONTINUED | OUTPATIENT
Start: 2024-03-30 | End: 2024-04-02 | Stop reason: HOSPADM

## 2024-03-30 RX ORDER — FUROSEMIDE 10 MG/ML
40 INJECTION INTRAMUSCULAR; INTRAVENOUS
Status: DISCONTINUED | OUTPATIENT
Start: 2024-03-30 | End: 2024-04-01

## 2024-03-30 RX ORDER — ALBUTEROL SULFATE 2.5 MG/3ML
5 SOLUTION RESPIRATORY (INHALATION) ONCE
Status: COMPLETED | OUTPATIENT
Start: 2024-03-30 | End: 2024-03-30

## 2024-03-30 RX ADMIN — HALOPERIDOL 5 MG: 5 TABLET ORAL at 19:33

## 2024-03-30 RX ADMIN — OLANZAPINE 5 MG: 10 INJECTION, POWDER, FOR SOLUTION INTRAMUSCULAR at 23:05

## 2024-03-30 RX ADMIN — FUROSEMIDE 20 MG: 10 INJECTION, SOLUTION INTRAMUSCULAR; INTRAVENOUS at 21:04

## 2024-03-30 RX ADMIN — WATER 1 ML: 1 INJECTION INTRAMUSCULAR; INTRAVENOUS; SUBCUTANEOUS at 23:05

## 2024-03-30 RX ADMIN — OLANZAPINE 5 MG: 10 INJECTION, POWDER, LYOPHILIZED, FOR SOLUTION INTRAMUSCULAR at 23:05

## 2024-03-30 RX ADMIN — ALBUTEROL SULFATE 5 MG: 2.5 SOLUTION RESPIRATORY (INHALATION) at 19:10

## 2024-03-30 RX ADMIN — IBUPROFEN 400 MG: 400 TABLET, FILM COATED ORAL at 23:46

## 2024-03-30 RX ADMIN — IPRATROPIUM BROMIDE 0.5 MG: 0.5 SOLUTION RESPIRATORY (INHALATION) at 19:10

## 2024-03-30 RX ADMIN — FUROSEMIDE 20 MG: 10 INJECTION, SOLUTION INTRAMUSCULAR; INTRAVENOUS at 23:20

## 2024-03-30 RX ADMIN — ACETAMINOPHEN 650 MG: 325 TABLET, FILM COATED ORAL at 23:46

## 2024-03-30 NOTE — ED ATTENDING ATTESTATION
3/30/2024  I, Vasquez Rehman MD, saw and evaluated the patient. I have discussed the patient with the resident/non-physician practitioner and agree with the resident's/non-physician practitioner's findings, Plan of Care, and MDM as documented in the resident's/non-physician practitioner's note, except where noted. All available labs and Radiology studies were reviewed.  I was present for key portions of any procedure(s) performed by the resident/non-physician practitioner and I was immediately available to provide assistance.       At this point I agree with the current assessment done in the Emergency Department.  I have conducted an independent evaluation of this patient a history and physical is as follows:  Sob   lives in a group home  mentally disabled Psychiatric issues   Wheezing and rhonchi  Congestion   No fever   no CP  no leg swelling    Exam: Patient in no distress.  99% room air neck no JVD lungs with rhonchi and wheezes is regular no murmurs abdomen is soft and nontender extremities nontender no edema  Impression shortness of breath  Differential consideration would include COPD exacerbation, congestive heart failure, pneumonia,  Electrolyte abnormality and/or severe anemia  Cardiac workup beta-2 agonist  ED Course   Critical Care Time:  - Critical care    The patient presented with a condition in which there was a high probability of imminent or life-threatening deterioration, and critical care services (excluding separately billable procedures and total teaching time ) total 30  minutes.  - Critical care time was exclusive of seperately bilable procedures and treating other patients as well as teaching time.   - Critical care was necessary to treat or prevent imminent or life-threatening deterioration of the following condition: Respiratory distress   Congestive Heart failure     - Critical care time was spent personally by me on the following activities Obtaining history from patient or  surrogate, development of treatment plan with patient or surrogate, evaluation of patient's response to treatment, examination of patient, re-evaluation of patient's condition, review of old charts, ordering and performing treatments and interventions, ordering and review of laboratory studies and ordering and review of radiographic studies      Critical Care Time  Procedures

## 2024-03-30 NOTE — ED PROVIDER NOTES
History  Chief Complaint   Patient presents with    Shortness of Breath     Pt presents ambulatory with c/o shortness of breath, recently admitted for CHF exacerbation     73-year-old female patient with a history of hypertension, hypothyroid, type 2 diabetes, CHF, intellectual disability presenting to the ED complaining of shortness of breath.  Patient here with supervisor stating that she has shortness of breath.  History limited secondary to patient's nonverbal status.  Patient is able to say however that she does not have any chest pain, leg pain, leg swelling, nausea, vomiting, diarrhea.  Caretaker noted that patient has been having increased work of breathing in the past 2 days.  Patient was recently admitted for CHF exacerbation.        Prior to Admission Medications   Prescriptions Last Dose Informant Patient Reported? Taking?   ferrous sulfate 324 (65 Fe) mg   No No   Sig: Take 1 tablet (324 mg total) by mouth 2 (two) times a day before meals   furosemide (LASIX) 20 mg tablet   No No   Sig: Take 1 tablet (20 mg total) by mouth daily   haloperidol (HALDOL) 5 mg tablet   No No   Sig: Take 2 tablets (10 mg total) by mouth daily at bedtime   ibuprofen (MOTRIN) 400 mg tablet   Yes No   Sig: Take 400 mg by mouth every 6 (six) hours as needed for mild pain   levothyroxine 100 mcg tablet   No No   Sig: Take 1.5 tablets (150 mcg total) by mouth daily in the early morning   lisinopril (ZESTRIL) 2.5 mg tablet   No No   Sig: Take 1 tablet (2.5 mg total) by mouth daily   metFORMIN (GLUCOPHAGE) 1000 MG tablet   No No   Sig: Take 1 tablet (1,000 mg total) by mouth 2 (two) times a day with meals Indications: Type 2 Diabetes.      Facility-Administered Medications: None       Past Medical History:   Diagnosis Date    Hypertension     Hypothyroid 5/8/2016    Hypovitaminosis D 5/10/2016    Iron deficiency anemia 5/11/2016    Type 2 diabetes mellitus without complication (HCC) 5/8/2016       History reviewed. No pertinent  surgical history.    Family History   Family history unknown: Yes     I have reviewed and agree with the history as documented.    E-Cigarette/Vaping     E-Cigarette/Vaping Substances     Social History     Tobacco Use    Smoking status: Every Day     Types: Cigarettes    Smokeless tobacco: Current   Substance Use Topics    Alcohol use: No    Drug use: No        Review of Systems   Unable to perform ROS: Patient nonverbal   Respiratory:  Positive for cough and shortness of breath.        Physical Exam  ED Triage Vitals   Temperature Pulse Respirations Blood Pressure SpO2   03/30/24 1959 03/30/24 1758 03/30/24 1758 03/30/24 1758 03/30/24 1758   (!) 97.2 °F (36.2 °C) 95 (!) 30 (!) 184/97 99 %      Temp Source Heart Rate Source Patient Position - Orthostatic VS BP Location FiO2 (%)   03/30/24 1959 03/30/24 1758 -- -- --   Tympanic Monitor         Pain Score       03/30/24 1758       No Pain             Orthostatic Vital Signs  Vitals:    03/30/24 1758 03/30/24 1910 03/30/24 2100   BP: (!) 184/97 138/94 137/99   Pulse: 95  90       Physical Exam  Vitals reviewed.   Constitutional:       Appearance: Normal appearance.   HENT:      Head: Normocephalic and atraumatic.      Nose: Nose normal.      Mouth/Throat:      Mouth: Mucous membranes are moist.      Pharynx: Oropharynx is clear.   Eyes:      Extraocular Movements: Extraocular movements intact.      Conjunctiva/sclera: Conjunctivae normal.   Cardiovascular:      Rate and Rhythm: Normal rate and regular rhythm.      Pulses: Normal pulses.      Heart sounds: Normal heart sounds.   Pulmonary:      Effort: Pulmonary effort is normal.      Breath sounds: Wheezing (Bilateral wheezes) and rhonchi (Bilateral rhonchi) present.   Abdominal:      General: Bowel sounds are normal.      Palpations: Abdomen is soft.      Tenderness: There is no abdominal tenderness.   Musculoskeletal:         General: Normal range of motion.      Cervical back: Normal range of motion.      Right  lower leg: No tenderness. No edema.      Left lower leg: No tenderness. No edema.   Skin:     General: Skin is warm and dry.   Neurological:      General: No focal deficit present.      Mental Status: She is alert. Mental status is at baseline.      Comments: At baseline         ED Medications  Medications   furosemide (LASIX) injection 40 mg (20 mg Intravenous Given 3/30/24 2320)   ferrous sulfate tablet 325 mg (has no administration in time range)   lisinopril (ZESTRIL) tablet 2.5 mg (has no administration in time range)   ondansetron (ZOFRAN) injection 4 mg (has no administration in time range)   enoxaparin (LOVENOX) subcutaneous injection 40 mg (has no administration in time range)   insulin lispro (HumALOG/ADMELOG) 100 units/mL subcutaneous injection 1-5 Units (has no administration in time range)   insulin lispro (HumALOG/ADMELOG) 100 units/mL subcutaneous injection 1-5 Units (0 Units Subcutaneous Hold 3/30/24 2240)   haloperidol (HALDOL) tablet 5 mg (has no administration in time range)   levothyroxine tablet 125 mcg (has no administration in time range)   acetaminophen (TYLENOL) tablet 650 mg (650 mg Oral Given 3/30/24 2346)   albuterol inhalation solution 5 mg (5 mg Nebulization Given 3/30/24 1910)   ipratropium (ATROVENT) 0.02 % inhalation solution 0.5 mg (0.5 mg Nebulization Given 3/30/24 1910)   haloperidol (HALDOL) tablet 5 mg (5 mg Oral Given 3/30/24 1933)   OLANZapine (ZyPREXA) IM injection 5 mg (5 mg Intramuscular Given 3/30/24 2305)   sterile water injection **ADS Override Pull** (1 mL  Given 3/30/24 2305)   ibuprofen (MOTRIN) tablet 400 mg (400 mg Oral Given 3/30/24 2346)       Diagnostic Studies  Results Reviewed       Procedure Component Value Units Date/Time    HS Troponin I 2hr [[id number]]  (Normal) Collected: 03/30/24 2116    Lab Status: Final result Specimen: Blood from Arm, Left Updated: 03/30/24 2153     hs TnI 2hr 25 ng/L      Delta 2hr hsTnI 0 ng/L     Procalcitonin [[id number]]  (Normal)  Collected: 03/30/24 1905    Lab Status: Final result Specimen: Blood from Arm, Right Updated: 03/30/24 2122     Procalcitonin <0.05 ng/ml     B-Type Natriuretic Peptide(BNP) [[id number]]  (Abnormal) Collected: 03/30/24 1905    Lab Status: Final result Specimen: Blood from Arm, Right Updated: 03/30/24 1945      pg/mL     HS Troponin 0hr (reflex protocol) [[id number]]  (Normal) Collected: 03/30/24 1905    Lab Status: Final result Specimen: Blood from Arm, Right Updated: 03/30/24 1941     hs TnI 0hr 25 ng/L     Comprehensive metabolic panel [[id number]]  (Abnormal) Collected: 03/30/24 1905    Lab Status: Final result Specimen: Blood from Arm, Right Updated: 03/30/24 1940     Sodium 132 mmol/L      Potassium 4.4 mmol/L      Chloride 101 mmol/L      CO2 25 mmol/L      ANION GAP 6 mmol/L      BUN 7 mg/dL      Creatinine 0.54 mg/dL      Glucose 136 mg/dL      Calcium 8.5 mg/dL      AST 29 U/L      ALT 28 U/L      Alkaline Phosphatase 88 U/L      Total Protein 6.3 g/dL      Albumin 3.6 g/dL      Total Bilirubin 0.27 mg/dL      eGFR 95 ml/min/1.73sq m     Narrative:      [employer] guidelines for Chronic Kidney Disease (CKD):     Stage 1 with normal or high GFR (GFR > 90 mL/min/1.73 square meters)    Stage 2 Mild CKD (GFR = 60-89 mL/min/1.73 square meters)    Stage 3A Moderate CKD (GFR = 45-59 mL/min/1.73 square meters)    Stage 3B Moderate CKD (GFR = 30-44 mL/min/1.73 square meters)    Stage 4 Severe CKD (GFR = 15-29 mL/min/1.73 square meters)    Stage 5 End Stage CKD (GFR <15 mL/min/1.73 square meters)  Note: GFR calculation is accurate only with a steady state creatinine    CBC and differential [[id number]]  (Abnormal) Collected: 03/30/24 1905    Lab Status: Final result Specimen: Blood from Arm, Right Updated: 03/30/24 1918     WBC 14.16 Thousand/uL      RBC 4.03 Million/uL      Hemoglobin 8.6 g/dL      Hematocrit 31.3 %      MCV 78 fL      MCH 21.3 pg      MCHC 27.5 g/dL      RDW 20.4 %       MPV 10.3 fL      Platelets 314 Thousands/uL      nRBC 0 /100 WBCs      Neutrophils Relative 74 %      Immature Grans % 1 %      Lymphocytes Relative 14 %      Monocytes Relative 9 %      Eosinophils Relative 1 %      Basophils Relative 1 %      Neutrophils Absolute 10.51 Thousands/µL      Absolute Immature Grans 0.08 Thousand/uL      Absolute Lymphocytes 2.03 Thousands/µL      Absolute Monocytes 1.26 Thousand/µL      Eosinophils Absolute 0.20 Thousand/µL      Basophils Absolute 0.08 Thousands/µL                    XR chest portable    (Results Pending)         Procedures  Procedures      ED Course                             SBIRT 22yo+      Flowsheet Row Most Recent Value   Initial Alcohol Screen: US AUDIT-C     1. How often do you have a drink containing alcohol? 0 Filed at: 03/30/2024 1759   2. How many drinks containing alcohol do you have on a typical day you are drinking?  0 Filed at: 03/30/2024 1759   3a. Male UNDER 65: How often do you have five or more drinks on one occasion? 0 Filed at: 03/30/2024 1759   3b. FEMALE Any Age, or MALE 65+: How often do you have 4 or more drinks on one occassion? 0 Filed at: 03/30/2024 1759   Audit-C Score 0 Filed at: 03/30/2024 1759   FAUSTO: How many times in the past year have you...    Used an illegal drug or used a prescription medication for non-medical reasons? Never Filed at: 03/30/2024 1759                  Medical Decision Making  73-year-old female patient presenting with shortness of breath.  Recently admitted for CHF exacerbation.  History limited due to patient being nonverbal.  On exam, has some rhonchi and wheezes on auscultation.  DDx includes CHF exacerbation, COPD exacerbation, pneumonia.  Labs show elevated BNP and elevated troponin.  Patient having no chest pain.  Chest x-ray shows pulmonary edema.  Will treat with Lasix.  Will admit to medicine for CHF exacerbation.    Amount and/or Complexity of Data Reviewed  Labs: ordered.  Radiology:  ordered.    Risk  Prescription drug management.  Decision regarding hospitalization.          Disposition  Final diagnoses:   CHF exacerbation (HCC)   Dyspnea     Time reflects when diagnosis was documented in both MDM as applicable and the Disposition within this note       Time User Action Codes Description Comment    3/30/2024  8:51 PM Raymond Maldonado [I50.9] CHF exacerbation (HCC)     3/30/2024  8:51 PM Raymond Maldonado [R06.00] Dyspnea     3/30/2024  9:11 PM Ethan Max [I50.31] Acute diastolic CHF (congestive heart failure) (HCC)           ED Disposition       ED Disposition   Admit    Condition   Stable    Date/Time   Sat Mar 30, 2024 2051    Comment   Case was discussed with Dr. Max and the patient's admission status was agreed to be Admission Status: inpatient status to the service of Dr. Max .               Follow-up Information    None         Patient's Medications   Discharge Prescriptions    No medications on file     No discharge procedures on file.    PDMP Review         Value Time User    PDMP Reviewed  Yes 3/30/2024  9:10 PM Ethan Max DO             ED Provider  Attending physically available and evaluated Joslyn EVANS Pepe. I managed the patient along with the ED Attending.    Electronically Signed by           Raymond Maldonado MD  03/31/24 0028

## 2024-03-30 NOTE — ED NOTES
XR staff unable to obtain at bedside 2/2 pt's [email or web address] made aware, staff and pt requesting PO medication      Gama Rawls RN  03/30/24 1922

## 2024-03-31 LAB
ANION GAP SERPL CALCULATED.3IONS-SCNC: 7 MMOL/L (ref 4–13)
ATRIAL RATE: 93 BPM
BUN SERPL-MCNC: 6 MG/DL (ref 5–25)
CALCIUM SERPL-MCNC: 8.6 MG/DL (ref 8.4–10.2)
CHLORIDE SERPL-SCNC: 101 MMOL/L (ref 96–108)
CO2 SERPL-SCNC: 27 MMOL/L (ref 21–32)
CREAT SERPL-MCNC: 0.6 MG/DL (ref 0.6–1.3)
ERYTHROCYTE [DISTWIDTH] IN BLOOD BY AUTOMATED COUNT: 21.3 % (ref 11.6–15.1)
GFR SERPL CREATININE-BSD FRML MDRD: 91 ML/MIN/1.73SQ M
GLUCOSE SERPL-MCNC: 112 MG/DL (ref 65–140)
GLUCOSE SERPL-MCNC: 126 MG/DL (ref 65–140)
GLUCOSE SERPL-MCNC: 130 MG/DL (ref 65–140)
GLUCOSE SERPL-MCNC: 190 MG/DL (ref 65–140)
GLUCOSE SERPL-MCNC: 242 MG/DL (ref 65–140)
HCT VFR BLD AUTO: 31 % (ref 34.8–46.1)
HGB BLD-MCNC: 8.8 G/DL (ref 11.5–15.4)
MCH RBC QN AUTO: 21.5 PG (ref 26.8–34.3)
MCHC RBC AUTO-ENTMCNC: 28.4 G/DL (ref 31.4–37.4)
MCV RBC AUTO: 76 FL (ref 82–98)
P AXIS: 25 DEGREES
PLATELET # BLD AUTO: 290 THOUSANDS/UL (ref 149–390)
PMV BLD AUTO: 10.4 FL (ref 8.9–12.7)
POTASSIUM SERPL-SCNC: 3.8 MMOL/L (ref 3.5–5.3)
PR INTERVAL: 122 MS
QRS AXIS: 61 DEGREES
QRSD INTERVAL: 72 MS
QT INTERVAL: 368 MS
QTC INTERVAL: 457 MS
RBC # BLD AUTO: 4.1 MILLION/UL (ref 3.81–5.12)
SODIUM SERPL-SCNC: 135 MMOL/L (ref 135–147)
T WAVE AXIS: 67 DEGREES
VENTRICULAR RATE: 93 BPM
WBC # BLD AUTO: 12.03 THOUSAND/UL (ref 4.31–10.16)

## 2024-03-31 PROCEDURE — 85027 COMPLETE CBC AUTOMATED: CPT | Performed by: INTERNAL MEDICINE

## 2024-03-31 PROCEDURE — 93010 ELECTROCARDIOGRAM REPORT: CPT | Performed by: INTERNAL MEDICINE

## 2024-03-31 PROCEDURE — 80048 BASIC METABOLIC PNL TOTAL CA: CPT | Performed by: INTERNAL MEDICINE

## 2024-03-31 PROCEDURE — 36415 COLL VENOUS BLD VENIPUNCTURE: CPT | Performed by: INTERNAL MEDICINE

## 2024-03-31 PROCEDURE — 82948 REAGENT STRIP/BLOOD GLUCOSE: CPT

## 2024-03-31 PROCEDURE — 99232 SBSQ HOSP IP/OBS MODERATE 35: CPT | Performed by: PHYSICIAN ASSISTANT

## 2024-03-31 PROCEDURE — 99222 1ST HOSP IP/OBS MODERATE 55: CPT | Performed by: INTERNAL MEDICINE

## 2024-03-31 RX ORDER — LISINOPRIL 10 MG/1
10 TABLET ORAL DAILY
Status: DISCONTINUED | OUTPATIENT
Start: 2024-04-01 | End: 2024-04-02 | Stop reason: HOSPADM

## 2024-03-31 RX ADMIN — FUROSEMIDE 40 MG: 10 INJECTION, SOLUTION INTRAMUSCULAR; INTRAVENOUS at 08:34

## 2024-03-31 RX ADMIN — INSULIN LISPRO 2 UNITS: 100 INJECTION, SOLUTION INTRAVENOUS; SUBCUTANEOUS at 12:42

## 2024-03-31 RX ADMIN — FUROSEMIDE 40 MG: 10 INJECTION, SOLUTION INTRAMUSCULAR; INTRAVENOUS at 17:31

## 2024-03-31 RX ADMIN — LEVOTHYROXINE SODIUM 125 MCG: 125 TABLET ORAL at 08:33

## 2024-03-31 RX ADMIN — HALOPERIDOL 5 MG: 5 TABLET ORAL at 22:25

## 2024-03-31 RX ADMIN — FERROUS SULFATE TAB 325 MG (65 MG ELEMENTAL FE) 325 MG: 325 (65 FE) TAB at 17:32

## 2024-03-31 RX ADMIN — LISINOPRIL 2.5 MG: 2.5 TABLET ORAL at 08:33

## 2024-03-31 RX ADMIN — INSULIN LISPRO 1 UNITS: 100 INJECTION, SOLUTION INTRAVENOUS; SUBCUTANEOUS at 21:51

## 2024-03-31 RX ADMIN — ENOXAPARIN SODIUM 40 MG: 40 INJECTION SUBCUTANEOUS at 08:34

## 2024-03-31 RX ADMIN — FERROUS SULFATE TAB 325 MG (65 MG ELEMENTAL FE) 325 MG: 325 (65 FE) TAB at 08:33

## 2024-03-31 NOTE — ASSESSMENT & PLAN NOTE
"Lab Results   Component Value Date    HGBA1C 9.2 (H) 03/05/2024       No results for input(s): \"POCGLU\" in the last 72 hours.    Blood Sugar Average: Last 72 hrs:  Not optimally controlled as outpatient  Hold metformin while admitted, start correctional insulin coverage with Accu-Cheks  Carb controlled diet  Initiate hypoglycemia protocol    "

## 2024-03-31 NOTE — ASSESSMENT & PLAN NOTE
"Lab Results   Component Value Date    HGBA1C 9.2 (H) 03/05/2024       No results for input(s): \"POCGLU\" in the last 72 hours.    Blood Sugar Average: Last 72 hrs:  Not optimally controlled as outpatient  Hold metformin while admitted  Correctional insulin coverage with Accu-Cheks  Carb controlled diet  Initiate hypoglycemia protocol  "

## 2024-03-31 NOTE — H&P
"[facility]  H&P  Name: Joslyn Cantu 73 y.o. female I MRN: [id number]  Unit/Bed#: ED 29 I Date of Admission: 3/30/2024   Date of Service: 3/30/2024 I Hospital Day: 0      Assessment/Plan   * Acute HFrEF (heart failure with reduced ejection fraction) ([facility])  Assessment & Plan  Wt Readings from Last 3 Encounters:   03/26/24 59 kg (130 lb)   12/21/23 59 kg (130 lb)   12/03/19 59 kg (130 lb)   Patient admitted here 3/24/2024 - 3/26/2024 with shortness of breath, briefly requiring supplemental oxygen to maintain appropriate saturations.  Found with acute on chronic heart failure with TTE revealing EF 40%, moderate global hypokinesis, G2 DD, moderate AS, moderate TR. improved with IV diuresis and discharged back to facility with plan for outpatient cardiology follow-up  Presented now with increased shortness of breath from baseline, thus far able to maintain appropriate saturations on room air  BNP elevated from prior admission, CXR to wet read appears worsening pulmonary vascular congestion and persistent right pleural effusion pending final read  To receive 40 mg IV Lasix, continue IV diuresis Lasix 40 mg twice daily  Appreciate cardiology consult  Monitor daily weights, I's/O, volume status  Cardiac and fluid restricted diet  Daily BMP while on IV diuresis, replace electrolytes as needed and monitor renal function closely            Iron deficiency anemia  Assessment & Plan  Hemoglobin stable compared to prior discharge values, noted with low ferritin/iron saturation at prior hospitalization  Continue oral iron supplementation, monitor hemoglobin with CBC    Type 2 diabetes mellitus without complication ([facility])  Assessment & Plan  Lab Results   Component Value Date    HGBA1C 9.2 (H) 03/05/2024       No results for input(s): \"POCGLU\" in the last 72 hours.    Blood Sugar Average: Last 72 hrs:  Not optimally controlled as outpatient  Hold metformin while admitted, start correctional " insulin coverage with Accu-Cheks  Carb controlled diet  Initiate hypoglycemia protocol      Hypertension  Assessment & Plan  Continue lisinopril 2.5 mg daily with strict hold parameters and monitor blood pressure per protocol    Hypothyroid  Assessment & Plan  Continue home dose levothyroxine    Schizophrenia (HCC)  Assessment & Plan  Resides at group home  Per prior discharge summary patient was normally taking 10 mg at bedtime although caretaker had reported patient not taken this in some time.  Medication list available at time of this admission appearing patient is on 5 mg Haldol at bedtime though difficult to discern.  Will need to verify with staff in a.m. once able to reach  Noted that patient can be agitated/belligerent at times             VTE Prophylaxis: Enoxaparin (Lovenox)  / sequential compression device   Code Status: Level 1 - Full Code   POLST: POLST form is not discussed and not completed at this time.  Discussion with family: Attempted to reach group home staff via telephone but unable to reach    Anticipated Length of Stay:  Patient will be admitted on an Inpatient basis with an anticipated length of stay of greater than 2 midnights.   Justification for Hospital Stay: Please see detailed plans noted above.    Chief Complaint:     Shortness of breath  History of Present Illness:  Please note history is obtained from chart review and discussion with EM physician as patient agitated and not participating in conversation, no caretaker from group home present at bedside and I am unable to reach them via telephone at time of admission.    Joslyn Cantu is a 73 y.o. female who was recently admitted here 3/24/2024 - 3/26/2024 with acute respiratory failure with hypoxia found with acute heart failure improved with IV diuresis, with TTE at that time revealing EF 40%, moderate global hypokinesis, G2 DD, moderate AS, moderate TR and discharged on oral Lasix 20 mg with plan for outpatient cardiology follow-up,  incidentally also found with iron deficiency anemia.    She presents again from group home with recurrent shortness of breath with increased work of breathing over the past 2 days reported.  No fever/chills, apparent pain, nausea/vomiting/diarrhea, or edema were reported.  During ED evaluation she was noted with increased BNP from prior and CXR to wet read with worsening pulmonary vascular congestion ongoing right pleural effusion.  She is to receive 40 mg IV Lasix and is admitted for apparent acute on chronic heart failure.      Review of Systems:  Unable to perform ROS: Patient agitated and unable to provide history despite multiple attempts    Past Medical and Surgical History:   Past Medical History:   Diagnosis Date    Hypertension     Hypothyroid 5/8/2016    Hypovitaminosis D 5/10/2016    Iron deficiency anemia 5/11/2016    Type 2 diabetes mellitus without complication (HCC) 5/8/2016     History reviewed. No pertinent surgical history.    Meds/Allergies:    Current Facility-Administered Medications:     [START ON 3/31/2024] enoxaparin (LOVENOX) subcutaneous injection 40 mg, 40 mg, Subcutaneous, Daily, Ethan Max DO    [START ON 3/31/2024] ferrous sulfate tablet 325 mg, 325 mg, Oral, BID With Meals, Ethan Max DO    furosemide (LASIX) injection 40 mg, 40 mg, Intravenous, BID (diuretic), Ethan Max DO    [START ON 3/31/2024] haloperidol (HALDOL) tablet 5 mg, 5 mg, Oral, HS, Ethan Max DO    [START ON 3/31/2024] insulin lispro (HumALOG/ADMELOG) 100 units/mL subcutaneous injection 1-5 Units, 1-5 Units, Subcutaneous, TID AC **AND** [START ON 3/31/2024] Fingerstick Glucose (POCT), , , TID AC, Ethan Max DO    insulin lispro (HumALOG/ADMELOG) 100 units/mL subcutaneous injection 1-5 Units, 1-5 Units, Subcutaneous, HS, Ethan Max DO    [START ON 3/31/2024] levothyroxine tablet 125 mcg, 125 mcg, Oral, Early Morning, Ethan Max DO    [START ON 3/31/2024] lisinopril (ZESTRIL) tablet 2.5  mg, 2.5 mg, Oral, Daily, Ethan Max DO    ondansetron (ZOFRAN) injection 4 mg, 4 mg, Intravenous, Q6H PRN, Ethan Max DO    Current Outpatient Medications:     ferrous sulfate 324 (65 Fe) mg, Take 1 tablet (324 mg total) by mouth 2 (two) times a day before meals, Disp: 60 tablet, Rfl: 0    furosemide (LASIX) 20 mg tablet, Take 1 tablet (20 mg total) by mouth daily, Disp: 30 tablet, Rfl: 0    haloperidol (HALDOL) 5 mg tablet, Take 2 tablets (10 mg total) by mouth daily at bedtime, Disp: , Rfl:     ibuprofen (MOTRIN) 400 mg tablet, Take 400 mg by mouth every 6 (six) hours as needed for mild pain, Disp: , Rfl:     levothyroxine 100 mcg tablet, Take 1.5 tablets (150 mcg total) by mouth daily in the early morning, Disp: , Rfl:     lisinopril (ZESTRIL) 2.5 mg tablet, Take 1 tablet (2.5 mg total) by mouth daily, Disp: 30 tablet, Rfl: 0    metFORMIN (GLUCOPHAGE) 1000 MG tablet, Take 1 tablet (1,000 mg total) by mouth 2 (two) times a day with meals Indications: Type 2 Diabetes., Disp: 60 tablet, Rfl: 0        Allergies: No Known Allergies  History:  Marital Status: Single     Substance Use History:   Social History     Substance and Sexual Activity   Alcohol Use No     Social History     Tobacco Use   Smoking Status Every Day    Types: Cigarettes   Smokeless Tobacco Current     Social History     Substance and Sexual Activity   Drug Use No       Family History:  Family History   Family history unknown: Yes       Physical Exam:     Vitals:   Blood Pressure: 137/99 (03/30/24 2100)  Pulse: 90 (03/30/24 2100)  Temperature: (!) 97.2 °F (36.2 °C) (03/30/24 1959)  Temp Source: Tympanic (03/30/24 1959)  Respirations: (!) 29 (03/30/24 1910)  SpO2: 96 % (03/30/24 2100)    Constitutional:  Well developed, well nourished, no acute distress, non-toxic appearance   Eyes:  PERRL, conjunctiva normal   HENT:  Atraumatic, external ears normal, nose normal, oropharynx moist, no pharyngeal exudates. Neck- normal range of motion, no  tenderness, supple, +JVD   Respiratory:  Poor patient effort limits examination. No respiratory distress, bibasilar rales with mild expiratory wheezing  Cardiovascular:  Normal rate, normal rhythm, no murmurs, no gallops, no rubs   GI:  Soft, nondistended, normal bowel sounds, nontender, no organomegaly, no mass, no rebound, no guarding   :  No costovertebral angle tenderness   Musculoskeletal:  No edema, no tenderness, no deformities. Back- no tenderness  Integument:  Well hydrated, no rash   Lymphatic:  No lymphadenopathy noted   Neurologic:  Alert &awake, agitated and does not communicate to me/only intermittently follows commands, no focal deficits noted       Lab Results: I have personally reviewed pertinent reports.      Results from last 7 days   Lab Units 03/30/24  1905   WBC Thousand/uL 14.16*   HEMOGLOBIN g/dL 8.6*   HEMATOCRIT % 31.3*   PLATELETS Thousands/uL 314   NEUTROS PCT % 74   LYMPHS PCT % 14   MONOS PCT % 9   EOS PCT % 1     Results from last 7 days   Lab Units 03/30/24  1905   POTASSIUM mmol/L 4.4   CHLORIDE mmol/L 101   CO2 mmol/L 25   BUN mg/dL 7   CREATININE mg/dL 0.54*   CALCIUM mg/dL 8.5   ALK PHOS U/L 88   ALT U/L 28   AST U/L 29           EKG: Sinus rhythm with premature SVCs HR 91, nonspecific ST-T wave abnormalities    Imaging: I have personally reviewed pertinent reports.   and I have personally reviewed pertinent films in PACS    CXR 3/30/2024: personally reviewed, worsening pulmonary vascular congestion compared to prior with persistent small right pleural effusion. Final radiologist read is pending    Echo complete w/ contrast if indicated    Result Date: 3/26/2024  Narrative:   Left Ventricle: Left ventricular cavity size is normal. Wall thickness is increased. The left ventricular ejection fraction is 40%. Systolic function is moderately reduced. There is moderate global hypokinesis. Diastolic function is moderately abnormal, consistent with grade II (pseudonormal) relaxation.    Right Ventricle: Right ventricular cavity size is normal. Systolic function is normal.   Left Atrium: The atrium is moderately dilated (42-48 mL/m2).   Atrial Septum: The septum bows into the right atrium, suggesting increased left atrial pressure.   Aortic Valve: The aortic valve is trileaflet. The leaflets are moderately thickened. The leaflets are moderately calcified. There is moderately reduced mobility. There is moderate stenosis. The aortic valve mean gradient is 6 mmHg. The dimensionless velocity index is 0.39. The aortic valve area is 1.14 cm2. The stroke volume index is 19.00 ml/m2. The aortic valve velocity is increased due to stenosis but lower than expected due to the presence of decreased flow.   Mitral Valve: There is mild regurgitation.   Tricuspid Valve: There is moderate regurgitation. The right ventricular systolic pressure is mildly elevated. The estimated right ventricular systolic pressure is 40.00 mmHg.   Pericardium: There is a right pleural effusion.     XR chest 2 views    Result Date: 3/24/2024  Narrative: XR CHEST PA & LATERAL-with dual-energy INDICATION: Shortness of breath. COMPARISON: Chest radiograph 12/22/2020 Views: 4 FINDINGS: Monitoring leads and clips project over the chest. Mild vascular prominence/indistinctness with diffuse bilateral interstitial prominence and peripheral Kerley B consistent with interstitial pulmonary edema. Small right pleural effusion. No pneumothorax. Normal cardiomediastinal silhouette. Thoracic spine degenerative changes and mild levoscoliosis thoracolumbar junction. Normal upper abdomen.     Impression: Diffuse interstitial pulmonary edema with small right pleural effusion. The study was marked in EPIC for immediate notification. Resident: BARON WYATT I, the attending radiologist, have reviewed the images and agree with the final report above. Workstation performed: [id number]         ** Please Note: Dragon 360 Dictation voice to text software was  used in the creation of this document. **

## 2024-03-31 NOTE — ASSESSMENT & PLAN NOTE
Continue lisinopril 2.5 mg daily with strict hold parameters and monitor blood pressure per protocol

## 2024-03-31 NOTE — CONSULTS
Heart Failure/ Pulmonary Hypertension Consult Note - Joslyn Cantu 73 y.o. female MRN: [id number]    Unit/Bed#: ED 29 Encounter: [id number]      Assessment:    Principal Problem:    Acute HFrEF (heart failure with reduced ejection fraction) (HCC)  Active Problems:    Schizophrenia (HCC)    Hypothyroid    Hypertension    Type 2 diabetes mellitus without complication (HCC)    Iron deficiency anemia    # New HFrEF, Stage C  Etiology: unclear at this time, no Q waves on EKG, no focal WMA on echo. BP not too elevated on initial presentation to ED last admit but 184/97 on this admit so possible HTN. Given her refusal to procedures, ischemic  workup not done    Weight: 130 lbs  BNP    Studies- personally reviewed by me  EKG- SR, PACs, no Q waves    Echo 3/26/24  LVEF: 35%  LVIDd:  RV: normal  MR: mild  PASP: 40 mmHg  RVOT:   Other: moderate AS  Moderate TR    Neurohormonal Blockade:  --Beta-Blocker:  --ACEi, ARB or ARNi: lisinopril 2.5 mg daily--> 10 mg daily    (or SVR reduction)  --Aldosterone Receptor Blocker:  --SGLT2-I:   --Diuretic: lasix 20 mg daily  Inpt: lasix 40 mg IV BID    Sudden Cardiac Death Risk Reduction:  --ICD:     Electrical Resynchronization:  Native QRS:     Advanced Therapies (If appropriate):  --Inotrope:  --LVAD/Transplant Candidacy:    # HTN  # DM2, not controlled  HgA1C 9.2% on 3/5/24  # hypothyroid- on levothyroxine  # schizophrenia  # social- resides at a group home  Aida her caretaker  Haldol at bedtime- but not every night  States she often refuses meds, etc  # iron def anemia    Plan:  Lasix 40 mg IV BID  Increase lisinopril to 10 mg daily and titrate up for SVR reduction  Start beta blocker tomorrow    HPI:  Pt is 72 yo female s/p hospitalization for respiratory insufficiency due to acute mildly reduced heart failure, discharged on 3/26/24. She was discharged on lasix 20 mg daily. Note states that she is not consistent taking medicines and refuses procedures often. Discharge weight was  130 lbs. Present back with shortness of breath.    Review of Systems   Constitutional:  Negative for activity change, appetite change, fatigue and unexpected weight change.   HENT:  Negative for congestion and nosebleeds.    Eyes: Negative.    Respiratory:  Positive for shortness of breath. Negative for cough and chest tightness.    Cardiovascular:  Negative for chest pain, palpitations and leg swelling.   Gastrointestinal:  Negative for abdominal distention.   Endocrine: Negative.    Genitourinary: Negative.    Musculoskeletal: Negative.    Skin: Negative.    Neurological:  Negative for dizziness, syncope and weakness.   Hematological: Negative.    Psychiatric/Behavioral: Negative.          Central Line (day, reason):  Crawley catheter (day, reason):    Vitals: Blood pressure 153/68, pulse 71, temperature (!) 97.2 °F (36.2 °C), temperature source Tympanic, resp. rate 18, SpO2 97%., There is no height or weight on file to calculate BMI., I/O last 3 completed shifts:  In: -   Out: 240 [Urine:240]  I/O this shift:  In: -   Out: 500 [Urine:500]  Wt Readings from Last 3 Encounters:   03/26/24 59 kg (130 lb)   12/21/23 59 kg (130 lb)   12/03/19 59 kg (130 lb)       Intake/Output Summary (Last 24 hours) at 3/31/2024 0848  Last data filed at 3/31/2024 0833  Gross per 24 hour   Intake --   Output 740 ml   Net -740 ml     I/O last 3 completed shifts:  In: -   Out: 240 [Urine:240]    No significant arrhythmias seen on telemetry review.       Physical Exam:  Vitals:    03/31/24 0048 03/31/24 0209 03/31/24 0308 03/31/24 0837   BP:    153/68   Pulse:    71   Resp: 15 15 13 18   Temp:       TempSrc:       SpO2:    97%       GEN: Joslyn Cantu appears well, alert and oriented x 3, pleasant and cooperative   HEENT: pupils equal, round, and reactive to light; extraocular muscles intact  NECK: supple, no carotid bruits   HEART: regular rhythm, normal S1 and S2, no murmurs, clicks, gallops or rubs, JVP is    LUNGS: clear to auscultation  bilaterally; no wheezes, rales, or rhonchi   ABDOMEN: normal bowel sounds, soft, no tenderness, no distention  EXTREMITIES: peripheral pulses normal; no clubbing, cyanosis, or edema  NEURO: no focal findings   SKIN: normal without suspicious lesions on exposed skin      Current Facility-Administered Medications:     acetaminophen (TYLENOL) tablet 650 mg, 650 mg, Oral, Q6H PRN, Ethan Max DO, 650 mg at 03/30/24 2346    enoxaparin (LOVENOX) subcutaneous injection 40 mg, 40 mg, Subcutaneous, Daily, Ethan Max DO, 40 mg at 03/31/24 0834    ferrous sulfate tablet 325 mg, 325 mg, Oral, BID With Meals, Ethan Max DO, 325 mg at 03/31/24 0833    furosemide (LASIX) injection 40 mg, 40 mg, Intravenous, BID (diuretic), Ethan Max DO, 40 mg at 03/31/24 0834    haloperidol (HALDOL) tablet 5 mg, 5 mg, Oral, HS, Ethan Max DO    insulin lispro (HumALOG/ADMELOG) 100 units/mL subcutaneous injection 1-5 Units, 1-5 Units, Subcutaneous, TID AC **AND** Fingerstick Glucose (POCT), , , TID AC, Ethan Max DO    insulin lispro (HumALOG/ADMELOG) 100 units/mL subcutaneous injection 1-5 Units, 1-5 Units, Subcutaneous, HS, Ethan Max, DO    levothyroxine tablet 125 mcg, 125 mcg, Oral, Early Morning, Ethan Max DO, 125 mcg at 03/31/24 0833    lisinopril (ZESTRIL) tablet 2.5 mg, 2.5 mg, Oral, Daily, Ethan Max DO, 2.5 mg at 03/31/24 0833    ondansetron (ZOFRAN) injection 4 mg, 4 mg, Intravenous, Q6H PRN, Ethan Max DO    Current Outpatient Medications:     ferrous sulfate 324 (65 Fe) mg, Take 1 tablet (324 mg total) by mouth 2 (two) times a day before meals, Disp: 60 tablet, Rfl: 0    furosemide (LASIX) 20 mg tablet, Take 1 tablet (20 mg total) by mouth daily, Disp: 30 tablet, Rfl: 0    haloperidol (HALDOL) 5 mg tablet, Take 2 tablets (10 mg total) by mouth daily at bedtime, Disp: , Rfl:     ibuprofen (MOTRIN) 400 mg tablet, Take 400 mg by mouth every 6 (six) hours as needed for mild pain, Disp: ,  Rfl:     levothyroxine 100 mcg tablet, Take 1.5 tablets (150 mcg total) by mouth daily in the early morning, Disp: , Rfl:     lisinopril (ZESTRIL) 2.5 mg tablet, Take 1 tablet (2.5 mg total) by mouth daily, Disp: 30 tablet, Rfl: 0    metFORMIN (GLUCOPHAGE) 1000 MG tablet, Take 1 tablet (1,000 mg total) by mouth 2 (two) times a day with meals Indications: Type 2 Diabetes., Disp: 60 tablet, Rfl: 0      Labs & Results:        Results from last 7 days   Lab Units 03/30/24 1905 03/26/24  0625   WBC Thousand/uL 14.16* 10.69*   HEMOGLOBIN g/dL 8.6* 8.6*   HEMATOCRIT % 31.3* 30.7*   PLATELETS Thousands/uL 314 302         Results from last 7 days   Lab Units 03/30/24 1905 03/26/24  0625   POTASSIUM mmol/L 4.4 4.3   CHLORIDE mmol/L 101 96   CO2 mmol/L 25 27   BUN mg/dL 7 16   CREATININE mg/dL 0.54* 0.71   CALCIUM mg/dL 8.5 8.6   ALK PHOS U/L 88  --    ALT U/L 28  --    AST U/L 29  --            Counseling / Coordination of Care  Total floor / unit time spent today 40 minutes.  Greater than 50% of total time was spent with the patient and / or family counseling and / or coordination of care.  A description of the counseling / coordination of care: 25.      Thank you for the opportunity to participate in the care of this patient.  KAREEM TOBAR D.O.  DIRECTOR OF HEART FAILURE/ PULMONARY HYPERTENSION  MEDICAL DIRECTOR OF LVAD PROGRAM  [facility]

## 2024-03-31 NOTE — ASSESSMENT & PLAN NOTE
Continue lisinopril 2.5 mg daily with strict hold parameters and monitor blood pressure per protocol  IV diuresis as per cardiology

## 2024-03-31 NOTE — ASSESSMENT & PLAN NOTE
Wt Readings from Last 3 Encounters:   03/26/24 59 kg (130 lb)   12/21/23 59 kg (130 lb)   12/03/19 59 kg (130 lb)   Patient admitted here 3/24/2024 - 3/26/2024 with shortness of breath, briefly requiring supplemental oxygen to maintain appropriate saturations.  Found with acute on chronic heart failure with TTE revealing EF 40%, moderate global hypokinesis, G2 DD, moderate AS, moderate TR. improved with IV diuresis and discharged back to facility with plan for outpatient cardiology follow-up  Presented now with increased shortness of breath from baseline, thus far able to maintain appropriate saturations on room air  BNP elevated from prior admission, CXR to wet read appears worsening pulmonary vascular congestion and persistent right pleural effusion pending final read  To receive 40 mg IV Lasix, continue IV diuresis Lasix 40 mg twice daily  Appreciate cardiology consult  Monitor daily weights, I's/O, volume status  Cardiac and fluid restricted diet  Daily BMP while on IV diuresis, replace electrolytes as needed and monitor renal function closely

## 2024-03-31 NOTE — ED NOTES
Pt removed IV, requesting home meds, not accepting that she has received same, declining POCT bgl test. Pt removed gown, not accepting redirection. MEE Max made aware, Step by step staff informed     Gama Rawls, MARIIA  03/30/24 1840

## 2024-03-31 NOTE — ASSESSMENT & PLAN NOTE
Wt Readings from Last 3 Encounters:   03/26/24 59 kg (130 lb)   12/21/23 59 kg (130 lb)   12/03/19 59 kg (130 lb)   Patient admitted here 3/24/2024 - 3/26/2024 with shortness of breath, briefly requiring supplemental oxygen to maintain appropriate saturations.  Found with acute on chronic heart failure with TTE revealing EF 40%, moderate global hypokinesis, G2 DD, moderate AS, moderate TR. Improved with IV diuresis and discharged back to facility with plan for outpatient cardiology follow-up. Presented now with increased shortness of breath from baseline. Of note, with underlying psychiatric disorder there is concern that patient has not been taking mediations appropriately.   BNP elevated from prior admission, CXR showed persistent moderate pulmonary edema and small effusions with bibasilar atelectasis  Appreciate cardiology consult and recommendations   Currently on IV Lasix 40 mg twice daily   Lisinopril increased to 10 mg daily, plan to start BB tomorrow   Monitor daily weights, I's/O, volume status  Cardiac and fluid restricted diet  Monitor BMP and electrolytes

## 2024-03-31 NOTE — ED NOTES
Pt walked back to room with security presence, declining gowazael, LARRY given     Gama Rawls, MARIIA  03/30/24 6479

## 2024-03-31 NOTE — ASSESSMENT & PLAN NOTE
Hemoglobin stable compared to prior discharge values, noted with low ferritin/iron saturation at prior hospitalization  Continue oral iron supplementation, monitor hemoglobin with CBC

## 2024-03-31 NOTE — ASSESSMENT & PLAN NOTE
Resides at group home prior to admission. Noted that patient can be agitated/belligerent at times, refuses medications.  Confirmed with staff that patient is supposed to be taking Haldol 10 mg QHS however for approximately the last month has been refusing   Will continue with Haldol 5 mg QHS for now while hospitalized

## 2024-03-31 NOTE — PROGRESS NOTES
[facility]  Progress Note  Name: Joslyn Cantu I  MRN: [id number]  Unit/Bed#: ED 29 I Date of Admission: 3/30/2024   Date of Service: 3/31/2024 I Hospital Day: 1    Assessment/Plan   * Acute HFrEF (heart failure with reduced ejection fraction) ([facility])  Assessment & Plan  Wt Readings from Last 3 Encounters:   03/26/24 59 kg (130 lb)   12/21/23 59 kg (130 lb)   12/03/19 59 kg (130 lb)   Patient admitted here 3/24/2024 - 3/26/2024 with shortness of breath, briefly requiring supplemental oxygen to maintain appropriate saturations.  Found with acute on chronic heart failure with TTE revealing EF 40%, moderate global hypokinesis, G2 DD, moderate AS, moderate TR. Improved with IV diuresis and discharged back to facility with plan for outpatient cardiology follow-up. Presented now with increased shortness of breath from baseline. Of note, with underlying psychiatric disorder there is concern that patient has not been taking mediations appropriately.   BNP elevated from prior admission, CXR showed persistent moderate pulmonary edema and small effusions with bibasilar atelectasis  Appreciate cardiology consult and recommendations   Currently on IV Lasix 40 mg twice daily   Lisinopril increased to 10 mg daily, plan to start BB tomorrow   Monitor daily weights, I's/O, volume status  Cardiac and fluid restricted diet  Monitor BMP and electrolytes     Hypertension  Assessment & Plan  Continue lisinopril 2.5 mg daily with strict hold parameters and monitor blood pressure per protocol  IV diuresis as per cardiology     Schizophrenia ([facility])  Assessment & Plan  Resides at group home prior to admission. Noted that patient can be agitated/belligerent at times, refuses medications.  Confirmed with staff that patient is supposed to be taking Haldol 10 mg QHS however for approximately the last month has been refusing   Will continue with Haldol 5 mg QHS for now while hospitalized     Iron deficiency  "anemia  Assessment & Plan  Hemoglobin stable compared to prior discharge values, noted with low ferritin/iron saturation at prior hospitalization  Continue oral iron supplementation, monitor hemoglobin with CBC    Type 2 diabetes mellitus without complication (HCC)  Assessment & Plan  Lab Results   Component Value Date    HGBA1C 9.2 (H) 03/05/2024       No results for input(s): \"POCGLU\" in the last 72 hours.    Blood Sugar Average: Last 72 hrs:  Not optimally controlled as outpatient  Hold metformin while admitted  Correctional insulin coverage with Accu-Cheks  Carb controlled diet  Initiate hypoglycemia protocol    Hypothyroid  Assessment & Plan  Continue home dose levothyroxine           VTE Pharmacologic Prophylaxis: VTE Score: 3 Moderate Risk (Score 3-4) - Pharmacological DVT Prophylaxis Ordered: enoxaparin (Lovenox).    Mobility:      -HLM Goal NOT achieved. Continue with multidisciplinary rounding and encourage appropriate mobility to improve upon -HLM goals.    Patient Centered Rounds: I performed bedside rounds with nursing staff today.   Discussions with Specialists or Other Care Team Provider: none     Education and Discussions with Family / Patient: Updated  (caregiver Aida at [facility] home) via phone.    Total Time Spent on Date of Encounter in care of patient: 30 mins. This time was spent on one or more of the following: performing physical exam; counseling and coordination of care; obtaining or reviewing history; documenting in the medical record; reviewing/ordering tests, medications or procedures; communicating with other healthcare professionals and discussing with patient's family/caregivers.    Current Length of Stay: 1 day(s)  Current Patient Status: Inpatient   Certification Statement: The patient will continue to require additional inpatient hospital stay due to IV diuresis, pending cardiology clearance   Discharge Plan: Anticipate discharge in 48-72 hrs to group " home.    Code Status: Level 1 - Full Code    Subjective:   Patient sleeping comfortably upon entering the room, easily arouses to voice but falls right back to sleep. Does not participate in ROS.     Objective:     Vitals:   Temp (24hrs), Av.2 °F (36.2 °C), Min:97.2 °F (36.2 °C), Max:97.2 °F (36.2 °C)    Temp:  [97.2 °F (36.2 °C)] 97.2 °F (36.2 °C)  HR:  [71-95] 71  Resp:  [13-30] 18  BP: (128-184)/(68-99) 153/68  SpO2:  [96 %-99 %] 97 %  There is no height or weight on file to calculate BMI.     Input and Output Summary (last 24 hours):     Intake/Output Summary (Last 24 hours) at 3/31/2024 1343  Last data filed at 3/31/2024 1337  Gross per 24 hour   Intake --   Output 1990 ml   Net -1990 ml       Physical Exam: patient sleeping during my evaluation, cardiopulmonary auscultation deferred   Physical Exam  Vitals and nursing note reviewed.   Constitutional:       General: She is sleeping. She is not in acute distress.  Cardiovascular:      Rate and Rhythm: Normal rate.   Pulmonary:      Effort: Pulmonary effort is normal. No respiratory distress.      Comments: On room air  Neurological:      General: No focal deficit present.      Mental Status: She is easily aroused.          Additional Data:     Labs:  Results from last 7 days   Lab Units 24  0831 24  1905   WBC Thousand/uL 12.03* 14.16*   HEMOGLOBIN g/dL 8.8* 8.6*   HEMATOCRIT % 31.0* 31.3*   PLATELETS Thousands/uL 290 314   NEUTROS PCT %  --  74   LYMPHS PCT %  --  14   MONOS PCT %  --  9   EOS PCT %  --  1     Results from last 7 days   Lab Units 24  0831 24  1905   SODIUM mmol/L 135 132*   POTASSIUM mmol/L 3.8 4.4   CHLORIDE mmol/L 101 101   CO2 mmol/L 27 25   BUN mg/dL 6 7   CREATININE mg/dL 0.60 0.54*   ANION GAP mmol/L 7 6   CALCIUM mg/dL 8.6 8.5   ALBUMIN g/dL  --  3.6   TOTAL BILIRUBIN mg/dL  --  0.27   ALK PHOS U/L  --  88   ALT U/L  --  28   AST U/L  --  29   GLUCOSE RANDOM mg/dL 126 136         Results from last 7 days   Lab  Units 03/31/24  1144 03/31/24  0832 03/26/24  1153 03/26/24  0621 03/25/24  2100 03/25/24  1621 03/25/24  1115 03/25/24  0616 03/24/24  2140 03/24/24  1656   POC GLUCOSE mg/dl 242* 130 202* 192* 196* 100 367* 161* 153* 216*         Results from last 7 days   Lab Units 03/30/24  1905   PROCALCITONIN ng/ml <0.05       Lines/Drains:  Invasive Devices       Peripheral Intravenous Line  Duration             Peripheral IV 03/30/24 Left Antecubital <1 day                          Imaging: Reviewed radiology reports from this admission including: chest xray    Recent Cultures (last 7 days):         Last 24 Hours Medication List:   Current Facility-Administered Medications   Medication Dose Route Frequency Provider Last Rate    acetaminophen  650 mg Oral Q6H PRN Ethan Max DO      enoxaparin  40 mg Subcutaneous Daily Ethan Max DO      ferrous sulfate  325 mg Oral BID With Meals Ethan Max DO      furosemide  40 mg Intravenous BID (diuretic) Ethan Max DO      haloperidol  5 mg Oral HS Ethan Max DO      insulin lispro  1-5 Units Subcutaneous TID AC Ethan Max DO      insulin lispro  1-5 Units Subcutaneous HS Ethan Max DO      levothyroxine  125 mcg Oral Early Morning Ethan Max DO      [START ON 4/1/2024] lisinopril  10 mg Oral Daily Vasquez Ryagoza DO      ondansetron  4 mg Intravenous Q6H PRN Ethan Max DO          Today, Patient Was Seen By: Melisa Tobar PA-C    **Please Note: This note may have been constructed using a voice recognition system.**

## 2024-03-31 NOTE — ASSESSMENT & PLAN NOTE
Resides at group home  Per prior discharge summary patient was normally taking 10 mg at bedtime although caretaker had reported patient not taken this in some time.  Medication list available at time of this admission appearing patient is on 5 mg Haldol at bedtime though difficult to discern.  Will need to verify with staff in a.m. once able to reach  Noted that patient can be agitated/belligerent at times

## 2024-04-01 LAB
ANION GAP SERPL CALCULATED.3IONS-SCNC: 9 MMOL/L (ref 4–13)
BUN SERPL-MCNC: 11 MG/DL (ref 5–25)
CALCIUM SERPL-MCNC: 8.3 MG/DL (ref 8.4–10.2)
CHLORIDE SERPL-SCNC: 99 MMOL/L (ref 96–108)
CO2 SERPL-SCNC: 29 MMOL/L (ref 21–32)
CREAT SERPL-MCNC: 0.68 MG/DL (ref 0.6–1.3)
ERYTHROCYTE [DISTWIDTH] IN BLOOD BY AUTOMATED COUNT: 22.6 % (ref 11.6–15.1)
GFR SERPL CREATININE-BSD FRML MDRD: 88 ML/MIN/1.73SQ M
GLUCOSE SERPL-MCNC: 110 MG/DL (ref 65–140)
GLUCOSE SERPL-MCNC: 139 MG/DL (ref 65–140)
GLUCOSE SERPL-MCNC: 164 MG/DL (ref 65–140)
GLUCOSE SERPL-MCNC: 213 MG/DL (ref 65–140)
GLUCOSE SERPL-MCNC: 334 MG/DL (ref 65–140)
HCT VFR BLD AUTO: 31.6 % (ref 34.8–46.1)
HGB BLD-MCNC: 9 G/DL (ref 11.5–15.4)
MCH RBC QN AUTO: 21.5 PG (ref 26.8–34.3)
MCHC RBC AUTO-ENTMCNC: 28.5 G/DL (ref 31.4–37.4)
MCV RBC AUTO: 75 FL (ref 82–98)
PLATELET # BLD AUTO: 289 THOUSANDS/UL (ref 149–390)
PMV BLD AUTO: 10.6 FL (ref 8.9–12.7)
POTASSIUM SERPL-SCNC: 3.7 MMOL/L (ref 3.5–5.3)
RBC # BLD AUTO: 4.19 MILLION/UL (ref 3.81–5.12)
SODIUM SERPL-SCNC: 137 MMOL/L (ref 135–147)
WBC # BLD AUTO: 12.69 THOUSAND/UL (ref 4.31–10.16)

## 2024-04-01 PROCEDURE — 82948 REAGENT STRIP/BLOOD GLUCOSE: CPT

## 2024-04-01 PROCEDURE — 85027 COMPLETE CBC AUTOMATED: CPT | Performed by: PHYSICIAN ASSISTANT

## 2024-04-01 PROCEDURE — 80048 BASIC METABOLIC PNL TOTAL CA: CPT | Performed by: PHYSICIAN ASSISTANT

## 2024-04-01 PROCEDURE — 99232 SBSQ HOSP IP/OBS MODERATE 35: CPT | Performed by: STUDENT IN AN ORGANIZED HEALTH CARE EDUCATION/TRAINING PROGRAM

## 2024-04-01 PROCEDURE — 99232 SBSQ HOSP IP/OBS MODERATE 35: CPT | Performed by: INTERNAL MEDICINE

## 2024-04-01 RX ORDER — FUROSEMIDE 40 MG/1
40 TABLET ORAL DAILY
Status: DISCONTINUED | OUTPATIENT
Start: 2024-04-02 | End: 2024-04-02

## 2024-04-01 RX ORDER — METOPROLOL SUCCINATE 25 MG/1
12.5 TABLET, EXTENDED RELEASE ORAL DAILY
Status: DISCONTINUED | OUTPATIENT
Start: 2024-04-01 | End: 2024-04-02 | Stop reason: HOSPADM

## 2024-04-01 RX ADMIN — FUROSEMIDE 40 MG: 10 INJECTION, SOLUTION INTRAMUSCULAR; INTRAVENOUS at 08:12

## 2024-04-01 RX ADMIN — METOPROLOL SUCCINATE 12.5 MG: 25 TABLET, EXTENDED RELEASE ORAL at 11:55

## 2024-04-01 RX ADMIN — INSULIN LISPRO 1 UNITS: 100 INJECTION, SOLUTION INTRAVENOUS; SUBCUTANEOUS at 21:26

## 2024-04-01 RX ADMIN — FERROUS SULFATE TAB 325 MG (65 MG ELEMENTAL FE) 325 MG: 325 (65 FE) TAB at 08:11

## 2024-04-01 RX ADMIN — LISINOPRIL 10 MG: 10 TABLET ORAL at 08:11

## 2024-04-01 RX ADMIN — HALOPERIDOL 5 MG: 5 TABLET ORAL at 21:26

## 2024-04-01 RX ADMIN — INSULIN LISPRO 4 UNITS: 100 INJECTION, SOLUTION INTRAVENOUS; SUBCUTANEOUS at 11:55

## 2024-04-01 RX ADMIN — INSULIN LISPRO 2 UNITS: 100 INJECTION, SOLUTION INTRAVENOUS; SUBCUTANEOUS at 17:45

## 2024-04-01 RX ADMIN — ENOXAPARIN SODIUM 40 MG: 40 INJECTION SUBCUTANEOUS at 08:12

## 2024-04-01 RX ADMIN — FERROUS SULFATE TAB 325 MG (65 MG ELEMENTAL FE) 325 MG: 325 (65 FE) TAB at 16:43

## 2024-04-01 RX ADMIN — LEVOTHYROXINE SODIUM 125 MCG: 125 TABLET ORAL at 05:14

## 2024-04-01 NOTE — ASSESSMENT & PLAN NOTE
Wt Readings from Last 3 Encounters:   04/01/24 59 kg (130 lb)   03/26/24 59 kg (130 lb)   12/21/23 59 kg (130 lb)   Patient admitted here 3/24/2024 - 3/26/2024 with shortness of breath, briefly requiring supplemental oxygen to maintain appropriate saturations.  Found with acute on chronic heart failure with TTE revealing EF 40%, moderate global hypokinesis, G2 DD, moderate AS, moderate TR. Improved with IV diuresis and discharged back to facility with plan for outpatient cardiology follow-up. Presented now with increased shortness of breath from baseline. Of note, with underlying psychiatric disorder there is concern that patient has not been taking mediations appropriately. T  Transitioned to oral diuretics   Stable for DC   Will need help from CM to arrange return to group home.

## 2024-04-01 NOTE — UTILIZATION REVIEW
Initial Clinical Review    Admission: Date/Time/Statement:   Admission Orders (From admission, onward)       Ordered        03/30/24 2052  INPATIENT ADMISSION  Once                          Orders Placed This Encounter   Procedures    INPATIENT ADMISSION     Standing Status:   Standing     Number of Occurrences:   1     Order Specific Question:   Level of Care     Answer:   Med Surg [16]     Order Specific Question:   Estimated length of stay     Answer:   More than 2 Midnights     Order Specific Question:   Certification     Answer:   I certify that inpatient services are medically necessary for this patient for a duration of greater than two midnights. See H&P and MD Progress Notes for additional information about the patient's course of treatment.     ED Arrival Information       Expected   -    Arrival   3/30/2024 17:52    Acuity   Urgent              Means of arrival   Wheelchair    Escorted by   Caregiver    Service   Hospitalist    Admission type   Emergency              Arrival complaint   SOB             Chief Complaint   Patient presents with    Shortness of Breath     Pt presents ambulatory with c/o shortness of breath, recently admitted for CHF exacerbation       Initial Presentation: 73 y.o. female with PMHx: HTN, Hypothyroid, Iron deficiency anemia, T2DM who presented from group home to [facility] ED on 3/30/24 due to agitation and not participating in conversation, no caretaker from group home present at bedside. Recently admitted here 3/24/2024 - 3/26/2024 with acute respiratory failure with hypoxia found with acute heart failure improved with IV diuresis, with TTE at that time revealing EF 40%, moderate global hypokinesis, G2 DD, moderate AS, moderate TR and discharged on oral Lasix 20 mg with plan for outpatient cardiology follow-up, incidentally also found with iron deficiency anemia.  She presents again from group home with recurrent shortness of breath with increased work of breathing  over the past 2 days reported. In the ED, tachypneic, hypertensive. Labs revealed increased BNP to 873 from prior.  CXR with worsening pulmonary vascular congestion ongoing right pleural effusion.  Given IV Lasix.  Plan:  Admit Inpatient status to med surg dt  Acute on chronic heart failure: Cardiology consult, cardiac diet with fluid restriction, continue IV Lasix, daily BMP, monitor electrolytes and replete prn, monitor IO and daily wts, OOB as hillary, start accuchecks w/ SSI, inc spirometry, SCDs.     Date: 3/31   Day 2:  Per Heart Failure: Etiology: unclear at this time, no Q waves on EKG, no focal WMA on echo. BP not too elevated on initial presentation to ED last admit but 184/97 on this admit so possible HTN. Given her refusal to procedures, ischemic workup not done. Continue IV Lasix, increase lisinopril, start BB on 4/1.    Day 3: Has surpassed a 2nd midnight with active treatments and services. Continue cardiac diet with fluid restriction, accuchecks, monitor BP, electrolytes and replete prn, fluid status, continue diuretic, Heart Failure team signed off, no further cardiac changes for now. CM following for dispo planning.     ED Triage Vitals   Temperature Pulse Respirations Blood Pressure SpO2   03/30/24 1959 03/30/24 1758 03/30/24 1758 03/30/24 1758 03/30/24 1758   (!) 97.2 °F (36.2 °C) 95 (!) 30 (!) 184/97 99 %      Temp Source Heart Rate Source Patient Position - Orthostatic VS BP Location FiO2 (%)   03/30/24 1959 03/30/24 1758 04/01/24 0732 04/01/24 0732 --   Tympanic Monitor Lying Right arm       Pain Score       03/30/24 1758       No Pain          Wt Readings from Last 1 Encounters:   04/01/24 59 kg (130 lb)     Additional Vital Signs:   Date/Time Temp Pulse Resp BP MAP (mmHg) SpO2 O2 Device Patient Position - Orthostatic VS   04/01/24 0736 -- -- -- 118/52 -- -- -- Lying   04/01/24 07:32:30 98.8 °F (37.1 °C) 68 16 102/43 Abnormal  63 Abnormal  93 % None (Room air) Lying   03/31/24 22:34:52 98.4 °F  (36.9 °C) 78 18 101/43 Abnormal  62 Abnormal  95 % -- --   03/31/24 20:34:27 98.8 °F (37.1 °C) 69 -- 100/42 Abnormal  61 Abnormal  92 % None (Room air) --   03/31/24 20:31:10 -- 58 18 100/42 Abnormal  61 Abnormal  94 % -- --   03/31/24 1730 -- 64 18 114/64 84 99 % None (Room air) --   03/31/24 0847 -- -- -- -- -- -- None (Room air) --   03/31/24 0837 -- 71 18 153/68 -- 97 % -- --   03/31/24 0308 -- -- 13 -- -- -- -- --   03/31/24 0209 -- -- 15 -- -- -- -- --   03/31/24 0048 -- -- 15 -- -- -- None (Room air) --   03/30/24 2330 -- 84 17 128/96 -- 96 % -- --   03/30/24 2100 -- 90 -- 137/99 114 96 % -- --   03/30/24 1959 97.2 °F (36.2 °C) Abnormal  -- -- -- -- -- -- --   03/30/24 1920 -- -- -- -- -- -- Aerosol mask --   03/30/24 1910 -- -- 29 Abnormal  138/94 111 -- -- --   03/30/24 1758 --   95 30 Abnormal  184/97 Abnormal  -- 99 % None (Room air) --     Pertinent Labs/Diagnostic Test Results:   3/30 EKG: Sinus rhythm with Premature supraventricular complexes  Nonspecific ST and T wave abnormality  Abnormal ECG    XR chest portable   Final Result by Linda Mclean MD (03/31 0649)      Persistent moderate pulmonary edema.      Small effusions with bibasilar atelectasis.            Workstation performed: [id number]               Results from last 7 days   Lab Units 04/01/24  0518 03/31/24  0831 03/30/24  1905 03/26/24  0625   WBC Thousand/uL 12.69* 12.03* 14.16* 10.69*   HEMOGLOBIN g/dL 9.0* 8.8* 8.6* 8.6*   HEMATOCRIT % 31.6* 31.0* 31.3* 30.7*   PLATELETS Thousands/uL 289 290 314 302   NEUTROS ABS Thousands/µL  --   --  10.51*  --          Results from last 7 days   Lab Units 04/01/24  0518 03/31/24  0831 03/30/24  1905 03/26/24  0625   SODIUM mmol/L 137 135 132* 132*   POTASSIUM mmol/L 3.7 3.8 4.4 4.3   CHLORIDE mmol/L 99 101 101 96   CO2 mmol/L 29 27 25 27   ANION GAP mmol/L 9 7 6 9   BUN mg/dL 11 6 7 16   CREATININE mg/dL 0.68 0.60 0.54* 0.71   EGFR ml/min/1.73sq m 88 91 95 85   CALCIUM mg/dL 8.3* 8.6 8.5 8.6      Results from last 7 days   Lab Units 03/30/24  1905   AST U/L 29   ALT U/L 28   ALK PHOS U/L 88   TOTAL PROTEIN g/dL 6.3*   ALBUMIN g/dL 3.6   TOTAL BILIRUBIN mg/dL 0.27     Results from last 7 days   Lab Units 04/01/24  0651 03/31/24  2044 03/31/24  1728 03/31/24  1144 03/31/24  0832 03/26/24  1153 03/26/24  0621 03/25/24  2100 03/25/24  1621 03/25/24  1115   POC GLUCOSE mg/dl 139 190* 112 242* 130 202* 192* 196* 100 367*     Results from last 7 days   Lab Units 04/01/24  0518 03/31/24  0831 03/30/24  1905 03/26/24  0625   GLUCOSE RANDOM mg/dL 110 126 136 145*     Results from last 7 days   Lab Units 03/30/24  2116 03/30/24  1905   HS TNI 0HR ng/L  --  25   HS TNI 2HR ng/L 25  --    HSTNI D2 ng/L 0  --      Results from last 7 days   Lab Units 03/30/24  1905   PROCALCITONIN ng/ml <0.05     Results from last 7 days   Lab Units 03/30/24  1905   BNP pg/mL 873*     Results from last 7 days   Lab Units 03/26/24  0625   FERRITIN ng/mL 7*   IRON SATURATION % 3*   IRON ug/dL 10*   TIBC ug/dL 336     ED Treatment:   Medication Administration from 03/30/2024 1752 to 03/31/2024 2018         Date/Time Order Dose Route Action     03/30/2024 1910 EDT albuterol inhalation solution 5 mg 5 mg Nebulization Given     03/30/2024 1910 EDT ipratropium (ATROVENT) 0.02 % inhalation solution 0.5 mg 0.5 mg Nebulization Given     03/30/2024 1933 EDT haloperidol (HALDOL) tablet 5 mg 5 mg Oral Given     03/30/2024 2104 EDT furosemide (LASIX) injection 20 mg 20 mg Intravenous Given     03/31/2024 1731 EDT furosemide (LASIX) injection 40 mg 40 mg Intravenous Given     03/31/2024 0834 EDT furosemide (LASIX) injection 40 mg 40 mg Intravenous Given     03/30/2024 2320 EDT furosemide (LASIX) injection 40 mg 20 mg Intravenous Given     03/31/2024 1732 EDT ferrous sulfate tablet 325 mg 325 mg Oral Given     03/31/2024 0833 EDT ferrous sulfate tablet 325 mg 325 mg Oral Given     03/31/2024 0833 EDT lisinopril (ZESTRIL) tablet 2.5 mg 2.5 mg Oral  Given     03/31/2024 0834 EDT enoxaparin (LOVENOX) subcutaneous injection 40 mg 40 mg Subcutaneous Given     03/31/2024 1242 EDT insulin lispro (HumALOG/ADMELOG) 100 units/mL subcutaneous injection 1-5 Units 2 Units Subcutaneous Given     03/31/2024 0833 EDT levothyroxine tablet 125 mcg 125 mcg Oral Given     03/30/2024 2305 EDT OLANZapine (ZyPREXA) IM injection 5 mg 5 mg Intramuscular Given     03/30/2024 2305 EDT sterile water injection **ADS Override Pull** 1 mL  Given     03/30/2024 2346 EDT acetaminophen (TYLENOL) tablet 650 mg 650 mg Oral Given     03/30/2024 2346 EDT ibuprofen (MOTRIN) tablet 400 mg 400 mg Oral Given          Past Medical History:   Diagnosis Date    Hypertension     Hypothyroid 5/8/2016    Hypovitaminosis D 5/10/2016    Iron deficiency anemia 5/11/2016    Type 2 diabetes mellitus without complication ([facility]) 5/8/2016     Present on Admission:   Acute HFrEF (heart failure with reduced ejection fraction) ([facility])   Hypertension   Hypothyroid   Schizophrenia ([facility])   Type 2 diabetes mellitus without complication ([facility])   Iron deficiency anemia      Admitting Diagnosis: Shortness of breath [R06.02]  Dyspnea [R06.00]  CHF exacerbation ([facility]) [I50.9]  Acute diastolic CHF (congestive heart failure) ([facility]) [I50.31]  Age/Sex: 73 y.o. female  Admission Orders:  Scheduled Medications:  enoxaparin, 40 mg, Subcutaneous, Daily  ferrous sulfate, 325 mg, Oral, BID With Meals  furosemide, 40 mg, Intravenous, BID (diuretic)  haloperidol, 5 mg, Oral, HS  insulin lispro, 1-5 Units, Subcutaneous, TID AC  insulin lispro, 1-5 Units, Subcutaneous, HS  levothyroxine, 125 mcg, Oral, Early Morning  lisinopril, 10 mg, Oral, Daily      Continuous IV Infusions: none     PRN Meds:  acetaminophen, 650 mg, Oral, Q6H PRN x1 thus far  ondansetron, 4 mg, Intravenous, Q6H PRN        IP CONSULT TO NUTRITION SERVICES  IP CONSULT TO CARDIOLOGY    Network Utilization Review Department  ATTENTION: Please call with any questions or concerns  to [phone] and carefully listen to the prompts so that you are directed to the right person. All voicemails are confidential.   For Discharge needs, contact Care Management DC Support Team at [phone] opt. 2  Send all requests for admission clinical reviews, approved or denied determinations and any other requests to dedicated fax number below belonging to the campus where the patient is receiving treatment. List of dedicated fax numbers for the Facilities:  FACILITY NAME UR FAX NUMBER   ADMISSION DENIALS (Administrative/Medical Necessity) [phone]   DISCHARGE SUPPORT TEAM (NETWORK) [phone]   PARENT CHILD HEALTH (Maternity/NICU/Pediatrics) [phone]   [facility] [phone]   [facility] [phone]   [facility] [phone]   [facility] [phone]   [facility] [phone]   [facility] [phone]   [facility] [phone]   [facility] [phone]   [facility] [phone]   [facility] [phone]   [facility] [phone]   [facility] [phone]

## 2024-04-01 NOTE — UTILIZATION REVIEW
"NOTIFICATION OF INPATIENT ADMISSION   AUTHORIZATION REQUEST   SERVICING FACILITY:   [facility]  Address: [address] [place] [address]  Tax ID: [id number]  NPI: [id number] ATTENDING PROVIDER:  Attending Name and NPI#: Regi Palacios Md [[id number]]  Address: [address] [place] [address]  Phone: [phone]   ADMISSION INFORMATION:  Place of Service: Inpatient [facility]  Place of Service Code: 21  Inpatient Admission Date/Time: 3/30/24  8:52 PM  Discharge Date/Time: No discharge date for patient encounter.  Admitting Diagnosis Code/Description:  Shortness of breath [R06.02]  Dyspnea [R06.00]  CHF exacerbation (HCC) [I50.9]  Acute diastolic CHF (congestive heart failure) (HCC) [I50.31]     UTILIZATION REVIEW CONTACT:  Natalia Cheatham"Theresa\"Estuardo Utilization   Network Utilization Review Department  Phone: [phone]  Fax: [phone]  Email: [email or web address]  Contact for approvals/pending authorizations, clinical reviews, and discharge.     PHYSICIAN ADVISORY SERVICES:  Medical Necessity Denial & Jyzf-fp-Lupe Review  Phone: [phone]  Fax: [phone]  Email: [email or web address]     DISCHARGE SUPPORT TEAM:  For Patients Discharge Needs & Updates  Phone: [phone] opt. 2 Fax: [phone]  Email: [email or web address]     "

## 2024-04-01 NOTE — PLAN OF CARE
Problem: PAIN - ADULT  Goal: Verbalizes/displays adequate comfort level or baseline comfort level  Description: Interventions:  - Encourage patient to monitor pain and request assistance  - Assess pain using appropriate pain scale  - Administer analgesics based on type and severity of pain and evaluate response  - Implement non-pharmacological measures as appropriate and evaluate response  - Consider cultural and social influences on pain and pain management  - Notify physician/advanced practitioner if interventions unsuccessful or patient reports new pain  4/1/2024 0720 by Juany Rhoades RN  Outcome: Progressing  4/1/2024 0719 by Juany Rhoades RN  Outcome: Progressing

## 2024-04-01 NOTE — CASE MANAGEMENT
Case Management Discharge Planning Note    Patient name Joslyn Cantu  Location [facility] 810/[facility] 810-01 MRN [id number]  : 1950 Date 2024       Current Admission Date: 3/30/2024  Current Admission Diagnosis:Acute HFrEF (heart failure with reduced ejection fraction) ([facility])   Patient Active Problem List    Diagnosis Date Noted    Acute HFrEF (heart failure with reduced ejection fraction) ([facility]) 2024    Acute respiratory failure with hypoxia ([facility]) 2024    Iron deficiency anemia 2016    Hypovitaminosis D 05/10/2016    Hypothyroid 2016    Hypertension 2016    Type 2 diabetes mellitus without complication ([facility]) 2016    Schizophrenia ([facility]) 2016      LOS (days): 2  Geometric Mean LOS (GMLOS) (days):   Days to GMLOS:     OBJECTIVE:  Risk of Unplanned Readmission Score: 15.56         Current admission status: Inpatient   Preferred Pharmacy:   [facility] [address]  [address] [facility] [address]  Phone: [phone] Fax: [phone]    [facility] [address]   [address] [facility] [address]  Phone: [phone] Fax: [phone]    Primary Care Provider: Natalia Elizondo MD    Primary Insurance: [employer]  REP  Secondary Insurance: [place] [employer] [facility]    DISCHARGE DETAILS:    Other Referral/Resources/Interventions Provided:  Interventions: Group Home  Referral Comments: CM spoke with Khoi of [facility] group home. They are able to accept pt back when medically stable for discharge. [facility] is able to transport pt back to group home.    Treatment Team Recommendation: Facility Return, Group Home  Discharge Destination Plan:: Group Home, Facility Return      SEE FULL CM ASSESSMENT FROM 3/24.

## 2024-04-01 NOTE — ASSESSMENT & PLAN NOTE
Continue lisinopril 2.5 mg daily with strict hold parameters and monitor blood pressure per protocol  IV diuresis as per cardiology - transition to oral today

## 2024-04-01 NOTE — PLAN OF CARE
Problem: PAIN - ADULT  Goal: Verbalizes/displays adequate comfort level or baseline comfort level  Description: Interventions:  - Encourage patient to monitor pain and request assistance  - Assess pain using appropriate pain scale  - Administer analgesics based on type and severity of pain and evaluate response  - Implement non-pharmacological measures as appropriate and evaluate response  - Consider cultural and social influences on pain and pain management  - Notify physician/advanced practitioner if interventions unsuccessful or patient reports new pain  Outcome: Not Progressing     Problem: INFECTION - ADULT  Goal: Absence or prevention of progression during hospitalization  Description: INTERVENTIONS:  - Assess and monitor for signs and symptoms of infection  - Monitor lab/diagnostic results  - Monitor all insertion sites, i.e. indwelling lines, tubes, and drains  - Monitor endotracheal if appropriate and nasal secretions for changes in amount and color  - Merkel appropriate cooling/warming therapies per order  - Administer medications as ordered  - Instruct and encourage patient and family to use good hand hygiene technique  - Identify and instruct in appropriate isolation precautions for identified infection/condition  Outcome: Not Progressing  Goal: Absence of fever/infection during neutropenic period  Description: INTERVENTIONS:  - Monitor WBC    Outcome: Not Progressing     Problem: SAFETY ADULT  Goal: Patient will remain free of falls  Description: INTERVENTIONS:  - Educate patient/family on patient safety including physical limitations  - Instruct patient to call for assistance with activity   - Consult OT/PT to assist with strengthening/mobility   - Keep Call bell within reach  - Keep bed low and locked with side rails adjusted as appropriate  - Keep care items and personal belongings within reach  - Initiate and maintain comfort rounds  - Make Fall Risk Sign visible to staff  - Offer Toileting  every 2 Hours, in advance of need  - Initiate/Maintain bed alarm  - Obtain necessary fall risk management equipment:   - Apply yellow socks and bracelet for high fall risk patients  - Consider moving patient to room near nurses station  Outcome: Not Progressing  Goal: Maintain or return to baseline ADL function  Description: INTERVENTIONS:  -  Assess patient's ability to carry out ADLs; assess patient's baseline for ADL function and identify physical deficits which impact ability to perform ADLs (bathing, care of mouth/teeth, toileting, grooming, dressing, etc.)  - Assess/evaluate cause of self-care deficits   - Assess range of motion  - Assess patient's mobility; develop plan if impaired  - Assess patient's need for assistive devices and provide as appropriate  - Encourage maximum independence but intervene and supervise when necessary  - Involve family in performance of ADLs  - Assess for home care needs following discharge   - Consider OT consult to assist with ADL evaluation and planning for discharge  - Provide patient education as appropriate  Outcome: Not Progressing  Goal: Maintains/Returns to pre admission functional level  Description: INTERVENTIONS:  - Perform AM-PAC 6 Click Basic Mobility/ Daily Activity assessment daily.  - Set and communicate daily mobility goal to care team and patient/family/caregiver.   - Collaborate with rehabilitation services on mobility goals if consulted  - Perform Range of Motion 3 times a day.  - Reposition patient every 2 hours.  - Dangle patient 3 times a day  - Stand patient 3 times a day  - Ambulate patient 3 times a day  - Out of bed to chair 3 times a day   - Out of bed for meals 3 times a day  - Out of bed for toileting  - Record patient progress and toleration of activity level   Outcome: Not Progressing     Problem: DISCHARGE PLANNING  Goal: Discharge to home or other facility with appropriate resources  Description: INTERVENTIONS:  - Identify barriers to discharge  w/patient and caregiver  - Arrange for needed discharge resources and transportation as appropriate  - Identify discharge learning needs (meds, wound care, etc.)  - Arrange for interpretive services to assist at discharge as needed  - Refer to Case Management Department for coordinating discharge planning if the patient needs post-hospital services based on physician/advanced practitioner order or complex needs related to functional status, cognitive ability, or social support system  Outcome: Not Progressing     Problem: Knowledge Deficit  Goal: Patient/family/caregiver demonstrates understanding of disease process, treatment plan, medications, and discharge instructions  Description: Complete learning assessment and assess knowledge base.  Interventions:  - Provide teaching at level of understanding  - Provide teaching via preferred learning methods  Outcome: Not Progressing

## 2024-04-01 NOTE — PROGRESS NOTES
"Advanced Heart Failure Team Progress Note - Joslyn Cantu 73 y.o. female MRN: [id number]    Unit/Bed#: [facility] 810-01 Encounter: [id number]          Subjective:   Patient laying in bed with sheet pulled over her head and face covered. Alerted easily w/ verbal stimulation. Attempts to speak, but unable to comprehend.  Unclear if she only speaks Citizen of the [place]. Unable to provide appropriate history.    Hospital Course:   Joslyn Cantu is a 73 y.o. year old female s/p hospitalization for respiratory insufficiency due to acute mildly reduced heart failure, discharged on 3/26/24. She was discharged on lasix 20 mg daily. Note states that she is not consistent taking medicines and refuses procedures often. Discharge weight was 130 lbs. Readmitted for acute HFrEF w/ unclear etiology. Pt noted to have refused ischemic workup and unable to further investigate.      Vitals: Blood pressure 118/52, pulse 68, temperature 98.8 °F (37.1 °C), temperature source Oral, resp. rate 16, height 5' 2\" (1.575 m), weight 59 kg (130 lb), SpO2 93%., Body mass index is 23.78 kg/m².,   Orthostatic Blood Pressures      Flowsheet Row Most Recent Value   Blood Pressure 118/52 filed at 04/01/2024 0736   Patient Position - Orthostatic VS Lying filed at 04/01/2024 0736              Intake/Output Summary (Last 24 hours) at 4/1/2024 1418  Last data filed at 4/1/2024 1258  Gross per 24 hour   Intake 840 ml   Output 1400 ml   Net -560 ml       Review of System:  Review of system was conducted and was negative except for as stated in the subjective course.      Physical Exam:    GEN: Abnormal behavior with incomprehensible speech, yells frequently.  HEENT:  Normocephalic w/ hat on, moist mucous membranes  NECK: No carotid bruits  HEART: Regular rhythm, normal rate, normal S1 and S2, no murmur, no clicks, gallops or rubs   LUNGS: Clear to auscultation bilaterally; no wheezes, rales, or rhonchi; respiration nonlabored on room air.  ABDOMEN:  Normoactive bowel sounds, " soft, no distention  EXTREMITIES: peripheral pulses palpable; no edema  NEURO: no gross focal findings; cranial nerves grossly intact   SKIN:  Dry, intact, warm to touch      Current Facility-Administered Medications:     acetaminophen (TYLENOL) tablet 650 mg, 650 mg, Oral, Q6H PRN, Ethan Max DO, 650 mg at 03/30/24 2346    enoxaparin (LOVENOX) subcutaneous injection 40 mg, 40 mg, Subcutaneous, Daily, Ethan Max DO, 40 mg at 04/01/24 0812    ferrous sulfate tablet 325 mg, 325 mg, Oral, BID With Meals, Ethan Max DO, 325 mg at 04/01/24 0811    [START ON 4/2/2024] furosemide (LASIX) tablet 40 mg, 40 mg, Oral, Daily, Yadi Pak DO    haloperidol (HALDOL) tablet 5 mg, 5 mg, Oral, HS, Ethan Max DO, 5 mg at 03/31/24 2225    insulin lispro (HumALOG/ADMELOG) 100 units/mL subcutaneous injection 1-5 Units, 1-5 Units, Subcutaneous, TID AC, 4 Units at 04/01/24 1155 **AND** Fingerstick Glucose (POCT), , , TID AC, Ethan Max DO    insulin lispro (HumALOG/ADMELOG) 100 units/mL subcutaneous injection 1-5 Units, 1-5 Units, Subcutaneous, HS, Ethan Max DO, 1 Units at 03/31/24 2151    levothyroxine tablet 125 mcg, 125 mcg, Oral, Early Morning, Ethan Max DO, 125 mcg at 04/01/24 0514    lisinopril (ZESTRIL) tablet 10 mg, 10 mg, Oral, Daily, Vasquez Raygoza DO, 10 mg at 04/01/24 0811    metoprolol succinate (TOPROL-XL) 24 hr tablet 12.5 mg, 12.5 mg, Oral, Daily, Yadi Pak DO, 12.5 mg at 04/01/24 1155    ondansetron (ZOFRAN) injection 4 mg, 4 mg, Intravenous, Q6H PRN, Ethan Max DO    Labs & Results:  Results from last 7 days   Lab Units 03/30/24 2116 03/30/24  1905   HS TNI 0HR ng/L  --  25   HS TNI 2HR ng/L 25  --          Results from last 7 days   Lab Units 04/01/24  0518 03/31/24  0831 03/30/24  1905   POTASSIUM mmol/L 3.7 3.8 4.4   CO2 mmol/L 29 27 25   CHLORIDE mmol/L 99 101 101   BUN mg/dL 11 6 7   CREATININE mg/dL 0.68 0.60 0.54*     Results from last 7 days   Lab Units  04/01/24  0518 03/31/24  0831 03/30/24  1905   HEMOGLOBIN g/dL 9.0* 8.8* 8.6*   HEMATOCRIT % 31.6* 31.0* 31.3*   PLATELETS Thousands/uL 289 290 314               Telemetry: Not on telemetry.    Imaging: Reviewed.    VTE Prophylaxis: Enoxaparin (Lovenox)     ==========================================================================================    Assessment:  Principal Problem:    Acute HFrEF (heart failure with reduced ejection fraction) ([facility])  Active Problems:    Schizophrenia (HCC)    Hypothyroid    Hypertension    Type 2 diabetes mellitus without complication (HCC)    Iron deficiency anemia      1. New HFrEF, stage C  Unclear etiology given inability to do ischemic workup per patient.  EKG did not reveal Q waves.   BP has been a bit elevated during hospitalization.    Neurohormonal Blockade:  --Beta-Blocker: None at this time.  --ACEi, ARB or ARNi: 10mg lisinopril daily.   --Aldosterone Receptor Blocker: None at this time.  --SGLT2i: None at this time.  --Diuretic: Was on 40mg IV lasix BID.      2. T2DM - HgA1C 9.2% on 3/5/24.  3. Schizophrenia - resides in a group home where it is unclear if pt is compliant with her medication. Supposed to take Haldol HS, but often noncompliant with this as well.    Plan:  Will discontinue IV lasix and transition to 40mg PO lasix daily.  Added 12.5mg PO metoprolol succinate daily.   Tolerating lisinopril, continue w/ 10mg lisinopril daily.  Attempt to monitor I&O and daily weights - as pt allows.      Case discussed and reviewed with Dr. Streeter who agrees with my assessment and plan.      Yadi Pak DO  Chief Resident  PGY-3, Internal Medicine Resident    ==========================================================================================    Epic/ Allscripts/Care Everywhere records reviewed: Reviewed.    ** Please Note: Fluency DirectDictation voice to text software may have been used in the creation of this document. **

## 2024-04-01 NOTE — ASSESSMENT & PLAN NOTE
Lab Results   Component Value Date    HGBA1C 9.2 (H) 03/05/2024       Recent Labs     03/31/24  2044 04/01/24  0651 04/01/24  1128 04/01/24  1607   POCGLU 190* 139 334* 213*       Blood Sugar Average: Last 72 hrs:  (P) [id number] optimally controlled as outpatient  Hold metformin while admitted  Correctional insulin coverage with Accu-Cheks  Carb controlled diet  Initiate hypoglycemia protocol  Will add 10 units lantus

## 2024-04-01 NOTE — NURSING NOTE
Pt found going into bathroom and filling up her water bottle.  Reinforced to pt importance of fluid restriction but d/t pts cognitive level she appears to be uncooperative with this. Will continue to reinforce.

## 2024-04-02 VITALS
BODY MASS INDEX: 24.22 KG/M2 | HEIGHT: 62 IN | SYSTOLIC BLOOD PRESSURE: 93 MMHG | HEART RATE: 65 BPM | OXYGEN SATURATION: 96 % | WEIGHT: 131.61 LBS | DIASTOLIC BLOOD PRESSURE: 55 MMHG | TEMPERATURE: 98.4 F | RESPIRATION RATE: 17 BRPM

## 2024-04-02 LAB
ANION GAP SERPL CALCULATED.3IONS-SCNC: 7 MMOL/L (ref 4–13)
BUN SERPL-MCNC: 14 MG/DL (ref 5–25)
CALCIUM SERPL-MCNC: 8.5 MG/DL (ref 8.4–10.2)
CHLORIDE SERPL-SCNC: 101 MMOL/L (ref 96–108)
CO2 SERPL-SCNC: 25 MMOL/L (ref 21–32)
CREAT SERPL-MCNC: 0.65 MG/DL (ref 0.6–1.3)
GFR SERPL CREATININE-BSD FRML MDRD: 89 ML/MIN/1.73SQ M
GLUCOSE SERPL-MCNC: 148 MG/DL (ref 65–140)
GLUCOSE SERPL-MCNC: 217 MG/DL (ref 65–140)
GLUCOSE SERPL-MCNC: 233 MG/DL (ref 65–140)
POTASSIUM SERPL-SCNC: 4.6 MMOL/L (ref 3.5–5.3)
SODIUM SERPL-SCNC: 133 MMOL/L (ref 135–147)

## 2024-04-02 PROCEDURE — 97162 PT EVAL MOD COMPLEX 30 MIN: CPT

## 2024-04-02 PROCEDURE — 80048 BASIC METABOLIC PNL TOTAL CA: CPT | Performed by: INTERNAL MEDICINE

## 2024-04-02 PROCEDURE — 99239 HOSP IP/OBS DSCHRG MGMT >30: CPT | Performed by: HOSPITALIST

## 2024-04-02 PROCEDURE — 97166 OT EVAL MOD COMPLEX 45 MIN: CPT

## 2024-04-02 PROCEDURE — 82948 REAGENT STRIP/BLOOD GLUCOSE: CPT

## 2024-04-02 RX ORDER — HALOPERIDOL 5 MG/1
5 TABLET ORAL
Qty: 3 TABLET | Refills: 0 | Status: SHIPPED | OUTPATIENT
Start: 2024-04-02 | End: 2024-04-02

## 2024-04-02 RX ORDER — HALOPERIDOL 5 MG/1
5 TABLET ORAL
Qty: 3 TABLET | Refills: 0 | Status: SHIPPED | OUTPATIENT
Start: 2024-04-02 | End: 2024-04-05

## 2024-04-02 RX ORDER — FUROSEMIDE 20 MG/1
40 TABLET ORAL DAILY
Qty: 60 TABLET | Refills: 0 | Status: SHIPPED | OUTPATIENT
Start: 2024-04-02 | End: 2024-04-02

## 2024-04-02 RX ORDER — FUROSEMIDE 40 MG/1
40 TABLET ORAL DAILY
Status: DISCONTINUED | OUTPATIENT
Start: 2024-04-02 | End: 2024-04-02 | Stop reason: HOSPADM

## 2024-04-02 RX ORDER — LISINOPRIL 10 MG/1
10 TABLET ORAL DAILY
Qty: 30 TABLET | Refills: 0 | Status: SHIPPED | OUTPATIENT
Start: 2024-04-03 | End: 2024-05-03

## 2024-04-02 RX ORDER — METOPROLOL SUCCINATE 25 MG/1
12.5 TABLET, EXTENDED RELEASE ORAL DAILY
Qty: 15 TABLET | Refills: 0 | Status: SHIPPED | OUTPATIENT
Start: 2024-04-03 | End: 2024-05-03

## 2024-04-02 RX ORDER — LISINOPRIL 10 MG/1
10 TABLET ORAL DAILY
Qty: 30 TABLET | Refills: 0 | Status: SHIPPED | OUTPATIENT
Start: 2024-04-03 | End: 2024-04-02

## 2024-04-02 RX ORDER — METOPROLOL SUCCINATE 25 MG/1
12.5 TABLET, EXTENDED RELEASE ORAL DAILY
Qty: 15 TABLET | Refills: 0 | Status: SHIPPED | OUTPATIENT
Start: 2024-04-03 | End: 2024-04-02

## 2024-04-02 RX ORDER — FUROSEMIDE 20 MG/1
40 TABLET ORAL DAILY
Qty: 60 TABLET | Refills: 0 | Status: SHIPPED | OUTPATIENT
Start: 2024-04-02 | End: 2024-05-02

## 2024-04-02 RX ADMIN — INSULIN LISPRO 1 UNITS: 100 INJECTION, SOLUTION INTRAVENOUS; SUBCUTANEOUS at 08:12

## 2024-04-02 RX ADMIN — LEVOTHYROXINE SODIUM 125 MCG: 125 TABLET ORAL at 05:40

## 2024-04-02 RX ADMIN — INSULIN LISPRO 2 UNITS: 100 INJECTION, SOLUTION INTRAVENOUS; SUBCUTANEOUS at 12:02

## 2024-04-02 RX ADMIN — ENOXAPARIN SODIUM 40 MG: 40 INJECTION SUBCUTANEOUS at 08:11

## 2024-04-02 RX ADMIN — FERROUS SULFATE TAB 325 MG (65 MG ELEMENTAL FE) 325 MG: 325 (65 FE) TAB at 05:42

## 2024-04-02 NOTE — CASE MANAGEMENT
Case Management Discharge Planning Note    Patient name Joslyn Cantu  Location [facility] 810/[facility] 810-01 MRN [id number]  : 1950 Date 2024       Current Admission Date: 3/30/2024  Current Admission Diagnosis:Acute HFrEF (heart failure with reduced ejection fraction) ([facility])   Patient Active Problem List    Diagnosis Date Noted    Acute HFrEF (heart failure with reduced ejection fraction) ([facility]) 2024    Acute respiratory failure with hypoxia ([facility]) 2024    Iron deficiency anemia 2016    Hypovitaminosis D 05/10/2016    Hypothyroid 2016    Hypertension 2016    Type 2 diabetes mellitus without complication ([facility]) 2016    Schizophrenia ([facility]) 2016      LOS (days): 3  Geometric Mean LOS (GMLOS) (days):   Days to GMLOS:     OBJECTIVE:  Risk of Unplanned Readmission Score: 15.75         Current admission status: Inpatient   Preferred Pharmacy:   [facility] [address]  [address] [facility] [address]  Phone: [phone] Fax: [phone]    [facility] [address]   [address] [address]  Phone: [phone] Fax: [phone]    Primary Care Provider: Natalia Elizondo MD    Primary Insurance: [employer]ICARE [facility]  Secondary Insurance: [place] [employer] AND [facility]    DISCHARGE DETAILS:    IMM Given (Date):: 24  IMM Given to:: Other (.)     Additional Comments: CM spoke with Aida (Supervisor of [facility] [employer]). Aida reports she will be coming for pt today between 1:30-2pm. Provider and nursing aware.

## 2024-04-02 NOTE — ASSESSMENT & PLAN NOTE
Wt Readings from Last 3 Encounters:   04/02/24 59.7 kg (131 lb 9.8 oz)   03/26/24 59 kg (130 lb)   12/21/23 59 kg (130 lb)   Patient admitted here 3/24/2024 - 3/26/2024 with shortness of breath, briefly requiring supplemental oxygen to maintain appropriate saturations.  Found with acute on chronic heart failure with TTE revealing EF 40%, moderate global hypokinesis, G2 DD, moderate AS, moderate TR. Improved with IV diuresis and discharged back to facility with plan for outpatient cardiology follow-up. Presented now with increased shortness of breath from baseline. Of note, with underlying psychiatric disorder there is concern that patient has not been taking mediations appropriately. T  Transitioned to oral diuretics - lasix 40 mg PO QD  Stable for DC back to group home today

## 2024-04-02 NOTE — ASSESSMENT & PLAN NOTE
Resides at group home prior to admission. Noted that patient can be agitated/belligerent at times, refuses medications.  Confirmed with staff that patient is supposed to be taking Haldol 10 mg QHS however for approximately the last month has been refusing   Will continue with Haldol 5 mg QHS for now while hospitalized & recommend to continue that on DC

## 2024-04-02 NOTE — PHYSICAL THERAPY NOTE
Physical Therapy Evaluation     Patient's Name: Joslyn Cantu    Admitting Diagnosis  Shortness of breath [R06.02]  Dyspnea [R06.00]  CHF exacerbation (HCC) [I50.9]  Acute diastolic CHF (congestive heart failure) (HCC) [I50.31]    Problem List  Patient Active Problem List   Diagnosis    Schizophrenia (HCC)    Hypothyroid    Hypertension    Type 2 diabetes mellitus without complication (HCC)    Hypovitaminosis D    Iron deficiency anemia    Acute respiratory failure with hypoxia (HCC)    Acute HFrEF (heart failure with reduced ejection fraction) (HCC)       Past Medical History  Past Medical History:   Diagnosis Date    Hypertension     Hypothyroid 5/8/2016    Hypovitaminosis D 5/10/2016    Iron deficiency anemia 5/11/2016    Type 2 diabetes mellitus without complication (HCC) 5/8/2016       Past Surgical History  History reviewed. No pertinent surgical history.       04/02/24 1343   PT Last Visit   PT Visit Date 04/02/24   Pain Assessment   Pain Assessment Tool FLACC   Pain Rating: FLACC (Rest) - Face 0   Pain Rating: FLACC (Rest) - Legs 0   Pain Rating: FLACC (Rest) - Activity 0   Pain Rating: FLACC (Rest) - Cry 0   Pain Rating: FLACC (Rest) - Consolability 0   Score: FLACC (Rest) 0   Pain Rating: FLACC (Activity) - Face 0   Pain Rating: FLACC (Activity) - Legs 0   Pain Rating: FLACC (Activity) - Activity 0   Pain Rating: FLACC (Activity) - Cry 0   Pain Rating: FLACC (Activity) - Consolability 0   Score: FLACC (Activity) 0   Restrictions/Precautions   Weight Bearing Precautions Per Order No   Other Precautions Cognitive;Fall Risk   Home Living   Type of Home Group Home   Home Layout Two level   Bathroom Accessibility Accessible   Home Equipment Other (Comment)  (No DME)   Additional Comments Pt lives in Group home. 2ST set up with pt negotiating stairs at times to access shower.   Prior Function   Level of [place] Independent with ADLs;Independent with functional mobility   Lives With Facility staff    Receives Help From   (Group home staff)   IADLs Family/Friend/Other provides transportation;Family/Friend/Other provides meals;Family/Friend/Other provides medication management   Falls in the last 6 months 1 to 4   Comments Per GH aide, pt completes mobility and ADLs independently at baseline   General   Family/Caregiver Present Yes   Cognition   Overall Cognitive Status Impaired   Attention Difficulty attending to directions   Orientation Level Unable to assess   Memory Unable to assess   Following Commands Follows one step commands inconsistently   Comments Pt has difficulty following directions. Pt responds at times to yes/no questions, inconsistent with following directions. Pt declines to attempt OOB at 1st trial. Pt noted to be OOB in bathroom upon 2nd attempt, with group home aide present. GH aide reports pt appears at functional baseline.   RLE Assessment   RLE Assessment   (from observed movements, grossly WFL)   LLE Assessment   LLE Assessment   (from observed movements , grossly WFL)   Bed Mobility   Supine to Sit Unable to assess   Sit to Supine 5  Supervision   Additional Comments Pt OOB in bathroom upon arrival.   Transfers   Sit to Stand 5  Supervision   Stand to Sit 5  Supervision   Additional Comments no AD   Ambulation/Elevation   Gait pattern   (Slow gait)   Gait Assistance 5  Supervision   Assistive Device None   Distance 12 ft   Ambulation/Elevation Additional Comments Pt ambulates within room with supervision, no AD used. Pt declines to ambulate further at this time. Pt becomes irritable intermittently. GH aide present in room during session, reports pt appears at functional baseline.   Balance   Static Sitting Fair +   Dynamic Sitting Fair+   Static Standing Fair+   Dynamic Standing Fair   Ambulatory Fair   Activity Tolerance   Activity Tolerance Other (Comment)  (Cognition)   Medical Staff Made Aware Co-eval with OT, GH aide present   Nurse Made Aware RN cleared pt   Assessment   Prognosis  Fair   Problem List Decreased cognition;Impaired judgement;Decreased safety awareness   Assessment Pt is a 73 y.o. female seen for PT evaluation s/p admit to [facility] on 3/30/2024. Pt was admitted with a primary dx of: Acute heart failure with reduced EF, PMH sx for Schizophrenia, Anemia, DM, HTN, ARF with hypoxia.  PT now consulted for assessment of mobility and d/c needs. Pt with Activity as tolerated orders.  Pts current clinical presentation is Evolving (medium complexity) due to Ongoing medical management for primary dx. Prior to admission, pt was Independent for mobility and ADLs at . Upon evaluation, pt currently is requiring supervision for all mobility aspects in this setting, no AD used for mobility . Pt has been ambulating in room to bathroom per nursing staff.   Limited assessment/ambulation distance and stair assessment 2* pt's cognition and refusal to participate.  At conclusion of PT session pt returned BTB, all needs in reach, and RN notified of session findings/recommendations with phone and call bell within reach.  The patient's AM-PAC Basic Mobility Inpatient Short Form Raw Score is 20. A Raw score of greater than 16 suggests the patient may benefit from discharge to home. Please also refer to the recommendation of the Physical Therapist for safe discharge planning. Recommend return to group home , pending level of care provided at  upon hospital D/C. Per  aide, pt appears at functional baseline. Encourage continued mobility while in hospital with staff and restorative team as able. No further skilled PT services indicated.   Goals   Patient Goals to go home   Plan   Treatment/Interventions Spoke to nursing;Spoke to case management;OT   Discharge Recommendation   Rehab Resource Intensity Level, PT No post-acute rehabilitation needs  (Return to Group home pending level of assist provided at group home)   AM-PAC Basic Mobility Inpatient   Turning in Flat Bed Without Bedrails 4    Lying on Back to Sitting on Edge of Flat Bed Without Bedrails 4   Moving Bed to Chair 3   Standing Up From Chair Using Arms 3   Walk in Room 3   Climb 3-5 Stairs With Railing 3   Basic Mobility Inpatient Raw Score 20   Basic Mobility Standardized Score 43.99   [facility] Highest Level Of Mobility   -[facility] Goal 6: Walk 10 steps or more   -HLM Achieved 6: Walk 10 steps or more   Modified Fei Scale   Modified [place] Scale 2   End of Consult   Patient Position at End of Consult All needs within reach;Supine  (Bed alarm left off per nurse recommendation)         Chelsey Eaton, PT DPT

## 2024-04-02 NOTE — ASSESSMENT & PLAN NOTE
Lab Results   Component Value Date    HGBA1C 9.2 (H) 03/05/2024       Recent Labs     04/01/24  1607 04/01/24  2056 04/02/24  0805 04/02/24  1114   POCGLU 213* 164* 217* 233*         Blood Sugar Average: Last 72 hrs:  (P) 197.4Not optimally controlled as outpatient  Hold metformin while admitted - restart on DC  Correctional insulin coverage with Accu-Cheks  Carb controlled diet  Initiate hypoglycemia protocol  Will add 10 units lantus

## 2024-04-02 NOTE — DISCHARGE SUMMARY
[facility]  Discharge- Joslyn Cantu 1950, 73 y.o. female MRN: [id number]  [facility] Encounter: [id number]  Primary Care Provider: Natalia Elizondo MD   Date and time admitted to hospital: 3/30/2024  6:32 PM    * Acute HFrEF (heart failure with reduced ejection fraction) ([facility])  Assessment & Plan  Wt Readings from Last 3 Encounters:   04/02/24 59.7 kg (131 lb 9.8 oz)   03/26/24 59 kg (130 lb)   12/21/23 59 kg (130 lb)   Patient admitted here 3/24/2024 - 3/26/2024 with shortness of breath, briefly requiring supplemental oxygen to maintain appropriate saturations.  Found with acute on chronic heart failure with TTE revealing EF 40%, moderate global hypokinesis, G2 DD, moderate AS, moderate TR. Improved with IV diuresis and discharged back to facility with plan for outpatient cardiology follow-up. Presented now with increased shortness of breath from baseline. Of note, with underlying psychiatric disorder there is concern that patient has not been taking mediations appropriately. T  Transitioned to oral diuretics - lasix 40 mg PO QD  Stable for DC back to group home today  Has cardio f/u on 4/8    Iron deficiency anemia  Assessment & Plan  Hemoglobin stable compared to prior discharge values, noted with low ferritin/iron saturation at prior hospitalization  Continue oral iron supplementation, monitor hemoglobin with CBC    Type 2 diabetes mellitus without complication ([facility])  Assessment & Plan  Lab Results   Component Value Date    HGBA1C 9.2 (H) 03/05/2024       Recent Labs     04/01/24  1607 04/01/24  2056 04/02/24  0805 04/02/24  1114   POCGLU 213* 164* 217* 233*         Blood Sugar Average: Last 72 hrs:  (P) 197.4Not optimally controlled as outpatient  Hold metformin while admitted - restart on DC  Correctional insulin coverage with Accu-Cheks  Carb controlled diet  Initiate hypoglycemia protocol  Will add 10 units lantus     Hypertension  Assessment & Plan  New  med changes here  Now on Toprol xl 12.5 mg QD   Lisinopril 10 mg  PO lasix 40 mg QD    Hypothyroid  Assessment & Plan  Continue home dose levothyroxine    Schizophrenia (HCC)  Assessment & Plan  Resides at group home prior to admission. Noted that patient can be agitated/belligerent at times, refuses medications.  Confirmed with staff that patient is supposed to be taking Haldol 10 mg QHS however for approximately the last month has been refusing   Will continue with Haldol 5 mg QHS for now while hospitalized & recommend to continue that on DC      Medical Problems       Resolved Problems  Date Reviewed: 4/2/2024   None       Discharging Physician / Practitioner: Lin Knight MD  PCP: Natalia Elizondo MD  Admission Date:   Admission Orders (From admission, onward)       Ordered        03/30/24 2052  INPATIENT ADMISSION  Once                          Discharge Date: 04/02/24    Consultations During Hospital Stay:  cardiology    Procedures Performed:   none    Significant Findings / Test Results:   XR chest portable   Final Result by Linda Mclean MD (03/31 0649)      Persistent moderate pulmonary edema.      Small effusions with bibasilar atelectasis.            Workstation performed: [id number]                 Reason for Admission: Shortness of breath     Hospital Course:   Joslyn Cantu is a 73 y.o. female patient who originally presented to the hospital on 3/30/2024 due to Shortness of breath . was recently admitted here 3/24/2024 - 3/26/2024 with acute respiratory failure with hypoxia found with acute heart failure improved with IV diuresis, with TTE at that time revealing EF 40%, moderate global hypokinesis, G2 DD, moderate AS, moderate TR and discharged on oral Lasix 20 mg with plan for outpatient cardiology follow-up, incidentally also found with iron deficiency anemia.    She presents again from group home with recurrent shortness of breath with increased work of breathing over the past 2  "days      Please see above list of diagnoses and related plan for additional information.     Condition at Discharge: stable    Discharge Day Visit / Exam:   Subjective:  feels well, no CP or SOB    Vitals: Blood Pressure: 93/55 (04/02/24 1159)  Pulse: 65 (04/02/24 1159)  Temperature: 98.4 °F (36.9 °C) (04/02/24 0717)  Temp Source: Oral (04/01/24 0732)  Respirations: 17 (04/02/24 0717)  Height: 5' 2\" (157.5 cm) (04/01/24 0701)  Weight - Scale: 59.7 kg (131 lb 9.8 oz) (04/02/24 0600)  SpO2: 96 % (04/02/24 1159)  Exam:   Physical Exam  Vitals reviewed.   HENT:      Head: Normocephalic and atraumatic.   Cardiovascular:      Rate and Rhythm: Normal rate and regular rhythm.   Pulmonary:      Effort: Pulmonary effort is normal. No respiratory distress.      Breath sounds: Normal breath sounds.   Abdominal:      General: There is no distension.      Palpations: Abdomen is soft.      Tenderness: There is no abdominal tenderness.   Musculoskeletal:      Right lower leg: No edema.      Left lower leg: No edema.   Neurological:      Mental Status: She is alert.            Discharge instructions/Information to patient and family:   See after visit summary for information provided to patient and family.      Provisions for Follow-Up Care:  See after visit summary for information related to follow-up care and any pertinent home health orders.      Mobility at time of Discharge:   Basic Mobility Inpatient Raw Score: 20  JH-HLM Goal: 6: Walk 10 steps or more  JH-HLM Achieved: 6: Walk 10 steps or more  HLM Goal achieved. Continue to encourage appropriate mobility.     Disposition:   Group Home / Personal Care Home at [facility]    Planned Readmission: no     Discharge Statement:  I spent 45 minutes discharging the patient. This time was spent on the day of discharge. I had direct contact with the patient on the day of discharge. Greater than 50% of the total time was spent examining patient, answering all patient questions, arranging " and discussing plan of care with patient as well as directly providing post-discharge instructions.  Additional time then spent on discharge activities.    Discharge Medications:  See after visit summary for reconciled discharge medications provided to patient and/or family.      **Please Note: This note may have been constructed using a voice recognition system**

## 2024-04-02 NOTE — OCCUPATIONAL THERAPY NOTE
Occupational Therapy Evaluation     Patient Name: Joslyn Cantu  Today's Date: 4/2/2024  Problem List  Principal Problem:    Acute HFrEF (heart failure with reduced ejection fraction) (HCC)  Active Problems:    Schizophrenia (HCC)    Hypothyroid    Hypertension    Type 2 diabetes mellitus without complication (HCC)    Iron deficiency anemia    Past Medical History  Past Medical History:   Diagnosis Date    Hypertension     Hypothyroid 5/8/2016    Hypovitaminosis D 5/10/2016    Iron deficiency anemia 5/11/2016    Type 2 diabetes mellitus without complication (HCC) 5/8/2016     Past Surgical History  History reviewed. No pertinent surgical history.        04/02/24 1344   OT Last Visit   OT Visit Date 04/02/24   Note Type   Note type Evaluation   Pain Assessment   Pain Assessment Tool FLACC   Pain Rating: FLACC (Rest) - Face 0   Pain Rating: FLACC (Rest) - Legs 0   Pain Rating: FLACC (Rest) - Activity 0   Pain Rating: FLACC (Rest) - Cry 0   Pain Rating: FLACC (Rest) - Consolability 0   Score: FLACC (Rest) 0   Pain Rating: FLACC (Activity) - Face 0   Pain Rating: FLACC (Activity) - Legs 1   Pain Rating: FLACC (Activity) - Activity 0   Pain Rating: FLACC (Activity) - Cry 1   Pain Rating: FLACC (Activity) - Consolability 0   Score: FLACC (Activity) 2   Restrictions/Precautions   Weight Bearing Precautions Per Order No   Other Precautions Cognitive;Fall Risk   Home Living   Type of Home Group Home   Home Layout Two level  (Has to do stairs to shower. FFSU for all other living areas)   Bathroom Accessibility Accessible   Home Equipment   (No DME used)   Additional Comments Pt resides in a group home, lives all on one level but has to do stairs to get to bathroom with shower periodically   Prior Function   Level of [place] Independent with ADLs;Independent with functional mobility;Needs assistance with IADLS   Lives With Facility staff   Receives Help From Other (Comment)  (Group home staff)   IADLs  "Family/Friend/Other provides transportation;Family/Friend/Other provides meals;Family/Friend/Other provides medication management   Falls in the last 6 months 1 to 4  (2 falls reported by group home liaison)   Vocational Retired   Lifestyle   Autonomy Per group home employee, pt is IND with all ADLs and functional mobility. No DME used. (-).   Reciprocal Relationships Pt lives in group home   Service to Others Retired   Intrinsic Gratification None identified in session   General   Additional Pertinent History Pt is a ?historian, history and PLOF obtained from EMR and group home liaison   Family/Caregiver Present Yes   Subjective   Subjective \"I want to go\"   ADL   Where Assessed Edge of bed   Eating Assistance 5  Supervision/Setup   Grooming Assistance 5  Supervision/Setup   UB Bathing Assistance 5  Supervision/Setup   LB Bathing Assistance 5  Supervision/Setup   UB Dressing Assistance 5  Supervision/Setup   LB Dressing Assistance 5  Supervision/Setup   Toileting Assistance  5  Supervision/Setup   Functional Assistance 5  Supervision/Setup   Bed Mobility   Supine to Sit Unable to assess   Sit to Supine 5  Supervision   Additional Comments Pt OOB standing EOB upon arrival. Pt assisted to bathroom and returned to supine in bed post session, all needs met, call bell within reach.   Transfers   Sit to Stand 5  Supervision   Stand to Sit 5  Supervision   Additional Comments No AD   Functional Mobility   Functional Mobility 5  Supervision   Additional Comments household distances in room to bathroom. No AD   Balance   Static Sitting Fair +   Dynamic Sitting Fair   Static Standing Fair -   Dynamic Standing Fair -   Ambulatory Fair -   Activity Tolerance   Activity Tolerance Patient limited by fatigue;Other (Comment)  (Pt only wanting to go home. Group home liaison present post session)   Medical Staff Made Aware PT jose r Barbosa 2/2 increased medical complexity and comorbidities   Nurse Made Aware RN cleared for " therapy/updated   RUE Assessment   RUE Assessment WFL   LUE Assessment   LUE Assessment WFL   Hand Function   Gross Motor Coordination Functional   Fine Motor Coordination Functional   Cognition   Overall Cognitive Status Impaired   Arousal/Participation Alert;Responsive   Attention Difficulty attending to directions   Orientation Level Unable to assess  (Pt answers yes/no at times, speech is mumbled, and often difficult to understand. Group home liaison reporting pt is at her baseline)   Memory Unable to assess   Following Commands Follows one step commands inconsistently   Comments Pt responding intermittantly to yes/no questions, frequently requesting medication or to go home. Unable to assess memory/orientation 2/2 pt inability to attend to questions. Group home liaison present in session reporting pt is at her baseline. Pt is IND with ADLs but has SUP/support from group home staff. Liaison reporting pt is at her cognitive/functional baseline.   Assessment   Prognosis Good   Assessment 73 year old pt seen today for an OT evaluation following admission to [facility] 2/2 SOB, acute HFrEF, CHF exacerbation with present symptoms significant for fatigue, limited functional ability, limited functional mobility. Pt has a past medical history of Hypertension, Hypothyroid, Hypovitaminosis D, Iron deficiency anemia, and Type 2 diabetes mellitus without complication (HCC). Pt is a ? Historian, responding inconsistently to questions, and only requesting to leave hospital and for medication t/o session. Pt also with mumbled speech proving difficult to interpret at times. Group home liaison present in session to provide further PLOF and hx. Pt resides at [facility] group home where she is IND with ADLs and fx mobility, has support for IADLs. Pt does not use AD PTA. (-). Pt OOB standing EOB upon entry, assisted to bathroom and returned to supine in bed post session. Pt completed functional bed  mobility with SUP. SUP for functional STS txfs with no AD.  SUP for functional mobility with no AD to go household distances. Pt was SUP for UB ADLs and SUP for LB ADLs. Group home liaison present in session reporting that pt is at functional and cognitive baseline and that she will be able to txf pt home and provide support when she is cleared for d/c. The patient's raw score on the AM-PAC Daily Activity Inpatient Short Form is 21. A raw score of greater than or equal to 19 suggests the patient may benefit from discharge to home. Please refer to the recommendation of the Occupational Therapist for safe discharge planning. Pt is functioning at or near baseline level of function and no further skilled OT needs are identified. At this time, current OT recommendation is Level 4 - return to group home where she has appropriate level of SUP/support. Will remain available if skilled acute OT needs arise. D/c OT.   Goals   Patient Goals to go home   Plan   OT Frequency Eval only   Discharge Recommendation   Rehab Resource Intensity Level, OT No post-acute rehabilitation needs  (Appropriate level of SUP/support from group home staff.)   AM-PAC Daily Activity Inpatient   Lower Body Dressing 3   Bathing 3   Toileting 3   Upper Body Dressing 4   Grooming 4   Eating 4   Daily Activity Raw Score 21   Daily Activity Standardized Score (Calc for Raw Score >=11) 44.27   AM-PAC Applied Cognition Inpatient   Following a Speech/Presentation 2   Understanding Ordinary Conversation 3   Taking Medications 2   Remembering Where Things Are Placed or Put Away 2   Remembering List of 4-5 Errands 2   Taking Care of Complicated Tasks 2   Applied Cognition Raw Score 13   Applied Cognition Standardized Score 30.46   End of Consult   Education Provided Yes;Family or social support of family present for education by provider  (group home liaison present)   Patient Position at End of Consult Supine;All needs within reach   Nurse Communication Nurse  aware of consult         Rubén Porter MS, OTR/L

## 2024-04-03 ENCOUNTER — TELEPHONE (OUTPATIENT)
Dept: CARDIOLOGY CLINIC | Facility: CLINIC | Age: 74
End: 2024-04-03

## 2024-04-03 PROBLEM — I50.22 CHRONIC HFREF (HEART FAILURE WITH REDUCED EJECTION FRACTION) (HCC): Chronic | Status: ACTIVE | Noted: 2024-03-26

## 2024-04-03 PROBLEM — J96.01 ACUTE RESPIRATORY FAILURE WITH HYPOXIA (HCC): Status: RESOLVED | Noted: 2024-03-24 | Resolved: 2024-04-03

## 2024-04-03 NOTE — TELEPHONE ENCOUNTER
Spoke to facility today. They are not weighing pt. Advised she needs daily wts.  Pt is refusing her BP and BS test.  She is doing ok right now and will be seen in the office tomorrow with Shikha Meade PA-C.

## 2024-04-04 ENCOUNTER — OFFICE VISIT (OUTPATIENT)
Dept: CARDIOLOGY CLINIC | Facility: CLINIC | Age: 74
End: 2024-04-04
Payer: MEDICARE

## 2024-04-04 VITALS
SYSTOLIC BLOOD PRESSURE: 110 MMHG | OXYGEN SATURATION: 99 % | DIASTOLIC BLOOD PRESSURE: 62 MMHG | WEIGHT: 132 LBS | HEART RATE: 78 BPM | HEIGHT: 62 IN | BODY MASS INDEX: 24.29 KG/M2

## 2024-04-04 DIAGNOSIS — I10 PRIMARY HYPERTENSION: Chronic | ICD-10-CM

## 2024-04-04 DIAGNOSIS — E11.9 TYPE 2 DIABETES MELLITUS WITHOUT COMPLICATION, WITHOUT LONG-TERM CURRENT USE OF INSULIN (HCC): ICD-10-CM

## 2024-04-04 DIAGNOSIS — I50.22 CHRONIC HFREF (HEART FAILURE WITH REDUCED EJECTION FRACTION) (HCC): ICD-10-CM

## 2024-04-04 DIAGNOSIS — Z09 HOSPITAL DISCHARGE FOLLOW-UP: Primary | ICD-10-CM

## 2024-04-04 PROBLEM — E11.649 UNCONTROLLED TYPE 2 DIABETES MELLITUS WITH HYPOGLYCEMIA WITHOUT COMA (HCC): Status: ACTIVE | Noted: 2024-04-04

## 2024-04-04 PROCEDURE — 99215 OFFICE O/P EST HI 40 MIN: CPT | Performed by: PHYSICIAN ASSISTANT

## 2024-04-04 RX ORDER — LIDOCAINE 50 MG/G
1 PATCH TOPICAL EVERY 12 HOURS PRN
COMMUNITY
Start: 2024-03-15 | End: 2025-03-15

## 2024-04-04 RX ORDER — IBUPROFEN 200 MG
200 TABLET ORAL EVERY 6 HOURS PRN
COMMUNITY
Start: 2024-02-02

## 2024-04-04 RX ORDER — PSEUDOEPHED/ACETAMINOPH/DIPHEN 30MG-500MG
TABLET ORAL
COMMUNITY
Start: 2024-02-01

## 2024-04-04 NOTE — PROGRESS NOTES
"Advanced Heart Failure / Pulmonary Hypertension Outpatient Progress Note    Joslyn Cantu 73 y.o. female   MRN: [id number]  Encounter: [id number]    Assessment:  Patient Active Problem List    Diagnosis Date Noted    Uncontrolled type 2 diabetes mellitus with hypoglycemia without coma ([facility]) 04/04/2024    Chronic HFrEF (heart failure with reduced ejection fraction) ([facility]) 03/26/2024    Iron deficiency anemia 05/11/2016    Hypovitaminosis D 05/10/2016    Hypothyroid 05/08/2016    Hypertension 05/08/2016    Type 2 diabetes mellitus without complication ([facility]) 05/08/2016    Schizophrenia ([facility]) 05/01/2016       Today's Plan:  Continue current medications.   Provided direct order for daily weights and provided indications to contact office for rapid weight gain.  Plan to reassess LVEF with limited TTE after 3-4 months of uninterrupted and optimized HF GDMT.  Recommend avoiding NSAIDs when able. Consider acetaminophen as alternative to patient's ibuprofen.    Plan:  Chronic HFrEF; LVEF 35-40%   Etiology: \"unclear...no Q waves on EKG, no focal WMA on echo. BP not too elevated on initial presentation to ED last admit but 184/97 on this admit so possible HTN. Given her refusal to procedures, ischemic workup not done.\"   TTE 03/26/2024: LVEF 40%. Normal RV. Moderate AS. Mild MR. Moderate TR. Normal IVC.    Weight of 131 lbs on 04/02 (day of discharge). Today, weighs 132 lbs.    Most recent BMP from 04/02/2024: sodium 133; potassium 4.6; BUN 14; creatinine 0.65; eGFR 89.     Pharmacotherapies / Neurohormonal Blockade:  --Beta Blocker: metoprolol succinate 12.5 mg daily.   --ARNi / ACEi / ARB: lisinopril 10 mg daily.   --Aldosterone Antagonist: No.   --SGLT2 Inhibitor: No.   --Diuretic: Lasix 40 mg daily.     Sudden Cardiac Death Risk Reduction:  --LVEF 35-40%.    --Plan to reassess LVEF with limited TTE after 3-4 months of uninterrupted and optimized HF GDMT (late 06/2024).    Electrical Resynchronization:  --Candidacy for BiV " device: narrow QRS.    Advanced Therapies: Not a candidate at this time.     Hypertension   BP of 110/62 mmHg in office today.   Continue on medications as above.     Hypothyroidism  Diabetes mellitus, type II    Schizophrenia: lives in group home.   Iron deficiency anemia   History of noncompliance / refusing medications  Tobacco abuse     HPI:   Joslyn Cantu is a 73-year-old woman with a PMH as above who presents to the office for hospital follow-up and to establish with outpatient practice.     Readmitted to [facility] from 03/30 to 04/02/2024 after presenting with worsening SOB and need for supplemental oxygen at group home. Unclear if patient taking/refusing medications ordered s/p discharge on 03/26. . Started on IV diuretics, and advanced HF consulted. Transitioned to PO Lasix on day of discharge. Daily weights not kept.     04/04/2024: Patient presents with caregiver from [facility] for hospital follow-up. Patient offers no complaints. Denies SOB, CP, and worsening LE swelling. Caregiver reports patient has been sleeping more during the day since returning from hospital. He also reports that patient has been complaint with taking medications. They have not been checking daily weights - need order for this.     Past Medical History:   Diagnosis Date    Acute respiratory failure with hypoxia (HCC) 03/24/2024    Hypertension     Hypothyroid 05/08/2016    Hypovitaminosis D 05/10/2016    Iron deficiency anemia 05/11/2016    Type 2 diabetes mellitus without complication (HCC) 05/08/2016       Review of Systems   Constitutional:  Positive for fatigue. Negative for activity change, appetite change and unexpected weight change.   Respiratory:  Negative for shortness of breath and wheezing.    Cardiovascular:  Positive for leg swelling. Negative for chest pain and palpitations.   Gastrointestinal:  Negative for abdominal pain, constipation and diarrhea.   Genitourinary:  Negative for decreased urine volume  and difficulty urinating.   Neurological:  Negative for dizziness, syncope and weakness.   Psychiatric/Behavioral:  Negative for sleep disturbance.        No Known Allergies      Current Outpatient Medications:     Acetaminophen Extra Strength 500 MG TABS, , Disp: , Rfl:     ferrous sulfate 324 (65 Fe) mg, Take 1 tablet (324 mg total) by mouth 2 (two) times a day before meals, Disp: 60 tablet, Rfl: 0    furosemide (LASIX) 20 mg tablet, Take 2 tablets (40 mg total) by mouth daily, Disp: 60 tablet, Rfl: 0    haloperidol (HALDOL) 5 mg tablet, Take 1 tablet (5 mg total) by mouth daily at bedtime for 3 days, Disp: 3 tablet, Rfl: 0    ibuprofen (MOTRIN) 200 mg tablet, Take 200 mg by mouth every 6 (six) hours as needed, Disp: , Rfl:     levothyroxine 100 mcg tablet, Take 1.5 tablets (150 mcg total) by mouth daily in the early morning, Disp: , Rfl:     lidocaine (LIDODERM) 5 %, Place 1 patch on the skin every 12 (twelve) hours as needed, Disp: , Rfl:     lisinopril (ZESTRIL) 10 mg tablet, Take 1 tablet (10 mg total) by mouth daily Do not start before April 3, 2024., Disp: 30 tablet, Rfl: 0    metFORMIN (GLUCOPHAGE) 1000 MG tablet, Take 1 tablet (1,000 mg total) by mouth 2 (two) times a day with meals Indications: Type 2 Diabetes., Disp: 60 tablet, Rfl: 0    metoprolol succinate (TOPROL-XL) 25 mg 24 hr tablet, Take 0.5 tablets (12.5 mg total) by mouth daily Do not start before April 3, 2024., Disp: 15 tablet, Rfl: 0    Social History     Socioeconomic History    Marital status: Single     Spouse name: Not on file    Number of children: Not on file    Years of education: Not on file    Highest education level: Not on file   Occupational History    Not on file   Tobacco Use    Smoking status: Every Day     Types: Cigarettes    Smokeless tobacco: Current   Vaping Use    Vaping status: Unknown   Substance and Sexual Activity    Alcohol use: No    Drug use: No    Sexual activity: Not Currently   Other Topics Concern    Not on  "file   Social History Narrative    Not on file     Social Determinants of Health     Financial Resource Strain: Not on file   Food Insecurity: No Food Insecurity (4/1/2024)    Hunger Vital Sign     Worried About Running Out of Food in the Last Year: Never true     Ran Out of Food in the Last Year: Never true   Transportation Needs: No Transportation Needs (4/1/2024)    PRAPARE - Transportation     Lack of Transportation (Medical): No     Lack of Transportation (Non-Medical): No   Physical Activity: Not on file   Stress: Not on file   Social Connections: Not on file   Intimate Partner Violence: Not on file   Housing Stability: Low Risk  (4/1/2024)    Housing Stability Vital Sign     Unable to Pay for Housing in the Last Year: No     Number of Places Lived in the Last Year: 1     Unstable Housing in the Last Year: No     Family History   Family history unknown: Yes       Vitals:   Blood pressure 110/62, pulse 78, height 5' 2\" (1.575 m), weight 59.9 kg (132 lb), SpO2 99%.    Wt Readings from Last 10 Encounters:   04/04/24 59.9 kg (132 lb)   04/02/24 59.7 kg (131 lb 9.8 oz)   03/26/24 59 kg (130 lb)   12/21/23 59 kg (130 lb)   12/03/19 59 kg (130 lb)   01/05/18 56.7 kg (125 lb)   04/30/16 59 kg (130 lb)     Vitals:    04/04/24 1136   BP: 110/62   BP Location: Left arm   Patient Position: Sitting   Cuff Size: Standard   Pulse: 78   SpO2: 99%   Weight: 59.9 kg (132 lb)   Height: 5' 2\" (1.575 m)       Physical Exam  Vitals reviewed.   Constitutional:       General: She is awake. She is not in acute distress.     Appearance: She is well-developed and normal weight. She is not toxic-appearing or diaphoretic.   HENT:      Head: Normocephalic.      Nose: Nose normal.      Mouth/Throat:      Mouth: Mucous membranes are moist.   Eyes:      General: No scleral icterus.     Conjunctiva/sclera: Conjunctivae normal.   Neck:      Vascular: No JVD.   Cardiovascular:      Rate and Rhythm: Normal rate and regular rhythm.      Heart " "sounds: Murmur heard.   Pulmonary:      Effort: Pulmonary effort is normal. No tachypnea, bradypnea or respiratory distress.      Breath sounds: Normal air entry. No decreased air movement. No wheezing or rhonchi.   Abdominal:      General: There is no distension.      Palpations: Abdomen is soft.   Musculoskeletal:      Cervical back: Neck supple.      Right lower leg: Edema present.      Left lower leg: Edema present.   Skin:     General: Skin is warm and dry.      Coloration: Skin is pale. Skin is not jaundiced.   Neurological:      General: No focal deficit present.      Mental Status: She is alert.   Psychiatric:         Mood and Affect: Affect normal.         Behavior: Behavior is cooperative.       Labs & Results:  Lab Results   Component Value Date    WBC 12.69 (H) 04/01/2024    HGB 9.0 (L) 04/01/2024    HCT 31.6 (L) 04/01/2024    MCV 75 (L) 04/01/2024     04/01/2024     Lab Results   Component Value Date    SODIUM 133 (L) 04/02/2024    K 4.6 04/02/2024     04/02/2024    CO2 25 04/02/2024    BUN 14 04/02/2024    CREATININE 0.65 04/02/2024    GLUC 148 (H) 04/02/2024    CALCIUM 8.5 04/02/2024     Lab Results   Component Value Date    INR 0.9 11/25/2021     No results found for: \"NTBNP\"     Lab Results   Component Value Date     (H) 03/30/2024      Shikha Meade PA-C  "

## 2024-04-04 NOTE — PATIENT INSTRUCTIONS
Please begin daily weights. Contact the Heart Failure program at [phone] if you gain 3+ lbs overnight or 5+ lbs in 5-7 days.    Recommend avoiding NSAIDs when able. Consider acetaminophen as alternative to patient's ibuprofen.    Please weigh yourself every day (after emptying your bladder) and keep a detailed log of weights.   Contact the Heart Failure program at [phone] if you gain 3+ lbs overnight or 5+ lbs in 5-7 days.  Limit daily sodium/salt intake to 2000 mg daily to prevent fluid retention.  Avoid canned foods, fast food/Chinese food, and processed meats (hot dogs, lunch meat, and sausage etc.). Caution with condiments.  Limit fluid intake to 2000 mL or 2 liters (about 60-65 ounces) daily.  Avoid electrolyte replacement drinks (such as Gatorade, Pedialyte, Propel, Liquid IV, etc.).  Bring complete list of medications and log of daily weights to your follow-up appointment.

## 2024-04-08 NOTE — ED ATTENDING ATTESTATION
3/22/2024  I, Isaac Martinez MD, saw and evaluated the patient. I have discussed the patient with the resident/non-physician practitioner and agree with the resident's/non-physician practitioner's findings, Plan of Care, and MDM as documented in the resident's/non-physician practitioner's note, except where noted. All available labs and Radiology studies were reviewed.  I was present for key portions of any procedure(s) performed by the resident/non-physician practitioner and I was immediately available to provide assistance.       At this point I agree with the current assessment done in the Emergency Department.  I have conducted an independent evaluation of this patient a history and physical is as follows:    ED Course     Patient presents for evaluation secondary to altered mental status. Patient has hx of schizophrenia and lives at a independent crisis living facility. Patient has been refusing meds so the physician canceled the meds. Patient wanted to come to the hospital for haldol, tylenol, and ibuprofen. Difficult to understand patient's speech to obtain new history. Exam: Awake, alert, agitated, difficult to understand speech, intermittently yelling. A/P: Agitation, medication noncompliance. Will give meds patient is requesting and discuss with crisis.     Critical Care Time  Procedures

## 2024-04-10 ENCOUNTER — TELEPHONE (OUTPATIENT)
Age: 74
End: 2024-04-10

## 2024-04-10 NOTE — TELEPHONE ENCOUNTER
Atif called from [facility] concerned about weights.   4/6, 4/8, 4/9 Weight 133 LBS.    Today weight 126.2 LBS    Per Atif no c/o of any other cardiac s/s.    Unsure of intake and output.     Advised to continue to monitor and call office with any concerns, and educated on watching food intake and sodium.     Educate on weight 3 lbs in  a day or 5 in one week to call office.     Verbally understood

## 2024-04-26 ENCOUNTER — TELEPHONE (OUTPATIENT)
Dept: OTHER | Facility: HOSPITAL | Age: 74
End: 2024-04-26

## 2024-04-26 NOTE — TELEPHONE ENCOUNTER
Group home called Rx refill line to fill medications prescribed by an [facility] provider, advised to call that office for refills.

## 2024-04-29 NOTE — TELEPHONE ENCOUNTER
Group home called for Medication Refill on ferrous sulfate 324 (65 Fe) mg it was given by the ED, and they now want PCP to approve a refill. PCP is with [facility].

## 2024-05-03 ENCOUNTER — TELEPHONE (OUTPATIENT)
Dept: HEMATOLOGY ONCOLOGY | Facility: CLINIC | Age: 74
End: 2024-05-03

## 2024-05-03 ENCOUNTER — OFFICE VISIT (OUTPATIENT)
Dept: HEMATOLOGY ONCOLOGY | Facility: CLINIC | Age: 74
End: 2024-05-03
Payer: MEDICARE

## 2024-05-03 VITALS
BODY MASS INDEX: 23 KG/M2 | DIASTOLIC BLOOD PRESSURE: 60 MMHG | RESPIRATION RATE: 14 BRPM | HEIGHT: 62 IN | WEIGHT: 125 LBS | SYSTOLIC BLOOD PRESSURE: 117 MMHG | HEART RATE: 76 BPM | TEMPERATURE: 97.7 F | OXYGEN SATURATION: 95 %

## 2024-05-03 DIAGNOSIS — D50.9 IRON DEFICIENCY ANEMIA, UNSPECIFIED IRON DEFICIENCY ANEMIA TYPE: Primary | ICD-10-CM

## 2024-05-03 DIAGNOSIS — D72.829 LEUKOCYTOSIS, UNSPECIFIED TYPE: ICD-10-CM

## 2024-05-03 PROCEDURE — 99203 OFFICE O/P NEW LOW 30 MIN: CPT

## 2024-05-03 RX ORDER — QUETIAPINE FUMARATE 200 MG/1
TABLET, FILM COATED ORAL
COMMUNITY
Start: 2024-02-16

## 2024-05-03 NOTE — TELEPHONE ENCOUNTER
Appointment Confirmation   Who are you speaking with? [facility]   If it is not the patient, are they listed on an active communication consent form? N/A   Which provider is the appointment scheduled with?  YUMI Rocha   When is the appointment scheduled?  Please list date and time 5/21/24 1:30pm   At which location is the appointment scheduled to take place? [place]   Did caller verbalize understanding of appointment details? Yes

## 2024-05-03 NOTE — PROGRESS NOTES
Oncology Outpatient Consult Note  Joslyn Cantu 73 y.o. female MRN: @ Encounter: [id number]        Date:  5/3/2024        CC: Iron deficiency anemia and leukocytosis      HPI:  Joslyn Cantu is seen for initial consultation 5/3/2024 regarding iron deficiency anemia.  Patient is patient has PMH significant for HTN, CHF, hypothyroidism, DM 2, schizophrenia.  She resides in a group home.  She is here with Hardy, member of the group.  He is not familiar with patient's case however, states she is independent and can answer for herself.    When reviewing patient's symptoms, I was unable to understand patient when she was speaking.  I looked to the member of her team for translation however, I am not confident that it was being translated this accurately.    For the little information I was able to gather, patient endorses fatigue.  She denies any dizziness, lightheadedness.  She denies any abnormal bleeding.    Iron deficiency anemia is noted March 2024..  She was started on oral iron supplements.  Patient has not had a colonoscopy/EGD.    Labs:  4/1/2024: Hgb 9.0, MCV 75, WBC 12.6, platelets 289,000    3/26/2024: Hgb 8.6, MCV 72, WBC 10.6, platelets 302,000, serum iron 70, iron saturation 3%, ferritin 7, vitamin B12 538, within normal limits    Patient is up-to-date on her yearly mammogram.    ROS: As stated in history of present illness otherwise her 14 point review of systems today was negative.    ECOG PS: 3      Test Results:    Imaging: No results found.    Labs:   Lab Results   Component Value Date    WBC 12.69 (H) 04/01/2024    HGB 9.0 (L) 04/01/2024    HCT 31.6 (L) 04/01/2024    MCV 75 (L) 04/01/2024     04/01/2024     Lab Results   Component Value Date    IRON 10 (L) 03/26/2024    TIBC 336 03/26/2024    FERRITIN 7 (L) 03/26/2024     Lab Results   Component Value Date    [id number] 538 03/26/2024     Active Problems:   Patient Active Problem List   Diagnosis    Schizophrenia (HCC)    Hypothyroid     Hypertension    Type 2 diabetes mellitus without complication (HCC)    Hypovitaminosis D    Iron deficiency anemia    Chronic HFrEF (heart failure with reduced ejection fraction) (HCC)    Uncontrolled type 2 diabetes mellitus with hypoglycemia without coma (HCC)       Past Medical History:   Past Medical History:   Diagnosis Date    Acute respiratory failure with hypoxia ([facility]) 03/24/2024    Hypertension     Hypothyroid 05/08/2016    Hypovitaminosis D 05/10/2016    Iron deficiency anemia 05/11/2016    Type 2 diabetes mellitus without complication (HCC) 05/08/2016       Surgical History: History reviewed. No pertinent surgical history.    Family History:    Family History   Family history unknown: Yes       Family history is unknown by patient.    Social History:   Social History     Socioeconomic History    Marital status: Single     Spouse name: Not on file    Number of children: Not on file    Years of education: Not on file    Highest education level: Not on file   Occupational History    Not on file   Tobacco Use    Smoking status: Every Day     Types: Cigarettes    Smokeless tobacco: Current   Vaping Use    Vaping status: Unknown   Substance and Sexual Activity    Alcohol use: No    Drug use: No    Sexual activity: Not Currently   Other Topics Concern    Not on file   Social History Narrative    Not on file     Social Determinants of Health     Financial Resource Strain: Not on file   Food Insecurity: No Food Insecurity (4/1/2024)    Hunger Vital Sign     Worried About Running Out of Food in the Last Year: Never true     Ran Out of Food in the Last Year: Never true   Transportation Needs: No Transportation Needs (4/1/2024)    PRAPARE - Transportation     Lack of Transportation (Medical): No     Lack of Transportation (Non-Medical): No   Physical Activity: Not on file   Stress: Not on file   Social Connections: Not on file   Intimate Partner Violence: Not on file   Housing Stability: Low Risk  (4/1/2024)     Housing Stability Vital Sign     Unable to Pay for Housing in the Last Year: No     Number of Places Lived in the Last Year: 1     Unstable Housing in the Last Year: No       Current Medications:   Current Outpatient Medications   Medication Sig Dispense Refill    Acetaminophen Extra Strength 500 MG TABS       Cholecalciferol 125 MCG (5000 UT) capsule Take 5,000 Units by mouth daily      ferrous sulfate 324 (65 Fe) mg Take 1 tablet (324 mg total) by mouth 2 (two) times a day before meals 60 tablet 0    furosemide (LASIX) 20 mg tablet Take 2 tablets (40 mg total) by mouth daily 60 tablet 0    haloperidol (HALDOL) 5 mg tablet Take 1 tablet (5 mg total) by mouth daily at bedtime for 3 days 3 tablet 0    ibuprofen (MOTRIN) 200 mg tablet Take 200 mg by mouth every 6 (six) hours as needed      levothyroxine 100 mcg tablet Take 1.5 tablets (150 mcg total) by mouth daily in the early morning      lidocaine (LIDODERM) 5 % Place 1 patch on the skin every 12 (twelve) hours as needed      lisinopril (ZESTRIL) 10 mg tablet Take 1 tablet (10 mg total) by mouth daily Do not start before April 3, 2024. 30 tablet 0    metFORMIN (GLUCOPHAGE) 1000 MG tablet Take 1 tablet (1,000 mg total) by mouth 2 (two) times a day with meals Indications: Type 2 Diabetes. 60 tablet 0    metoprolol succinate (TOPROL-XL) 25 mg 24 hr tablet Take 0.5 tablets (12.5 mg total) by mouth daily Do not start before April 3, 2024. 15 tablet 0    QUEtiapine (SEROquel) 200 mg tablet       Valbenazine Tosylate 80 MG CAPS Take 80 mg by mouth       No current facility-administered medications for this visit.       Allergies: No Known Allergies      Physical Exam:    Body surface area is 1.57 meters squared.    Wt Readings from Last 3 Encounters:   05/03/24 56.7 kg (125 lb)   04/04/24 59.9 kg (132 lb)   04/02/24 59.7 kg (131 lb 9.8 oz)        Temp Readings from Last 3 Encounters:   05/03/24 97.7 °F (36.5 °C) (Temporal)   04/02/24 98.4 °F (36.9 °C)   03/24/24 (!) 97.3  °F (36.3 °C) (Oral)        BP Readings from Last 3 Encounters:   05/03/24 117/60   04/04/24 110/62   04/02/24 93/55         Pulse Readings from Last 3 Encounters:   05/03/24 76   04/04/24 78   04/02/24 65     @LASTSAO2(3)@    Physical Exam     Constitutional   General appearance: No acute distress, well appearing and well nourished.    Eyes   Conjunctiva and lids: No swelling, erythema or discharge.    Pupils and irises: Equal, round and reactive to light.    Ears, Nose, Mouth, and Throat   External inspection of ears and nose: Normal.    Nasal mucosa, septum, and turbinates: Normal without edema or erythema.    Oropharynx: Normal with no erythema, edema, exudate or lesions.    Pulmonary   Respiratory effort: No increased work of breathing or signs of respiratory distress.    Auscultation of lungs: Clear to auscultation.    Cardiovascular   Palpation of heart: Normal PMI, no thrills.    Auscultation of heart: Normal rate and rhythm, normal S1 and S2, without murmurs.    Examination of extremities for edema and/or varicosities: Normal.    Carotid pulses: Normal.    Abdomen   Abdomen: Non-tender, no masses.    Liver and spleen: No hepatomegaly or splenomegaly.    Lymphatic   Palpation of lymph nodes in neck: No lymphadenopathy.    Musculoskeletal   Gait and station: Normal.    Digits and nails: Normal without clubbing or cyanosis.    Inspection/palpation of joints, bones, and muscles: Normal.    Skin   Skin and subcutaneous tissue: Normal without rashes or lesions.    Neurologic   Cranial nerves: Cranial nerves 2-12 intact.    Sensation: No sensory loss.    Psychiatric   Orientation to person, place, and time: Normal.    Mood and affect: Normal.          Assessment/ Plan:  Discussed etiologies of iron deficiency, which include blood loss, reduced iron absorption, diet, medication, bariatric surgery or malignancy.  The etiology of her iron deficiency anemia is unclear at this time.  We will further evaluate by folate  hemolysis and plasma cell disorder/neoplasm (LDH, haptoglobin, Amanda count, bili, SPEP, FLC, immunoglobulins).  Will repeat her iron panel and CBC.  To further evaluate her leukocytosis we will obtain a flow cytometry, MARI screen, CRP, sed rate.    Will see patient back in the office in 2 weeks to review the above.  Patient's caregiver aware to contact us for any additional questions/concerns.    I spent 40 minutes in chart review, face to face counseling, coordination of care, and documentation.

## 2024-05-06 PROBLEM — D72.829 LEUKOCYTOSIS: Status: ACTIVE | Noted: 2024-05-06

## 2024-05-14 ENCOUNTER — TELEPHONE (OUTPATIENT)
Dept: HEMATOLOGY ONCOLOGY | Facility: CLINIC | Age: 74
End: 2024-05-14

## 2024-05-14 NOTE — TELEPHONE ENCOUNTER
Spoke to group home informing that labs need to be drawn prior to appt. Provided fax number once labs are drawn.

## 2024-05-17 ENCOUNTER — APPOINTMENT (OUTPATIENT)
Dept: LAB | Facility: CLINIC | Age: 74
End: 2024-05-17
Payer: MEDICARE

## 2024-05-17 DIAGNOSIS — D72.829 LEUKOCYTOSIS, UNSPECIFIED TYPE: ICD-10-CM

## 2024-05-17 DIAGNOSIS — D50.9 IRON DEFICIENCY ANEMIA, UNSPECIFIED IRON DEFICIENCY ANEMIA TYPE: ICD-10-CM

## 2024-05-17 LAB
BASOPHILS # BLD AUTO: 0.08 THOUSANDS/ÂΜL (ref 0–0.1)
BASOPHILS NFR BLD AUTO: 1 % (ref 0–1)
BILIRUB DIRECT SERPL-MCNC: 0.05 MG/DL (ref 0–0.2)
BILIRUB SERPL-MCNC: 0.26 MG/DL (ref 0.2–1)
CRP SERPL QL: <1 MG/L
EOSINOPHIL # BLD AUTO: 0.27 THOUSAND/ÂΜL (ref 0–0.61)
EOSINOPHIL NFR BLD AUTO: 2 % (ref 0–6)
ERYTHROCYTE [SEDIMENTATION RATE] IN BLOOD: 24 MM/HOUR (ref 0–29)
FERRITIN SERPL-MCNC: 24 NG/ML (ref 11–307)
HCT VFR BLD AUTO: 41.6 % (ref 34.8–46.1)
HGB BLD-MCNC: 12.4 G/DL (ref 11.5–15.4)
IGA SERPL-MCNC: 188 MG/DL (ref 66–433)
IGG SERPL-MCNC: 835 MG/DL (ref 635–1741)
IGM SERPL-MCNC: 32 MG/DL (ref 45–281)
IMM GRANULOCYTES # BLD AUTO: 0.04 THOUSAND/UL (ref 0–0.2)
IMM GRANULOCYTES NFR BLD AUTO: 0 % (ref 0–2)
IRON SATN MFR SERPL: 36 % (ref 15–50)
IRON SERPL-MCNC: 133 UG/DL (ref 50–212)
LDH SERPL-CCNC: 169 U/L (ref 140–271)
LYMPHOCYTES # BLD AUTO: 2.19 THOUSANDS/ÂΜL (ref 0.6–4.47)
LYMPHOCYTES NFR BLD AUTO: 17 % (ref 14–44)
MCH RBC QN AUTO: 25.8 PG (ref 26.8–34.3)
MCHC RBC AUTO-ENTMCNC: 29.8 G/DL (ref 31.4–37.4)
MCV RBC AUTO: 87 FL (ref 82–98)
MONOCYTES # BLD AUTO: 1.25 THOUSAND/ÂΜL (ref 0.17–1.22)
MONOCYTES NFR BLD AUTO: 10 % (ref 4–12)
NEUTROPHILS # BLD AUTO: 8.77 THOUSANDS/ÂΜL (ref 1.85–7.62)
NEUTS SEG NFR BLD AUTO: 70 % (ref 43–75)
NRBC BLD AUTO-RTO: 0 /100 WBCS
PLATELET # BLD AUTO: 221 THOUSANDS/UL (ref 149–390)
PMV BLD AUTO: 10.7 FL (ref 8.9–12.7)
RBC # BLD AUTO: 4.8 MILLION/UL (ref 3.81–5.12)
RETICS # AUTO: NORMAL 10*3/UL (ref 14097–95744)
RETICS # CALC: 0.71 % (ref 0.37–1.87)
TIBC SERPL-MCNC: 365 UG/DL (ref 250–450)
UIBC SERPL-MCNC: 232 UG/DL (ref 155–355)
WBC # BLD AUTO: 12.6 THOUSAND/UL (ref 4.31–10.16)

## 2024-05-17 PROCEDURE — 86140 C-REACTIVE PROTEIN: CPT

## 2024-05-17 PROCEDURE — 82784 ASSAY IGA/IGD/IGG/IGM EACH: CPT

## 2024-05-17 PROCEDURE — 86334 IMMUNOFIX E-PHORESIS SERUM: CPT

## 2024-05-17 PROCEDURE — 83540 ASSAY OF IRON: CPT

## 2024-05-17 PROCEDURE — 82247 BILIRUBIN TOTAL: CPT

## 2024-05-17 PROCEDURE — 85045 AUTOMATED RETICULOCYTE COUNT: CPT

## 2024-05-17 PROCEDURE — 86235 NUCLEAR ANTIGEN ANTIBODY: CPT

## 2024-05-17 PROCEDURE — 86039 ANTINUCLEAR ANTIBODIES (ANA): CPT

## 2024-05-17 PROCEDURE — 82728 ASSAY OF FERRITIN: CPT

## 2024-05-17 PROCEDURE — 85025 COMPLETE CBC W/AUTO DIFF WBC: CPT

## 2024-05-17 PROCEDURE — 84165 PROTEIN E-PHORESIS SERUM: CPT

## 2024-05-17 PROCEDURE — 83521 IG LIGHT CHAINS FREE EACH: CPT

## 2024-05-17 PROCEDURE — 86225 DNA ANTIBODY NATIVE: CPT

## 2024-05-17 PROCEDURE — 83615 LACTATE (LD) (LDH) ENZYME: CPT

## 2024-05-17 PROCEDURE — 85652 RBC SED RATE AUTOMATED: CPT

## 2024-05-17 PROCEDURE — 83010 ASSAY OF HAPTOGLOBIN QUANT: CPT

## 2024-05-17 PROCEDURE — 86038 ANTINUCLEAR ANTIBODIES: CPT

## 2024-05-17 PROCEDURE — 88185 FLOWCYTOMETRY/TC ADD-ON: CPT

## 2024-05-17 PROCEDURE — 82248 BILIRUBIN DIRECT: CPT

## 2024-05-17 PROCEDURE — 36415 COLL VENOUS BLD VENIPUNCTURE: CPT

## 2024-05-17 PROCEDURE — 83550 IRON BINDING TEST: CPT

## 2024-05-18 LAB
ANA SER QL IA: POSITIVE
HAPTOGLOB SERPL-MCNC: 206 MG/DL (ref 42–346)
KAPPA LC FREE SER-MCNC: 32.7 MG/L (ref 3.3–19.4)
KAPPA LC FREE/LAMBDA FREE SER: 1.39 {RATIO} (ref 0.26–1.65)
LAMBDA LC FREE SERPL-MCNC: 23.5 MG/L (ref 5.7–26.3)

## 2024-05-20 ENCOUNTER — TELEPHONE (OUTPATIENT)
Age: 74
End: 2024-05-20

## 2024-05-20 LAB
ALBUMIN SERPL ELPH-MCNC: 4.13 G/DL (ref 3.2–5.1)
ALBUMIN SERPL ELPH-MCNC: 60.7 % (ref 48–70)
ALPHA1 GLOB SERPL ELPH-MCNC: 0.31 G/DL (ref 0.15–0.47)
ALPHA1 GLOB SERPL ELPH-MCNC: 4.5 % (ref 1.8–7)
ALPHA2 GLOB SERPL ELPH-MCNC: 0.83 G/DL (ref 0.42–1.04)
ALPHA2 GLOB SERPL ELPH-MCNC: 12.2 % (ref 5.9–14.9)
ANA HOMOGEN SER QL IF: NORMAL
ANA HOMOGEN TITR SER: NORMAL {TITER}
BETA GLOB ABNORMAL SERPL ELPH-MCNC: 0.42 G/DL (ref 0.31–0.57)
BETA1 GLOB SERPL ELPH-MCNC: 6.2 % (ref 4.7–7.7)
BETA2 GLOB SERPL ELPH-MCNC: 5 % (ref 3.1–7.9)
BETA2+GAMMA GLOB SERPL ELPH-MCNC: 0.34 G/DL (ref 0.2–0.58)
CHROMATIN AB SERPL-ACNC: NEGATIVE
DSDNA AB SER-ACNC: <4 IU/ML
ENA SS-A AB SER IA-ACNC: NEGATIVE
ENA SS-B AB SER IA-ACNC: NEGATIVE
GAMMA GLOB ABNORMAL SERPL ELPH-MCNC: 0.78 G/DL (ref 0.4–1.66)
GAMMA GLOB SERPL ELPH-MCNC: 11.4 % (ref 6.9–22.3)
IGG/ALB SER: 1.54 {RATIO} (ref 1.1–1.8)
INTERPRETATION UR IFE-IMP: NORMAL
PROT PATTERN SERPL ELPH-IMP: NORMAL
PROT SERPL-MCNC: 6.8 G/DL (ref 6.4–8.2)

## 2024-05-20 PROCEDURE — 86334 IMMUNOFIX E-PHORESIS SERUM: CPT | Performed by: PATHOLOGY

## 2024-05-20 PROCEDURE — 84165 PROTEIN E-PHORESIS SERUM: CPT | Performed by: PATHOLOGY

## 2024-05-20 NOTE — TELEPHONE ENCOUNTER
Spoke with Khoi patient , advised labs are still pending and we would like to r/s her appt. [facility] 5[facility] Patient needs later in the day appt.

## 2024-05-22 LAB — SCAN RESULT: NORMAL

## 2024-05-28 LAB — SCAN RESULT: NORMAL

## 2024-05-31 ENCOUNTER — OFFICE VISIT (OUTPATIENT)
Dept: HEMATOLOGY ONCOLOGY | Facility: CLINIC | Age: 74
End: 2024-05-31
Payer: MEDICARE

## 2024-05-31 VITALS
WEIGHT: 123.4 LBS | HEIGHT: 62 IN | DIASTOLIC BLOOD PRESSURE: 70 MMHG | TEMPERATURE: 97.4 F | BODY MASS INDEX: 22.71 KG/M2 | HEART RATE: 83 BPM | SYSTOLIC BLOOD PRESSURE: 122 MMHG | OXYGEN SATURATION: 100 % | RESPIRATION RATE: 14 BRPM

## 2024-05-31 DIAGNOSIS — D50.9 IRON DEFICIENCY ANEMIA, UNSPECIFIED IRON DEFICIENCY ANEMIA TYPE: ICD-10-CM

## 2024-05-31 DIAGNOSIS — D72.829 LEUKOCYTOSIS, UNSPECIFIED TYPE: Primary | ICD-10-CM

## 2024-05-31 PROCEDURE — 99214 OFFICE O/P EST MOD 30 MIN: CPT

## 2024-05-31 RX ORDER — FERROUS SULFATE 325(65) MG
TABLET ORAL
COMMUNITY
Start: 2024-05-28

## 2024-05-31 RX ORDER — LANCETS 23 GAUGE
EACH MISCELLANEOUS 2 TIMES DAILY
COMMUNITY
Start: 2024-01-09

## 2024-05-31 RX ORDER — BLOOD PRESSURE TEST KIT
KIT MISCELLANEOUS
COMMUNITY
Start: 2024-01-10

## 2024-05-31 RX ORDER — METFORMIN HYDROCHLORIDE 500 MG/1
TABLET, EXTENDED RELEASE ORAL
COMMUNITY
Start: 2024-05-13

## 2024-05-31 NOTE — PROGRESS NOTES
"HEMATOLOGY / ONCOLOGY CLINIC FOLLOW UP NOTE    Primary Care Provider: Natalia Elizondo MD  Referring Provider:    MRN: [id number]  : 1950    Reason for Encounter: Follow-up iron deficiency anemia and leukocytosis           Interval History: Patient presents for follow-up of iron deficiency anemia and leukocytosis.  Has a PMH significant for HTN, CHF, hypothyroidism, DM 2, Diverio.  Patient resides in a group home and she is here with the group home counselor, Jordan.    The last office visit, I was not able to understand patient as she was screaming during most of the office visit.  I informed Jordan, the group [place] counselor over this and he stated \"we are not able to understand her most times either.\"    Attempted to ask patient yes/no questions.  She reports she is doing well overall.  Denies any fevers, infections, drenching night sweats, decreased appetite or weight loss.  Jordan reports that she does not eat her meals or finish them.  At this, patient started screaming.  Per paperwork provided during office visit, patient is taking daily oral iron supplements.    Jordan, patient smokes \"a lot.\"  When she was asked, she smokes, she responded \"none of your business.\"     Labs:  2024: WBC 12.6, Hgb 12.4, MCV 87, platelets 221,000, ANC 8.77, absolute monocytes 1.25, CRP/sed rate WNL, MARI screen positive, titers unremarkable, no monoclonal bands noted on SPEP, free light chain ratio 1.39, IgG = 835, IgA = 188, IgM = 32, serum iron 133, iron saturation 36%, ferritin 24, , reticulocyte count 34,100, haptoglobin, 206, bilirubin direct 0.05, flow cytometry negative    2024: Hgb 9.0, MCV 75, WBC 12.6, platelets 289,000     3/26/2024: Hgb 8.6, MCV 72, WBC 10.6, platelets 302,000, serum iron 70, iron saturation 3%, ferritin 7, vitamin B12 538, within normal limits    REVIEW OF SYSTEMS:  Please note that a 14-point review of systems was performed to include Constitutional, HEENT, Respiratory, " CVS, GI, , Musculoskeletal, Integumentary, Neurologic, Rheumatologic, Endocrinologic, Psychiatric, Lymphatic, and Hematologic/Oncologic systems were reviewed and are negative unless otherwise stated in HPI. Positive and negative findings pertinent to this evaluation are incorporated into the history of present illness.      ECOG PS: 3    HPI:  Joslyn Cantu was seen for initial consultation 5/3/2024 regarding iron deficiency anemia.  Patient is patient has PMH significant for HTN, CHF, hypothyroidism, DM 2, schizophrenia.  She resides in a group home.  She is here with Hardy, member of the group.  He is not familiar with patient's case however, states she is independent and can answer for herself.     When reviewing patient's symptoms, I was unable to understand patient when she was speaking.  I looked to the member of her team for translation however, I am not confident that it was being translated this accurately.     For the little information I was able to gather, patient endorses fatigue.  She denies any dizziness, lightheadedness.  She denies any abnormal bleeding.     Iron deficiency anemia is noted March 2024..  She was started on oral iron supplements.  Patient has not had a colonoscopy/EGD.    Patient is up-to-date on her yearly mammogram    PROBLEM LIST:  Patient Active Problem List   Diagnosis    Schizophrenia (HCC)    Hypothyroid    Hypertension    Type 2 diabetes mellitus without complication (HCC)    Hypovitaminosis D    Iron deficiency anemia    Chronic HFrEF (heart failure with reduced ejection fraction) (HCC)    Uncontrolled type 2 diabetes mellitus with hypoglycemia without coma (HCC)    Leukocytosis       Assessment / Plan: I was able to make out some sentences during our office visit however, I was not able to understand most of what the patient was verbalizing as she is very difficult to understand.  Did attempt to ask her yes or no questions, which I was able to get an answer to.       Reviewed with patient she is no longer iron deficient or anemic.  Leukocytosis likely reactive from smoking cigarettes.  Informed her that I would like to get a BCR-ABL to rule out any underlying chronic leukemia as her monocytes are elevated.  Patient started screaming at this point.  Informed her that I will see her back in the office in 3 months with labs prior (CBCD, iron panel, BCR-ABL).  Patient will allowed me to auscultate heart sounds and lung sounds.    I am not certain she understands what we reviewed today.  Informed the group counselor of this as well.  Recommend she continue taking oral iron supplements daily, which was noted in the paperwork I provided to them.    The group home is aware to contact us for any additional questions/concerns or worsening symptoms.      I spent 30 minutes on chart review, face to face counseling time, coordination of care and documentation.    Past Medical History:   has a past medical history of Acute respiratory failure with hypoxia (HCC) (03/24/2024), Hypertension, Hypothyroid (05/08/2016), Hypovitaminosis D (05/10/2016), Iron deficiency anemia (05/11/2016), and Type 2 diabetes mellitus without complication (HCC) (05/08/2016).    PAST SURGICAL HISTORY:   has no past surgical history on file.    CURRENT MEDICATIONS  Current Outpatient Medications   Medication Sig Dispense Refill    Acetaminophen Extra Strength 500 MG TABS       Alcohol Swabs PADS Use as directed for blood glucose testing      Cholecalciferol 125 MCG (5000 UT) capsule Take 5,000 Units by mouth daily      FeroSul 325 (65 Fe) MG tablet       furosemide (LASIX) 20 mg tablet Take 2 tablets (40 mg total) by mouth daily 60 tablet 0    haloperidol (HALDOL) 5 mg tablet Take 1 tablet (5 mg total) by mouth daily at bedtime for 3 days 3 tablet 0    ibuprofen (MOTRIN) 200 mg tablet Take 200 mg by mouth every 6 (six) hours as needed      Lancets 28G MISC 2 (two) times a day      levothyroxine 100 mcg tablet Take  "1.5 tablets (150 mcg total) by mouth daily in the early morning      lidocaine (LIDODERM) 5 % Place 1 patch on the skin every 12 (twelve) hours as needed      lisinopril (ZESTRIL) 10 mg tablet Take 1 tablet (10 mg total) by mouth daily Do not start before April 3, 2024. 30 tablet 0    metFORMIN (GLUCOPHAGE-XR) 500 mg 24 hr tablet       metoprolol succinate (TOPROL-XL) 25 mg 24 hr tablet Take 0.5 tablets (12.5 mg total) by mouth daily Do not start before April 3, 2024. 15 tablet 0    ferrous sulfate 324 (65 Fe) mg Take 1 tablet (324 mg total) by mouth 2 (two) times a day before meals (Patient not taking: Reported on 5/31/2024) 60 tablet 0    metFORMIN (GLUCOPHAGE) 1000 MG tablet Take 1 tablet (1,000 mg total) by mouth 2 (two) times a day with meals Indications: Type 2 Diabetes. (Patient not taking: Reported on 5/31/2024) 60 tablet 0    QUEtiapine (SEROquel) 200 mg tablet  (Patient not taking: Reported on 5/31/2024)      Valbenazine Tosylate 80 MG CAPS Take 80 mg by mouth (Patient not taking: Reported on 5/31/2024)       No current facility-administered medications for this visit.     [unfilled]    SOCIAL HISTORY:   reports that she has been smoking cigarettes. She uses smokeless tobacco. She reports that she does not drink alcohol and does not use drugs.     FAMILY HISTORY:  Family history is unknown by patient.     ALLERGIES:  has No Known Allergies.      Physical Exam:  Vital Signs:   Visit Vitals  /70 (BP Location: Left arm, Patient Position: Sitting, Cuff Size: Standard)   Pulse 83   Temp (!) 97.4 °F (36.3 °C) (Temporal)   Resp 14   Ht 5' 2\" (1.575 m)   Wt 56 kg (123 lb 6.4 oz)   SpO2 100%   BMI 22.57 kg/m²   OB Status Postmenopausal   Smoking Status Every Day   BSA 1.56 m²     Body mass index is 22.57 kg/m².  Body surface area is 1.56 meters squared.    GEN: Alert, awake oriented x3, in no acute distress  HEENT- No pallor, icterus, cyanosis, no oral mucosal lesions,   LAD - no palpable cervical, clavicle, " axillary, inguinal LAD  Heart- normal S1 S2, regular rate and rhythm, No murmur, rubs.   Lungs- clear breathing sound bilateral.   Abdomen- soft, Non tender, bowel sounds present  Extremities- No cyanosis, clubbing, edema  Neuro- No focal neurological deficit    Labs:  Lab Results   Component Value Date    WBC 12.60 (H) 05/17/2024    HGB 12.4 05/17/2024    HCT 41.6 05/17/2024    MCV 87 05/17/2024     05/17/2024     Lab Results   Component Value Date     04/12/2014    SODIUM 133 (L) 04/02/2024    K 4.6 04/02/2024     04/02/2024    CO2 25 04/02/2024    ANIONGAP 8 04/12/2014    AGAP 7 04/02/2024    BUN 14 04/02/2024    CREATININE 0.65 04/02/2024    GLUC 148 (H) 04/02/2024    CALCIUM 8.5 04/02/2024    AST 29 03/30/2024    ALT 28 03/30/2024    ALKPHOS 88 03/30/2024    PROT 6.3 (L) 04/12/2014    TP 6.8 05/17/2024    BILITOT 0.22 04/12/2014    TBILI 0.26 05/17/2024    EGFR 89 04/02/2024

## 2024-06-25 ENCOUNTER — OFFICE VISIT (OUTPATIENT)
Dept: CARDIOLOGY CLINIC | Facility: CLINIC | Age: 74
End: 2024-06-25
Payer: MEDICARE

## 2024-06-25 VITALS
HEART RATE: 90 BPM | DIASTOLIC BLOOD PRESSURE: 50 MMHG | HEIGHT: 62 IN | BODY MASS INDEX: 22.52 KG/M2 | SYSTOLIC BLOOD PRESSURE: 80 MMHG | WEIGHT: 122.4 LBS

## 2024-06-25 DIAGNOSIS — E11.649 UNCONTROLLED TYPE 2 DIABETES MELLITUS WITH HYPOGLYCEMIA WITHOUT COMA (HCC): ICD-10-CM

## 2024-06-25 DIAGNOSIS — I10 PRIMARY HYPERTENSION: Chronic | ICD-10-CM

## 2024-06-25 DIAGNOSIS — I51.9 LEFT VENTRICULAR DIASTOLIC DYSFUNCTION: ICD-10-CM

## 2024-06-25 DIAGNOSIS — I50.31 ACUTE DIASTOLIC CHF (CONGESTIVE HEART FAILURE) (HCC): ICD-10-CM

## 2024-06-25 DIAGNOSIS — I42.9 CARDIOMYOPATHY, UNSPECIFIED TYPE (HCC): ICD-10-CM

## 2024-06-25 DIAGNOSIS — I50.22 CHRONIC HFREF (HEART FAILURE WITH REDUCED EJECTION FRACTION) (HCC): Primary | Chronic | ICD-10-CM

## 2024-06-25 DIAGNOSIS — N18.31 STAGE 3A CHRONIC KIDNEY DISEASE (HCC): ICD-10-CM

## 2024-06-25 PROCEDURE — 99214 OFFICE O/P EST MOD 30 MIN: CPT | Performed by: INTERNAL MEDICINE

## 2024-06-25 RX ORDER — LISINOPRIL 5 MG/1
10 TABLET ORAL DAILY
Qty: 90 TABLET | Refills: 3 | Status: SHIPPED | OUTPATIENT
Start: 2024-06-25 | End: 2024-06-26

## 2024-06-25 NOTE — PROGRESS NOTES
[facility]  [facility]  Primary Office: [address] [place] [address]  Phone: [phone]; Fax: [phone]  *-*-*-*-*-*-*-*-*-*-*-*-*-*-*-*-*-*-*-*-*-*-*-*-*-*-*-*-*-*-*-*-*-*-*-*-*-*-*-*-*-*-*-*-*-*-*-*-*-*-*-*-*-*  ENCOUNTER DATE: 06/25/24 1:34 PM  PATIENT NAME: Joslyn Cantu   1950    [id number]  Age: 74 y.o.      Sex: female  ENCOUNTER PROVIDER:  Bib San MD, [facility], [facility]  PRIMARYCARE PHYSICIAN: Natalia Elizondo MD  REFERRING PHYSICIAN: No referring provider defined for this encounter. No ref. provider found   *-*-*-*-*-*-*-*-*-*-*-*-*-*-*-*-*-*-*-*-*-*-*-*-*-*-*-*-*-*-*-*-*-*-*-*-*-*-*-*-*-*-*-*-*-*-*-*-*-*-*-*-*-*-  REASON FOR REFERRAL: Comanagement and evaluation for recently diagnosed cardiomyopathy and congestive heart failure    *-*-*-*-*-*-*-*-*-*-*-*-*-*-*-*-*-*-*-*-*-*-*-*-*-*-*-*-*-*-*-*-*-*-*-*-*-*-*-*-*-*-*-*-*-*-*-*-*-*-*-*-*-*-  CARDIOLOGY ASSESSMENT & PLAN:   Diagnosis ICD-10-CM Associated Orders   1. Chronic HFrEF (heart failure with reduced ejection fraction) (HCC)  I50.22 Basic metabolic panel     B-Type Natriuretic Peptide(BNP)      2. Cardiomyopathy, unspecified type ([facility])  I42.9       3. Uncontrolled type 2 diabetes mellitus with hypoglycemia without coma ([facility])  E11.649       4. Left ventricular diastolic dysfunction  I51.9       5. Primary hypertension  I10       6. Acute diastolic CHF (congestive heart failure) ([facility])  I50.31 lisinopril (ZESTRIL) 5 mg tablet      7. Stage 3a chronic kidney disease ([facility])  N18.31         1. Chronic HFrEF (heart failure with reduced ejection fraction) ([facility])  Assessment & Plan:  Wt Readings from Last 3 Encounters:   06/25/24 55.5 kg (122 lb 6.4 oz)   05/31/24 56 kg (123 lb 6.4 oz)   05/03/24 56.7 kg (125 lb)     Ms. Joslyn Cantu appears to be overall stable from cardiac perspective with no symptoms that suggest decompensated heart failure.  Her blood pressure is noted to be severely low but is  asymptomatic.  Lability of blood pressure measurement is not clear because patient had significant tremor and this may have contributed to the blood pressure reading.  Her extremities are warm and do not reflect significant hypotension.  She is on good medical regimen.  There is no evidence of pulmonary or peripheral vascular congestion on exam.  There is no evidence of significant valvular heart disease.  Her echocardiogram during the hospitalization in March demonstrated global hypokinesis without regional variability.  I suspect she has nonischemic cardiomyopathy.  She is on significant antipsychotic medications.  Her thyroid function is normal.  Her lipids were reasonably well-controlled.  She has hyponatremia.    -- At this time I am advising to reduce the dose of lisinopril to 5 mg daily while continuing all other medications.  Recommend that lisinopril and metoprolol succinate should be given at different times at least a couple of hours apart to avoid symptomatic correlation.  -- Given her comorbidities and given definite symptoms of ischemic heart disease I would not recommend ischemic evaluation at this time.  -- Recommend continued close monitoring for signs of heart failure and to avoid added salt in diet.  Some tips are put provided below to prevent  heart failure exacerbations.  -- We will plan to see her back in office in 6 months time but the staff can call us anytime if there are any concerns regarding her status.  -- She should have a follow-up basic metabolic panel checked within the next 3 to 6 months with follow-up with primary care physician after the blood work.    The following routine measures are being advised to prevent exacerbation of congestive heart failure:     - Daily or atleast weekly checking of weight.    Notify your clinicians if greater than 2 lb is gained in 1 day or greater than 5 lb is gained in 1 wk.    - Checking for lower extremity swelling.    Examine legs for swelling  (new or increase in existing swelling) and describe if swelling reaches ankle, shin, or knee.    - Monitoring for decrease in exercise tolerance.    Check your self for unusual shortness of breath at rest, or with mild exertion, moderate exertion, or none at all.    - Monitoring for worsening shortness of breath on lying down.    Check for increase in number of pillows used at night and for increase in waking at night with shortness of breath.    - Salt/sodium restriction to less than 2 g a day.    Calculate total sodium intake in a day from nutrition labels and food charts. Understand hidden sources of salt intake.  Eliminate the salt shaker. (Remember, One teaspoon of table salt = 2,300 mg of sodium)   Avoid using garlic salt, onion salt, MSG, meat tenderizers, broth mixes, Chinese food, soy sauce, teriyaki sauce, barbeque sauce, sauerkraut, olives, pickles, pickle relish, carter bits, and croutons.   Use fresh ingredients and/or foods with no added salt.  Try orange, lemon, lime, pineapple juice, or vinegar as a base for meat marinades or to add tart flavor.  Avoid convenience foods such as canned soups, entrees, vegetables, pasta and rice mixes, frozen dinners, instant cereal and puddings, and gravy sauce mixes.   Select frozen meals that contain around 600 mg sodium or less.  Use fresh, frozen, no-added-salt canned vegetables, low-sodium soups, and low-sodium lunchmeats.  Look for seasoning or spice blends with no salt, or try fresh herbs, onions, or garlic.    You are advised to inform us for any concerns regarding above measures.              Orders:  -     Basic metabolic panel; Future; Expected date: 09/25/2024  -     B-Type Natriuretic Peptide(BNP); Future; Expected date: 09/25/2024  2. Cardiomyopathy, unspecified type (HCC)  3. Uncontrolled type 2 diabetes mellitus with hypoglycemia without coma (HCC)  4. Left ventricular diastolic dysfunction  5. Primary hypertension  6. Acute diastolic CHF (congestive  heart failure) ([facility])  -     lisinopril (ZESTRIL) 5 mg tablet; Take 2 tablets (10 mg total) by mouth daily  7. Stage 3a chronic kidney disease (HCC)     *-*-*-*-*-*-*-*-*-*-*-*-*-*-*-*-*-*-*-*-*-*-*-*-*-*-*-*-*-*-*-*-*-*-*-*-*-*-*-*-*-*-*-*-*-*-*-*-*-*-*-*-*-*-  CURRENT ECG:  No results found for this visit on 06/25/24.  *-*-*-*-*-*-*-*-*-*-*-*-*-*-*-*-*-*-*-*-*-*-*-*-*-*-*-*-*-*-*-*-*-*-*-*-*-*-*-*-*-*-*-*-*-*-*-*-*-*-*-*-*-*-  HISTORY OF PRESENT ILLNESS:  Ms. Joslyn Cantu 74-year-old female who is a resident of [facility].    Medical history is significant for:  1.  Recently diagnosed cardiomyopathy with acute heart failure, heart failure with reduced ejection fraction + diastolic heart failure  2.  Moderate aortic valve stenosis  3.  Moderate tricuspid valve regurgitation  4.  Hypothyroidism  5.  Essential hypertension  6.  Vitamin D deficiency  7.  Iron deficiency anemia  8.  History of paranoid schizophrenia, bipolar disorder with psychosis.  9.  Diabetes mellitus    She has had recent couple of admissions in March and April respectively with decompensated heart failure and hypoxemic respiratory failure.  She was diagnosed with cardiomyopathy by echocardiogram which showed moderately reduced left ventricular systolic function with global hypokinesis.  She was treated for heart failure and was discharged home.  She was subsequently readmitted 1 week later with decompensated heart failure again.  She was treated with IV diuretics and eventually discharged home.  She is now referred for further evaluation and management.    She is here for visit accompanied with her  from group home Mr. Chetan Mcdonald.  Patient is unable to provide full history because of her language difficulty due to dystonia and underlying psychiatric illness.  She does answer to simple questions with yes and no and acknowledges to our questions.  As per information since her last hospitalization she has had no significant illnesses or  new diagnosis or repeat hospitalizations.  As per records she has not complained of any significant symptoms at nursing facility.  Patient denies any feeling of chest pain or shortness of breath or dizziness or palpitations.  She denies any passing out or near passing out episodes.  She has not had any swelling in lower extremity.    Functional capacity status: Moderate   (Excellent- >10 METs; Good: (7-10 METs); Moderate (4-7 METs); Poor (<= 4 METs)    Any chronic stressors: None   (feeling of poor health, financial problems, and social isolation etc).    Tobacco or alcohol dependence: She continues to smoke.  About a pack of cigarettes a day.  She has had no issues with alcohol or illicit drug use.    Family history: Family history is not obtainable as patient is a resident of [facility].    Current cardiac meds: Furosemide 20 mg daily lisinopril 10 mg daily metoprolol succinate 12.5 mg daily    Previous blood work:   Blood work 4/2/2024 showed sodium 133 potassium 4.6 chloride 101 bicarb 25 BUN 14 creatinine 0.65 calcium 8.5  GFR 89  Negative high sensitive troponins during hospitalization.   on 3/30/2024  Lipid profile 5/1/2060 total cholesterol 165 triglyceride 204 HDL 49 calculated LDL 75  TSH 3.35 and free T40.96 On 3/5/2024  Hemoglobin A1c 9.135 2024  Serum protein electrophoresis did not identify monoclonal immunoglobulins.  Positive MARI screen with homogeneous pattern.  Negative SSA and SSB and negative antichromatin antibodies, negative anti-double-stranded DNA    Previous cardiac studies:   Echocardiogram 3/26/2024: Increased left ventricular wall thickness, moderately reduced left ventricular systolic function with global hypokinesis, EF 40%.  Grade 2 diastolic dysfunction.  Normal right ventricle size and systolic function.  Moderate left atrial cavity enlargement normal right atrial size.  Mobile interatrial septum bulging towards the right atrium suggesting increased left-sided  pressure  Moderately thickened and calcified aortic valve with reduced cuspal suppression.  Moderate aortic valve stenosis.  Aortic valve area 1.14 cm.  DVI 0.39, no aortic valve regurgitation.  Mitral valve regurgitation, mild,  Moderate tricuspid valve regurgitation.  Mild pulmonary hypertension.  Estimated RVSP/space-based 40 mmHg  Normal-sized interventricular with blunted respirophasic response.  No pericardial effusion.    ECG 3/30/2024 shows sinus rhythm with premature atrial complexes.  Nonspecific ST-T wave abnormalities.    Major cardiac risk factors: Hypertension, dyslipidemia (Tobacco use, Hypertension, Family history of CHD, Primary severe hypercholesterolemia, DM, multiple major and risk enhancing factors)     Any cardiac clinical risk enhancers from available data: Unspecified cardiomyopathy (Family history of premature CAD, patient's history of chronic kidney disease, primary hypercholesterolemia, metabolic syndrome, abnormal MAGDA, inflammatory condition such as RA-HIV-psoriasis, RA-HIV-Psoriasis,, pregnancy related complications such as preeclampsia or premature delivery, early menopause, high risk ethnicity such as [place] , social deprivation, physical inactivity, nonalcoholic fatty liver disease psychosocial stress, major psychiatric illness, atrial fibrillation, left ventricular hypertrophy, obstructive sleep apnea, nonalcoholic fatty liver disease).    *-*-*-*-*-*-*-*-*-*-*-*-*-*-*-*-*-*-*-*-*-*-*-*-*-*-*-*-*-*-*-*-*-*-*-*-*-*-*-*-*-*-*-*-*-*-*-*-*-*-*-*-*-*  PAST MEDICAL HISTORY:  Past Medical History:   Diagnosis Date    Acute respiratory failure with hypoxia (HCC) 03/24/2024    Hypertension     Hypothyroid 05/08/2016    Hypovitaminosis D 05/10/2016    Iron deficiency anemia 05/11/2016    Type 2 diabetes mellitus without complication (HCC) 05/08/2016    PAST SURGICAL HISTORY:   No past surgical history on file.      FAMILY HISTORY:  Family History   Family history unknown: Yes    SOCIAL  HISTORY:  Social History     Tobacco Use   Smoking Status Every Day    Types: Cigarettes   Smokeless Tobacco Current      Social History     Substance and Sexual Activity   Alcohol Use No     Social History     Substance and Sexual Activity   Drug Use No    [unfilled]     *-*-*-*-*-*-*-*-*-*-*-*-*-*-*-*-*-*-*-*-*-*-*-*-*-*-*-*-*-*-*-*-*-*-*-*-*-*-*-*-*-*-*-*-*-*-*-*-*-*-*-*-*-*  ALLERGIES:  No Known Allergies CURRENT SCHEDULED MEDICATIONS:    Current Outpatient Medications:     Acetaminophen Extra Strength 500 MG TABS, , Disp: , Rfl:     Alcohol Swabs PADS, Use as directed for blood glucose testing, Disp: , Rfl:     Cholecalciferol 125 MCG (5000 UT) capsule, Take 5,000 Units by mouth daily, Disp: , Rfl:     ferrous sulfate 324 (65 Fe) mg, Take 1 tablet (324 mg total) by mouth 2 (two) times a day before meals, Disp: 60 tablet, Rfl: 0    furosemide (LASIX) 20 mg tablet, Take 2 tablets (40 mg total) by mouth daily, Disp: 60 tablet, Rfl: 0    haloperidol (HALDOL) 5 mg tablet, Take 1 tablet (5 mg total) by mouth daily at bedtime for 3 days, Disp: 3 tablet, Rfl: 0    Lancets 28G MISC, 2 (two) times a day, Disp: , Rfl:     levothyroxine 100 mcg tablet, Take 1.5 tablets (150 mcg total) by mouth daily in the early morning, Disp: , Rfl:     lisinopril (ZESTRIL) 5 mg tablet, Take 2 tablets (10 mg total) by mouth daily, Disp: 90 tablet, Rfl: 3    metFORMIN (GLUCOPHAGE) 1000 MG tablet, Take 1 tablet (1,000 mg total) by mouth 2 (two) times a day with meals Indications: Type 2 Diabetes., Disp: 60 tablet, Rfl: 0    metoprolol succinate (TOPROL-XL) 25 mg 24 hr tablet, Take 0.5 tablets (12.5 mg total) by mouth daily Do not start before April 3, 2024., Disp: 15 tablet, Rfl: 0    FeroSul 325 (65 Fe) MG tablet, , Disp: , Rfl:     ibuprofen (MOTRIN) 200 mg tablet, Take 200 mg by mouth every 6 (six) hours as needed (Patient not taking: Reported on 6/25/2024), Disp: , Rfl:     lidocaine (LIDODERM) 5 %, Place 1 patch on the skin every 12  (twelve) hours as needed (Patient not taking: Reported on 6/25/2024), Disp: , Rfl:     metFORMIN (GLUCOPHAGE-XR) 500 mg 24 hr tablet, , Disp: , Rfl:     QUEtiapine (SEROquel) 200 mg tablet, , Disp: , Rfl:     Valbenazine Tosylate 80 MG CAPS, Take 80 mg by mouth (Patient not taking: Reported on 5/31/2024), Disp: , Rfl:      *-*-*-*-*-*-*-*-*-*-*-*-*-*-*-*-*-*-*-*-*-*-*-*-*-*-*-*-*-*-*-*-*-*-*-*-*-*-*-*-*-*-*-*-*-*-*-*-*-*-*-*-*-*  REVIEW OF SYMPTOMS:    Positive for: As noted above in HPI  Negative for: All remaining as reviewed below and in HPI. SYSTEM SYMPTOMS REVIEWED:  General--weight change, fever, night sweats  Respiratoryl-- Wheezing, shortness of breath, cough, URI symptoms, sputum, blood  Cardiovascular--chest pain, syncope, dyspnea on exertion, edema, decline in exercise tolerance, claudication   Gastrointestinal--persistent vomiting, diarrhea, abdominal distention, blood in stool   Muscular or skeletal--joint pain or swelling   Neurologic--headaches, syncope, abnormal movement  Hematologic--history of easy bruising and bleeding   Endocrine--thyroid enlargement, heat or cold intolerance, polyuria   Psychiatric--anxiety, depression      *-*-*-*-*-*-*-*-*-*-*-*-*-*-*-*-*-*-*-*-*-*-*-*-*-*-*-*-*-*-*-*-*-*-*-*-*-*-*-*-*-*-*-*-*-*-*-*-*-*-*-*-*-*  CURRENT OUTPATIENT MEDICATIONS:     Current Outpatient Medications:     Acetaminophen Extra Strength 500 MG TABS, , Disp: , Rfl:     Alcohol Swabs PADS, Use as directed for blood glucose testing, Disp: , Rfl:     Cholecalciferol 125 MCG (5000 UT) capsule, Take 5,000 Units by mouth daily, Disp: , Rfl:     ferrous sulfate 324 (65 Fe) mg, Take 1 tablet (324 mg total) by mouth 2 (two) times a day before meals, Disp: 60 tablet, Rfl: 0    furosemide (LASIX) 20 mg tablet, Take 2 tablets (40 mg total) by mouth daily, Disp: 60 tablet, Rfl: 0    haloperidol (HALDOL) 5 mg tablet, Take 1 tablet (5 mg total) by mouth daily at bedtime for 3 days, Disp: 3 tablet, Rfl: 0    Lancets  "28G MISC, 2 (two) times a day, Disp: , Rfl:     levothyroxine 100 mcg tablet, Take 1.5 tablets (150 mcg total) by mouth daily in the early morning, Disp: , Rfl:     lisinopril (ZESTRIL) 5 mg tablet, Take 2 tablets (10 mg total) by mouth daily, Disp: 90 tablet, Rfl: 3    metFORMIN (GLUCOPHAGE) 1000 MG tablet, Take 1 tablet (1,000 mg total) by mouth 2 (two) times a day with meals Indications: Type 2 Diabetes., Disp: 60 tablet, Rfl: 0    metoprolol succinate (TOPROL-XL) 25 mg 24 hr tablet, Take 0.5 tablets (12.5 mg total) by mouth daily Do not start before April 3, 2024., Disp: 15 tablet, Rfl: 0    FeroSul 325 (65 Fe) MG tablet, , Disp: , Rfl:     ibuprofen (MOTRIN) 200 mg tablet, Take 200 mg by mouth every 6 (six) hours as needed (Patient not taking: Reported on 6/25/2024), Disp: , Rfl:     lidocaine (LIDODERM) 5 %, Place 1 patch on the skin every 12 (twelve) hours as needed (Patient not taking: Reported on 6/25/2024), Disp: , Rfl:     metFORMIN (GLUCOPHAGE-XR) 500 mg 24 hr tablet, , Disp: , Rfl:     QUEtiapine (SEROquel) 200 mg tablet, , Disp: , Rfl:     Valbenazine Tosylate 80 MG CAPS, Take 80 mg by mouth (Patient not taking: Reported on 5/31/2024), Disp: , Rfl:     *-*-*-*-*-*-*-*-*-*-*-*-*-*-*-*-*-*-*-*-*-*-*-*-*-*-*-*-*-*-*-*-*-*-*-*-*-*-*-*-*-*-*-*-*-*-*-*-*-*-*-*-*-*  VITAL SIGNS:  Vitals:    06/25/24 1303   BP: (!) 80/50   BP Location: Left arm   Patient Position: Sitting   Cuff Size: Adult   Pulse: 90   Weight: 55.5 kg (122 lb 6.4 oz)   Height: 5' 2\" (1.575 m)       BMI: Body mass index is 22.39 kg/m².    WEIGHTS:   Wt Readings from Last 25 Encounters:   06/25/24 55.5 kg (122 lb 6.4 oz)   05/31/24 56 kg (123 lb 6.4 oz)   05/03/24 56.7 kg (125 lb)   04/04/24 59.9 kg (132 lb)   04/02/24 59.7 kg (131 lb 9.8 oz)   03/26/24 59 kg (130 lb)   12/21/23 59 kg (130 lb)   12/03/19 59 kg (130 lb)   01/05/18 56.7 kg (125 lb)   05/04/16 59.1 kg (130 lb 4.7 oz)   04/30/16 59 kg (130 lb)    "     *-*-*-*-*-*-*-*-*-*-*-*-*-*-*-*-*-*-*-*-*-*-*-*-*-*-*-*-*-*-*-*-*-*-*-*-*-*-*-*-*-*-*-*-*-*-*-*-*-*-*-*-*-*-  PHYSICAL EXAM:  General Appearance:    Alert, cooperative, incoherent, appears older than stated age, in no respiratory distress   Head, Eyes, ENT:  Protein calorie malnutrition, moist mucous mebranes.   Neck:   Supple, no carotid bruit. No JVD, no obvious lymphadenoapthy   Back:     Symmetric, mild kyphosis   Lungs:     Respirations unlabored.  Decreased breath sounds at bases without crackles,    Chest wall:    No tenderness or deformity   Heart:    Regular rate and rhythm, normal intensity heart sounds, no murmur, rub  or gallop.   Abdomen:     Soft, non-tender.   Extremities:   Extremities warm, no cyanosis or edema    Skin:   No venostatic changes in lower extremities.   Normal skin color, texture, and turgor. No rashes or lesions   Neuro: Pt is alert and oriented time 3 with no gross motor deficits.   Psychiatric/Behavioral: Mood is normal. Behavior is normal.   *-*-*-*-*-*-*-*-*-*-*-*-*-*-*-*-*-*-*-*-*-*-*-*-*-*-*-*-*-*-*-*-*-*-*-*-*-*-*-*-*-*-*-*-*-*-*-*-*-*-*-*-*-*-  LABORATORY DATA: I have personally reviewed the available laboratory data.        Potassium   Date Value Ref Range Status   04/02/2024 4.6 3.5 - 5.3 mmol/L Final   04/01/2024 3.7 3.5 - 5.3 mmol/L Final   03/31/2024 3.8 3.5 - 5.3 mmol/L Final   03/05/2024 4.8 3.5 - 5.2 mmol/L Final   08/10/2023 4.7 3.5 - 5.2 mmol/L Final   09/26/2022 5.2 3.5 - 5.2 mmol/L Final     Chloride   Date Value Ref Range Status   04/02/2024 101 96 - 108 mmol/L Final   04/01/2024 99 96 - 108 mmol/L Final   03/31/2024 101 96 - 108 mmol/L Final   03/05/2024 98 (L) 100 - 109 mmol/L Final   08/10/2023 105 100 - 109 mmol/L Final   09/26/2022 98 (L) 100 - 109 mmol/L Final     Carbon Dioxide   Date Value Ref Range Status   03/05/2024 27 21 - 31 mmol/L Final   08/10/2023 27 21 - 31 mmol/L Final   09/26/2022 22 (L) 23 - 31 mmol/L Final     CO2   Date Value Ref Range  Status   04/02/2024 25 21 - 32 mmol/L Final   04/01/2024 29 21 - 32 mmol/L Final   03/31/2024 27 21 - 32 mmol/L Final     Anion Gap   Date Value Ref Range Status   04/12/2014 8 4 - 13 mmol/L Final   04/11/2014 7 4 - 13 mmol/L Final     BUN   Date Value Ref Range Status   04/02/2024 14 5 - 25 mg/dL Final   04/01/2024 11 5 - 25 mg/dL Final   03/31/2024 6 5 - 25 mg/dL Final   03/05/2024 12 7 - 25 mg/dL Final   08/10/2023 17 7 - 25 mg/dL Final   09/26/2022 13 7 - 25 mg/dL Final     Creatinine   Date Value Ref Range Status   04/02/2024 0.65 0.60 - 1.30 mg/dL Final     Comment:     Standardized to IDMS reference method   04/01/2024 0.68 0.60 - 1.30 mg/dL Final     Comment:     Standardized to IDMS reference method   03/31/2024 0.60 0.60 - 1.30 mg/dL Final     Comment:     Standardized to IDMS reference method   03/05/2024 0.68 0.40 - 1.10 mg/dL Final   08/10/2023 0.79 0.40 - 1.10 mg/dL Final   02/08/2023 0.79 0.40 - 1.10 mg/dL Final     GFR, Calculated   Date Value Ref Range Status   04/24/2018 78 >60 mL/min/1.73m2 Final     Comment:     mL/min per 1.73 square meters                                            Normal Function or Mild Renal    Disease (if clinically at risk):  >or=60  Moderately Decreased:                30-59  Severely Decreased:                  15-29  Renal Failure:                         <15                                            -American GFR: multiply reported GFR by 1.16    Please note that the eGFR is based on the CKD-EPI calculation, and is not intended to be used for drug dosing.                                            Note: Calculated GFR may not be an accurate indicator of renal function if the patient's renal function is not in a steady state.     eGFRcr   Date Value Ref Range Status   03/05/2024 93 >59 Final   08/10/2023 80 >59 Final   02/08/2023 80 >59 Final     eGFR   Date Value Ref Range Status   04/02/2024 89 ml/min/1.73sq m Final   04/01/2024 88 ml/min/1.73sq m Final    03/31/2024 91 ml/min/1.73sq m Final     Glucose   Date Value Ref Range Status   04/12/2014 124 65 - 140 mg/dL Final     Comment:     If patient is fasting, the ADA then defines impaired fasting glucose as  >100 mg/dl and diabetes as  >or equal to 126 mg/dl.     04/11/2014 155 (H) 65 - 140 mg/dL Final     Comment:     If patient is fasting, the ADA then defines impaired fasting glucose as  >100 mg/dl and diabetes as  >or equal to 126 mg/dl.       Calcium   Date Value Ref Range Status   04/02/2024 8.5 8.4 - 10.2 mg/dL Final   04/01/2024 8.3 (L) 8.4 - 10.2 mg/dL Final   03/31/2024 8.6 8.4 - 10.2 mg/dL Final   03/05/2024 9.2 8.5 - 10.1 mg/dL Final   08/10/2023 9.3 8.5 - 10.1 mg/dL Final   09/26/2022 8.4 (L) 8.5 - 10.1 mg/dL Final     AST   Date Value Ref Range Status   03/30/2024 29 13 - 39 U/L Final   03/05/2024 19 <41 U/L Final   08/10/2023 44 (H) <41 U/L Final   09/26/2022 20 <41 U/L Final     ALT   Date Value Ref Range Status   03/30/2024 28 7 - 52 U/L Final     Comment:     Specimen collection should occur prior to Sulfasalazine administration due to the potential for falsely depressed results.    03/05/2024 17 <56 U/L Final   08/10/2023 45 <56 U/L Final   09/26/2022 25 <56 U/L Final     Alkaline Phosphatase   Date Value Ref Range Status   03/30/2024 88 34 - 104 U/L Final   03/05/2024 98 35 - 120 U/L Final   08/10/2023 73 35 - 120 U/L Final   09/26/2022 79 35 - 120 U/L Final     Total Protein   Date Value Ref Range Status   04/12/2014 6.3 (L) 6.4 - 8.2 g/dL Final   04/11/2014 6.0 (L) 6.4 - 8.2 g/dL Final     Total Bilirubin   Date Value Ref Range Status   04/12/2014 0.22 0.20 - 1.00 mg/dL Final   04/11/2014 0.11 (L) 0.20 - 1.00 mg/dL Final     Magnesium   Date Value Ref Range Status   12/22/2020 1.8 1.6 - 2.6 mg/dL Final     WBC   Date Value Ref Range Status   05/17/2024 12.60 (H) 4.31 - 10.16 Thousand/uL Final   04/01/2024 12.69 (H) 4.31 - 10.16 Thousand/uL Final   03/31/2024 12.03 (H) 4.31 - 10.16 Thousand/uL  Final   04/15/2014 10.45 (H) 4.31 - 10.16 Thousand/uL Final   04/12/2014 10.62 (H) 4.31 - 10.16 Thousand/uL Final   04/11/2014 15.10 (H) 4.31 - 10.16 Thousand/uL Final     Hemoglobin   Date Value Ref Range Status   05/17/2024 12.4 11.5 - 15.4 g/dL Final   04/01/2024 9.0 (L) 11.5 - 15.4 g/dL Final   03/31/2024 8.8 (L) 11.5 - 15.4 g/dL Final   04/15/2014 12.8 11.5 - 15.4 g/dL Final     Comment:     New Reference Range   04/12/2014 12.6 11.5 - 15.4 g/dL Final     Comment:     New Reference Range   04/11/2014 11.7 11.5 - 15.4 g/dL Final     Comment:     New Reference Range     Platelets   Date Value Ref Range Status   05/17/2024 221 149 - 390 Thousands/uL Final   04/01/2024 289 149 - 390 Thousands/uL Final   03/31/2024 290 149 - 390 Thousands/uL Final   04/15/2014 175 149 - 390 Thousand/uL Final     Comment:     New Reference Range   04/12/2014 188 149 - 390 Thousand/uL Final     Comment:     New Reference Range   04/11/2014 196 149 - 390 Thousand/uL Final     Comment:     New Reference Range     Prothrombin Time   Date Value Ref Range Status   11/25/2021 11.9 (L) 12.0 - 14.6 sec Final     Comment:     Interpretation  Suggested INR ranges for various  clinical conditions:                                           INR  Ambulatory Surgery          <1.5  Coumadinized Patients             (DVT,PE,MI or A.Fib)     2.0-3.0  Mechanical Heart Valve   2.5-3.5  Cardiogenic Embolus      2.5-3.5     INR   Date Value Ref Range Status   11/25/2021 0.9  Final     CK-MB   Date Value Ref Range Status   01/05/2018 6.0 (H) 0.0 - 5.0 ng/mL Final     TSH   Date Value Ref Range Status   03/05/2024 3.35 0.45 - 5.33 uIU/mL Final   08/10/2023 0.08 (L) 0.45 - 5.33 uIU/mL Final     Cholesterol   Date Value Ref Range Status   04/12/2014 138 mg/dL Final     Comment:     CHOLESTEROL:       Desirable           <200 mg/dl       Borderline High     200-239 mg/dl       High                   >239 mg/dl  ____________________________________       HDL  "  Date Value Ref Range Status   04/12/2014 49 mg/dL Final     Comment:     HDL:       High       >59 mg/dl       Low        <41 mg/dl  ______________________________       HDL, Direct   Date Value Ref Range Status   05/01/2016 49 40 - 60 mg/dL Final     Comment:     Specimen collection should occur prior to Metamizole administration due to the potential for falsely depressed results.     Triglycerides   Date Value Ref Range Status   05/01/2016 204 (H) <=150 mg/dL Final     Comment:     Specimen collection should occur prior to N-Acetylcysteine or Metamizole administration due to the potential for falsely depressed results.   04/12/2014 84 mg/dL Final     Comment:     TRIGLYCERIDE:       Normal                 <150 mg/dl       Borderline High       150-199 mg/dl       High                  200-499 mg/dl       Very High             >499 mg/dl  _______________________________________        Hemoglobin A1C   Date Value Ref Range Status   03/05/2024 9.2 (H) <5.7 % Final     Comment:     Reference Range  Non-diabetic                     <5.7  Pre-diabetic                     5.7-6.4  Diabetic                         >=6.5  ADA target for diabetic control  <=7     No results found for: \"BLOODCX\", \"SPUTUMCULTUR\", \"GRAMSTAIN\", \"URINECX\", \"WOUNDCULT\", \"BODYFLUIDCUL\", \"MRSACULTURE\", \"INFLUAPCR\", \"INFLUBPCR\", \"RSVPCR\", \"LEGIONELLAUR\", \"CDIFFTOXINB\"    *-*-*-*-*-*-*-*-*-*-*-*-*-*-*-*-*-*-*-*-*-*-*-*-*-*-*-*-*-*-*-*-*-*-*-*-*-*-*-*-*-*-*-*-*-*-*-*-*-*-*-*-*-*-  RADIOLOGY RESULTS:  XR chest portable    Result Date: 3/31/2024  Impression: Persistent moderate pulmonary edema. Small effusions with bibasilar atelectasis. Workstation performed: [id number]       *-*-*-*-*-*-*-*-*-*-*-*-*-*-*-*-*-*-*-*-*-*-*-*-*-*-*-*-*-*-*-*-*-*-*-*-*-*-*-*-*-*-*-*-*-*-*-*-*-*-*-*-*-*-  LAST ECHOCARDIOGRAM AND OTHER CARDIOLOGY RESULTS:  No results found for this or any previous visit.    No results found for this or any previous visit.    No results " found for this or any previous visit.    No results found for this or any previous visit.       *-*-*-*-*-*-*-*-*-*-*-*-*-*-*-*-*-*-*-*-*-*-*-*-*-*-*-*-*-*-*-*-*-*-*-*-*-*-*-*-*-*-*-*-*-*-*-*-*-*-*-*-*-*-  SIGNATURES:   @SIG@   Bib San MD     CC:   Natalia Elizondo MD   No ref. provider found

## 2024-06-25 NOTE — PATIENT INSTRUCTIONS
CARDIOLOGY ASSESSMENT & PLAN:   Diagnosis ICD-10-CM Associated Orders   1. Chronic HFrEF (heart failure with reduced ejection fraction) ([facility])  I50.22       2. Cardiomyopathy, unspecified type ([facility])  I42.9       3. Uncontrolled type 2 diabetes mellitus with hypoglycemia without coma ([facility])  E11.649       4. Left ventricular diastolic dysfunction  I51.9       5. Primary hypertension  I10       6. Acute diastolic CHF (congestive heart failure) ([facility])  I50.31 lisinopril (ZESTRIL) 5 mg tablet      7. Stage 3a chronic kidney disease ([facility])  N18.31         1. Chronic HFrEF (heart failure with reduced ejection fraction) ([facility])  Assessment & Plan:  Wt Readings from Last 3 Encounters:   06/25/24 55.5 kg (122 lb 6.4 oz)   05/31/24 56 kg (123 lb 6.4 oz)   05/03/24 56.7 kg (125 lb)     Ms. Joslyn Cantu appears to be overall stable from cardiac perspective with no symptoms that suggest decompensated heart failure.  Her blood pressure is noted to be severely low but is asymptomatic.  Lability of blood pressure measurement is not clear because patient had significant tremor and this may have contributed to the blood pressure reading.  Her extremities are warm and do not reflect significant hypotension.  She is on good medical regimen.  There is no evidence of pulmonary or peripheral vascular congestion on exam.  There is no evidence of significant valvular heart disease.  Her echocardiogram during the hospitalization in March demonstrated global hypokinesis without regional variability.  I suspect she has nonischemic cardiomyopathy.  She is on significant antipsychotic medications.  Her thyroid function is normal.  Her lipids were reasonably well-controlled.  She has hyponatremia.    -- At this time I am advising to reduce the dose of lisinopril to 5 mg daily while continuing all other medications.  Recommend that lisinopril and metoprolol succinate should be given at different times at least a couple of hours apart to avoid symptomatic  correlation.  -- Given her comorbidities and given definite symptoms of ischemic heart disease I would not recommend ischemic evaluation at this time.  -- Recommend continued close monitoring for signs of heart failure and to avoid added salt in diet.  Some tips are put provided below to prevent  heart failure exacerbations.  -- We will plan to see her back in office in 6 months time but the staff can call us anytime if there are any concerns regarding her status.  -- She should have a follow-up basic metabolic panel checked within the next 3 to 6 months with follow-up with primary care physician after the blood work.    The following routine measures are being advised to prevent exacerbation of congestive heart failure:     - Daily or atleast weekly checking of weight.    Notify your clinicians if greater than 2 lb is gained in 1 day or greater than 5 lb is gained in 1 wk.    - Checking for lower extremity swelling.    Examine legs for swelling (new or increase in existing swelling) and describe if swelling reaches ankle, shin, or knee.    - Monitoring for decrease in exercise tolerance.    Check your self for unusual shortness of breath at rest, or with mild exertion, moderate exertion, or none at all.    - Monitoring for worsening shortness of breath on lying down.    Check for increase in number of pillows used at night and for increase in waking at night with shortness of breath.    - Salt/sodium restriction to less than 2 g a day.    Calculate total sodium intake in a day from nutrition labels and food charts. Understand hidden sources of salt intake.  Eliminate the salt shaker. (Remember, One teaspoon of table salt = 2,300 mg of sodium)   Avoid using garlic salt, onion salt, MSG, meat tenderizers, broth mixes, Chinese food, soy sauce, teriyaki sauce, barbeque sauce, sauerkraut, olives, pickles, pickle relish, carter bits, and croutons.   Use fresh ingredients and/or foods with no added salt.  Try orange,  lemon, lime, pineapple juice, or vinegar as a base for meat marinades or to add tart flavor.  Avoid convenience foods such as canned soups, entrees, vegetables, pasta and rice mixes, frozen dinners, instant cereal and puddings, and gravy sauce mixes.   Select frozen meals that contain around 600 mg sodium or less.  Use fresh, frozen, no-added-salt canned vegetables, low-sodium soups, and low-sodium lunchmeats.  Look for seasoning or spice blends with no salt, or try fresh herbs, onions, or garlic.    You are advised to inform us for any concerns regarding above measures.              2. Cardiomyopathy, unspecified type ([facility])  3. Uncontrolled type 2 diabetes mellitus with hypoglycemia without coma ([facility])  4. Left ventricular diastolic dysfunction  5. Primary hypertension  6. Acute diastolic CHF (congestive heart failure) ([facility])  -     lisinopril (ZESTRIL) 5 mg tablet; Take 2 tablets (10 mg total) by mouth daily  7. Stage 3a chronic kidney disease ([facility])

## 2024-06-25 NOTE — ASSESSMENT & PLAN NOTE
Wt Readings from Last 3 Encounters:   06/25/24 55.5 kg (122 lb 6.4 oz)   05/31/24 56 kg (123 lb 6.4 oz)   05/03/24 56.7 kg (125 lb)     Ms. Joslyn Cantu appears to be overall stable from cardiac perspective with no symptoms that suggest decompensated heart failure.  Her blood pressure is noted to be severely low but is asymptomatic.  Lability of blood pressure measurement is not clear because patient had significant tremor and this may have contributed to the blood pressure reading.  Her extremities are warm and do not reflect significant hypotension.  She is on good medical regimen.  There is no evidence of pulmonary or peripheral vascular congestion on exam.  There is no evidence of significant valvular heart disease.  Her echocardiogram during the hospitalization in March demonstrated global hypokinesis without regional variability.  I suspect she has nonischemic cardiomyopathy.  She is on significant antipsychotic medications.  Her thyroid function is normal.  Her lipids were reasonably well-controlled.  She has hyponatremia.    -- At this time I am advising to reduce the dose of lisinopril to 5 mg daily while continuing all other medications.  Recommend that lisinopril and metoprolol succinate should be given at different times at least a couple of hours apart to avoid symptomatic correlation.  -- Given her comorbidities and given definite symptoms of ischemic heart disease I would not recommend ischemic evaluation at this time.  -- Recommend continued close monitoring for signs of heart failure and to avoid added salt in diet.  Some tips are put provided below to prevent  heart failure exacerbations.  -- We will plan to see her back in office in 6 months time but the staff can call us anytime if there are any concerns regarding her status.  -- She should have a follow-up basic metabolic panel checked within the next 3 to 6 months with follow-up with primary care physician after the blood work.    The following  routine measures are being advised to prevent exacerbation of congestive heart failure:     - Daily or atleast weekly checking of weight.    Notify your clinicians if greater than 2 lb is gained in 1 day or greater than 5 lb is gained in 1 wk.    - Checking for lower extremity swelling.    Examine legs for swelling (new or increase in existing swelling) and describe if swelling reaches ankle, shin, or knee.    - Monitoring for decrease in exercise tolerance.    Check your self for unusual shortness of breath at rest, or with mild exertion, moderate exertion, or none at all.    - Monitoring for worsening shortness of breath on lying down.    Check for increase in number of pillows used at night and for increase in waking at night with shortness of breath.    - Salt/sodium restriction to less than 2 g a day.    Calculate total sodium intake in a day from nutrition labels and food charts. Understand hidden sources of salt intake.  Eliminate the salt shaker. (Remember, One teaspoon of table salt = 2,300 mg of sodium)   Avoid using garlic salt, onion salt, MSG, meat tenderizers, broth mixes, Chinese food, soy sauce, teriyaki sauce, barbeque sauce, sauerkraut, olives, pickles, pickle relish, carter bits, and croutons.   Use fresh ingredients and/or foods with no added salt.  Try orange, lemon, lime, pineapple juice, or vinegar as a base for meat marinades or to add tart flavor.  Avoid convenience foods such as canned soups, entrees, vegetables, pasta and rice mixes, frozen dinners, instant cereal and puddings, and gravy sauce mixes.   Select frozen meals that contain around 600 mg sodium or less.  Use fresh, frozen, no-added-salt canned vegetables, low-sodium soups, and low-sodium lunchmeats.  Look for seasoning or spice blends with no salt, or try fresh herbs, onions, or garlic.    You are advised to inform us for any concerns regarding above measures.

## 2024-06-26 ENCOUNTER — TELEPHONE (OUTPATIENT)
Age: 74
End: 2024-06-26

## 2024-06-26 DIAGNOSIS — I10 PRIMARY HYPERTENSION: Primary | ICD-10-CM

## 2024-06-26 DIAGNOSIS — I50.31 ACUTE DIASTOLIC CHF (CONGESTIVE HEART FAILURE) (HCC): ICD-10-CM

## 2024-06-26 RX ORDER — LISINOPRIL 10 MG/1
10 TABLET ORAL DAILY
Qty: 90 TABLET | Refills: 1 | Status: SHIPPED | OUTPATIENT
Start: 2024-06-26

## 2024-06-26 NOTE — TELEPHONE ENCOUNTER
Insurance is rejecting Lisinopril 5 mg at the dose of 2 tablets per day.  They will pay for one tablet, please resend as a 10 mg tablet for once a day dosing

## 2024-07-18 ENCOUNTER — TELEPHONE (OUTPATIENT)
Dept: HEMATOLOGY ONCOLOGY | Facility: CLINIC | Age: 74
End: 2024-07-18

## 2024-08-22 ENCOUNTER — TELEPHONE (OUTPATIENT)
Age: 74
End: 2024-08-22

## 2024-08-22 NOTE — TELEPHONE ENCOUNTER
Phone call to Joslyn. Spoke to Manuel at [facility] regarding Joslyn having labs completed for upcoming appointment 8/27/24. Manuel requested lab orders to be faxed to [phone].    Lab script orders faxed to [phone]. Received Faxed confirmation.

## 2024-09-26 ENCOUNTER — APPOINTMENT (OUTPATIENT)
Dept: LAB | Facility: CLINIC | Age: 74
End: 2024-09-26
Payer: MEDICARE

## 2024-09-26 ENCOUNTER — TELEPHONE (OUTPATIENT)
Dept: HEMATOLOGY ONCOLOGY | Facility: CLINIC | Age: 74
End: 2024-09-26

## 2024-09-26 DIAGNOSIS — I51.9 MYXEDEMA HEART DISEASE: ICD-10-CM

## 2024-09-26 DIAGNOSIS — E11.9 TYPE 2 DIABETES MELLITUS WITHOUT COMPLICATION, WITHOUT LONG-TERM CURRENT USE OF INSULIN (HCC): ICD-10-CM

## 2024-09-26 DIAGNOSIS — F25.0 SCHIZOAFFECTIVE DISORDER, BIPOLAR TYPE (HCC): ICD-10-CM

## 2024-09-26 DIAGNOSIS — Z79.899 ENCOUNTER FOR LONG-TERM (CURRENT) USE OF OTHER MEDICATIONS: ICD-10-CM

## 2024-09-26 DIAGNOSIS — D50.9 IRON DEFICIENCY ANEMIA, UNSPECIFIED IRON DEFICIENCY ANEMIA TYPE: ICD-10-CM

## 2024-09-26 DIAGNOSIS — I50.22 CHRONIC HFREF (HEART FAILURE WITH REDUCED EJECTION FRACTION) (HCC): Chronic | ICD-10-CM

## 2024-09-26 DIAGNOSIS — I50.22 CHRONIC SYSTOLIC HEART FAILURE (HCC): ICD-10-CM

## 2024-09-26 DIAGNOSIS — D72.829 LEUKOCYTOSIS, UNSPECIFIED TYPE: ICD-10-CM

## 2024-09-26 DIAGNOSIS — I10 ESSENTIAL HYPERTENSION, MALIGNANT: ICD-10-CM

## 2024-09-26 DIAGNOSIS — E03.9 MYXEDEMA HEART DISEASE: ICD-10-CM

## 2024-09-26 LAB
ALBUMIN SERPL BCG-MCNC: 4.3 G/DL (ref 3.5–5)
ALP SERPL-CCNC: 81 U/L (ref 34–104)
ALT SERPL W P-5'-P-CCNC: 12 U/L (ref 7–52)
ANION GAP SERPL CALCULATED.3IONS-SCNC: 10 MMOL/L (ref 4–13)
AST SERPL W P-5'-P-CCNC: 16 U/L (ref 13–39)
BASOPHILS # BLD AUTO: 0.06 THOUSANDS/ΜL (ref 0–0.1)
BASOPHILS NFR BLD AUTO: 1 % (ref 0–1)
BILIRUB SERPL-MCNC: 0.28 MG/DL (ref 0.2–1)
BNP SERPL-MCNC: 44 PG/ML (ref 0–100)
BUN SERPL-MCNC: 13 MG/DL (ref 5–25)
CALCIUM SERPL-MCNC: 9.5 MG/DL (ref 8.4–10.2)
CHLORIDE SERPL-SCNC: 99 MMOL/L (ref 96–108)
CHOLEST SERPL-MCNC: 165 MG/DL
CO2 SERPL-SCNC: 26 MMOL/L (ref 21–32)
CREAT SERPL-MCNC: 0.57 MG/DL (ref 0.6–1.3)
EOSINOPHIL # BLD AUTO: 0.25 THOUSAND/ΜL (ref 0–0.61)
EOSINOPHIL NFR BLD AUTO: 2 % (ref 0–6)
ERYTHROCYTE [DISTWIDTH] IN BLOOD BY AUTOMATED COUNT: 12.5 % (ref 11.6–15.1)
FERRITIN SERPL-MCNC: 27 NG/ML (ref 11–307)
GFR SERPL CREATININE-BSD FRML MDRD: 92 ML/MIN/1.73SQ M
GLUCOSE P FAST SERPL-MCNC: 146 MG/DL (ref 65–99)
HCT VFR BLD AUTO: 42.4 % (ref 34.8–46.1)
HDLC SERPL-MCNC: 54 MG/DL
HGB BLD-MCNC: 14.1 G/DL (ref 11.5–15.4)
IMM GRANULOCYTES # BLD AUTO: 0.06 THOUSAND/UL (ref 0–0.2)
IMM GRANULOCYTES NFR BLD AUTO: 1 % (ref 0–2)
IRON SATN MFR SERPL: 42 % (ref 15–50)
IRON SERPL-MCNC: 105 UG/DL (ref 50–212)
LDLC SERPL CALC-MCNC: 71 MG/DL (ref 0–100)
LYMPHOCYTES # BLD AUTO: 2.42 THOUSANDS/ΜL (ref 0.6–4.47)
LYMPHOCYTES NFR BLD AUTO: 20 % (ref 14–44)
MCH RBC QN AUTO: 31.8 PG (ref 26.8–34.3)
MCHC RBC AUTO-ENTMCNC: 33.3 G/DL (ref 31.4–37.4)
MCV RBC AUTO: 96 FL (ref 82–98)
MONOCYTES # BLD AUTO: 1.2 THOUSAND/ΜL (ref 0.17–1.22)
MONOCYTES NFR BLD AUTO: 10 % (ref 4–12)
NEUTROPHILS # BLD AUTO: 8.08 THOUSANDS/ΜL (ref 1.85–7.62)
NEUTS SEG NFR BLD AUTO: 66 % (ref 43–75)
NONHDLC SERPL-MCNC: 111 MG/DL
NRBC BLD AUTO-RTO: 0 /100 WBCS
PLATELET # BLD AUTO: 243 THOUSANDS/UL (ref 149–390)
PMV BLD AUTO: 10.5 FL (ref 8.9–12.7)
POTASSIUM SERPL-SCNC: 4.5 MMOL/L (ref 3.5–5.3)
PROT SERPL-MCNC: 7.1 G/DL (ref 6.4–8.4)
RBC # BLD AUTO: 4.44 MILLION/UL (ref 3.81–5.12)
SODIUM SERPL-SCNC: 135 MMOL/L (ref 135–147)
T4 FREE SERPL-MCNC: 1.36 NG/DL (ref 0.61–1.12)
TIBC SERPL-MCNC: 253 UG/DL (ref 250–450)
TRIGL SERPL-MCNC: 198 MG/DL
TSH SERPL DL<=0.05 MIU/L-ACNC: <0.01 UIU/ML (ref 0.45–4.5)
UIBC SERPL-MCNC: 148 UG/DL (ref 155–355)
WBC # BLD AUTO: 12.07 THOUSAND/UL (ref 4.31–10.16)

## 2024-09-26 PROCEDURE — 85025 COMPLETE CBC W/AUTO DIFF WBC: CPT

## 2024-09-26 PROCEDURE — 36415 COLL VENOUS BLD VENIPUNCTURE: CPT

## 2024-09-26 PROCEDURE — 80061 LIPID PANEL: CPT

## 2024-09-26 PROCEDURE — 88374 M/PHMTRC ALYS ISHQUANT/SEMIQ: CPT

## 2024-09-26 PROCEDURE — 82728 ASSAY OF FERRITIN: CPT

## 2024-09-26 PROCEDURE — 84439 ASSAY OF FREE THYROXINE: CPT

## 2024-09-26 PROCEDURE — 84443 ASSAY THYROID STIM HORMONE: CPT

## 2024-09-26 PROCEDURE — 83540 ASSAY OF IRON: CPT

## 2024-09-26 PROCEDURE — 83880 ASSAY OF NATRIURETIC PEPTIDE: CPT

## 2024-09-26 PROCEDURE — 83550 IRON BINDING TEST: CPT

## 2024-09-26 PROCEDURE — 80053 COMPREHEN METABOLIC PANEL: CPT

## 2024-09-26 NOTE — TELEPHONE ENCOUNTER
Spoke with Atif from [facility]. Stated they were going to attempt to get patient to lab for blood work today.  Notified that patient needs lab work for appointment tomorrow. Group Home states they would call back if unable to and need to r/s appointment.

## 2024-10-02 ENCOUNTER — OFFICE VISIT (OUTPATIENT)
Dept: HEMATOLOGY ONCOLOGY | Facility: CLINIC | Age: 74
End: 2024-10-02
Payer: MEDICARE

## 2024-10-02 VITALS
WEIGHT: 120 LBS | OXYGEN SATURATION: 97 % | HEART RATE: 79 BPM | DIASTOLIC BLOOD PRESSURE: 68 MMHG | BODY MASS INDEX: 21.95 KG/M2 | SYSTOLIC BLOOD PRESSURE: 124 MMHG | TEMPERATURE: 98.3 F

## 2024-10-02 DIAGNOSIS — D72.829 LEUKOCYTOSIS, UNSPECIFIED TYPE: Primary | ICD-10-CM

## 2024-10-02 LAB — SCAN RESULT: NORMAL

## 2024-10-02 PROCEDURE — 99213 OFFICE O/P EST LOW 20 MIN: CPT

## 2024-10-02 RX ORDER — LEVOTHYROXINE SODIUM 150 UG/1
TABLET ORAL
COMMUNITY
Start: 2024-09-10

## 2024-10-02 NOTE — PROGRESS NOTES
HEMATOLOGY / ONCOLOGY CLINIC FOLLOW UP NOTE    Primary Care Provider: Natalia Elizondo MD  Referring Provider:    MRN: [id number]  : 1950    Reason for Encounter: Follow-up leukocytosis iron deficiency anemia         Interval History: Patient presents for follow-up of iron deficiency anemia and leukocytosis.  Has a PMH significant for HTN, CHF, hypothyroidism, DM 2, Diverio. Patient resides in a group home and she is here with the group home counselor,  Atif.  At last office visit we had determined her iron deficiency anemia is resolved.    Patient is very difficult to understand.  There were times during the office visit that her counselor was not able to understand her either.  When asked how patient is doing, she responded good.  Is not able to answer any other assessment questions.    From what I can gather, patient continues to smoke daily.    Labs:  2024: WBC 12.07, ANC 8.08, Hgb 14.1, MCV 96, platelets 243,000, ferritin 27, serum iron 105, iron saturation 42%    BCR/ABL - in process (not resulted)    2024: WBC 12.6, Hgb 12.4, MCV 87, platelets 221,000, ANC 8.77, absolute monocytes 1.25, CRP/sed rate WNL, MARI screen positive, titers unremarkable, no monoclonal bands noted on SPEP, free light chain ratio 1.39, IgG = 835, IgA = 188, IgM = 32, serum iron 133, iron saturation 36%, ferritin 24, , reticulocyte count 34,100, haptoglobin, 206, bilirubin direct 0.05, flow cytometry negative     2024: Hgb 9.0, MCV 75, WBC 12.6, platelets 289,000     3/26/2024: Hgb 8.6, MCV 72, WBC 10.6, platelets 302,000, serum iron 70, iron saturation 3%, ferritin 7, vitamin B12 538, within normal limits    REVIEW OF SYSTEMS:  Please note that a 14-point review of systems was performed to include Constitutional, HEENT, Respiratory, CVS, GI, , Musculoskeletal, Integumentary, Neurologic, Rheumatologic, Endocrinologic, Psychiatric, Lymphatic, and Hematologic/Oncologic systems were reviewed and are negative  unless otherwise stated in HPI. Positive and negative findings pertinent to this evaluation are incorporated into the history of present illness.      ECOG PS: 3    HPI:  Joslyn Cantu was seen for initial consultation 5/3/2024 regarding iron deficiency anemia.  Patient is patient has PMH significant for HTN, CHF, hypothyroidism, DM 2, schizophrenia.  She resides in a group home.  She is here with Hardy, member of the group.  He is not familiar with patient's case however, states she is independent and can answer for herself.     When reviewing patient's symptoms, I was unable to understand patient when she was speaking.  I looked to the member of her team for translation however, I am not confident that it was being translated this accurately.     For the little information I was able to gather, patient endorses fatigue.  She denies any dizziness, lightheadedness.  She denies any abnormal bleeding.     Iron deficiency anemia is noted March 2024..  She was started on oral iron supplements.  Patient has not had a colonoscopy/EGD.     Patient is up-to-date on her yearly mammogram    PROBLEM LIST:  Patient Active Problem List   Diagnosis    Schizophrenia (HCC)    Hypothyroid    Hypertension    Type 2 diabetes mellitus without complication (HCC)    Hypovitaminosis D    Iron deficiency anemia    Chronic HFrEF (heart failure with reduced ejection fraction) (HCC)    Uncontrolled type 2 diabetes mellitus with hypoglycemia without coma (HCC)    Leukocytosis    Cardiomyopathy, unspecified (HCC)    Left ventricular diastolic dysfunction    Stage 3a chronic kidney disease (HCC)       Assessment / Plan: 74-year-old female with leukocytosis I was able to make out some sentences during our office visit however, I was not able to understand most of what the patient was verbalizing as she is very difficult to understand. Did attempt to ask her yes or no questions.    Leukocytosis likely reactive from smoking cigarettes.  Informed  patient's counselor this.  Informed her that the BCR-ABL has not resulted.  Should it be negative, patient can see us on an as-needed basis.    If it comes back positive, we will schedule patient back with an oncologist.  Atif asked I call the group home a number to inform her of the results.    The group home is aware to contact us for any additional questions/concerns or worsening symptoms.     Addendum: Spoke with Atif at the group home and informed her patient's BCR-ABL came back negative.  Informed her that her leukocytosis is likely secondary to patient's smoking.  Patient can see us on an as-needed basis.      I spent 20 minutes on chart review, face to face counseling time, coordination of care and documentation.    Past Medical History:   has a past medical history of Acute respiratory failure with hypoxia (HCC) (03/24/2024), Hypertension, Hypothyroid (05/08/2016), Hypovitaminosis D (05/10/2016), Iron deficiency anemia (05/11/2016), and Type 2 diabetes mellitus without complication (HCC) (05/08/2016).    PAST SURGICAL HISTORY:   has no past surgical history on file.    CURRENT MEDICATIONS  Current Outpatient Medications   Medication Sig Dispense Refill    Acetaminophen Extra Strength 500 MG TABS       Alcohol Swabs PADS Use as directed for blood glucose testing      ferrous sulfate 324 (65 Fe) mg Take 1 tablet (324 mg total) by mouth 2 (two) times a day before meals 60 tablet 0    Lancets 28G MISC 2 (two) times a day      levothyroxine 150 mcg tablet       lisinopril (ZESTRIL) 10 mg tablet Take 1 tablet (10 mg total) by mouth daily 90 tablet 1    metFORMIN (GLUCOPHAGE) 1000 MG tablet Take 1 tablet (1,000 mg total) by mouth 2 (two) times a day with meals Indications: Type 2 Diabetes. 60 tablet 0    Cholecalciferol 125 MCG (5000 UT) capsule Take 5,000 Units by mouth daily (Patient not taking: Reported on 10/2/2024)      FeroSul 325 (65 Fe) MG tablet  (Patient not taking: Reported on 6/25/2024)      furosemide  (LASIX) 20 mg tablet Take 2 tablets (40 mg total) by mouth daily 60 tablet 0    haloperidol (HALDOL) 5 mg tablet Take 1 tablet (5 mg total) by mouth daily at bedtime for 3 days 3 tablet 0    ibuprofen (MOTRIN) 200 mg tablet Take 200 mg by mouth every 6 (six) hours as needed (Patient not taking: Reported on 6/25/2024)      levothyroxine 100 mcg tablet Take 1.5 tablets (150 mcg total) by mouth daily in the early morning (Patient not taking: Reported on 10/2/2024)      lidocaine (LIDODERM) 5 % Place 1 patch on the skin every 12 (twelve) hours as needed (Patient not taking: Reported on 6/25/2024)      metFORMIN (GLUCOPHAGE-XR) 500 mg 24 hr tablet  (Patient not taking: Reported on 6/25/2024)      metoprolol succinate (TOPROL-XL) 25 mg 24 hr tablet Take 0.5 tablets (12.5 mg total) by mouth daily Do not start before April 3, 2024. 15 tablet 0    QUEtiapine (SEROquel) 200 mg tablet  (Patient not taking: Reported on 5/31/2024)      Valbenazine Tosylate 80 MG CAPS Take 80 mg by mouth (Patient not taking: Reported on 5/31/2024)       No current facility-administered medications for this visit.     [unfilled]    SOCIAL HISTORY:   reports that she has been smoking cigarettes. She uses smokeless tobacco. She reports that she does not drink alcohol and does not use drugs.     FAMILY HISTORY:  Family history is unknown by patient.     ALLERGIES:  has No Known Allergies.      Physical Exam:  Vital Signs:   Visit Vitals  /68 (BP Location: Left arm, Patient Position: Sitting, Cuff Size: Standard)   Pulse 79   Temp 98.3 °F (36.8 °C) (Temporal)   Wt 54.4 kg (120 lb)   SpO2 97%   BMI 21.95 kg/m²   OB Status Postmenopausal   Smoking Status Every Day   BSA 1.54 m²     Body mass index is 21.95 kg/m².  Body surface area is 1.54 meters squared.    GEN: Alert, awake oriented x3, in no acute distress  HEENT- No pallor, icterus, cyanosis, no oral mucosal lesions,   LAD - no palpable cervical, clavicle, axillary, inguinal LAD  Heart- normal  S1 S2, regular rate and rhythm, No murmur, rubs.   Lungs- clear breathing sound bilateral.   Abdomen- soft, Non tender, bowel sounds present  Extremities- No cyanosis, clubbing, edema  Neuro- No focal neurological deficit    Labs:  Lab Results   Component Value Date    WBC 12.07 (H) 09/26/2024    HGB 14.1 09/26/2024    HCT 42.4 09/26/2024    MCV 96 09/26/2024     09/26/2024     Lab Results   Component Value Date     04/12/2014    SODIUM 135 09/26/2024    K 4.5 09/26/2024    CL 99 09/26/2024    CO2 26 09/26/2024    ANIONGAP 8 04/12/2014    AGAP 10 09/26/2024    BUN 13 09/26/2024    CREATININE 0.57 (L) 09/26/2024    GLUC 148 (H) 04/02/2024    GLUF 146 (H) 09/26/2024    CALCIUM 9.5 09/26/2024    AST 16 09/26/2024    ALT 12 09/26/2024    ALKPHOS 81 09/26/2024    PROT 6.3 (L) 04/12/2014    TP 7.1 09/26/2024    BILITOT 0.22 04/12/2014    TBILI 0.28 09/26/2024    EGFR 92 09/26/2024

## 2024-10-25 PROBLEM — I63.9 STROKE (HCC): Status: ACTIVE | Noted: 2024-01-01

## 2024-10-26 PROBLEM — R79.89 ELEVATED TROPONIN: Status: ACTIVE | Noted: 2024-01-01

## 2024-10-26 PROBLEM — I35.0 AORTIC STENOSIS, MODERATE: Status: ACTIVE | Noted: 2024-01-01

## 2024-10-26 NOTE — ANESTHESIA PROCEDURE NOTES
Arterial Line Insertion    Performed by: Anjum Chairez CRNA  Authorized by: Holden Nelson MD  Consent: The procedure was performed in an emergent situation.  Consent given by: patient  Required items: required blood products, implants, devices, and special equipment available  Patient identity confirmed: verbally with patient and arm band  Preparation: Patient was prepped and draped in the usual sterile fashion.  Indications: hemodynamic monitoring  Orientation:  Right  Location: radial artery  Sedation:  Patient sedated: yes  Sedation type: anxiolysis    Procedure Details:  Jaret's test normal: yes  Needle gauge: 18  Seldinger technique: Seldinger technique used  Number of attempts: 1    Post-procedure:  Post-procedure: chlorhexidine patch applied, dressing applied and line sutured  Waveform: good waveform and waveform confirmed  Post-procedure CNS: normal

## 2024-10-26 NOTE — QUICK NOTE
Contacted patient's group home who informed me that patient does not have any family, POA, or default decision maker.  They stated that we can make what ever decision we deemed best for her.  Will contact facility again on Monday to speak to a manager for more information, but in the meantime we will consult case management in case a decision maker is needed.    Due to hypotension this morning with systolics down to the 70s, Levophed was started.  In addition given the white count continuing to trend up, will continue to monitor blood culture results.  Patient received a 2 L bolus of Isolyte and was started on ceftriaxone.  Covid flu RSV negative.    MRI brain is ordered but not yet done.  CT head done after patient had some abnormal movements on the left arm and leg were concerning for increased stroke evolution as well as spreading to different territories via watershed infarcts which may have occurred when patient was hypotensive.  Video EEG was started.  Patient received 2 mg of Ativan and a as needed order was placed.  She was loaded with Keppra 1.5 g and started on 750 mg twice daily.    Troponin is trending down and patient has echo ordered.    Patient started on hypertonic saline.  24-hour post TNK CT head is scheduled for 9 PM.    NG tube placed for tube feeds.

## 2024-10-26 NOTE — ASSESSMENT & PLAN NOTE
Lab Results   Component Value Date    HGBA1C 9.2 (H) 03/05/2024       Recent Labs     10/25/24  2043 10/25/24  2322   POCGLU 409* 321*       Blood Sugar Average: Last 72 hrs:  (P) 365

## 2024-10-26 NOTE — ASSESSMENT & PLAN NOTE
Assessment:  Joslyn Cantu is a 74 y.o. female who presented to the ED as a result of stroke-like symptoms.  Patient has a past medical history of acute respiratory failure with hypoxia, hypertension, hypothyroidism, hypovitaminosis D, iron deficiency anemia, and type 2 diabetes. Patient was found at the side of a sidewalk not responding and also not moving her right hand and right arm with a left gaze deviation.  Given the symptoms, a pre-hospital stroke alert was initiated.  As per patient's group home, patient was conversing with another resident around 6-6:30 PM and was her normal self prior to leaving the group home to cash a check.     Initial NIHSS 27  Presenting deficits were: R-sided weakness, L gaze preference  Interventions: After a discussion of risks, benefits and alternatives reviewing inclusion and exclusion criteria the decision was made to proceed with thrombolytic therapy. Specifically discussed were the potential benefits, risks, and side effects of the proposed stroke intervention(s) and care; the likelihood of the patient achieving their goals; and any potential problems that might occur as a result of the intervention(s); reasonable alternatives to the proposed stroke intervention(s) and care. The discussion encompasses risks, benefits and side effects related to the alternatives and the risks related to not receiving the proposed stroke intervention(s) and care. Given the critical condition of the patient, the ED team and the neurology team together conversed in regards to the patient's condition taking into consideration the group home's decision of doing everything to save the patient.  The decision was to administer TNK.  There was a delay in administering TNK with no family members able to consent for TNK but it was still administered within the appropriate timeframe. Verbal consent was obtained from conferring with Dr. Juárez and Dr. Treadwell, they were in agreement to administer the  medication since no surrogate decision maker was available.  Consent was obtained by Dr. Mary Hyman.     Workup:  Lab Results   Component Value Date    HGBA1C 8.8 (H) 10/26/2024    CHOLESTEROL 113 10/26/2024    LDLCALC 45 10/26/2024    TRIG 125 10/26/2024    INR 0.94 10/25/2024      CTH: Early findings of ischemia in the left MCA territory. No hemorrhage seen.   CTA:  Occluded left M2 segment. The M1 segments are patent. Severe bilateral ICA stenosis with pronounced noncalcified plaque proximally on the left. Additional stenoses are seen throughout the left CCA, both intracranial internal carotid arteries, and the left intracranial vertebral artery.  CTP: Infinite mismatch ratio  CTH at 24 hours: Pending  MRI: Pending  TTE/WILL: Pending    Pertinent scores as of 10/26/24:  - NIHSS: 27    Impression: Patient is a 74 year old female presented to the ED as a result of strokelike symptoms. CTH showed a possible subacute infarct and CTA showed a left M2 occlusion.  Patient received TNK at 2111.  Patient was sent for CTP and an endovascular alert was initiated.  S/p thrombectomy post-TICI score of 2C. Patient's stroke seems to be embolic in nature but source unknown. LDL 45, A1C 8.8. Further workup pending.    Plan:  Case discussed with Dr. Beyer & Dr. Lynn  AP/AC: Hold all AP/AC secondary to TNK administration  Repeat CTH in 24 hours recommended  Defer to neurosurgery status post mechanical thrombectomy  Cholesterol med: Recommend Lipitor 40 mg  Healthy diet encouraged  MRI Brain wo contrast recommended  Echocardiogram recommended  Telemetry  Consider sz, vEEG warranted  Keppra appropriate, adjust as needed based on vEEG  PT/OT versus active exercise discussed  BP management and HTN med compliance discussed, defer to neurosurgical team for SBP goals s/p mechanical thrombectomy  Rest of care as per primary care team

## 2024-10-26 NOTE — ANESTHESIA POSTPROCEDURE EVALUATION
Post-Op Assessment Note              Comments: respiratory failure    Reason for prolonged intubation > 24 hours:  Respiratory failureReason for prolonged intubation > 48 hours:  Respiratory failure      Last Filed PACU Vitals:  Vitals Value Taken Time   Temp     Pulse 66 10/26/24 0650   BP     Resp 15 10/26/24 0650   SpO2 100 % 10/26/24 0650   Vitals shown include unfiled device data.    Modified Kavon:  No data recorded

## 2024-10-26 NOTE — ASSESSMENT & PLAN NOTE
-Echocardiogram (3/26/2024):Moderate aortic stenosis with mean gradient 6 mmHg and aortic valve area 1.14 cm².

## 2024-10-26 NOTE — RESPIRATORY THERAPY NOTE
10/26/24 0822   Respiratory Assessment   Assessment Type Assess only   Bilateral Breath Sounds Clear   Resp Comments Pt resting on Spont PS 4/6 30%. BS clear. No rx needed at this time. Will continue to monitor pt.   Vent Information   Vent ID [id number]   Vent type Harris G5   Harris Vent Mode SPONT   $ Pulse Oximetry Spot Check Charge Completed   SPONT Settings   FIO2 (%) 30 %   PEEP (cmH2O) 6 cmH2O   Pressure Support (cmH2O) 4 cmH20   Flow Trigger (LPM) 5 LPM   P-ramp (ms) 100 ms   ETS  (%) 25 %   Humidification Heater   Heater Temp 95 °F (35 °C)   SPONT Actuals   Resp Rate (BPM) 17 BPM   VT (mL) 482 mL   MV (Obs) 7.9   MAP (cmH2O) 7.6 cmH2O   Peak Pressure (cmH2O) 11 cmH2O   I:E Ratio (Obs) 1:1.6   RSBI (f/vt) 36 f/Vt   Heater Temperature (Obs) 98.6 °F (37 °C)   SPONT Alarms   High Peak Pressure (cmH20) 40 cmH2O   High Resp Rate (BPM) 40 BPM   High MV (L/min) 20 L/min   Low MV (L/min) 4 L/min   High Spont VTE (mL) 800 mL   Low Spont VTE (mL) 250 mL   Apnea Time (S) 20 S   SPONT Apnea Settings   Resp Rate (BPM) 14 BPM   FIO2 (%) 30 %   %TI (%) 1 %   Apnea Time (S) 30 S   Maintenance   Alarm (pink) cable attached No   Resuscitation bag with peep valve at bedside Yes   Water bag changed No   Circuit changed No   Daily Screen   Patient safety screen outcome: Passed   Spont breathing trial outcome: Passed   ETT  Cuffed;Oral;Pre-curved;Inflated 8 mm   Placement Date/Time: 10/25/24 2144   Mask Ventilation: Mask ventilation not attempted (0)  Preoxygenated: Yes  Technique: Direct laryngoscopy;Rapid sequence;Stylet  Type: Cuffed;Oral;Pre-curved;Inflated  Tube Size: 8 mm  Laryngoscope: Mac  Blade Size:...   Secured at (cm) 20   Measured from Lips   Secured Location Right   Repositioned Center to Right   Secured by Commercial tube oneill   Site Condition Dry   Cuff Pressure (color) Green   HI-LO Suction  Other (Comment)  (na)

## 2024-10-26 NOTE — ASSESSMENT & PLAN NOTE
Wt Readings from Last 3 Encounters:   10/25/24 55 kg (121 lb 4.1 oz)   10/02/24 54.4 kg (120 lb)   06/25/24 55.5 kg (122 lb 6.4 oz)     -Weight today 55 kg  -Euvolemic on examination  -Echocardiogram (3/26/2024): Left ventricular ejection fraction 40%.  Moderate global hypokinesis.  Grade 2 diastolic dysfunction.  Moderately dilated left atrium (42 to 48 mL/m2).  Moderate aortic stenosis with mean gradient 6 mmHg and aortic valve area 1.14 cm².  Mild mitral valve regurgitation.  Moderate tricuspid valve regurgitation.  -Home regimen Lasix 40 mg daily and metoprolol succinate 12.5 mg daily currently held due to hypotension  -Currently on Levophed gtt  -EKG: Sinus tachycardia rate 118 bpm poor R wave progression, nonspecific ST abnormality.

## 2024-10-26 NOTE — ANESTHESIA PREPROCEDURE EVALUATION
Procedure:  IR STROKE ALERT    Relevant Problems   CARDIO   (+) Hypertension      ENDO   (+) Hypothyroid   (+) Type 2 diabetes mellitus without complication (HCC)      /RENAL   (+) Stage 3a chronic kidney disease (HCC)      HEMATOLOGY   (+) Iron deficiency anemia      NEURO/PSYCH   (+) Schizophrenia (HCC)   (+) Stroke (HCC)        Physical Exam    Airway    Mallampati score: II  TM Distance: >3 FB  Neck ROM: full     Dental   No notable dental hx     Cardiovascular  Cardiovascular exam normal    Pulmonary  Pulmonary exam normal     Other Findings  post-pubertal.    Left Ventricle: Left ventricular cavity size is normal. Wall thickness is increased. The left ventricular ejection fraction is 40%. Systolic function is moderately reduced. There is moderate global hypokinesis. Diastolic function is moderately abnormal, consistent with grade II (pseudonormal) relaxation.    Right Ventricle: Right ventricular cavity size is normal. Systolic function is normal.    Left Atrium: The atrium is moderately dilated (42-48 mL/m2).    Atrial Septum: The septum bows into the right atrium, suggesting increased left atrial pressure.    Aortic Valve: The aortic valve is trileaflet. The leaflets are moderately thickened. The leaflets are moderately calcified. There is moderately reduced mobility. There is moderate stenosis. The aortic valve mean gradient is 6 mmHg. The dimensionless velocity index is 0.39. The aortic valve area is 1.14 cm2. The stroke volume index is 19.00 ml/m2. The aortic valve velocity is increased due to stenosis but lower than expected due to the presence of decreased flow.    Mitral Valve: There is mild regurgitation.    Tricuspid Valve: There is moderate regurgitation. The right ventricular systolic pressure is mildly elevated. The estimated right ventricular systolic pressure is 40.00 mmHg.    Pericardium: There is a right pleural effusion.       Sinus rhythm with Premature supraventricular  complexes  Nonspecific ST and T wave abnormality  Abnormal ECG  When compared with ECG of 24-MAR-2024 03:10,  ST now depressed in Anterior leads    Anesthesia Plan  ASA Score- 4 Emergent    Anesthesia Type- general with ASA Monitors.         Additional Monitors:     Airway Plan: ETT.    Comment: Emergent case.       Plan Factors-Exercise tolerance (METS): <4 METS.    Chart reviewed. EKG reviewed. Imaging results reviewed. Existing labs reviewed. Patient summary reviewed.    Patient is a current smoker.              Induction-     Postoperative Plan-     Perioperative Resuscitation Plan - Level 1 - Full Code.       Informed Consent-   I personally reviewed this patient with the CRNA. Discussed and agreed on the Anesthesia Plan with the CRNA..      Smoking    1. Occluded left M2 segment. The M1 segments are patent.  2. Severe bilateral ICA stenosis with pronounced noncalcified plaque proximally on the left.  3. Additional stenoses are seen throughout the left CCA, both intracranial internal carotid arteries, and the left intracranial vertebral artery.

## 2024-10-26 NOTE — ED ATTENDING ATTESTATION
10/25/2024  I, Eloisa Juárez MD, saw and evaluated the patient. I have discussed the patient with the resident/non-physician practitioner and agree with the resident's/non-physician practitioner's findings, Plan of Care, and MDM as documented in the resident's/non-physician practitioner's note, except where noted. All available labs and Radiology studies were reviewed.  I was present for key portions of any procedure(s) performed by the resident/non-physician practitioner and I was immediately available to provide assistance.       At this point I agree with the current assessment done in the Emergency Department.  I have conducted an independent evaluation of this patient a history and physical is as follows:  This is a 74-year-old woman who came in as an prehospital stroke alert.  Patient is from a group home.  She was apparently wandering on the street, and then developed left-sided gaze preference and right-sided weakness.  Patient then became less responsive and was no longer verbal.  EMS then called back, concern for patient having STEMI as well.  Patient arrives to the emergency department with very limited history.  On initial evaluation in the emergency department, patient is maintaining her own airway.  She does respond to voice.  She is not phonating.  She has no pooling secretions.  Her mucous membranes are dry.  The patient is tachypneic.  She has adequate oxygen saturation.  She is cachectic.  Her heart is tachycardic without murmurs.  Her lungs are essentially clear.  Abdomen is benign. MEDICAL DECISION MAKING    Number and Complexity of Problems  Differential diagnosis: Dissection, stroke, aortic dissection, MI, electrolyte disturbance, seizure, encephalopathy from intoxicants or medications    Medical Decision Making Data  External documents reviewed: Prior medical records reviewed, patient with history of COPD, leukocytosis, schizoaffective disorder  My EKG interpretation: Sinus tach, no  obvious ischemia  My CT interpretation: No dissection, subacute stroke  My X-ray interpretation:   My ultrasound interpretation: Ballooning of the patient's apex, depressed EF    CT head wo contrast   Final Result      1. Evolving recent left MCA territory infarct with multifocal left cerebral hemispheric contrast staining. Mild local mass effect. No significant midline shift.      2. Development of subtle loss of gray-white matter differentiation as well as sulcal effacement involving the right posterior frontal lobe and right parietal lobe, suspicious for recent ischemia.      Recommendation: Brain MRI      I personally discussed this study with EMILY COATES on 10/26/2024 8:39 AM.                  Workstation performed: [id number]         X-ray chest 1 view   Final Result      Improved bilateral pulmonary opacities.            Workstation performed: [id number]         IR stroke alert   Final Result      CT cerebral perfusion   Final Result      CT perfusion performed.  Data available on PACS.   There is significant motion degradation.         Workstation performed: [id number]         CTA dissection protocol chest abdomen pelvis w wo contrast   Final Result      1.  No identifiable acute abnormality to account for the patient's clinical presentation. There is no evidence of an aortic dissection. There is an occlusion of the left femoral artery, which is incompletely assessed on this exam.   2.  Advanced centrilobular emphysema.   3.  Nonspecific mildly enlarged right hilar lymph node. A 3-month contrast-enhanced chest CT follow-up should be considered for further evaluation.      The study was marked in EPIC for significant notification.         Workstation performed: [id number]         CTA stroke alert (head/neck)   Final Result      1. Occluded left M2 segment. The M1 segments are patent.   2. Severe bilateral ICA stenosis with pronounced noncalcified plaque proximally on the left.   3. Additional stenoses are  seen throughout the left CCA, both intracranial internal carotid arteries, and the left intracranial vertebral artery.      Findings were directly discussed with Donita Beyer at 8:45 p.m. on 10/25/2024.      Workstation performed: [id number]         CT stroke alert brain   Final Result   Early findings of ischemia in the left MCA territory. No hemorrhage seen.      Findings were directly discussed with Dr. Mary Hyman at approximately 8:40 p.m. on 10/25/2024.      Workstation performed: [id number]         MRI Inpatient Order    (Results Pending)   CT head wo contrast    (Results Pending)   CT head wo contrast    (Results Pending)       Labs Reviewed   BASIC METABOLIC PANEL - Abnormal       Result Value Ref Range Status    Sodium 132 (*) 135 - 147 mmol/L Final    Potassium 4.3  3.5 - 5.3 mmol/L Final    Chloride 98  96 - 108 mmol/L Final    CO2 21  21 - 32 mmol/L Final    ANION GAP 13  4 - 13 mmol/L Final    BUN 17  5 - 25 mg/dL Final    Creatinine 0.78  0.60 - 1.30 mg/dL Final    Comment: Standardized to IDMS reference method    Glucose 505 (*) 65 - 140 mg/dL Final    Comment: If the patient is fasting, the ADA then defines impaired fasting glucose as > 100 mg/dL and diabetes as > or equal to 123 mg/dL.    Calcium 9.2  8.4 - 10.2 mg/dL Final    eGFR 76  ml/min/1.73sq m Final    Narrative:     National Kid[employer] Disease Foundation guidelines for Chronic Kidney Disease (CKD):     Stage 1 with normal or high GFR (GFR > 90 mL/min/1.73 square meters)    Stage 2 Mild CKD (GFR = 60-89 mL/min/1.73 square meters)    Stage 3A Moderate CKD (GFR = 45-59 mL/min/1.73 square meters)    Stage 3B Moderate CKD (GFR = 30-44 mL/min/1.73 square meters)    Stage 4 Severe CKD (GFR = 15-29 mL/min/1.73 square meters)    Stage 5 End Stage CKD (GFR <15 mL/min/1.73 square meters)  Note: GFR calculation is accurate only with a steady state creatinine   CBC AND PLATELET - Abnormal    WBC 14.77 (*) 4.31 - 10.16 Thousand/uL Final    RBC 4.17  3.81 - 5.12  "Million/uL Final    Hemoglobin 12.6  11.5 - 15.4 g/dL Final    Hematocrit 40.0  34.8 - 46.1 % Final    MCV 96  82 - 98 fL Final    MCH 30.2  26.8 - 34.3 pg Final    MCHC 31.5  31.4 - 37.4 g/dL Final    RDW 13.2  11.6 - 15.1 % Final    Platelets 243  149 - 390 Thousands/uL Final    MPV 10.4  8.9 - 12.7 fL Final   APTT - Abnormal    PTT 21 (*) 23 - 34 seconds Final    Comment: Therapeutic Heparin Range =  60-90 seconds   HS TROPONIN I 0HR - Abnormal    hs TnI 0hr 614 (*) \"Refer to ACS Flowchart\"- see link ng/L Final    Comment:                                              Initial (time 0) result  If >=50 ng/L, Myocardial injury suggested ;  Type of myocardial injury and treatment strategy  to be determined.  If 5-49 ng/L, a delta result at 2 hours and or 4 hours will be needed to further evaluate.  If <4 ng/L, and chest pain has been >3 hours since onset, patient may qualify for discharge based on the HEART score in the ED.  If <5 ng/L and <3hours since onset of chest pain, a delta result at 2 hours will be needed to further evaluate.    HS Troponin 99th Percentile URL of a Health Population=12 ng/L with a 95% Confidence Interval of 8-18 ng/L.    Second Troponin (time 2 hours)  If calculated delta >= 20 ng/L,  Myocardial injury suggested ; Type of myocardial injury and treatment strategy to be determined.  If 5-49 ng/L and the calculated delta is 5-19 ng/L, consult medical service for evaluation.  Continue evaluation for ischemia on ecg and other possible etiology and repeat hs troponin at 4 hours.  If delta is <5 ng/L at 2 hours, consider discharge based on risk stratification via the HEART score (if in ED), or AIDEE risk score in IP/Observation.    HS Troponin 99th Percentile URL of a Health Population=12 ng/L with a 95% Confidence Interval of 8-18 ng/L.   POCT GLUCOSE - Abnormal    POC Glucose 409 (*) 65 - 140 mg/dl Final   PROTIME-INR - Normal    Protime 12.9  12.3 - 15.0 seconds Final    INR 0.94  0.85 - 1.19 " Final    Narrative:     INR Therapeutic Range    Indication                                             INR Range      Atrial Fibrillation                                               2.0-3.0  Hypercoagulable State                                    2.0.2.3  Left Ventricular Asist Device                            2.0-3.0  Mechanical Heart Valve                                  -    Aortic(with afib, MI, embolism, HF, LA enlargement,    and/or coagulopathy)                                     2.0-3.0 (2.5-3.5)     Mitral                                                             2.5-3.5  Prosthetic/Bioprosthetic Heart Valve               2.0-3.0  Venous thromboembolism (VTE: VT, PE        2.0-3.0   HS TROPONIN I 4HR       Labs reviewed by me are significant for: Hyperglycemia, elevated troponin    Clinical decision rules/scores are significant for:     Discussed case with: Neurology, neurointerventional  Considered admission for: 1 stroke, 2 MI    Treatment and Disposition  ED course:   Shared decision making:   Code status:     ED Course         Critical Care Time  CriticalCare Time    Date/Time: 10/25/2024 11:12 PM    Performed by: Eloisa Juárez MD  Authorized by: Eloisa Juárez MD    Critical care provider statement:     Critical care time (minutes):  47    Critical care time was exclusive of:  Separately billable procedures and treating other patients and teaching time    Critical care was necessary to treat or prevent imminent or life-threatening deterioration of the following conditions:  Cardiac failure and CNS failure or compromise    Critical care was time spent personally by me on the following activities:  Blood draw for specimens, obtaining history from patient or surrogate, development of treatment plan with patient or surrogate, discussions with consultants, evaluation of patient's response to treatment, discussions with primary provider, examination of patient, review of old charts,  re-evaluation of patient's condition, ordering and review of radiographic studies, ordering and review of laboratory studies and ordering and performing treatments and interventions

## 2024-10-26 NOTE — H&P
H&P - Critical Care/ICU   Name: Joslyn Cantu 74 y.o. female I MRN: [id number]  Unit/Bed#: [facility] I Date of Admission: 10/25/2024   Date of Service: 10/25/2024 I Hospital Day: 0       Assessment & Plan   Neuro:   Diagnosis: Stroke  Plan: 10/25/2024 CTA-occluded left M2, severe bilateral ICA stenosis  Status post TNK 2100  NIH on admission 27  Follow-up 24-hour post TNK CT head  Status post mechanical thrombectomy, ICA stent 10/25  Post thrombectomy CT head showing contrast staining  Follow-up MRI  Continue Integrilin drip 0.5  Follow-up a.m. CT head 0400  Diagnosis: Schizophrenia  Plan: Will hold home dose Haldol in setting of prolonged Qtc  Resume when able    CV:   Diagnosis: Chronic heart failure, hypertension,  Plan: 3/26/2024 echo-EF 40%, systolic function moderately reduced, moderate global hypokinesis, diastolic function moderately abnormal grade 2  Follow-up repeat echo  Monitor on telemetry  Continue home dose lisinopril  Resume home dose Lasix and metoprolol when able  Diagnosis: Elevated troponin, suspect type II, prolonged QTc  Plan: Initial troponin 614  Continue to trend  EKG now-prolonged Qtc 535, NSR    Pulm:  Diagnosis: Respiratory insufficiency, advanced centrilobular emphysema  Plan: 10/25/2024 CTA chest abdomen pelvis-advanced setrobuvir of emphysema, mildly enlarged right hilar lymph node  Remained intubated post IR  Wean vent as able    GI:   Diagnosis: Dysphagia  Plan: Speech evaluation when able    :   Diagnosis: CKD 3  Plan: Creatinine on admission 0.78  Strict intake and output  Trend BUN and creatinine    F/E/N:   Normal saline drip  Replete electrolytes as needed  N.p.o. until cleared by speech    Heme/Onc:   Diagnosis: DVT prophylaxis  Plan: Hold chemical prophylaxis in setting of TNK  Maintain SCDs    Endo:   Diagnosis: DM 2, hypothyroidism  Plan: 3/5/2024 hemoglobin A1c 9.2  Insulin gtt   Follow-up a.m. hemoglobin A1c  Follow-up a.m. TSH  Continue home dose levothyroxine    ID:    Diagnosis: Leukocytosis  Plan: Suspect reactive  Trend fever curve and WBC  F/u Blood cultures, UA    MSK/Skin:   Diagnosis: Ambulatory dysfunction  Plan: PT/OT    Disposition: Critical care    History of Present Illness   Joslyn Cantu is a 74 y.o. with a past medical history of schizophrenia, DM 2, hypothyroidism, heart failure with reduced EF, CKD 3. Patient resides at group home, was found on the street AMS. Presented with right-sided weakness, left gaze preference, NIH 27. CT completed showing left M2 occlusion given TNK and taken for mechanical thrombectomy.    History obtained from chart review and unobtainable from patient due to mental status.      Historical Information   Past Medical History:  03/24/2024: Acute respiratory failure with hypoxia (HCC)  No date: Hypertension  05/08/2016: Hypothyroid  05/10/2016: Hypovitaminosis D  05/11/2016: Iron deficiency anemia  05/08/2016: Type 2 diabetes mellitus without complication (HCC) No past surgical history on file.   Current Outpatient Medications   Medication Instructions    Acetaminophen Extra Strength 500 MG TABS     Alcohol Swabs PADS Use as directed for blood glucose testing    Cholecalciferol 5,000 Units, Daily    FeroSul 325 (65 Fe) MG tablet     ferrous sulfate 324 mg, Oral, 2 times daily before meals    furosemide (LASIX) 40 mg, Oral, Daily    haloperidol (HALDOL) 5 mg, Oral, Daily at bedtime    ibuprofen (MOTRIN) 200 mg, Every 6 hours PRN    Lancets 28G MISC 2 times daily    levothyroxine 150 mcg tablet     levothyroxine 150 mcg, Oral, Daily (early morning)    lidocaine (LIDODERM) 5 % 1 patch, Every 12 hours PRN    lisinopril (ZESTRIL) 10 mg, Oral, Daily    metFORMIN (GLUCOPHAGE) 1,000 mg, Oral, 2 times daily with meals    metFORMIN (GLUCOPHAGE-XR) 500 mg 24 hr tablet     metoprolol succinate (TOPROL-XL) 12.5 mg, Oral, Daily    QUEtiapine (SEROquel) 200 mg tablet     Valbenazine Tosylate 80 mg    No Known Allergies   Social History     Tobacco Use     Smoking status: Every Day     Types: Cigarettes    Smokeless tobacco: Current   Vaping Use    Vaping status: Unknown   Substance Use Topics    Alcohol use: No    Drug use: No    Family History   Family history unknown: Yes          Objective :                   Vitals I/O      Most Recent Min/Max in 24hrs   Temp   No data recorded   Pulse 96 Pulse  Min: 93  Max: 118   Resp 16 Resp  Min: 16  Max: 19   /89 BP  Min: 154/89  Max: 165/87   O2 Sat 96 % SpO2  Min: 96 %  Max: 99 %    No intake or output data in the 24 hours ending 10/25/24 2227    No diet orders on file    Invasive Monitoring           Physical Exam   Physical Exam  Vitals and nursing note reviewed.   Eyes:      Pupils: Pupils are equal, round, and reactive to light.   Skin:     General: Skin is warm.      Capillary Refill: Capillary refill takes less than 2 seconds.   HENT:      Head: Normocephalic.      Mouth/Throat:      Mouth: Mucous membranes are dry.   Cardiovascular:      Rate and Rhythm: Normal rate and regular rhythm.      Pulses: Normal pulses.   Abdominal:      Palpations: Abdomen is soft.   Constitutional:       Appearance: She is ill-appearing.   Pulmonary:      Breath sounds: Normal breath sounds.   Neurological:      Comments: Exam limited due to intubation and sedation  Does not open eyes  Spontaneously moves LUE/LLE  Withdraws from pain RUE/RLE    Genitourinary/Anorectal:  external catheter present.        Diagnostic Studies        Lab Results: I have reviewed the following results:     Medications:  Scheduled PRN      eptifibatide, , Continuous PRN  eptifibatide, , Continuous PRN  lidocaine (PF), , PRN       Continuous    eptifibatide, , Last Rate: 11,466 mcg (10/25/24 2214)  eptifibatide, , Last Rate: 0.5 mcg/kg/min (10/25/24 2218)         Labs:   CBC    Recent Labs     10/25/24  2028   WBC 14.77*   HGB 12.6   HCT 40.0        BMP    Recent Labs     10/25/24  2028   SODIUM 132*   K 4.3   CL 98   CO2 21   AGAP 13   BUN 17    CREATININE 0.78   CALCIUM 9.2       Coags    Recent Labs     10/25/24  2028   INR 0.94   PTT 21*        Additional Electrolytes  No recent results       Blood Gas    No recent results  No recent results LFTs  No recent results    Infectious  No recent results  Glucose  Recent Labs     10/25/24  2028   GLUC 505*

## 2024-10-26 NOTE — CONSULTS
Consultation - Neurology   Name: Joslyn Cantu 74 y.o. female I MRN: [id number]  Unit/Bed#: ICU 09 I Date of Admission: 10/25/2024   Date of Service: 10/26/2024 I Hospital Day: 1   Consult to Neurology  Consult performed by: Mary Hyman DO  Consult ordered by: Joshua Treadwell MD        Physician Requesting Evaluation: Aida Victor DO   Reason for Evaluation / Principal Problem: Stroke    Assessment & Plan  Stroke (HCC)  Assessment:  Joslyn Cantu is a 74 y.o. female who presented to the ED as a result of stroke-like symptoms.  Patient has a past medical history of acute respiratory failure with hypoxia, hypertension, hypothyroidism, hypovitaminosis D, iron deficiency anemia, and type 2 diabetes. Patient was found at the side of a sidewalk not responding and also not moving her right hand and right arm with a left gaze deviation.  Given the symptoms, a pre-hospital stroke alert was initiated.  As per patient's group home, patient was conversing with another resident around 6-6:30 PM and was her normal self prior to leaving the group home to cash a check.     Initial NIHSS 27  Presenting deficits were: R-sided weakness, L gaze preference  Interventions: After a discussion of risks, benefits and alternatives reviewing inclusion and exclusion criteria the decision was made to proceed with thrombolytic therapy. Specifically discussed were the potential benefits, risks, and side effects of the proposed stroke intervention(s) and care; the likelihood of the patient achieving their goals; and any potential problems that might occur as a result of the intervention(s); reasonable alternatives to the proposed stroke intervention(s) and care. The discussion encompasses risks, benefits and side effects related to the alternatives and the risks related to not receiving the proposed stroke intervention(s) and care. Given the critical condition of the patient, the ED team and the neurology team together conversed in regards to  the patient's condition taking into consideration the group home's decision of doing everything to save the patient.  The decision was to administer TNK.  There was a delay in administering TNK with no family members able to consent for TNK but it was still administered within the appropriate timeframe. Verbal consent was obtained from conferring with Dr. Juárez and Dr. Treadwell, they were in agreement to administer the medication since no surrogate decision maker was available.  Consent was obtained by Dr. Mary Hyman.     Workup:  Lab Results   Component Value Date    HGBA1C 8.8 (H) 10/26/2024    CHOLESTEROL 113 10/26/2024    LDLCALC 45 10/26/2024    TRIG 125 10/26/2024    INR 0.94 10/25/2024      CTH: Early findings of ischemia in the left MCA territory. No hemorrhage seen.   CTA:  Occluded left M2 segment. The M1 segments are patent. Severe bilateral ICA stenosis with pronounced noncalcified plaque proximally on the left. Additional stenoses are seen throughout the left CCA, both intracranial internal carotid arteries, and the left intracranial vertebral artery.  CTP: Infinite mismatch ratio  CTH at 24 hours: Pending  MRI: Pending  TTE/WILL: Pending    Pertinent scores as of 10/26/24:  - NIHSS: 27    Impression: Patient is a 74 year old female presented to the ED as a result of strokelike symptoms. CTH showed a possible subacute infarct and CTA showed a left M2 occlusion.  Patient received TNK at 2111.  Patient was sent for CTP and an endovascular alert was initiated.  S/p thrombectomy post-TICI score of 2C. Patient's stroke seems to be embolic in nature but source unknown. LDL 45, A1C 8.8. Further workup pending.    Plan:  Case discussed with Dr. Beyer & Dr. Lynn  AP/AC: Hold all AP/AC secondary to TNK administration  Repeat CTH in 24 hours recommended  Defer to neurosurgery status post mechanical thrombectomy  Cholesterol med: Recommend Lipitor 40 mg  Healthy diet encouraged  MRI Brain wo contrast  recommended  Echocardiogram recommended  Telemetry  Consider sz, vEEG warranted  Keppra appropriate, adjust as needed based on vEEG  PT/OT versus active exercise discussed  BP management and HTN med compliance discussed, defer to neurosurgical team for SBP goals s/p mechanical thrombectomy  Rest of care as per primary care team  Aortic stenosis, moderate    Elevated troponin    I have discussed with Dr. Lynn the above plan to treat pt. She agrees with the plan.  Neurology service will follow.    Thrombolytic Decision: After a discussion of risks, benefits and alternatives reviewing inclusion and exclusion criteria the decision was made to proceed with thrombolytic therapy. Specifically discussed were the potential benefits, risks, and side effects of the proposed stroke intervention(s) and care; the likelihood of the patient achieving their goals; and any potential problems that might occur as a result of the intervention(s); reasonable alternatives to the proposed stroke intervention(s) and care. The discussion encompasses risks, benefits and side effects related to the alternatives and the risks related to not receiving the proposed stroke intervention(s) and care. Verbal consent was obtained from conferring with doctors noted above. we are in agreement to administer the medication since no surrogate decision maker was available..  Consent was obtained by discussion.    Recommendations for outpatient neurological follow up have yet to be determined.    History of Present Illness   Hx and PE limited by: intubation  Patient last known well: 18:30 10/26    HPI: Joslyn Cantu is a 74 y.o. female who presented to the ED as a result of stroke-like symptoms.  Patient has a past medical history of acute respiratory failure with hypoxia, hypertension, hypothyroidism, hypovitaminosis D, iron deficiency anemia, and type 2 diabetes.     Patient was found at the side of a sidewalk not responding and also not moving her right  hand and right arm with a left gaze deviation.  Given the symptoms, a pre-hospital stroke alert was initiated.  As per patient's group home, patient was conversing with another resident around 6-6:30 PM and was her normal self prior to leaving the group home to cash a check.       On presentation, patient's SBP was in the 140s.  Glucose of 519 in the field and 409 in the ED. CTH showed early findings of ischemia in the left MCA territory.  CTA showed an occluded left M2 segment with a patent M1 segment.  Patient was deemed an appropriate candidate for TNK.  Patient's group home was called in regards to TNK consent given that patient does not have any family members.  After speaking with the group home, patient's psychiatric counselor stated that she is unable to make any critical decisions such as administering TNK and she would alert the primary on-call .  After few minutes the group home was called once again and the primary on-call  and the psychiatric counselor stated that they would like everything done to help save the patient.  Given the critical condition of the patient, the ED team and the neurology team together conversed in regards to the patient's condition taking into consideration the group home's decision of doing everything to save the patient.  The decision was to administer TNK.  There was a delay in administering TNK with no family members able to consent for TNK but it was still administered within the appropriate timeframe.     Given the occluded left M2 segment, an endovascular alert was initiated and a STAT CTP was ordered.  CTP showed an infinite mismatch ratio.  S/p mechanical thrombectomy, patient had a TICI 2C score.     Patient admitted to the neurocritical care for further management.    Review of Systems   Unable to perform ROS: Intubated     I have reviewed the patient's PMH, PSH, Social History, Family History, Meds, and Allergies  Historical Information    Past Medical History:   Diagnosis Date    Acute respiratory failure with hypoxia (HCC) 03/24/2024    Hypertension     Hypothyroid 05/08/2016    Hypovitaminosis D 05/10/2016    Iron deficiency anemia 05/11/2016    Type 2 diabetes mellitus without complication (HCC) 05/08/2016     Past Surgical History:   Procedure Laterality Date    IR STROKE ALERT  10/25/2024     Social History     Tobacco Use    Smoking status: Every Day     Types: Cigarettes    Smokeless tobacco: Current   Vaping Use    Vaping status: Unknown   Substance and Sexual Activity    Alcohol use: No    Drug use: No    Sexual activity: Not Currently     E-Cigarette/Vaping    E-Cigarette Use Unknown If Ever Used      E-Cigarette/Vaping Substances     Family History   Family history unknown: Yes     Social History     Tobacco Use    Smoking status: Every Day     Types: Cigarettes    Smokeless tobacco: Current   Vaping Use    Vaping status: Unknown   Substance and Sexual Activity    Alcohol use: No    Drug use: No    Sexual activity: Not Currently       Current Facility-Administered Medications:     atorvastatin (LIPITOR) tablet 40 mg, QPM    cefTRIAXone (ROCEPHIN) 1,000 mg in dextrose 5 % 50 mL IVPB, Q24H, Last Rate: 1,000 mg (10/26/24 1356)    chlorhexidine (PERIDEX) 0.12 % oral rinse 15 mL, Q12H JORGE LUIS    eptifibatide (INTEGRILIN) 75 mg in 100 mL infusion (premix), Continuous, Last Rate: 0.5 mcg/kg/min (10/26/24 0014)    fentaNYL injection 50 mcg, Q1H PRN    insulin regular (HumuLIN R,NovoLIN R) 1 Units/mL in sodium chloride 0.9 % 100 mL infusion, Titrated, Last Rate: 0.5 Units/hr (10/26/24 1211)    labetalol (NORMODYNE) injection 10 mg, Q4H PRN    levETIRAcetam (KEPPRA) injection 750 mg, Q12H    levothyroxine tablet 150 mcg, Early Morning    [Held by provider] lisinopril (ZESTRIL) tablet 10 mg, Daily    LORazepam (ATIVAN) injection 2 mg, Q30 Min PRN    norepinephrine (LEVOPHED) 4 mg (STANDARD CONCENTRATION) IV in sodium chloride 0.9% 250 mL, Titrated,  Last Rate: 3 mcg/min (10/26/24 1112)    ondansetron (ZOFRAN) injection 4 mg, Q6H PRN    propofol (DIPRIVAN) 1000 mg in 100 mL infusion (premix), Titrated, Last Rate: 20 mcg/kg/min (10/26/24 1256)    senna-docusate sodium (SENOKOT S) 8.6-50 mg per tablet 2 tablet, BID    sodium chloride (HYPERTONIC) 3 % infusion, Continuous, Last Rate: 30 mL/hr (10/26/24 1543)  Prior to Admission Medications   Prescriptions Last Dose Informant Patient Reported? Taking?   Acetaminophen Extra Strength 500 MG TABS  Care Giver Yes No   Alcohol Swabs PADS  Care Giver Yes No   Sig: Use as directed for blood glucose testing   Cholecalciferol 125 MCG (5000 UT) capsule  Care Giver Yes No   Sig: Take 5,000 Units by mouth daily   Patient not taking: Reported on 10/2/2024   FeroSul 325 (65 Fe) MG tablet  Care Giver Yes No   Patient not taking: Reported on 2024   Lancets 28G MISC  Care Giver Yes No   Si (two) times a day   QUEtiapine (SEROquel) 200 mg tablet  Care Giver Yes No   Patient not taking: Reported on 2024   Valbenazine Tosylate 80 MG CAPS  Care Giver Yes No   Sig: Take 80 mg by mouth   Patient not taking: Reported on 2024   ferrous sulfate 324 (65 Fe) mg  Care Giver No No   Sig: Take 1 tablet (324 mg total) by mouth 2 (two) times a day before meals   furosemide (LASIX) 20 mg tablet  Care Giver, Outside Facility (Specify) No No   Sig: Take 2 tablets (40 mg total) by mouth daily   haloperidol (HALDOL) 5 mg tablet  Care Giver, Outside Facility (Specify) No No   Sig: Take 1 tablet (5 mg total) by mouth daily at bedtime for 3 days   ibuprofen (MOTRIN) 200 mg tablet  Care Giver Yes No   Sig: Take 200 mg by mouth every 6 (six) hours as needed   Patient not taking: Reported on 2024   levothyroxine 100 mcg tablet  Care Giver No No   Sig: Take 1.5 tablets (150 mcg total) by mouth daily in the early morning   Patient not taking: Reported on 10/2/2024   levothyroxine 150 mcg tablet  Care Giver Yes No   lidocaine (LIDODERM) 5  %  Care Giver Yes No   Sig: Place 1 patch on the skin every 12 (twelve) hours as needed   Patient not taking: Reported on 6/25/2024   lisinopril (ZESTRIL) 10 mg tablet  Care Giver No No   Sig: Take 1 tablet (10 mg total) by mouth daily   metFORMIN (GLUCOPHAGE) 1000 MG tablet  Care Giver No No   Sig: Take 1 tablet (1,000 mg total) by mouth 2 (two) times a day with meals Indications: Type 2 Diabetes.   metFORMIN (GLUCOPHAGE-XR) 500 mg 24 hr tablet  Care Giver Yes No   Patient not taking: Reported on 6/25/2024   metoprolol succinate (TOPROL-XL) 25 mg 24 hr tablet  Care Giver, Outside Facility (Specify) No No   Sig: Take 0.5 tablets (12.5 mg total) by mouth daily Do not start before April 3, 2024.      Facility-Administered Medications: None     Patient has no known allergies.    Objective :  Temp:  [97 °F (36.1 °C)-99.3 °F (37.4 °C)] 99.3 °F (37.4 °C)  HR:  [] 94  BP: ()/(51-91) 103/68  Resp:  [14-50] 22  SpO2:  [95 %-100 %] 100 %  O2 Device: Ventilator  FiO2 (%):  [55] 55    Physical Exam  Vitals and nursing note reviewed.   Constitutional:       Appearance: She is ill-appearing and toxic-appearing.   Neurological:      Mental Status: She is disoriented.     Neurological Exam  Mental Status  Responsive to painful stimuli.  Intubated.    Cranial Nerves  Intubated  Gaze deviated to L.    Motor  Normal muscle bulk throughout. Normal muscle tone. The following abnormal movements were seen:  Intubated  Some prompted tremor w/ painful stimuli to LUE  Moves L side spontaneously.    Sensory  Intubated.    Reflexes  Deep tendon reflexes are 2+ and symmetric except as noted.  Symmetric, dimminished.    Coordination    Intubated.    Gait    Intubated.          Lab Results: I have reviewed the following results:CBC/BMP:   .     10/26/24  0455 10/26/24  1456   WBC 20.95*  --    HGB 10.5*  --    HCT 32.9*  --      --    SODIUM 140 139   K 3.9 3.8   * 112*   CO2 20* 22   BUN 11 9   CREATININE 0.50* 0.41*    GLUC 153* 104   MG 2.3  --    PHOS 2.2*  --     , Creatinine Clearance: Estimated Creatinine Clearance: 98.1 mL/min (A) (by C-G formula based on SCr of 0.41 mg/dL (L))., Troponin,BNP:  .     10/26/24  0543 10/26/24  0730   HSTNI0 3,206*  --    HSTNI2  --  3,723*    , TSH:   Results from last 7 days   Lab Units 10/26/24  0001   TSH 3RD GENERATON uIU/mL 0.015*       Imaging Results Review: I personally reviewed the following image studies in PACS and associated radiology reports: CT head. My interpretation of the radiology images/reports is: evolving stroke.  Other Study Results Review: EKG was reviewed.     VTE Prophylaxis: VTE covered by:    None        Code Status: Level 1 - Full Code  Advance Directive and Living Will:      Power of :    POLST:      Administrative Statements   I have spent a total time of 60 minutes in caring for this patient on the day of the visit/encounter including Counseling / Coordination of care, Documenting in the medical record, Reviewing / ordering tests, medicine, procedures  , Obtaining or reviewing history  , and Communicating with other healthcare professionals .

## 2024-10-26 NOTE — ASSESSMENT & PLAN NOTE
Lab Results   Component Value Date    EGFR 97 10/26/2024    EGFR 76 10/25/2024    EGFR 92 09/26/2024    CREATININE 0.50 (L) 10/26/2024    CREATININE 0.78 10/25/2024    CREATININE 0.57 (L) 09/26/2024   -Creatinine stable  -Continue to monitor BMP

## 2024-10-26 NOTE — QUICK NOTE
* Stroke ([facility])  Assessment & Plan  Assessment:  Joslyn Cantu is a 74 y.o. female who presented to the ED as a result of stroke-like symptoms.  Patient has a past medical history of acute respiratory failure with hypoxia, hypertension, hypothyroidism, hypovitaminosis D, iron deficiency anemia, and type 2 diabetes. Patient was found at the side of a sidewalk not responding and also not moving her right hand and right arm with a left gaze deviation.  Given the symptoms, a pre-hospital stroke alert was initiated.  As per patient's group home, patient was conversing with another resident around 6-6:30 PM and was her normal self prior to leaving the group home to cash a check.     Initial NIHSS 27  Presenting deficits were: R-sided weakness, L gaze preference  Interventions: After a discussion of risks, benefits and alternatives reviewing inclusion and exclusion criteria the decision was made to proceed with thrombolytic therapy. Specifically discussed were the potential benefits, risks, and side effects of the proposed stroke intervention(s) and care; the likelihood of the patient achieving their goals; and any potential problems that might occur as a result of the intervention(s); reasonable alternatives to the proposed stroke intervention(s) and care. The discussion encompasses risks, benefits and side effects related to the alternatives and the risks related to not receiving the proposed stroke intervention(s) and care. Given the critical condition of the patient, the ED team and the neurology team together conversed in regards to the patient's condition taking into consideration the group home's decision of doing everything to save the patient.  The decision was to administer TNK.  There was a delay in administering TNK with no family members able to consent for TNK but it was still administered within the appropriate timeframe. Verbal consent was obtained from conferring with Dr. Juárez and Dr. Treadwell, they were  in agreement to administer the medication since no surrogate decision maker was available.  Consent was obtained by Dr. Mary Hyman.     Workup:  Lab Results   Component Value Date    HGBA1C 9.2 (H) 03/05/2024    CHOLESTEROL 165 09/26/2024    LDLCALC 71 09/26/2024    TRIG 198 (H) 09/26/2024    INR 0.94 10/25/2024      CTH: Early findings of ischemia in the left MCA territory. No hemorrhage seen.   CTA:  Occluded left M2 segment. The M1 segments are patent. Severe bilateral ICA stenosis with pronounced noncalcified plaque proximally on the left. Additional stenoses are seen throughout the left CCA, both intracranial internal carotid arteries, and the left intracranial vertebral artery.  CTP: Infinite mismatch ratio  CTH at 24 hours: Pending  MRI: Pending  TTE/WILL: Pending    Pertinent scores as of 10/26/24:  - NIHSS: 27    Impression: Patient is a 74 year old female presented to the ED as a result of strokelike symptoms. CTH showed a possible subacute infarct and CTA showed a left M2 occlusion.  Patient received TNK at 2111.  Patient was sent for CTP and an endovascular alert was initiated.  S/p thrombectomy post-TICI score of 2C. Patient's stroke seems to be embolic in nature but source unknown.  Further workup pending.    Plan:  Case discussed with Dr. Beyer  AP/AC: Hold all AP/AC secondary to TNK administration  Repeat CTH in 24 hours recommended  Defer any other decisions to neurosurgery status post mechanical thrombectomy  Cholesterol med: Recommend initiating Lipitor 40 mg  Healthy diet encouraged  MRI Brain wo contrast recommended  Echocardiogram recommended  Stroke labs recommended: A1c, Lipid Panel  Telemetry  PT/OT versus active exercise discussed  BP management and HTN med compliance discussed, defer to neurosurgical team for SBP goals s/p mechanical thrombectomy  Rest of care as per primary care team    Stage 3a chronic kidney disease (HCC)  Assessment & Plan  Lab Results   Component Value Date    EGFR 76  10/25/2024    EGFR 92 09/26/2024    EGFR 89 04/02/2024    CREATININE 0.78 10/25/2024    CREATININE 0.57 (L) 09/26/2024    CREATININE 0.65 04/02/2024       Chronic HFrEF (heart failure with reduced ejection fraction) (Formerly [facility])  Assessment & Plan  Wt Readings from Last 3 Encounters:   10/25/24 63.7 kg (140 lb 6.9 oz)   10/02/24 54.4 kg (120 lb)   06/25/24 55.5 kg (122 lb 6.4 oz)               Type 2 diabetes mellitus without complication ([facility])  Assessment & Plan  Lab Results   Component Value Date    HGBA1C 9.2 (H) 03/05/2024       Recent Labs     10/25/24  2043 10/25/24  2322   POCGLU 409* 321*       Blood Sugar Average: Last 72 hrs:  (P) 365          Thrombolytic Decision: After a discussion of risks, benefits and alternatives reviewing inclusion and exclusion criteria the decision was made to proceed with thrombolytic therapy. Specifically discussed were the potential benefits, risks, and side effects of the proposed stroke intervention(s) and care; the likelihood of the patient achieving their goals; and any potential problems that might occur as a result of the intervention(s); reasonable alternatives to the proposed stroke intervention(s) and care. The discussion encompasses risks, benefits and side effects related to the alternatives and the risks related to not receiving the proposed stroke intervention(s) and care. Verbal consent was obtained from conferring with Dr. Juárez and Dr. Treadwell, they were in agreement to administer the medication since no surrogate decision maker was available.  Consent was obtained by Dr. Mary Hyman.    Recommendations for outpatient neurological follow up have yet to be determined.    History of Present Illness   Hx and PE limited by: Patient nonverbal  Patient last known well: 6 to 6:30 PM  Stroke alert called: 8:15 PM  Neurology time of arrival: Immediate    HPI: Joslyn Cantu is a 74 y.o. female who presented to the ED as a result of stroke-like symptoms.  Patient has a past  medical history of acute respiratory failure with hypoxia, hypertension, hypothyroidism, hypovitaminosis D, iron deficiency anemia, and type 2 diabetes.    Patient was found at the side of a sidewalk not responding and also not moving her right hand and right arm with a left gaze deviation.  Given the symptoms, a pre-hospital stroke alert was initiated.  As per patient's group home, patient was conversing with another resident around 6-6:30 PM and was her normal self prior to leaving the group home to cash a check.      On presentation, patient's SBP was in the 140s.  Glucose of 519 in the field and 409 in the ED. CTH showed early findings of ischemia in the left MCA territory.  CTA showed an occluded left M2 segment with a patent M1 segment.  Patient was deemed an appropriate candidate for TNK.  Patient's group home was called in regards to TNK consent given that patient does not have any family members.  After speaking with the group home, patient's psychiatric counselor stated that she is unable to make any critical decisions such as administering TNK and she would alert the primary on-call .  After few minutes the group home was called once again and the primary on-call  and the psychiatric counselor stated that they would like everything done to help save the patient.  Given the critical condition of the patient, the ED team and the neurology team together conversed in regards to the patient's condition taking into consideration the group home's decision of doing everything to save the patient.  The decision was to administer TNK.  There was a delay in administering TNK with no family members able to consent for TNK but it was still administered within the appropriate timeframe.    Given the occluded left M2 segment, an endovascular alert was initiated and a STAT CTP was ordered.  CTP showed an infinite mismatch ratio.  S/p mechanical thrombectomy, patient had a TICI 2C  score.    Patient admitted to the neurocritical care for further management.      Review of Systems   Unable to perform ROS: Mental status change     I have reviewed the patient's PMH, PSH, Social History, Family History, Meds, and Allergies  Historical Information   Past Medical History:   Diagnosis Date    Acute respiratory failure with hypoxia (HCC) 03/24/2024    Hypertension     Hypothyroid 05/08/2016    Hypovitaminosis D 05/10/2016    Iron deficiency anemia 05/11/2016    Type 2 diabetes mellitus without complication (HCC) 05/08/2016     Past Surgical History:   Procedure Laterality Date    IR STROKE ALERT  10/25/2024     Social History     Tobacco Use    Smoking status: Every Day     Types: Cigarettes    Smokeless tobacco: Current   Vaping Use    Vaping status: Unknown   Substance and Sexual Activity    Alcohol use: No    Drug use: No    Sexual activity: Not Currently     E-Cigarette/Vaping    E-Cigarette Use Unknown If Ever Used      E-Cigarette/Vaping Substances     Family History   Family history unknown: Yes     Social History     Tobacco Use    Smoking status: Every Day     Types: Cigarettes    Smokeless tobacco: Current   Vaping Use    Vaping status: Unknown   Substance and Sexual Activity    Alcohol use: No    Drug use: No    Sexual activity: Not Currently       Current Facility-Administered Medications:     atorvastatin (LIPITOR) tablet 40 mg, QPM    chlorhexidine (PERIDEX) 0.12 % oral rinse 15 mL, Q12H JORGE LUIS    eptifibatide (INTEGRILIN) 75 mg in 100 mL infusion (premix), Continuous, Last Rate: 0.5 mcg/kg/min (10/26/24 0014)    fentaNYL injection 50 mcg, Q1H PRN    insulin regular (HumuLIN R,NovoLIN R) 1 Units/mL in sodium chloride 0.9 % 100 mL infusion, Titrated    labetalol (NORMODYNE) injection 10 mg, Q4H PRN    levothyroxine tablet 150 mcg, Early Morning    lisinopril (ZESTRIL) tablet 10 mg, Daily    ondansetron (ZOFRAN) injection 4 mg, Q6H PRN    propofol (DIPRIVAN) 1000 mg in 100 mL infusion  (premix), Titrated, Last Rate: Stopped (10/26/24 0015)    senna-docusate sodium (SENOKOT S) 8.6-50 mg per tablet 2 tablet, BID    sodium chloride 0.9 % bolus 1,000 mL, Once, Last Rate: 1,000 mL (10/26/24 0014)    sodium chloride 0.9 % infusion, Continuous, Last Rate: 75 mL/hr (10/26/24 0014)  Prior to Admission Medications   Prescriptions Last Dose Informant Patient Reported? Taking?   Acetaminophen Extra Strength 500 MG TABS  Care Giver Yes No   Alcohol Swabs PADS  Care Giver Yes No   Sig: Use as directed for blood glucose testing   Cholecalciferol 125 MCG (5000 UT) capsule  Care Giver Yes No   Sig: Take 5,000 Units by mouth daily   Patient not taking: Reported on 10/2/2024   FeroSul 325 (65 Fe) MG tablet  Care Giver Yes No   Patient not taking: Reported on 2024   Lancets 28G MISC  Care Giver Yes No   Si (two) times a day   QUEtiapine (SEROquel) 200 mg tablet  Care Giver Yes No   Patient not taking: Reported on 2024   Valbenazine Tosylate 80 MG CAPS  Care Giver Yes No   Sig: Take 80 mg by mouth   Patient not taking: Reported on 2024   ferrous sulfate 324 (65 Fe) mg  Care Giver No No   Sig: Take 1 tablet (324 mg total) by mouth 2 (two) times a day before meals   furosemide (LASIX) 20 mg tablet  Care Giver, Outside Facility (Specify) No No   Sig: Take 2 tablets (40 mg total) by mouth daily   haloperidol (HALDOL) 5 mg tablet  Care Giver, Outside Facility (Specify) No No   Sig: Take 1 tablet (5 mg total) by mouth daily at bedtime for 3 days   ibuprofen (MOTRIN) 200 mg tablet  Care Giver Yes No   Sig: Take 200 mg by mouth every 6 (six) hours as needed   Patient not taking: Reported on 2024   levothyroxine 100 mcg tablet  Care Giver No No   Sig: Take 1.5 tablets (150 mcg total) by mouth daily in the early morning   Patient not taking: Reported on 10/2/2024   levothyroxine 150 mcg tablet  Care Giver Yes No   lidocaine (LIDODERM) 5 %  Care Giver Yes No   Sig: Place 1 patch on the skin every 12  (twelve) hours as needed   Patient not taking: Reported on 6/25/2024   lisinopril (ZESTRIL) 10 mg tablet  Care Giver No No   Sig: Take 1 tablet (10 mg total) by mouth daily   metFORMIN (GLUCOPHAGE) 1000 MG tablet  Care Giver No No   Sig: Take 1 tablet (1,000 mg total) by mouth 2 (two) times a day with meals Indications: Type 2 Diabetes.   metFORMIN (GLUCOPHAGE-XR) 500 mg 24 hr tablet  Care Giver Yes No   Patient not taking: Reported on 6/25/2024   metoprolol succinate (TOPROL-XL) 25 mg 24 hr tablet  Care Giver, Outside Facility (Specify) No No   Sig: Take 0.5 tablets (12.5 mg total) by mouth daily Do not start before April 3, 2024.      Facility-Administered Medications: None     Patient has no known allergies.    Objective :  HR:  [] 102  BP: (154-165)/(87-91) 157/91  Resp:  [16-50] 40  SpO2:  [95 %-99 %] 96 %  O2 Device: Ventilator  FiO2 (%):  [55] 55    Physical Exam  Vitals reviewed.   Constitutional:       General: She is in acute distress.      Appearance: She is ill-appearing and toxic-appearing.         Neurological Exam  Mental Status  Arousable to tactile stimuli. Patient is nonverbal. Global aphasia present.  Patient unable to answer LOC questions or follow commands. Patient unable to participate in repetition of words/sentences or identifying of objects.    Cranial Nerves  Blink to threat intact bilaterally, forced left gaze deviation not overcome with oculocephalic maneuver, no facial asymmetry observed..    Motor    RUE/RLE/LLE: No effort against gravity. LUE: Drifts and hits the bed.    Sensory  Pinprick abnormality: Patient unable to sense pinprick sensation on either the 4 extremities..     Coordination  Right: Patient unable to understand command. Patient unable to understand command.Left: Patient unable to understand command. Patient unable to understand command.      NIHSS:  1a.Level of Consciousness: 2 = Not alert, requires repeated stimulation to attend   1b. LOC Questions: 2 = Answers  neither correctly   1c. LOC Commands: 2 = Obeys neither correctly   2. Best Gaze: 2 = Forced Deviation   3. Visual: 0 = No visual field loss   4. Facial Palsy: 0=Normal symmetric movement   5a. Motor Right Arm: 2=Some effort against gravity, limb cannot get to or maintain (if cured) 90 (or 45) degrees, drifts down to bed, but has some effort against gravity   5b. Motor Left Arm: 3=No effort against gravity, limb falls   6a. Motor Right Leg: 3=No effort against gravity, limb falls   6b. Motor Left Leg: 3=No effort against gravity, limb falls   7. Limb Ataxia:  UN = Untestable (does not understand)   8. Sensory: 2=Severe to total sensory loss; patient is not aware of being touched in face, arm, leg   9. Best Language:  3=Mute, global aphasia; no usable speech or auditory comprehension   10. Dysarthria: 3=Mute/global aphasia   11. Extinction and Inattention (formerly Neglect): 0=No abnormality   Total Score: 27     Time NIHSS was completed: Immediate    Modified [employer] Score:  3 (Moderate disability; requiring some help, but able to walk without assistance)      Lab Results: I have reviewed the following results:CBC/BMP:   .     10/25/24  2028   WBC 14.77*   HGB 12.6   HCT 40.0      SODIUM 132*   K 4.3   CL 98   CO2 21   BUN 17   CREATININE 0.78   GLUC 505*    , Troponin,BNP:  .     10/25/24  2028   HSTNI0 614*        Imaging Results Review: I personally reviewed the following image studies/reports in PACS and discussed pertinent findings with Radiology: CTA and CT head.    Other Study Results Review: No additional pertinent studies reviewed.    VTE Prophylaxis: Reason for no pharmacologic prophylaxis : TNK administered    Code Status: Level 1 - Full Code  Advance Directive and Living Will:      Power of :    POLST:      Administrative Statements   I have spent a total time of 65 minutes minutes in caring for this patient on the day of the visit/encounter including Risks and benefits of tx options,  Instructions for management, Counseling / Coordination of care, Documenting in the medical record, Reviewing / ordering tests, medicine, procedures  , Obtaining or reviewing history  , and Communicating with other healthcare professionals .

## 2024-10-26 NOTE — SEDATION DOCUMENTATION
Endovascular alert called by Dr. Rosales. General anesthesia.     2131 arrive in department and room  2154 access   TICI Pre 2b  TICI post 2c  2218 Integrilin bolus given

## 2024-10-26 NOTE — OP NOTE
OPERATIVE REPORT  PATIENT NAME: Joslyn Cantu    :  1950  MRN:  [id number]   Pt Location: Interventional radiology     SURGERY DATE: 10/25/24      Preop Diagnosis:  1. Stroke, left ICA stenosis and M2 occlusion  2. Hypertension  3.  Diastolic heart failure       Postop Diagnosis  1. Stroke, left ICA stenosis and M2 occlusion  2. Hypertension  3.  Diastolic heart failure     Procedure:  1.  Left common carotid Arteriogram  2.  Left ICA angioplasty  3.  Left internal carotid arteriogram  4.  Intra-arterial Integrilin administered for thrombolysis  5.  Stenting of left internal carotid artery without distal protection device  6.  Postprocedural arteriography  7.  Limited right femoral arteriogram  8.  Hope CT of the head with postprocessing images reviewed at a separate workstation    Surgeon:   German Rosales MD     Specimen(s):  None     Estimated Blood Loss:   None     Drains:  None     Anesthesia Type:   Monitored Anesthesia Care     Complications:  None     Operative Indications:  Joslyn Cantu is a very pleasant 74 y.o. female who presented with a left MCA syndrome. NIHSS was 26. CTA was significant for left M M2 and near carotid occlusion LVO. The last known well was 4 hours prior.  No POA was available as such implied consent was used.     Procedure Details:  After obtaining written informed consent, the patient was brought into the operating room and moved to the OR table in supine fashion. The groin was prepped and draped in the usual sterile fashion. Monitored anesthesia care was induced. 5cc of intradermal lidocaine was infused into the right femoral area and percutaneous access with a 5-Bengali micropuncture kit was obtained into the right femoral artery. A 8-Bengali introducer sheath was placed. This was then exchanged for a Zoom 088. A [employer] Selector catheter was then advanced over the aortic arch over an 035 Glidewire. The Neuron 088 was advanced into the left common carotid artery.  Arteriography  was performed.  This was nearly occluded.     A Zoom 14 wire was then advanced past the stenosis.  A 5 x 30 mm Wyatt Monorail balloon was used for angioplasty.  Repeat arteriography was performed.  The sheath was then advanced into the internal carotid artery.     Left internal carotid artery arteriography was performed.  There was a distal M3 occlusion.  I determined this to be TICI 2B flow.  Integrilin was given with a 90 mcg/kg bolus.  A drip at 0.5 mcg/kg a minute was not initiated.  Delayed imaging demonstrated improvement in flow consistent with TICI 2C filling.     The catheter was then withdrawn and repeat arteriography was performed.  An 8 x 40 mm precise stent was deployed across the bifurcation.  Repeat arteriography of the common carotid and internal carotid artery were performed.     The sheath was then removed.  Angio-Seal was attempted.  Unfortunately due to the size of her artery and her stature this failed.  Manual pressure was held for 15 minutes.  There was adequate hemostasis.    A Hope CT was then performed with post process images reviewed at a separate workstation.  The patient was then brought to the ICU intubated in stable condition.     INTERPRETATION OF ANGIOGRAPHIC FINDINGS:   The left common carotid artery has antegrade flow into the external and internal carotid vessels.  There is significant stenosis in the internal carotid artery with evidence of calcification.  There is greater than 90% stenosis according to NASCET criteria.    Post angioplasty arteriography demonstrates significant improvement in the caliber of her internal carotid artery.    3.  Left internal carotid artery arteriogram demonstrates an flow into the middle cerebral and anterior cerebral artery.  There is filling into the M1 and M2.  There appears to be M3 thrombus.    4.  Post Integrilin arteriography demonstrates improvement in flow consistent with TICI 2C revascularization.    5.  Repeat left cervical carotid  arteriography demonstrates continued irregularity along the bifurcation with concern for restenosis.  Post stent arteriography demonstrates improvement in the caliber of the vessel with decreased irregularity.  Intracranially there is evidence of antegrade flow.  There remains TICI 2C revascularization.    6.  Limited right femoral arteriogram demonstrates poor filling distal to the puncture likely related to stenosis that was predating.  There is some distal flow.    7.  Hope CT demonstrates some intraparenchymal contrast staining.  There is not appear to be any significant subarachnoid hemorrhage.  Post process images were reviewed at a separate workstation.    Impression:  TICI 2c revascularization of a left M2/3 occlusion and critical left ICA stenosis     Important times:  2131 arrive in department and room  2154 access   TICI Pre 2b  2218 integrillin bolus TICI post 2c    Patient Disposition:  Critical Care Unit     SIGNATURE: German Rosales MD  DATE: 10/25/24    TIME:  22:51

## 2024-10-26 NOTE — CONSULTS
Consultation - Cardiology Team One  Joslyn Cantu 74 y.o. female MRN: [id number]  Unit/Bed#: ICU 09 Encounter: [id number]    Inpatient consult to Cardiology  Consult performed by: YUMI Brooks  Consult ordered by: Suzanne Puente MD          Physician Requesting Consult: Aida Victor DO  Reason for Consult / Principal Problem:  ST elevations    Assessment & Plan  Stroke ([facility])  -Found unresponsive on left side walk with right sided weakness and left sided gaze.  -CT of the head showed early findings of ischemia of left MCA.  CTA showed occluded left M2 segment with a patent M1 segment  -TNK given and  status post mechanical thrombectomy, ICA stent (10/25)  -Following with neurology  --CTA head at 24 hours pending, MRI pending, TTE/WILL pending    Schizophrenia ([facility])  -As per primary team  -Resides in a group home  Hypothyroid  -Managed per primary team  Hypertension  -Home medication lisinopril 5 mg daily  Type 2 diabetes mellitus without complication ([facility])  Lab Results   Component Value Date    HGBA1C 8.8 (H) 10/26/2024       Recent Labs     10/25/24  2322 10/26/24  0156 10/26/24  0502 10/26/24  0548   POCGLU 321* 254* 161* 121       Blood Sugar Average: Last 72 hrs:  (P) 253.2    Chronic HFrEF (heart failure with reduced ejection fraction) ([facility])  Wt Readings from Last 3 Encounters:   10/25/24 55 kg (121 lb 4.1 oz)   10/02/24 54.4 kg (120 lb)   06/25/24 55.5 kg (122 lb 6.4 oz)     -Weight today 55 kg  -Euvolemic on examination  -Echocardiogram (3/26/2024): Left ventricular ejection fraction 40%.  Moderate global hypokinesis.  Grade 2 diastolic dysfunction.  Moderately dilated left atrium (42 to 48 mL/m2).  Moderate aortic stenosis with mean gradient 6 mmHg and aortic valve area 1.14 cm².  Mild mitral valve regurgitation.  Moderate tricuspid valve regurgitation.  -Home regimen Lasix 40 mg daily and metoprolol succinate 12.5 mg daily currently held due to hypotension  -Currently on Levophed gtt  -EKG:  Sinus tachycardia rate 118 bpm poor R wave progression, nonspecific ST abnormality.  Stage 3a chronic kidney disease (HCC)  Lab Results   Component Value Date    EGFR 97 10/26/2024    EGFR 76 10/25/2024    EGFR 92 09/26/2024    CREATININE 0.50 (L) 10/26/2024    CREATININE 0.78 10/25/2024    CREATININE 0.57 (L) 09/26/2024   -Creatinine stable  -Continue to monitor BMP  Aortic stenosis, moderate  -Echocardiogram (3/26/2024):Moderate aortic stenosis with mean gradient 6 mmHg and aortic valve area 1.14 cm².      Elevated troponin  -Initial troponin [id number]  -EKG: Sinus tachycardia rate 118 bpm poor R wave progression, nonspecific ST elevation  -Elevated troponins in the setting of acute ischemic stroke with respiratory insufficiency               Plan/Recommendations:  -Echocardiogram to assess structure and function  -Continue to trend troponins until peak      ______________________________________________________________________________________    CC: Patient intubated and sedated      History of Present Illness   HPI: Joslyn Cantu is a 74 y.o. year old female who has a past medical history of acute respiratory failure with hypoxia, hypertension, hypothyroidism, iron deficiency anemia type 2 diabetes , hypothyroidism, HFrEF, CKD stage III, presented to the emergency room with strokelike symptoms.  She was found on the sidewalk not responding and not moving her right hand right arm with left eye gaze deviation.  Prehospital stroke alert was initiated.  As per patient's group home, she was conversing with another resident around 6-to 6:30 PM and was reported to her normal self prior to leaving the group home.   On presentation to the emergency room, systolic blood pressure in the 140s.  Glucose 519.  CT of the head showed early findings of ischemia of left MCA.  CTA showed occluded left M2 segment with a patent M1 segment.  Patient received TNK and status post mechanical thrombectomy  with placement of ICA stent  on 10/25/2024.  She remained intubated following mechanical thrombectomy due to risk of respiratory insufficiency.  Troponins elevated [phone].  She follows with Dr. San on outpatient basis (with last visit 6/25/2024).       Home cardiac medication regimen: Lasix 40 mg daily, metoprolol succinate 12.5 mg daily    Previous cardiac studies  Echocardiogram (3/26/2024): Left ventricular ejection fraction 40%.  Moderate global hypokinesis.  Grade 2 diastolic dysfunction.  Moderately dilated left atrium (42 to 48 mL/m2).  Moderate aortic stenosis with mean gradient 6 mmHg and aortic valve area 1.14 cm².  Mild mitral valve regurgitation.  Moderate tricuspid valve regurgitation.      EKG reviewed personally: Sinus tachycardia with premature PACs.  Nonspecific        Telemetry reviewed personally: Tachycardia        Review of Systems   Unable to perform ROS: Acuity of condition (Intubated/sedated)     Historical Information   Past Medical History:   Diagnosis Date    Acute respiratory failure with hypoxia (HCC) 03/24/2024    Hypertension     Hypothyroid 05/08/2016    Hypovitaminosis D 05/10/2016    Iron deficiency anemia 05/11/2016    Type 2 diabetes mellitus without complication (HCC) 05/08/2016     Past Surgical History:   Procedure Laterality Date    IR STROKE ALERT  10/25/2024     Social History     Substance and Sexual Activity   Alcohol Use No     Social History     Substance and Sexual Activity   Drug Use No     Social History     Tobacco Use   Smoking Status Every Day    Types: Cigarettes   Smokeless Tobacco Current     Family History: Family history non-contributory    Meds/Allergies   all current active meds have been reviewed  eptifibatide, 0.5 mcg/kg/min, Last Rate: 0.5 mcg/kg/min (10/26/24 0014)  insulin regular (HumuLIN R,NovoLIN R) 1 Units/mL in sodium chloride 0.9 % 100 mL infusion, 0.3-21 Units/hr, Last Rate: 0.5 Units/hr (10/26/24 0549)  norepinephrine, 1-30 mcg/min, Last Rate: 4 mcg/min (10/26/24  0608)  propofol, 5-50 mcg/kg/min, Last Rate: Stopped (10/26/24 0015)  sodium chloride, 75 mL/hr, Last Rate: 75 mL/hr (10/26/24 0301)        No Known Allergies    Objective   Vitals: Blood pressure (!) 75/51, pulse 66, temperature (!) 97 °F (36.1 °C), temperature source Axillary, resp. rate 14, weight 55 kg (121 lb 4.1 oz), SpO2 100%.,     Body mass index is 22.18 kg/m².,     Systolic (24hrs), Av , Min:75 , Max:165     Diastolic (24hrs), Av, Min:51, Max:91    Wt Readings from Last 3 Encounters:   10/25/24 55 kg (121 lb 4.1 oz)   10/02/24 54.4 kg (120 lb)   24 55.5 kg (122 lb 6.4 oz)      Lab Results   Component Value Date    CREATININE 0.50 (L) 10/26/2024    CREATININE 0.78 10/25/2024    CREATININE 0.57 (L) 2024         Intake/Output Summary (Last 24 hours) at 10/26/2024 0750  Last data filed at 10/26/2024 0600  Gross per 24 hour   Intake 1536.52 ml   Output 950 ml   Net 586.52 ml     Weight (last 2 days)       Date/Time Weight    10/25/24 2311 55 (121.25)    10/25/24 20:36:30 63.7 (140.43)    10/25/24 2025 63.7 (140.43)          Invasive Devices       Peripheral Intravenous Line  Duration             Peripheral IV 10/25/24 Right Antecubital <1 day    Peripheral IV 10/26/24 Left;Upper Arm <1 day    Peripheral IV 10/26/24 Right;Upper Arm <1 day              Arterial Line  Duration             Arterial Line 10/25/24 Right Radial <1 day              Drain  Duration             Urethral Catheter <1 day              Airway  Duration             ETT  Cuffed;Oral;Pre-curved;Inflated 8 mm <1 day                      Physical Exam  Constitutional:       Appearance: Normal appearance. She is normal weight. She is ill-appearing.      Interventions: She is intubated.   HENT:      Head: Normocephalic.      Nose: Nose normal.   Eyes:      Conjunctiva/sclera: Conjunctivae normal.   Cardiovascular:      Rate and Rhythm: Regular rhythm. Tachycardia present.      Pulses: Normal pulses.      Heart sounds: Murmur  "heard.      Systolic murmur is present.   Pulmonary:      Effort: She is intubated.      Breath sounds: Normal breath sounds. No wheezing, rhonchi or rales.   Abdominal:      General: Bowel sounds are normal. There is no distension.      Palpations: Abdomen is soft.   Musculoskeletal:      Cervical back: Neck supple.      Right lower leg: No edema.      Left lower leg: No edema.   Skin:     General: Skin is warm.      Capillary Refill: Capillary refill takes less than 2 seconds.   Neurological:      GCS: GCS eye subscore is 2.           LABORATORY RESULTS:  Results from last 7 days   Lab Units 10/26/24  0001   CK TOTAL U/L 172     CBC with diff:   Results from last 7 days   Lab Units 10/26/24  0455 10/25/24  2028   WBC Thousand/uL 20.95* 14.77*   HEMOGLOBIN g/dL 10.5* 12.6   HEMATOCRIT % 32.9* 40.0   MCV fL 97 96   PLATELETS Thousands/uL 178 243   RBC Million/uL 3.41* 4.17   MCH pg 30.8 30.2   MCHC g/dL 31.9 31.5   RDW % 13.4 13.2   MPV fL 10.6 10.4       CMP:  Results from last 7 days   Lab Units 10/26/24  0455 10/26/24  0001 10/25/24  2028   POTASSIUM mmol/L 3.9  --  4.3   CHLORIDE mmol/L 111*  --  98   CO2 mmol/L 20*  --  21   BUN mg/dL 11  --  17   CREATININE mg/dL 0.50*  --  0.78   CALCIUM mg/dL 8.0*  --  9.2   AST U/L 36 37  --    ALT U/L 26 27  --    ALK PHOS U/L 70 83  --    EGFR ml/min/1.73sq m 97  --  76       BMP:  Results from last 7 days   Lab Units 10/26/24  0455 10/25/24  2028   POTASSIUM mmol/L 3.9 4.3   CHLORIDE mmol/L 111* 98   CO2 mmol/L 20* 21   BUN mg/dL 11 17   CREATININE mg/dL 0.50* 0.78   CALCIUM mg/dL 8.0* 9.2          No results found for: \"NTBNP\"         Results from last 7 days   Lab Units 10/26/24  0455   MAGNESIUM mg/dL 2.3          Results from last 7 days   Lab Units 10/26/24  0455   HEMOGLOBIN A1C % 8.8*          Results from last 7 days   Lab Units 10/26/24  0455 10/26/24  0001   TSH 3RD GENERATON uIU/mL  --  0.015*   T3 FREE pg/mL 2.78  --    FREE T4 ng/dL  --  1.99*       Results " from last 7 days   Lab Units 10/25/24  2028   INR  0.94     Lipid Profile:   Lab Results   Component Value Date    CHOL 138 04/12/2014     Lab Results   Component Value Date    HDL 43 (L) 10/26/2024    HDL 54 09/26/2024    HDL 49 05/01/2016     Lab Results   Component Value Date    LDLCALC 45 10/26/2024    LDLCALC 71 09/26/2024    LDLCALC 75 05/01/2016     Lab Results   Component Value Date    TRIG 125 10/26/2024    TRIG 198 (H) 09/26/2024    TRIG 204 (H) 05/01/2016       Imaging: Results Review Statement: No pertinent imaging studies reviewed.  IR stroke alert    Result Date: 10/25/2024  Narrative: Please refer to the Operative Note for procedure dictation.    CT cerebral perfusion    Result Date: 10/25/2024  Narrative: CT PERFUSION INDICATION:  Stroke Alert. COMPARISON: CTA from the same day TECHNIQUE: CT perfusion study was performed.  A total of 8 cm of brain tissue was evaluated in two different volumetric acquisitions.  iSchemaView RAPID software was utilized to calculate cerebral blood flow, cerebral blood volume, mean transit time, and  Tmax. Radiation dose length product (DLP) for this visit:  2115 mGy-cm .  This examination, like all CT scans performed in the [facility], was performed utilizing techniques to minimize radiation dose exposure, including the use of iterative reconstruction and automated exposure control. IV Contrast:  40 mL of iohexol (OMNIPAQUE) IMAGE QUALITY: The exam is significantly motion degraded with up to 10 mm of translation in the X and Y axis. FINDINGS: Hemisphere affected:  Left. Total CBF<30% volume: 0 mL Total Tmax>6.0s volume:  90 mL Total Mismatch difference: 90 mL Total Mismatch ratio: Infinite. Hypoperfusion Index (Tmax>10s/Tmax.6s): 0.1 Additional comments: Images are significantly motion degraded. Within this limitation however, there is relative hypoenhancement in the left MCA territory on series 242. This corresponds with perfusion findings and findings  on earlier noncontrast head CT     Impression: CT perfusion performed.  Data available on PACS. There is significant motion degradation. Workstation performed: [id number]     CTA dissection protocol chest abdomen pelvis w wo contrast    Result Date: 10/25/2024  Narrative: CTA - CHEST, ABDOMEN AND PELVIS - WITHOUT AND WITH IV CONTRAST INDICATION: Altered mental status, focal deficits. COMPARISON: Chest radiograph 3/30/2024 TECHNIQUE: CT examination of the chest, abdomen and pelvis was performed both prior to and after the administration of intravenous contrast. The noncontrast portion of this examination was performed utilizing low radiation dose technique. Thin section angiographic arterial phase post contrast technique was used in order to evaluate for aortic dissection. 3D reformatted images and volume rendering were performed on an independent workstation. Additionally, axial, sagittal, and coronal 2D reformatted images were created from the source data and submitted for interpretation. Radiation dose length product (DLP) for this visit: 1496.47 mGy-cm . This examination, like all CT scans performed in the [facility], was performed utilizing techniques to minimize radiation dose exposure, including the use of iterative reconstruction and automated exposure control. IV Contrast: 75 mL of iohexol (OMNIPAQUE) Enteric Contrast: Not administered. FINDINGS: The noncontrast series of this exam is limited, due to prior contrast administration from the CTA head and neck. Image quality is moderately degraded, due to patient motion artifact. This can decrease the sensitivity for detecting and characterizing subtle abnormalities. AORTA: No aortic dissection or intramural hematoma. No aortic aneurysm. Moderate diffuse atherosclerotic changes, with an occlusion of the left femoral (superficial) artery. This is incompletely imaged. CHEST LUNGS: There is advanced centrilobular emphysema throughout both lungs,  with dependent bibasilar atelectasis. No lobar consolidation. There are numerous punctate calcified granulomas in the right greater than left upper lobe PLEURA: Unremarkable. HEART/PULMONARY ARTERIAL TREE: The heart is not grossly enlarged, with prominent multivessel coronary artery calcifications. The pericardium is unremarkable. MEDIASTINUM AND EVERTON: There is a mildly enlarged (1.6 x 1.2 cm) nonspecific right hilar lymph node. There is no generalized mediastinal lymphadenopathy. The esophagus and central airways are grossly unremarkable. CHEST WALL AND LOWER NECK: Unremarkable. ABDOMEN Assessment of the upper abdomen is suboptimal due to artifact from the patient's arms and motion artifact. LIVER/BILIARY TREE: Unremarkable. GALLBLADDER: No calcified gallstones. No pericholecystic inflammatory change. SPLEEN: Unremarkable. PANCREAS: Unremarkable. ADRENAL GLANDS: Unremarkable. KIDNEYS/URETERS: Unremarkable. No hydronephrosis. STOMACH AND BOWEL: Unremarkable. APPENDIX: No findings to suggest appendicitis. ABDOMINOPELVIC CAVITY: No ascites. No pneumoperitoneum. No lymphadenopathy. PELVIS REPRODUCTIVE ORGANS: Unremarkable for patient's age. URINARY BLADDER: Unremarkable. ABDOMINAL WALL/INGUINAL REGIONS: Unremarkable. BONES: No acute fracture or suspicious osseous lesion.     Impression: 1.  No identifiable acute abnormality to account for the patient's clinical presentation. There is no evidence of an aortic dissection. There is an occlusion of the left femoral artery, which is incompletely assessed on this exam. 2.  Advanced centrilobular emphysema. 3.  Nonspecific mildly enlarged right hilar lymph node. A 3-month contrast-enhanced chest CT follow-up should be considered for further evaluation. The study was marked in EPIC for significant notification. Workstation performed: [id number]     CTA stroke alert (head/neck)    Result Date: 10/25/2024  Narrative: CTA NECK AND BRAIN WITH AND WITHOUT CONTRAST INDICATION: Stroke  Alert COMPARISON:   None. TECHNIQUE:  Post contrast imaging was performed after administration of iodinated contrast through the neck and brain. Post contrast axial 0.625 mm images timed to opacify the arterial system.  3D rendering was performed on an independent workstation.   MIP reconstructions performed. Coronal and sagittal reconstructions were performed of the non contrast portion of the brain. Radiation dose length product (DLP) for this visit:  515.51 mGy-cm .  This examination, like all CT scans performed in the [facility], was performed utilizing techniques to minimize radiation dose exposure, including the use of iterative  reconstruction and automated exposure control. IV Contrast:  75 mL of iohexol (OMNIPAQUE) IMAGE QUALITY:   Diagnostic FINDINGS: CTA NECK ARCH AND GREAT VESSELS: Mild atherosclerotic changes with no severe stenosis. VERTEBRAL ARTERIES: Patent extracranial segments. Mild right origin stenosis due to calcified plaque RIGHT CAROTID: Extensive calcified plaque. 70-99% stenosis.   No dissection. LEFT CAROTID: Extensive, mixed plaque. There is predominantly soft plaque along the lateral margin of the proximal ICA just after the bifurcation, best seen on image 162 of series 2. There is no definite contrast surrounding this soft tissue density to suggest an intraluminal thrombus. 70-99% stenosis.   No dissection. There is moderate stenosis of the left common carotid artery at its origin and throughout its proximal to mid course due to mixed plaque. NASCET criteria was used to determine the degree of internal carotid artery diameter stenosis. CTA BRAIN: There is mild diffuse intracranial vessel irregularity suggesting intracranial atherosclerosis. INTERNAL CAROTID ARTERIES: Moderate bilateral stenosis due to calcified plaque. ANTERIOR CEREBRAL ARTERY CIRCULATION:  No occlusion. There is prominent calcified plaque along the right A2 A3 junction. MIDDLE CEREBRAL ARTERY  CIRCULATION: There is an occluded left M2 branch on image numbers 78 and 79 of series 2. The additional M2 segments and left M1 segment appear normal. DISTAL VERTEBRAL ARTERIES: Moderate stenosis intracranially on the left due to soft plaque. BASILAR ARTERY:  No stenosis or occlusion. POSTERIOR CEREBRAL ARTERIES: No stenosis or occlusion. VENOUS STRUCTURES:  Normal. NON VASCULAR ANATOMY BONY STRUCTURES:  No acute osseous abnormality. SOFT TISSUES OF THE NECK:  Normal. THORACIC INLET: Moderate emphysema.     Impression: 1. Occluded left M2 segment. The M1 segments are patent. 2. Severe bilateral ICA stenosis with pronounced noncalcified plaque proximally on the left. 3. Additional stenoses are seen throughout the left CCA, both intracranial internal carotid arteries, and the left intracranial vertebral artery. Findings were directly discussed with Donita Beyer at 8:45 p.m. on 10/25/2024. Workstation performed: [id number]     CT stroke alert brain    Result Date: 10/25/2024  Narrative: CT BRAIN - STROKE ALERT PROTOCOL INDICATION:   Stroke Alert. COMPARISON: 12/18/2023 TECHNIQUE:  CT examination of the brain was performed.  In addition to axial images, coronal reformatted images were created and submitted for interpretation. Radiation dose length product (DLP) for this visit:  908.17 mGy-cm .  This examination, like all CT scans performed in the [facility], was performed utilizing techniques to minimize radiation dose exposure, including the use of iterative  reconstruction and automated exposure control. IMAGE QUALITY:  Diagnostic. FINDINGS: PARENCHYMA: There are findings of early ischemia in the left MCA territory superiorly with loss of gray-white differentiation for instance on image 34 of series 2. There is also loss of gray-white differentiation in the left inferior frontal lobe which is new in the interim but may be more subacute. No hemorrhage seen. Normal intracranial vasculature. VENTRICLES AND  EXTRA-AXIAL SPACES:  Normal for the patient's age. VISUALIZED ORBITS: Normal visualized orbits. PARANASAL SINUSES: Normal visualized paranasal sinuses. CALVARIUM AND EXTRACRANIAL SOFT TISSUES:   Normal.     Impression: Early findings of ischemia in the left MCA territory. No hemorrhage seen. Findings were directly discussed with Dr. Mary Hyman at approximately 8:40 p.m. on 10/25/2024. Workstation performed: [id number]     XR chest pa and lateral    Result Date: 10/24/2024  Narrative: XR CHEST 2 VIEWS (PA AND LAT) HISTORY:Upper respiratory tract infection COMPARISON: 11/25/21. FINDINGS: The cardiomediastinal silhouette is unremarkable.   There is probable pulmonary emphysema. No focal consolidation is seen. There is no pleural effusion or pneumothorax. The visualized upper abdomen is unremarkable. There is scoliosis, osteopenia and multilevel thoracic compression deformities which are similar to the prior examination.     Impression: IMPRESSION: Probable pulmonary emphysema. No focal consolidation. Workstation:[id number]        Counseling / Coordination of Care  Total floor / unit time spent today 45 minutes.  Greater than 50% of total time was spent with the patient and / or family counseling and / or coordination of care.  A description of the counseling / coordination of care: Review of history, current assessment, development of a plan.      Code Status: Level 1 - Full Code    ** Please Note: Dragon 360 Dictation voice to text software may have been used in the creation of this document. **

## 2024-10-26 NOTE — ASSESSMENT & PLAN NOTE
Wt Readings from Last 3 Encounters:   10/25/24 63.7 kg (140 lb 6.9 oz)   10/02/24 54.4 kg (120 lb)   06/25/24 55.5 kg (122 lb 6.4 oz)

## 2024-10-26 NOTE — ED RE-EVALUATION NOTE
ED Course as of 10/25/24 2058   Fri Oct 25, 2024   2055 POCUS Cardiac/IVC + POCUS Lung:  - transthoracic echocardiogram was performed at bedside by myself and resident.   - Images collected were poor quality.   - This was a technically difficult study due to lung interference.   - Apical, parasternal, subcostal views were obtained/attempted.   - The main purpose of this study was to r/o obvious pathology requiring emergent intervention as in summary.   - Many views/images obtained for educational reasons.   - Findings:    There was no obvious pericardial effusion:   LV     Function: grossly Reduced appearing.    RV     Function grossly normal appearing.    IVC: IVC < 2.1cm; <50% compression w/ sniff likely RAP 8 mmHg    IVC Max: 1.7    CI: 19%   Lungs:    There was no obvious pleural effusion.     B-lines were present anteriorly bilaterally at upper BLUE-point (3+ B-lines in 2+ fields w/ concern for HF/Cardiogenic pulmonary edema sensitivity 85-94%, specificity 92% LR+ 7.4-12; LR- 0.06-0.16)    Bilateral anterior lung sliding bilaterally present at upper BLUE-point (no pneumothorax, sen 91%; spec 98%)   Valves:    There was no obvious MR regurgitation.    There was no obvious TR regurgitation.    There was no obvious AR regurgitation; there was a very calcified aortic valve. Stenotic? No movement seen.     There was moderate LA dilation.    There was no obvious RA dilation.     Summary:   - Reduced ejection fraction; takotsubo's appearance (significant apical hypokinesis, basal portion seemed much more functional).   - B-lines anterior bilaterally. Pulmonary edema?  - IVC would suggest she is fluid tolerant.  - There was no obvious anterior pneumothorax  - There was no large pericardial effusion  - Moderate LA dilation. Likely elevated LAP.   - Calcified aortic valve. Stenosis suspected.           Rylan Baltazar MD  10/25/24 2058

## 2024-10-26 NOTE — PROGRESS NOTES
Progress Note - Critical Care/ICU   Name: Joslyn Cantu 74 y.o. female I MRN: [id number]  Unit/Bed#: ICU 09 I Date of Admission: 10/25/2024   Date of Service: 10/26/2024 I Hospital Day: 1       Assessment & Plan   Neuro:   Diagnosis: Stroke  Plan: 10/25/2024 CTA-occluded left M2, severe bilateral ICA stenosis  Status post TNK 2100  NIH on admission 27  Follow-up 24-hour post TNK CT head  Status post mechanical thrombectomy, ICA stent 10/25  Post thrombectomy CT head showing contrast staining  Follow-up MRI  Continue Integrilin drip 0.5  Follow-up a.m. CT head 0400  Diagnosis: Schizophrenia  Plan: Will hold home dose Haldol in setting of prolonged Qtc  Resume when able     CV:   Diagnosis: Chronic heart failure, hypertension,  Plan: 3/26/2024 echo-EF 40%, systolic function moderately reduced, moderate global hypokinesis, diastolic function moderately abnormal grade 2  Follow-up repeat echo  Monitor on telemetry  Continue home dose lisinopril  Resume home dose Lasix and metoprolol when able  Diagnosis: Elevated troponin, suspect type II, prolonged Qtc, hypotension   Plan: Initial troponin 614  Continue to trend  EKG now-prolonged Qtc 535, NSR  Levo gtt to maintain MAP > 65     Pulm:  Diagnosis: Respiratory insufficiency, advanced centrilobular emphysema  Plan: 10/25/2024 CTA chest abdomen pelvis-advanced setrobuvir of emphysema, mildly enlarged right hilar lymph node  Remained intubated post IR  Wean vent as able     GI:   Diagnosis: Dysphagia  Plan: Speech evaluation when able     :   Diagnosis: CKD 3  Plan: Creatinine on admission 0.78  Strict intake and output  Trend BUN and creatinine     F/E/N:   Normal saline drip  Replete electrolytes as needed  N.p.o. until cleared by speech     Heme/Onc:   Diagnosis: DVT prophylaxis  Plan: Hold chemical prophylaxis in setting of TNK  Maintain SCDs     Endo:   Diagnosis: DM 2, hypothyroidism  Plan: 3/5/2024 hemoglobin A1c 9.2  Insulin gtt   Follow-up a.m. hemoglobin  A1c  Follow-up a.m. TSH  Continue home dose levothyroxine     ID:   Diagnosis: Leukocytosis  Plan: Suspect reactive  Trend fever curve and WBC  F/u Blood cultures, UA     MSK/Skin:   Diagnosis: Ambulatory dysfunction  Plan: PT/OT    Disposition: Critical care    ICU Core Measures     Vented Patient  VAP Bundle  VAP bundle ordered     A: Assess, Prevent, and Manage Pain Has pain been assessed? Yes  Need for changes to pain regimen? No   B: Both Spontaneous Awakening Trials (SATs) and Spontaneous Breathing Trials (SBTs) Plan to perform spontaneous awakening trial today? Yes   Plan to perform spontaneous breathing trial today? Yes   Obvious barriers to extubation? Yes   C: Choice of Sedation RASS Goal: 0 Alert and Calm  Need for changes to sedation or analgesia regimen? No   D: Delirium CAM-ICU: Unable to perform secondary to Acute cognitive dysfunction   E: Early Mobility  Plan for early mobility? Yes   F: Family Engagement Plan for family engagement today? Yes       Review of Invasive Devices:        Louisa Plan: Keep arterial line for hemodynamic monitoring    Prophylaxis:  VTE Contraindicated secondary to: TNK   Stress Ulcer  not ordered         24 Hour Events : Admitted overnight status post TNK mechanical thrombectomy, hypotensive this AM requiring levo gtt   Subjective       Objective :                   Vitals I/O      Most Recent Min/Max in 24hrs   Temp (!) 97.4 °F (36.3 °C) Temp  Min: 97.4 °F (36.3 °C)  Max: 97.4 °F (36.3 °C)   Pulse 102 Pulse  Min: 92  Max: 119   Resp (!) 40 Resp  Min: 16  Max: 50   /91 BP  Min: 154/89  Max: 165/87   O2 Sat 96 % SpO2  Min: 95 %  Max: 99 %    No intake or output data in the 24 hours ending 10/26/24 0140    Diet NPO    Invasive Monitoring           Physical Exam   Physical Exam  Vitals and nursing note reviewed.   Eyes:      Pupils: Pupils are equal, round, and reactive to light.   Skin:     General: Skin is warm.   HENT:      Mouth/Throat:      Mouth: Mucous membranes  are moist.   Cardiovascular:      Rate and Rhythm: Normal rate.      Pulses: Normal pulses.   Abdominal:      Palpations: Abdomen is soft.   Constitutional:       Appearance: She is ill-appearing.   Pulmonary:      Breath sounds: Normal breath sounds.   Neurological:      Comments: Exam limited due to intubation  Does not open eyes  Spontaneously moves LUE/LLE   Withdraws RUE/RLE             Diagnostic Studies        Lab Results: I have reviewed the following results:     Medications:  Scheduled PRN   atorvastatin, 40 mg, QPM  chlorhexidine, 15 mL, Q12H JORGE LUIS  levothyroxine, 150 mcg, Early Morning  lisinopril, 10 mg, Daily  magnesium sulfate, 2 g, Once  senna-docusate sodium, 2 tablet, BID  sodium chloride, 1,000 mL, Once      fentaNYL, 50 mcg, Q1H PRN  labetalol, 10 mg, Q4H PRN  ondansetron, 4 mg, Q6H PRN       Continuous    eptifibatide, 0.5 mcg/kg/min, Last Rate: 0.5 mcg/kg/min (10/26/24 0014)  insulin regular (HumuLIN R,NovoLIN R) 1 Units/mL in sodium chloride 0.9 % 100 mL infusion, 0.3-21 Units/hr, Last Rate: 6 Units/hr (10/26/24 0024)  propofol, 5-50 mcg/kg/min, Last Rate: Stopped (10/26/24 0015)  sodium chloride, 75 mL/hr, Last Rate: 75 mL/hr (10/26/24 0014)         Labs:   CBC    Recent Labs     10/25/24  2028   WBC 14.77*   HGB 12.6   HCT 40.0        BMP    Recent Labs     10/25/24  2028   SODIUM 132*   K 4.3   CL 98   CO2 21   AGAP 13   BUN 17   CREATININE 0.78   CALCIUM 9.2       Coags    Recent Labs     10/25/24  2028   INR 0.94   PTT 21*        Additional Electrolytes  No recent results       Blood Gas    Recent Labs     10/26/24  0105   PHART 7.294*   VUO4DJI 38.5   PO2ART 100.1   SMU1BDP 18.3*   BEART -7.6   SOURCE Line, Arterial     Recent Labs     10/26/24  0105   SOURCE Line, Arterial    LFTs  Recent Labs     10/26/24  0001   ALT 27   AST 37   ALKPHOS 83   ALB 3.5   TBILI 0.31       Infectious  No recent results  Glucose  Recent Labs     10/25/24  2028   GLUC 505*

## 2024-10-26 NOTE — ASSESSMENT & PLAN NOTE
-Found unresponsive on left side walk with right sided weakness and left sided gaze.  -CT of the head showed early findings of ischemia of left MCA.  CTA showed occluded left M2 segment with a patent M1 segment  -TNK given and  status post mechanical thrombectomy, ICA stent (10/25)  -Following with neurology  --CTA head at 24 hours pending, MRI pending, TTE/WILL pending

## 2024-10-26 NOTE — SPEECH THERAPY NOTE
Speech-Language Pathology Progress Note      Patient Name: Joslyn Cantu    Today's Date: 10/26/2024      Pt is currently intubated. Orders completed. Please re consult as able/appropriate.

## 2024-10-26 NOTE — ANESTHESIA POSTPROCEDURE EVALUATION
Post-Op Assessment Note    CV Status:  Stable  Pain Score: 0    Pain management: adequate       Mental Status:  Unresponsive   Hydration Status:  Euvolemic   PONV Controlled:  Controlled   Airway Patency:  Patent  Airway: intubated     Post Op Vitals Reviewed: Yes    No anethesia notable event occurred.    Staff: CRNA   Comments: TRANSPORTED TO ICU ON PROPOFOL AND NOREPI GTT. VITALS STABLE, REPORT GIVEN AT BEDSIDE          Last Filed PACU Vitals:  Vitals Value Taken Time   Temp 97.4    Pulse 104 10/25/24 2339   /92    Resp 26 10/25/24 2339   SpO2 99 % 10/25/24 2339   Vitals shown include unfiled device data.    Modified Kavon:  No data recorded

## 2024-10-26 NOTE — ANESTHESIA PREPROCEDURE EVALUATION
Procedure:  PRE-OP ONLY    Relevant Problems   CARDIO   (+) Hypertension      ENDO   (+) Hypothyroid   (+) Type 2 diabetes mellitus without complication (HCC)      /RENAL   (+) Stage 3a chronic kidney disease (HCC)      HEMATOLOGY   (+) Iron deficiency anemia      NEURO/PSYCH   (+) Schizophrenia (HCC)        Physical Exam    Airway    Mallampati score: II  TM Distance: >3 FB  Neck ROM: full     Dental   No notable dental hx     Cardiovascular  Cardiovascular exam normal    Pulmonary  Pulmonary exam normal     Other Findings  post-pubertal.    Left Ventricle: Left ventricular cavity size is normal. Wall thickness is increased. The left ventricular ejection fraction is 40%. Systolic function is moderately reduced. There is moderate global hypokinesis. Diastolic function is moderately abnormal, consistent with grade II (pseudonormal) relaxation.    Right Ventricle: Right ventricular cavity size is normal. Systolic function is normal.    Left Atrium: The atrium is moderately dilated (42-48 mL/m2).    Atrial Septum: The septum bows into the right atrium, suggesting increased left atrial pressure.    Aortic Valve: The aortic valve is trileaflet. The leaflets are moderately thickened. The leaflets are moderately calcified. There is moderately reduced mobility. There is moderate stenosis. The aortic valve mean gradient is 6 mmHg. The dimensionless velocity index is 0.39. The aortic valve area is 1.14 cm2. The stroke volume index is 19.00 ml/m2. The aortic valve velocity is increased due to stenosis but lower than expected due to the presence of decreased flow.    Mitral Valve: There is mild regurgitation.    Tricuspid Valve: There is moderate regurgitation. The right ventricular systolic pressure is mildly elevated. The estimated right ventricular systolic pressure is 40.00 mmHg.    Pericardium: There is a right pleural effusion.       Sinus rhythm with Premature supraventricular complexes  Nonspecific ST and T wave  abnormality  Abnormal ECG  When compared with ECG of 24-MAR-2024 03:10,  ST now depressed in Anterior leads    Anesthesia Plan  ASA Score- 4 Emergent    Anesthesia Type- general with ASA Monitors.         Additional Monitors:     Airway Plan: ETT.    Comment: Emergent case.       Plan Factors-Exercise tolerance (METS): <4 METS.    Chart reviewed. EKG reviewed. Imaging results reviewed. Existing labs reviewed. Patient summary reviewed.    Patient is a current smoker.              Induction-     Postoperative Plan-     Perioperative Resuscitation Plan - Level 1 - Full Code.       Informed Consent-   I personally reviewed this patient with the CRNA. Discussed and agreed on the Anesthesia Plan with the CRNA..      Smoking    1. Occluded left M2 segment. The M1 segments are patent.  2. Severe bilateral ICA stenosis with pronounced noncalcified plaque proximally on the left.  3. Additional stenoses are seen throughout the left CCA, both intracranial internal carotid arteries, and the left intracranial vertebral artery.

## 2024-10-26 NOTE — ASSESSMENT & PLAN NOTE
Lab Results   Component Value Date    EGFR 76 10/25/2024    EGFR 92 09/26/2024    EGFR 89 04/02/2024    CREATININE 0.78 10/25/2024    CREATININE 0.57 (L) 09/26/2024    CREATININE 0.65 04/02/2024

## 2024-10-26 NOTE — SEPSIS NOTE
Sepsis Note   Joslyn Cantu 74 y.o. female MRN: [id number]  Unit/Bed#: ICU 09 Encounter: [id number]       Initial Sepsis Screening       Row Name 10/26/24 1309 10/26/24 0054             Is the patient's history suggestive of a new or worsening infection? Yes (Proceed)  -SV No  -LB       Suspected source of infection suspect infection, source unknown  -SV --       Indicate SIRS criteria Hyperthemia > 38.3C (100.9F) OR Hypothermia <36C (96.8F);Leukocytosis (WBC > 30805 IJL) OR Leukopenia (WBC <4000 IJL) OR Bandemia (WBC >10% bands)  -SV --       Are two or more of the above signs & symptoms of infection both present and new to the patient? Yes (Proceed)  -SV --       Assess for evidence of organ dysfunction: Are any of the below criteria present within 6 hours of suspected infection and SIRS criteria that are NOT considered to be chronic conditions? -- --                 User Key  (r) = Recorded By, (t) = Taken By, (c) = Cosigned By      Initials Name Provider Type    LB YUMI Marx Nurse Practitioner    SV Joann Daily MD Resident                        Body mass index is 22.18 kg/m².  Wt Readings from Last 1 Encounters:   10/25/24 55 kg (121 lb 4.1 oz)        Ideal body weight: 50.1 kg (110 lb 7.2 oz)  Adjusted ideal body weight: 52.1 kg (114 lb 12.3 oz)

## 2024-10-26 NOTE — RESPIRATORY THERAPY NOTE
10/26/24 0050   Respiratory Assessment   Resp Comments Patient transitioned to PSV.   Vent Information   Vent type Harris G5   Harris Vent Mode SPONT/TRC   $ Vent Daily Charge-Subsequent Yes   $ Pulse Oximetry Spot Check Charge Completed   SPONT/TRC Settings   FIO2 (%) 40 %   PEEP (cmH2O) 6 cmH2O   Tube Comp (%) 80 %   Flow Trigger (LPM) 5 LPM   Pressure Support (cmH2O) 4 cmH2O   ETS (%) 25 %   Tube Type  ET   Tube Size (I.D.) (mm) 8   Humidification Heater   Heater Temp 95 °F (35 °C)   SPONT/TRC Actuals   Resp Rate (BPM) 17 BPM   VT (mL) 547 mL   MV (Obs) 9.7   MAP (cmH2O) 8.4 cmH2O   Peak Pressure (cmH2O) 13 cmH2O   I:E Ratio (Obs) 1:1.4   RSBI (f/Vt) 33 f/Vt   Heater Temperature (Obs) 95 °F (35 °C)   SPONT/TRC Alarms   High Peak Pressure (cmH2O) 40 cm H2O   Low Pressure (cmH2O) 5 cm H2O   High Resp Rate (BPM) 40 BPM   Low Resp Rate (BPM) 8 BPM   High MV (L/min) 20 L/min   Low MV (L/min) 3 L/min   High Spont VTE (mL) 800 mL   Low Spont VTE (mL) 250 mL   Apnea Time (s) 20 S   SPONT/TRC Apnea Settings   Resp Rate (BPM) 14 BPM   Apnea Time (S) 20 S   %TI (%)   (1 sec)   Pcontrol (cmH2O) 15 cm H2O

## 2024-10-26 NOTE — ASSESSMENT & PLAN NOTE
Lab Results   Component Value Date    HGBA1C 8.8 (H) 10/26/2024       Recent Labs     10/25/24  2322 10/26/24  0156 10/26/24  0502 10/26/24  0548   POCGLU 321* 254* 161* 121       Blood Sugar Average: Last 72 hrs:  (P) 253.2

## 2024-10-26 NOTE — CONSULTS
Patient seen and examined prior to any intervention.    Assessment and plan.  This a 74-year-old female with complex medical history living in a group home who has noticed well was 630pm.  NIHSS 26 with left severe stenosis and M2 occlusion.  Unable to get verbal consent and so two-physician consent was used.     I spent 60 minutes in the care of this patient including the review and interpretation of imaging and tests results, as well as communication/explanation to the patient  regarding the natural history of this process and test/imaging results, care coordination and documentation.     CC. L mca syndrome  Hpi. This a 74-year-old female with complex medical history living in a group home who has noticed well was 630pm.  NIHSS 26 with left severe stenosis and M2 occlusion.TNK administered. CTP with large pneurmbra    Unable to get verbal consent and so two-physician consent was used.     Ehr used for pmh/psh    HR:  [] 102  BP: (154-165)/(87-91) 157/91  Resp:  [16-50] 40  SpO2:  [95 %-99 %] 96 %  O2 Device: Nasal cannula  Awake. Aphasic. Not moving rue/rle. Covered in stool. Left gaze and neglect. [id number]    Lab Results   Component Value Date/Time    SODIUM 132 (L) 10/25/2024 08:28 PM    CREATININE 0.78 10/25/2024 08:28 PM    WBC 14.77 (H) 10/25/2024 08:28 PM    HGB 12.6 10/25/2024 08:28 PM     10/25/2024 08:28 PM    PTT 21 (L) 10/25/2024 08:28 PM    INR 0.94 10/25/2024 08:28 PM

## 2024-10-26 NOTE — ASSESSMENT & PLAN NOTE
-Initial troponin [phone]  -EKG: Sinus tachycardia rate 118 bpm poor R wave progression, nonspecific ST elevation  -Elevated troponins in the setting of acute ischemic stroke with respiratory insufficiency

## 2024-10-26 NOTE — RESPIRATORY THERAPY NOTE
10/25/24 2342   Respiratory Assessment   Assessment Type Assess only   General Appearance Sedated   Respiratory Pattern Assisted   Chest Assessment Chest expansion symmetrical   Bilateral Breath Sounds Coarse   Cough Productive   Resp Comments Patient received s/p stroke from endovascular lab. She is orally intubated, ETT tube secured with tube oneill and she was placed on mechanical ventilation. She has a history of emphysema but is not on any inhaled pulmonary medications.   O2 Device G5   Vent Information   Vent ID [id number]   Vent type Harris G5   Harris Vent Mode (S)CMV   $ Vent Charge-INITIAL Yes   Ventilator Start Yes   $ Pulse Oximetry Spot Check Charge Completed   SpO2 96 %   (S)CMV Settings   Resp Rate (BPM) 14 BPM   VT (mL) 350 mL   FIO2 (%) 55 %   PEEP (cmH2O) 6 cmH2O   I:E Ratio 1:4.4   Insp Time (%)   (0.8 sec)   Flow Trigger (LPM) 5   Humidification Heater   Heater Temperature (Set) 95 °F (35 °C)   (S)CMV Actuals   Resp Rate (BPM) 14 BPM   VT (mL) 392   MV 5.6   MAP (cmH2O) 9.1 cmH2O   Peak Pressure (cmH2O) 23 cmH2O   I:E Ratio (Obs) 1:4.4   Heater Temperature (Obs) 94.8 °F (34.9 °C)   (S)CMV Alarms   High Peak Pressure (cmH2O) 40   Low Pressure (cmH2O) 5 cm H2O   High Resp Rate (BPM) 40 BPM   Low Resp Rate (BPM) 8 BPM   High MV (L/min) 20 L/min   Low MV (L/min) 3 L/min   High VT (mL) 800 mL   Low VT (mL) 250 mL   Apnea Time (s) 20 S   Maintenance   Alarm (pink) cable attached No   Resuscitation bag with peep valve at bedside Yes   Water bag changed No   Circuit changed No   IHI Ventilator Associated Pneumonia Bundle   Daily Assessment of Readiness to Extubate Not applicable (Comment)   Head of Bed Elevated HOB Flat   ETT  Cuffed;Oral;Pre-curved;Inflated 8 mm   Placement Date/Time: 10/25/24 2144   Mask Ventilation: Mask ventilation not attempted (0)  Preoxygenated: Yes  Technique: Direct laryngoscopy;Rapid sequence;Stylet  Type: Cuffed;Oral;Pre-curved;Inflated  Tube Size: 8 mm  Laryngoscope: Mac   Blade Size:...   Secured at (cm) 20   Measured from Lips   Secured Location Center   Secured by Commercial tube oneill   Cuff Pressure (cm H2O)   (MLT)   HI-LO Suction  Capped

## 2024-10-27 NOTE — ASSESSMENT & PLAN NOTE
Assessment:  Joslyn Cantu is a 74 y.o. female who presented to the ED as a result of stroke-like symptoms.  Patient has a past medical history of acute respiratory failure with hypoxia, hypertension, hypothyroidism, hypovitaminosis D, iron deficiency anemia, and type 2 diabetes. Patient was found at the side of a sidewalk not responding and also not moving her right hand and right arm with a left gaze deviation.  Given the symptoms, a pre-hospital stroke alert was initiated.  As per patient's group home, patient was conversing with another resident around 6-6:30 PM and was her normal self prior to leaving the group home to cash a check.     Initial NIHSS 27  Presenting deficits were: R-sided weakness, L gaze preference  Interventions: After a discussion of risks, benefits and alternatives reviewing inclusion and exclusion criteria the decision was made to proceed with thrombolytic therapy. Specifically discussed were the potential benefits, risks, and side effects of the proposed stroke intervention(s) and care; the likelihood of the patient achieving their goals; and any potential problems that might occur as a result of the intervention(s); reasonable alternatives to the proposed stroke intervention(s) and care. The discussion encompasses risks, benefits and side effects related to the alternatives and the risks related to not receiving the proposed stroke intervention(s) and care. Given the critical condition of the patient, the ED team and the neurology team together conversed in regards to the patient's condition taking into consideration the group home's decision of doing everything to save the patient.  The decision was to administer TNK.  There was a delay in administering TNK with no family members able to consent for TNK but it was still administered within the appropriate timeframe. Verbal consent was obtained from conferring with Dr. Juárez and Dr. Treadwell, they were in agreement to administer the  medication since no surrogate decision maker was available.  Consent was obtained by Dr. Mary Hyman.     Workup:  Lab Results   Component Value Date    HGBA1C 8.8 (H) 10/26/2024    CHOLESTEROL 113 10/26/2024    LDLCALC 45 10/26/2024    TRIG 125 10/26/2024    INR 0.94 10/25/2024      CTH: Early findings of ischemia in the left MCA territory. No hemorrhage seen.   CTA:  Occluded left M2 segment. The M1 segments are patent. Severe bilateral ICA stenosis with pronounced noncalcified plaque proximally on the left. Additional stenoses are seen throughout the left CCA, both intracranial internal carotid arteries, and the left intracranial vertebral artery.  CTP: Infinite mismatch ratio  CTH at 24 hours: Evolving left MCA infarcts, possible infarction of the left cerebral hemisphere, patient also has new evidence of infarctions in the watershed distribution.  MRI: Pending  TTE/WILL: Pending  Video EEG 10/27:  No seizures. Intermittent right hemisphere polymorphic delta slowing. Intermittent right frontal sharp waves. Mild/moderate encephalopathy.     Pertinent scores as of 10/27/24:  - NIHSS: 27    Impression: Patient is a 74 year old female presented to the ED as a result of strokelike symptoms. CTH showed a possible subacute infarct and CTA showed a left M2 occlusion.  Patient received TNK at 2111.  Patient was sent for CTP and an endovascular alert was initiated.  S/p thrombectomy post-TICI score of 2C. Patient's stroke seems to be embolic in nature but source unknown. LDL 45, A1C 8.8.  Complicated by concern for seizure-like activity, loaded with Keppra 1.5 g and Ativan 4 mg and on video EEG.  Further workup pending.    Plan:  Case discussed with Dr. Lynn  AP/AC: Hold all AP/AC secondary to TNK administration  Defer to neurosurgery status post mechanical thrombectomy  Cholesterol med: Recommend Lipitor 40 mg  Healthy diet encouraged  MRI Brain wo contrast recommended  Echocardiogram recommended  Telemetry  Continue  video EEG  Keppra 750 mg twice daily  PT/OT versus active exercise discussed  BP management and HTN med compliance discussed, defer to neurosurgical team for SBP goals s/p mechanical thrombectomy  Rest of care as per primary care team

## 2024-10-27 NOTE — CASE MANAGEMENT
Case Management Assessment & Discharge Planning Note    Patient name Joslyn Cantu  Location ICU 09/ICU 09 MRN [id number]  : 1950 Date 10/27/2024       Current Admission Date: 10/25/2024  Current Admission Diagnosis:Stroke (HCC)   Patient Active Problem List    Diagnosis Date Noted Date Diagnosed    Aortic stenosis, moderate 10/26/2024     Elevated troponin 10/26/2024     Stroke (HCC) 10/25/2024     Cardiomyopathy, unspecified (HCC) 2024     Left ventricular diastolic dysfunction 2024     Stage 3a chronic kidney disease (HCC) 2024     Leukocytosis 2024     Uncontrolled type 2 diabetes mellitus with hypoglycemia without coma ([facility]) 2024     Chronic HFrEF (heart failure with reduced ejection fraction) ([facility]) 2024     Iron deficiency anemia 2016     Hypovitaminosis D 05/10/2016     Hypothyroid 2016     Hypertension 2016     Type 2 diabetes mellitus without complication ([facility]) 2016     Schizophrenia ([facility]) 2016       LOS (days): 2  Geometric Mean LOS (GMLOS) (days):   Days to GMLOS:     OBJECTIVE:    Risk of Unplanned Readmission Score: 22.53         Current admission status: Inpatient  Referral Reason: Stroke    Preferred Pharmacy:   [facility] [address]  [address] [facility] [place] [address]  Phone: [phone] Fax: [phone]    [facility] [address]   [address] [address]  Phone: [phone] Fax: [phone]    Primary Care Provider: Natalia Elizondo MD    Primary Insurance: [employer] [facility]  Secondary Insurance: [place] [employer] AND [facility] [place]    ASSESSMENT:  Active Health Care Proxies    There are no active Health Care Proxies on file.                 Readmission Root Cause  30 Day Readmission: No    Patient Information  Admitted from:: Facility  Mental Status: Intubated  During Assessment patient was accompanied by: Not  accompanied during assessment  Assessment information provided by:: Other - please comment (Group home staff, Khoi)  Primary Caregiver: Other (Comment)  Caregiver's Name:: Group Home staff  Caregiver's Relationship to Patient:: Facility Staff  Caregiver's Telephone Number:: [phone]  Support Systems: Other (Comment) (Group home staff)  County of Residence: [place]  What city do you live in?: [place]  Type of Current Residence: Group home  Upon entering residence, is there a bedroom on the main floor (no further steps)?: Yes  Upon entering residence, is there a bathroom on the main floor (no further steps)?: Yes  Living Arrangements: Lives in Facility    Activities of Daily Living Prior to Admission  Functional Status: Independent  Completes ADLs independently?: Yes  Ambulates independently?: Yes  Does patient use assisted devices?: No  Does patient have a history of Outpatient Therapy (PT/OT)?: No  Does the patient have a history of Short-Term Rehab?: No  Does patient have a history of HHC?: No  Does patient currently have HHC?: No         Patient Information Continued  Does patient have prescription coverage?: Yes  Does patient receive dialysis treatments?: No  Does patient have a history of substance abuse?: No  Does patient have a history of Mental Health Diagnosis?: Yes (Schizophrenia)  Is patient receiving treatment for mental health?: Yes  Has patient received inpatient treatment related to mental health in the last 2 years?: No         Means of Transportation  Means of Transport to Appts:: Other (Comment) (Facility Transport)          DISCHARGE DETAILS:    Discharge planning discussed with:: [facility]  Freedom of Choice: Yes  Comments - Freedom of Choice: Discussed FOC  CM contacted family/caregiver?: Yes ([facility] staff)  Were Treatment Team discharge recommendations reviewed with patient/caregiver?: Yes          Contacts  Patient Contacts: Khoi  Relationship to Patient:: Other  "(Comment) (Group home staff)  Contact Method: Phone  Phone Number: [phone]  Reason/Outcome: Continuity of Care, Discharge Planning, Emergency Contact               Additional Comments: Pt currently intubated, assessment information obtained via TC to group [facility] staff and chart review. Per Khoi @ [facility], pt had lived there for \"a number of years.\" She had been \"acting out of sorts\" the last week or so, including \"jumping out of the van\" when she was being transported. PTA, pt was independent with ADLs and did not require DME. When asked what pt required assistance with, Khoi replied \"not much.\" Pt had speech impairment and was hard to understand at baseline, but was alert and aware. Pt has hx of schizophrenia and does take her medicaiton. [facility] provides transportation when pt has dr. [place]inas.  office will remain available and assist in DCP when approrpriate. Main office number provided to Khoi for contact.                    "

## 2024-10-27 NOTE — PROGRESS NOTES
Progress Note - Critical Care/ICU   Name: Joslyn Cantu 74 y.o. female I MRN: [id number]  Unit/Bed#: ICU 09 I Date of Admission: 10/25/2024   Date of Service: 10/27/2024 I Hospital Day: 2          EEG running overnight.  Patient did not have any acute events overnight.  She was noted to have significant changes in her EKG from prior.  She now has new T wave inversions laterally which had not been apparent yesterday.    Visit Vitals  /81   Pulse 102   Temp 100 °F (37.8 °C)   Resp (!) 23   Wt 60.2 kg (132 lb 11.5 oz)   SpO2 98%   BMI 24.27 kg/m²   OB Status Postmenopausal   Smoking Status Every Day   BSA 1.61 m²     GEN: Unresponsive on ventilator, appears frail and ill  HEENT: EOMI, PERRLA, endotracheal tube in place  CV: Tachycardia, no murmur  Resp: Rhonchorous coarse breath sounds diffusely that do not clear with suctioning, copious amounts of clear/white secretions  GI: soft,NT/ND  : urinary catheter, in place  Neuro: Observed spontaneously moving left upper and lower extremity with withdrawal of right lower extremity, no withdrawal of the right upper extremity appreciated patient does not follow commands, she has a left gaze preference  Skin: warm, dry    All laboratory data and imaging reviewed    Acute ischemic stroke left M2 stenosis with initial NIH 27 status post TNK and endovascular thrombectomy  Cerebral edema with evidence of brain compression secondary to ischemic strokes  Clinical seizure event  Severe bilateral ICA stenosis  Acute hypoxic respiratory failure  Tracheobronchitis  Centrilobular emphysematous changes in lungs on imaging  Hypotension  Chronic systolic and diastolic heart failure with EF 40%  NSTEMI type II with troponin elevation  Prolonged QTc- new  Moderate AS  CKD 3  Metabolic acidosis-gap and nongap  Diabetes mellitus type 2 hemoglobin A1c 9.2 in March 2024  Hypothyroid-continue levothyroxine  COPD without exacerbation  Acute encephalopathy  Schizophrenia  Tardive  dyskinesia    Goal systolic blood pressure:  110-1 60, MAP greater than 65  Norepinephrine 8 mcg/min     Respiratory support:  Assist-control rate 14, , PEEP 6, FiO2 30%     I/O +2.4 L  UO 1.3 L  BM 0     Devices:  10/26/2024 right IJ triple-lumen catheter  10/25/2024 endotracheal tube  10/25/2024 right radial arterial line  10/26/2024 urinary catheter    Micro:  10/26/2024 flu/COVID/RSV-negative  10/26/2024 blood culture-1 of 2 gram-positive cocci in pairs and changes, 1 of 2 gram-positive rods  10/26/2024 MRSA culture-pending  10/26/2024 blood culture identification panel-none detected right arm,  10/26/2024 blood culture identification panel-pending arterial line    Antimicrobials:  Ceftriaxone 1 g IV every 24 hours    Cerebral edema interventions:  Hypertonic saline 40 mL/h    Stroke interventions:  Lipitor 40 mg daily  Integrilin infusion 0.5 mcg/kg/min    AED:  Keppra 750 mg IV every 12 hours    Glycemic control:  Insulin infusion    Patient had significant events yesterday where she had clinical seizure event with left upper and lower extremity rhythmic movement observed.  She was placed on video EEG.  Patient was also administered Keppra as well as Ativan for the acute seizure events and placed on propofol for short period of time.  Repeat imaging displayed patient had new watershed area stroke events on the right side.  She had worsening cerebral edema related to ischemic stroke events.  Hypertonic saline was initiated.  Additionally patient required initiation of norepinephrine for hemodynamic support.  Antibiotics were initiated with elevated WBC count.  An attempt was made to contact family regarding clinical events and to discuss ongoing care plans.  Unfortunately patient's group facility home informed us they did not have any contacts or decision making for the patient.  Case management consulted to assist with identifying patient decision maker.    Tmax of 100 this morning with some tachypnea.   Otherwise no significant abnormalities of vital signs.  Radiology reviewed with CT head this morning with evolving left MCA territory stroke with multifocal left cerebral hemispheric contrast staining.  Additionally there is multifocal recent infarcts across multiple vascular territories with new hypoattenuation that is distinct from previously ascertain contrast staining.  Indeterminate but suspicious for associated hemorrhagic event.  Possible recent infarct of the left cerebellar hemisphere.  Concerning for watershed ischemia/sequelae of hypoxic ischemic injury due to global cerebral hypoperfusion.  Patient's glucoses had been elevated greater than 180.  Most recent blood glucose 163.  Current serum osmolarity 304 with a sodium of 146.  Magnesium 1.9, phosphorus 2.0, plan to replete and recheck.  WBC count is 17.04 which is slightly improved from yesterday.  Troponins are decreasing with most recent troponin 1373.  This is decreased from 3723.  EKG abnormalities as stated above.    Continue to monitor patient's neurologic exam every 2 hours.  Repeat imaging reviewed.  Considering patient had stent placements and stroke events she will need to maintain on antiplatelet therapy.  Plan for today was to transition to aspirin and Plavix.  Will confirm with endovascular neurosurgery they are still in agreement with the plan.  Considering the stroke burden that is seen on CT scan patient may not have a good prognosis for neurologic recovery.  MRI is still pending.  Continue with stroke interventions.    Continue to monitor on video EEG.  Follow-up with epileptologist today regarding overnight monitoring.  Maintain on Keppra.    Patient has a history of schizophrenia and home medications include Haldol as well as Seroquel.  She also takes valbenazine for tardive dyskinesia.  Considering patient had left upper extremity and lower extremity rhythmic movement this may have been related to seizure versus her behavioral health  diagnoses.  Will assess patient's neurologic exam during rounds and ascertain benefit versus risk of introducing Seroquel versus maintaining on as needed medications or infusion for comfort while on the ventilator.    Update 1243pm    HTS bolus given overnight and infusion increased.     HTS bolus now and increase infusion 50ml/hr    Repeat EKG now    Recheck blood cultures as both positive, highly suspect contaminate.     Presently on spont ventilation mode, not appropriate for extubation with poor mental status.     Start DVT ppx    Add xopenx and atrovent scheduled. Check sputum     Aggressive airway clearance with copious secretions from ETT.     Update 6:42 PM    Plan to discontinue Integrilin.  Plavix 300 mg x 1 and aspirin 325 mg x 1 to be administered now.  Start daily aspirin and Plavix 75 mg starting tomorrow.    Plan to recheck patient's magnesium as well as phosphorus levels.  She is at risk for refeeding syndrome.    Echocardiogram resulted with left ventricular ejection fraction 25-30%.  Mild global systolic hypokinesis.  Akinetic segments apical anterior, apical septal, apical inferior and apical lateral.  Hypokinesis mid anterior, mid anterior septal and mid inferior septal.  Will check SVO 2 as patient is still requiring vasopressors.  Patient had elevated troponin and abnormal EKG with T wave inversions.  Echocardiogram changes are consistent with EKG findings.  Continue with supportive care.    With the patient's cardiac injury as well as neurologic findings there was concern for patient to have independent recovery and return to neurologic baseline.  Await case management assistance on finding decision-maker regarding aggressiveness of care.      Critical care time does not include procedures or family update.  Critical care time 48 minutes

## 2024-10-27 NOTE — PROGRESS NOTES
Progress Note - Critical Care/ICU   Name: Joslyn Cantu 74 y.o. female I MRN: [id number]  Unit/Bed#: ICU 09 I Date of Admission: 10/25/2024   Date of Service: 10/27/2024 I Hospital Day: 2       Assessment & Plan   Neuro:   Diagnosis: Stroke  Plan: 10/25/2024 CTA-occluded left M2, severe bilateral ICA stenosis  Status post TNK 2100  NIH on admission 27  Follow-up 24-hour post TNK CT head read   Status post mechanical thrombectomy, ICA stent 10/25  Continue hypertonic saline drip  Post thrombectomy CT head showing contrast staining  Follow-up MRI  Continue Integrilin drip 0.5  Diagnosis: Schizophrenia  Plan: Will hold home dose Haldol in setting of prolonged Qtc  Resume when able     CV:   Diagnosis: Chronic heart failure, hypertension,  Plan: 3/26/2024 echo-EF 40%, systolic function moderately reduced, moderate global hypokinesis, diastolic function moderately abnormal grade 2  Follow-up repeat echo  Monitor on telemetry  Continue home dose lisinopril  Resume home dose Lasix and metoprolol when able  Diagnosis: Elevated troponin, suspect type II, prolonged Qtc, hypotension   Plan: Initial troponin 614  Continue to trend  EKG now-prolonged Qtc 535, NSR  Levo gtt to maintain MAP > 65     Pulm:  Diagnosis: Respiratory insufficiency, advanced centrilobular emphysema  Plan: 10/25/2024 CTA chest abdomen pelvis-advanced setrobuvir of emphysema, mildly enlarged right hilar lymph node  Remained intubated post IR  Wean vent as able     GI:   Diagnosis: Dysphagia  Plan: Speech evaluation when able     :   Diagnosis: CKD 3  Plan: Creatinine on admission 0.78  Strict intake and output  Trend BUN and creatinine     F/E/N:   Hypertonic saline drip  Replete electrolytes as needed  Tube feeds     Heme/Onc:   Diagnosis: DVT prophylaxis  Plan: Hold chemical prophylaxis in setting of TNK  Maintain SCDs     Endo:   Diagnosis: DM 2, hypothyroidism  Plan: 3/5/2024 hemoglobin A1c 9.2  Insulin gtt   Continue home dose levothyroxine     ID:    Diagnosis: Leukocytosis  Plan: Suspect reactive  Trend fever curve and WBC  Blood cultures 1/2 gram-positive rods -suspect contaminant   F/u Blood cultures, UA     MSK/Skin:   Diagnosis: Ambulatory dysfunction  Plan: PT/OT    Disposition: Critical care    ICU Core Measures     Vented Patient  VAP Bundle  VAP bundle ordered     A: Assess, Prevent, and Manage Pain Has pain been assessed? Yes  Need for changes to pain regimen? No   B: Both Spontaneous Awakening Trials (SATs) and Spontaneous Breathing Trials (SBTs) Plan to perform spontaneous awakening trial today? Yes   Plan to perform spontaneous breathing trial today? Yes   Obvious barriers to extubation? Yes   C: Choice of Sedation RASS Goal: 0 Alert and Calm  Need for changes to sedation or analgesia regimen? No   D: Delirium CAM-ICU: Unable to perform secondary to Acute cognitive dysfunction   E: Early Mobility  Plan for early mobility? Yes   F: Family Engagement Plan for family engagement today? Yes       Antibiotic Review: Patient on appropriate coverage based on culture data.     Review of Invasive Devices:    Denisa Plan: Continue for accurate I/O monitoring for 48 hours  Central access plan: Patient has multiple central venous catheters.   Louisa Plan: Keep arterial line for hemodynamic monitoring    Prophylaxis:  VTE Contraindicated secondary to: stroke   Stress Ulcer  not ordered         24 Hour Events : Started on video EEG monitoring yesterday  Subjective       Objective :                   Vitals I/O      Most Recent Min/Max in 24hrs   Temp 99.1 °F (37.3 °C) Temp  Min: 97 °F (36.1 °C)  Max: 99.3 °F (37.4 °C)   Pulse 84 Pulse  Min: 66  Max: 118   Resp 22 Resp  Min: 11  Max: 26   /79 BP  Min: 75/51  Max: 135/79   O2 Sat 100 % SpO2  Min: 99 %  Max: 100 %      Intake/Output Summary (Last 24 hours) at 10/27/2024 0312  Last data filed at 10/27/2024 0200  Gross per 24 hour   Intake 3216.65 ml   Output 1685 ml   Net 1531.65 ml       Diet  Enteral/Parenteral; Tube Feeding No Oral Diet; Jevity 1.2 Kyle; Cyclic; 45; 20 hours; 30; Water; Every 4 hours    Invasive Monitoring           Physical Exam   Physical Exam  Vitals and nursing note reviewed.   Skin:     General: Skin is warm.      Capillary Refill: Capillary refill takes less than 2 seconds.   HENT:      Mouth/Throat:      Mouth: Mucous membranes are moist.   Cardiovascular:      Rate and Rhythm: Tachycardia present.      Pulses: Normal pulses.   Abdominal:      Palpations: Abdomen is soft.   Constitutional:       Appearance: She is ill-appearing.   Pulmonary:      Breath sounds: Normal breath sounds.   Neurological:      Comments: Spontaneously moves LUE/LLE   Withdraws RUE/RLE           Diagnostic Studies        Lab Results: I have reviewed the following results:     Medications:  Scheduled PRN   atorvastatin, 40 mg, QPM  cefTRIAXone, 1,000 mg, Q24H  chlorhexidine, 15 mL, Q12H JORGE LUIS  levETIRAcetam, 750 mg, Q12H  levothyroxine, 150 mcg, Early Morning  [Held by provider] lisinopril, 10 mg, Daily  potassium chloride, 40 mEq, Once  senna-docusate sodium, 2 tablet, BID  sodium chloride, 250 mL, Once      fentaNYL, 50 mcg, Q1H PRN  labetalol, 10 mg, Q4H PRN  LORazepam, 2 mg, Q30 Min PRN  ondansetron, 4 mg, Q6H PRN       Continuous    eptifibatide, 0.5 mcg/kg/min, Last Rate: 0.5 mcg/kg/min (10/26/24 0014)  insulin regular (HumuLIN R,NovoLIN R) 1 Units/mL in sodium chloride 0.9 % 100 mL infusion, 0.3-21 Units/hr, Last Rate: 3 Units/hr (10/27/24 0220)  norepinephrine, 1-30 mcg/min, Last Rate: 3 mcg/min (10/26/24 1112)  propofol, 5-50 mcg/kg/min, Last Rate: Stopped (10/26/24 1800)  sodium chloride, 40 mL/hr, Last Rate: 30 mL/hr (10/26/24 1543)         Labs:   CBC    Recent Labs     10/25/24  2028 10/26/24  0455   WBC 14.77* 20.95*   HGB 12.6 10.5*   HCT 40.0 32.9*    178     BMP    Recent Labs     10/26/24  1854 10/26/24  8823   SODIUM 142 142   K 3.5 3.6   * 111*   CO2 22 21   AGAP 7 10   BUN 7  8   CREATININE 0.35* 0.43*   CALCIUM 7.5* 7.8*       Coags    Recent Labs     10/25/24  2028   INR 0.94   PTT 21*        Additional Electrolytes  Recent Labs     10/26/24  0455   MG 2.3   PHOS 2.2*          Blood Gas    Recent Labs     10/26/24  0502   PHART 7.353   PTY5LQV 33.8*   PO2ART 126.0   CPI2TBE 18.4*   BEART -6.3   SOURCE Line, Arterial     Recent Labs     10/26/24  0502   SOURCE Line, Arterial    LFTs  Recent Labs     10/26/24  0001 10/26/24  0455   ALT 27 26   AST 37 36   ALKPHOS 83 70   ALB 3.5 3.3*   TBILI 0.31 0.25       Infectious  No recent results  Glucose  Recent Labs     10/26/24  0455 10/26/24  1456 10/26/24  1854 10/26/24  2344   GLUC 153* 104 93 210*

## 2024-10-27 NOTE — ASSESSMENT & PLAN NOTE
-Elevated troponin with upward trend: [phone]  -EKG: Sinus tachycardia rate 118 bpm poor R wave progression, nonspecific ST elevation  Telemetry with sinus tachycardia.  -Elevated troponins most likely related to nonischemic myocardial injury in the setting of systemic illness.  Cardiac catheterization not indicated.  Echo has been requested.

## 2024-10-27 NOTE — PROGRESS NOTES
Progress Note - Cardiology   Name: Joslyn Cantu 74 y.o. female I MRN: [id number]  Unit/Bed#: ICU 09 I Date of Admission: 10/25/2024   Date of Service: 10/27/2024 I Hospital Day: 2     Assessment & Plan  Elevated troponin  -Elevated troponin with upward trend: [phone]  -EKG: Sinus tachycardia rate 118 bpm poor R wave progression, nonspecific ST elevation  Telemetry with sinus tachycardia.  -Elevated troponins most likely related to nonischemic myocardial injury in the setting of systemic illness.  Cardiac catheterization not indicated.  Echo has been requested.    Chronic HFrEF (heart failure with reduced ejection fraction) ([facility])  Her weight has trended up.  Significant positive fluid balance noted.  Last echo showed EF 40% with grade 2 diastolic dysfunction and moderate aortic stenosis.  Repeat echo is requested.  Recommend IV diuretics to keep negative fluid balance, if blood pressure allows.  Currently on Levophed.  --Home regimen Lasix 40 mg daily and metoprolol succinate 12.5 mg daily currently held due to hypotension    Aortic stenosis, moderate  -Echocardiogram (3/26/2024):Moderate aortic stenosis with mean gradient 6 mmHg and aortic valve area 1.14 cm².  Repeat echo has been requested.  It would be performed today.    Stroke ([facility])  -Found unresponsive on left side walk with right sided weakness and left sided gaze.  -CT of the head showed early findings of ischemia of left MCA.  CTA showed occluded left M2 segment with a patent M1 segment  -TNK given and  status post mechanical thrombectomy, ICA stent (10/25)  She remains intubated.  Continues to be on Integrilin.          Subjective:     Overnight events reviewed.  Remains intubated and sedated.  No bleeding on Integrilin.  Weight is trending up.  Positive fluid balance.  Blood pressure still requiring pressor support.    Review of Systems   Unable to perform ROS: Intubated          Current Facility-Administered Medications:     atorvastatin (LIPITOR)  tablet 40 mg, 40 mg, Oral, QPM, YUMI Marx, 40 mg at 10/26/24 1857    cefTRIAXone (ROCEPHIN) 1,000 mg in dextrose 5 % 50 mL IVPB, 1,000 mg, Intravenous, Q24H, Lakesha Antoine DO, Last Rate: 100 mL/hr at 10/26/24 1356, 1,000 mg at 10/26/24 1356    chlorhexidine (PERIDEX) 0.12 % oral rinse 15 mL, 15 mL, Mouth/Throat, Q12H JORGE LUIS, YUMI Marx, 15 mL at 10/26/24 2117    eptifibatide (INTEGRILIN) 75 mg in 100 mL infusion (premix), 0.5 mcg/kg/min, Intravenous, Continuous, German Rosales MD, Last Rate: 2.5 mL/hr at 10/26/24 0014, 0.5 mcg/kg/min at 10/26/24 0014    fentaNYL injection 50 mcg, 50 mcg, Intravenous, Q1H PRN, YUMI Marx, 50 mcg at 10/27/24 0445    insulin regular (HumuLIN R,NovoLIN R) 1 Units/mL in sodium chloride 0.9 % 100 mL infusion, 0.3-21 Units/hr, Intravenous, Titrated, YUMI Marx, Last Rate: 4 mL/hr at 10/27/24 0605, 4 Units/hr at 10/27/24 0605    labetalol (NORMODYNE) injection 10 mg, 10 mg, Intravenous, Q4H PRN, YUMI Marx    levETIRAcetam (KEPPRA) injection 750 mg, 750 mg, Intravenous, Q12H, Joann Daily MD, 750 mg at 10/27/24 0104    levothyroxine tablet 150 mcg, 150 mcg, Oral, Early Morning, YUMI Marx, 150 mcg at 10/27/24 0510    [Held by provider] lisinopril (ZESTRIL) tablet 10 mg, 10 mg, Oral, Daily, YUMI Marx    LORazepam (ATIVAN) injection 2 mg, 2 mg, Intravenous, Q30 Min PRN, Joann Daily MD    magnesium sulfate 2 g/50 mL IVPB (premix) 2 g, 2 g, Intravenous, Once, Manpreet Puri MD    norepinephrine (LEVOPHED) 4 mg (STANDARD CONCENTRATION) IV in sodium chloride 0.9% 250 mL, 1-30 mcg/min, Intravenous, Titrated, YUMI Marx, Last Rate: 30 mL/hr at 10/27/24 0450, 8 mcg/min at 10/27/24 0450    ondansetron (ZOFRAN) injection 4 mg, 4 mg, Intravenous, Q6H PRN, YUMI Marx    propofol (DIPRIVAN) 1000 mg in 100 mL infusion (premix), 5-50 mcg/kg/min,  Intravenous, Titrated, YUMI Marx, Stopped at 10/26/24 1800    senna-docusate sodium (SENOKOT S) 8.6-50 mg per tablet 2 tablet, 2 tablet, Oral, BID, YUMI Marx, 2 tablet at 10/26/24 1857    sodium chloride (HYPERTONIC) 3 % infusion, 40 mL/hr, Intravenous, Continuous, YUMI Marx, Last Rate: 40 mL/hr at 10/27/24 0319, 40 mL/hr at 10/27/24 0319    sodium phosphate 30 mmol in dextrose 5 % 250 mL Infusion, 30 mmol, Intravenous, Once, Manpreet Puri MD, Last Rate: 41.7 mL/hr at 10/27/24 08, 30 mmol at 10/27/24 0807      Objective:     Vitals:   Blood pressure 154/81, pulse 102, temperature 100 °F (37.8 °C), resp. rate (!) 23, weight 60.2 kg (132 lb 11.5 oz), SpO2 98%.  Body mass index is 24.27 kg/m².  Systolic (24hrs), Av , Min:103 , Max:160     Diastolic (24hrs), Av, Min:66, Max:88      Intake/Output Summary (Last 24 hours) at 10/27/2024 0953  Last data filed at 10/27/2024 0600  Gross per 24 hour   Intake 3817.39 ml   Output 1385 ml   Net 2432.39 ml     Weight (last 2 days)       Date/Time Weight    10/27/24 0557 60.2 (132.72)    10/25/24 2311 55 (121.25)    10/25/24 20:36:30 63.7 (140.43)    10/25/24 2025 63.7 (140.43)            Physical Exam  Vitals reviewed.   Constitutional:       Comments: Orally intubated and sedated   HENT:      Head:      Comments: Bandage over the head  Cardiovascular:      Rate and Rhythm: Tachycardia present.      Heart sounds: Murmur heard.   Pulmonary:      Breath sounds: No wheezing.   Abdominal:      Tenderness: There is no guarding.   Musculoskeletal:      Right lower leg: No edema.      Left lower leg: No edema.   Skin:     General: Skin is warm.   Neurological:      Comments: Intubated and sedated         Labs & Results:    Lab Results   Component Value Date    SODIUM 146 10/27/2024    K 4.1 10/27/2024     (H) 10/27/2024    CO2 21 10/27/2024    BUN 7 10/27/2024    CREATININE 0.45 (L) 10/27/2024    GLUC 220 (H)  "10/27/2024    CALCIUM 7.6 (L) 10/27/2024     Lab Results   Component Value Date    WBC 17.04 (H) 10/27/2024    WBC 10.45 (H) 04/15/2014    RBC 3.17 (L) 10/27/2024    RBC 4.41 04/15/2014    HGB 9.6 (L) 10/27/2024    HGB 12.8 04/15/2014    HCT 31.1 (L) 10/27/2024    HCT 36.9 02/08/2023    MCV 98 10/27/2024    MCV 91 04/15/2014    MCH 30.3 10/27/2024    MCH 29.0 04/15/2014    RDW 14.0 10/27/2024    RDW 15.5 (H) 04/15/2014     10/27/2024     04/15/2014     Results from last 7 days   Lab Units 10/25/24  2028   PTT seconds 21*   INR  0.94       Available cardiology studies, imaging and lab results independently reviewed today.      This note was completed in part utilizing voice recognition software.   Grammatical errors, random word insertion, spelling mistakes, occasional wrong word or \"sound-alike\" substitutions and incomplete sentences may be an occasional consequence of the system secondary to software limitations, ambient noise and hardware issues. At the time of dictation, efforts were made to edit, clarify and /or correct errors.  Please read the chart carefully and recognize, using context, where substitutions have occurred.  If you have any questions or concerns about the context, text or information contained within the body of this dictation, please contact myself, the provider, for further clarification.      "

## 2024-10-27 NOTE — ASSESSMENT & PLAN NOTE
Her weight has trended up.  Significant positive fluid balance noted.  Last echo showed EF 40% with grade 2 diastolic dysfunction and moderate aortic stenosis.  Repeat echo is requested.  Recommend IV diuretics to keep negative fluid balance, if blood pressure allows.  Currently on Levophed.  --Home regimen Lasix 40 mg daily and metoprolol succinate 12.5 mg daily currently held due to hypotension

## 2024-10-27 NOTE — CONSULTS
Nutrition Recommendations:    Recommend adjusting EN to Osmolite 1.0 @ 55mL/hr x22hrs (holding 1 hr before/after levothyroxine) + 2 packet prosource liquid providing 1210mL total volume, 1330kcals, 84g protein and 992mL free water from EN.   Continue with additional free water flushes of 30mL Q4hrs to provide a total of 1172mL free water from EN+flushes-- or adjust free water as per primary team recommends

## 2024-10-27 NOTE — ASSESSMENT & PLAN NOTE
-Echocardiogram (3/26/2024):Moderate aortic stenosis with mean gradient 6 mmHg and aortic valve area 1.14 cm².  Repeat echo has been requested.  It would be performed today.

## 2024-10-27 NOTE — PROCEDURES
Arterial Line Insertion    Date/Time: 10/27/2024 12:31 AM    Performed by: YUMI Marx  Authorized by: YUMI Marx    Patient location:  ICU  Consent:     Consent obtained:  Emergent situation  Universal protocol:     Immediately prior to procedure a time out was called: yes      Patient identity confirmed:  Hospital-assigned identification number  Indications:     Indications: continuous blood pressure monitoring    Pre-procedure details:     Skin preparation:  Chlorhexidine    Preparation: Patient was prepped and draped in sterile fashion    Procedure details:     Laterality:  Right    Needle gauge:  20 G (8cm)    Number of attempts:  1    Successful placement: yes      Transducer: waveform confirmed    Post-procedure details:     Post-procedure:  Biopatch applied, sterile dressing applied and sutured    CMS:  Normal    Patient tolerance of procedure:  Tolerated well, no immediate complications

## 2024-10-27 NOTE — PLAN OF CARE
Problem: Prexisting or High Potential for Compromised Skin Integrity  Goal: Skin integrity is maintained or improved  Description: INTERVENTIONS:  - Identify patients at risk for skin breakdown  - Assess and monitor skin integrity  - Assess and monitor nutrition and hydration status  - Monitor labs   - Assess for incontinence   - Turn and reposition patient  - Assist with mobility/ambulation  - Relieve pressure over bony prominences  - Avoid friction and shearing  - Provide appropriate hygiene as needed including keeping skin clean and dry  - Evaluate need for skin moisturizer/barrier cream  - Collaborate with interdisciplinary team   - Patient/family teaching  - Consider wound care consult   Outcome: Progressing     Problem: SAFETY,RESTRAINT: NV/NON-SELF DESTRUCTIVE BEHAVIOR  Goal: Remains free of harm/injury (restraint for non violent/non self-detsructive behavior)  Description: INTERVENTIONS:  - Instruct patient/family regarding restraint use   - Assess and monitor physiologic and psychological status   - Provide interventions and comfort measures to meet assessed patient needs   - Identify and implement measures to help patient regain control  - Assess readiness for release of restraint   Outcome: Progressing  Goal: Returns to optimal restraint-free functioning  Description: INTERVENTIONS:  - Assess the patient's behavior and symptoms that indicate continued need for restraint  - Identify and implement measures to help patient regain control  - Assess readiness for release of restraint   Outcome: Progressing     Problem: Neurological Deficit  Goal: Neurological status is stable or improving  Description: Interventions:  - Monitor and assess patient's level of consciousness, motor function, sensory function, and level of assistance needed for ADLs.   - Monitor and report changes from baseline. Collaborate with interdisciplinary team to initiate plan and implement interventions as ordered.   - Provide and  maintain a safe environment.  - Consider seizure precautions.  - Consider fall precautions.  - Consider aspiration precautions.  - Consider bleeding precautions.  Outcome: Progressing     Problem: Activity Intolerance/Impaired Mobility  Goal: Mobility/activity is maintained at optimum level for patient  Description: Interventions:  - Assess and monitor patient  barriers to mobility and need for assistive/adaptive devices.  - Assess patient's emotional response to limitations.  - Collaborate with interdisciplinary team and initiate plans and interventions as ordered.  - Encourage independent activity per ability.  - Maintain proper body alignment.  - Perform active/passive rom as tolerated/ordered.  - Plan activities to conserve energy.  - Turn patient as appropriate  Outcome: Progressing     Problem: Communication Impairment  Goal: Ability to express needs and understand communication  Description: Assess patient's communication skills and ability to understand information.  Patient will demonstrate use of effective communication techniques, alternative methods of communication and understanding even if not able to speak.     - Encourage communication and provide alternate methods of communication as needed.  - Collaborate with case management/ for discharge needs.  - Include patient/family/caregiver in decisions related to communication.  Outcome: Progressing     Problem: Potential for Aspiration  Goal: Non-ventilated patient's risk of aspiration is minimized  Description: Assess and monitor vital signs, respiratory status, and labs (WBC).  Monitor for signs of aspiration (tachypnea, cough, rales, wheezing, cyanosis, fever).    - Assess and monitor patient's ability to swallow.  - Place patient up in chair to eat if possible.  - HOB up at 90 degrees to eat if unable to get patient up into chair.  - Supervise patient during oral intake.   - Instruct patient/ family to take small bites.  - Instruct  patient/ family to take small single sips when taking liquids.  - Follow patient-specific strategies generated by speech pathologist.  Outcome: Progressing  Goal: Ventilated patient's risk of aspiration is minimized  Description: Assess and monitor vital signs, respiratory status, airway cuff pressure, and labs (WBC).  Monitor for signs of aspiration (tachypnea, cough, rales, wheezing, cyanosis, fever).    - Elevate head of bed 30 degrees if patient has tube feeding.  - Monitor tube feeding.  Outcome: Progressing     Problem: Nutrition  Goal: Nutrition/Hydration status is improving  Description: Monitor and assess patient's nutrition/hydration status for malnutrition (ex- brittle hair, bruises, dry skin, pale skin and conjunctiva, muscle wasting, smooth red tongue, and disorientation). Collaborate with interdisciplinary team and initiate plan and interventions as ordered.  Monitor patient's weight and dietary intake as ordered or per policy. Utilize nutrition screening tool and intervene per policy. Determine patient's food preferences and provide high-protein, high-caloric foods as appropriate.     - Assist patient with eating.  - Allow adequate time for meals.  - Encourage patient to take dietary supplement as ordered.  - Collaborate with clinical nutritionist.  - Include patient/family/caregiver in decisions related to nutrition.  Outcome: Progressing     Problem: NEUROSENSORY - ADULT  Goal: Achieves stable or improved neurological status  Description: INTERVENTIONS  - Monitor and report changes in neurological status  - Monitor vital signs such as temperature, blood pressure, glucose, and any other labs ordered   - Initiate measures to prevent increased intracranial pressure  - Monitor for seizure activity and implement precautions if appropriate      Outcome: Progressing  Goal: Remains free of injury related to seizures activity  Description: INTERVENTIONS  - Maintain airway, patient safety  and administer  oxygen as ordered  - Monitor patient for seizure activity, document and report duration and description of seizure to physician/advanced practitioner  - If seizure occurs,  ensure patient safety during seizure  - Reorient patient post seizure  - Seizure pads on all 4 side rails  - Instruct patient/family to notify RN of any seizure activity including if an aura is experienced  - Instruct patient/family to call for assistance with activity based on nursing assessment  - Administer anti-seizure medications if ordered    Outcome: Progressing  Goal: Achieves maximal functionality and self care  Description: INTERVENTIONS  - Monitor swallowing and airway patency with patient fatigue and changes in neurological status  - Encourage and assist patient to increase activity and self care.   - Encourage visually impaired, hearing impaired and aphasic patients to use assistive/communication devices  Outcome: Progressing     Problem: CARDIOVASCULAR - ADULT  Goal: Maintains optimal cardiac output and hemodynamic stability  Description: INTERVENTIONS:  - Monitor I/O, vital signs and rhythm  - Monitor for S/S and trends of decreased cardiac output  - Administer and titrate ordered vasoactive medications to optimize hemodynamic stability  - Assess quality of pulses, skin color and temperature  - Assess for signs of decreased coronary artery perfusion  - Instruct patient to report change in severity of symptoms  Outcome: Progressing  Goal: Absence of cardiac dysrhythmias or at baseline rhythm  Description: INTERVENTIONS:  - Continuous cardiac monitoring, vital signs, obtain 12 lead EKG if ordered  - Administer antiarrhythmic and heart rate control medications as ordered  - Monitor electrolytes and administer replacement therapy as ordered  Outcome: Progressing     Problem: RESPIRATORY - ADULT  Goal: Achieves optimal ventilation and oxygenation  Description: INTERVENTIONS:  - Assess for changes in respiratory status  - Assess for  changes in mentation and behavior  - Position to facilitate oxygenation and minimize respiratory effort  - Oxygen administered by appropriate delivery if ordered  - Initiate smoking cessation education as indicated  - Encourage broncho-pulmonary hygiene including cough, deep breathe, Incentive Spirometry  - Assess the need for suctioning and aspirate as needed  - Assess and instruct to report SOB or any respiratory difficulty  - Respiratory Therapy support as indicated  Outcome: Progressing     Problem: GASTROINTESTINAL - ADULT  Goal: Minimal or absence of nausea and/or vomiting  Description: INTERVENTIONS:  - Administer IV fluids if ordered to ensure adequate hydration  - Maintain NPO status until nausea and vomiting are resolved  - Nasogastric tube if ordered  - Administer ordered antiemetic medications as needed  - Provide nonpharmacologic comfort measures as appropriate  - Advance diet as tolerated, if ordered  - Consider nutrition services referral to assist patient with adequate nutrition and appropriate food choices  Outcome: Progressing  Goal: Maintains or returns to baseline bowel function  Description: INTERVENTIONS:  - Assess bowel function  - Encourage oral fluids to ensure adequate hydration  - Administer IV fluids if ordered to ensure adequate hydration  - Administer ordered medications as needed  - Encourage mobilization and activity  - Consider nutritional services referral to assist patient with adequate nutrition and appropriate food choices  Outcome: Progressing  Goal: Maintains adequate nutritional intake  Description: INTERVENTIONS:  - Monitor percentage of each meal consumed  - Identify factors contributing to decreased intake, treat as appropriate  - Assist with meals as needed  - Monitor I&O, weight, and lab values if indicated  - Obtain nutrition services referral as needed  Outcome: Progressing  Goal: Establish and maintain optimal ostomy function  Description: INTERVENTIONS:  - Assess  bowel function  - Encourage oral fluids to ensure adequate hydration  - Administer IV fluids if ordered to ensure adequate hydration   - Administer ordered medications as needed  - Encourage mobilization and activity  - Nutrition services referral to assist patient with appropriate food choices  - Assess stoma site  - Consider wound care consult   Outcome: Progressing  Goal: Oral mucous membranes remain intact  Description: INTERVENTIONS  - Assess oral mucosa and hygiene practices  - Implement preventative oral hygiene regimen  - Implement oral medicated treatments as ordered  - Initiate Nutrition services referral as needed  Outcome: Progressing     Problem: GENITOURINARY - ADULT  Goal: Maintains or returns to baseline urinary function  Description: INTERVENTIONS:  - Assess urinary function  - Encourage oral fluids to ensure adequate hydration if ordered  - Administer IV fluids as ordered to ensure adequate hydration  - Administer ordered medications as needed  - Offer frequent toileting  - Follow urinary retention protocol if ordered  Outcome: Progressing  Goal: Absence of urinary retention  Description: INTERVENTIONS:  - Assess patient’s ability to void and empty bladder  - Monitor I/O  - Bladder scan as needed  - Discuss with physician/AP medications to alleviate retention as needed  - Discuss catheterization for long term situations as appropriate  Outcome: Progressing  Goal: Urinary catheter remains patent  Description: INTERVENTIONS:  - Assess patency of urinary catheter  - If patient has a chronic hilton, consider changing catheter if non-functioning  - Follow guidelines for intermittent irrigation of non-functioning urinary catheter  Outcome: Progressing     Problem: METABOLIC, FLUID AND ELECTROLYTES - ADULT  Goal: Electrolytes maintained within normal limits  Description: INTERVENTIONS:  - Monitor labs and assess patient for signs and symptoms of electrolyte imbalances  - Administer electrolyte replacement  as ordered  - Monitor response to electrolyte replacements, including repeat lab results as appropriate  - Instruct patient on fluid and nutrition as appropriate  Outcome: Progressing  Goal: Fluid balance maintained  Description: INTERVENTIONS:  - Monitor labs   - Monitor I/O and WT  - Instruct patient on fluid and nutrition as appropriate  - Assess for signs & symptoms of volume excess or deficit  Outcome: Progressing  Goal: Glucose maintained within target range  Description: INTERVENTIONS:  - Monitor Blood Glucose as ordered  - Assess for signs and symptoms of hyperglycemia and hypoglycemia  - Administer ordered medications to maintain glucose within target range  - Assess nutritional intake and initiate nutrition service referral as needed  Outcome: Progressing     Problem: HEMATOLOGIC - ADULT  Goal: Maintains hematologic stability  Description: INTERVENTIONS  - Assess for signs and symptoms of bleeding or hemorrhage  - Monitor labs  - Administer supportive blood products/factors as ordered and appropriate  Outcome: Progressing     Problem: SKIN/TISSUE INTEGRITY - ADULT  Goal: Skin Integrity remains intact(Skin Breakdown Prevention)  Description: Assess:    -Inspect skin when repositioning, toileting, and assisting with ADLS  -Assess extremities for adequate circulation and sensation     Bed Management:  -Have minimal linens on bed & keep smooth, unwrinkled  -Change linens as needed when moist or perspiring          Activity:  -Encourage activity and walks on unit  -Encourage or provide ROM exercises     Skin Care:  -Avoid use of baby powder, tape, friction and shearing, hot water or constrictive clothing  -Do not massage red bony areas    Outcome: Progressing  Goal: Incision(s), wounds(s) or drain site(s) healing without S/S of infection  Description: INTERVENTIONS  - Assess and document dressing, incision, wound bed, drain sites and surrounding tissue  Outcome: Progressing  Goal: Pressure injury heals and does  not worsen  Description: Interventions:  - Implement low air loss mattress or specialty surface (Criteria met)  - Apply silicone foam dressing  - Consider nutrition services referral as needed  Outcome: Progressing     Problem: MUSCULOSKELETAL - ADULT  Goal: Maintain or return mobility to safest level of function  Description: INTERVENTIONS:  - Assess patient's ability to carry out ADLs; assess patient's baseline for ADL function and identify physical deficits which impact ability to perform ADLs (bathing, care of mouth/teeth, toileting, grooming, dressing, etc.)  - Assess/evaluate cause of self-care deficits   - Assess range of motion  - Assess patient's mobility  - Assess patient's need for assistive devices and provide as appropriate  - Encourage maximum independence but intervene and supervise when necessary  - Involve family in performance of ADLs  - Assess for home care needs following discharge   - Consider OT consult to assist with ADL evaluation and planning for discharge  - Provide patient education as appropriate  Outcome: Progressing  Goal: Maintain proper alignment of affected body part  Description: INTERVENTIONS:  - Support, maintain and protect limb and body alignment  - Provide patient/ family with appropriate education  Outcome: Progressing

## 2024-10-27 NOTE — PROGRESS NOTES
Progress Note - Neurology   Name: Joslyn Cantu 74 y.o. female I MRN: [id number]  Unit/Bed#: ICU 09 I Date of Admission: 10/25/2024   Date of Service: 10/27/2024 I Hospital Day: 2    Assessment & Plan  Stroke ([facility])  Assessment:  Joslyn Cnatu is a 74 y.o. female who presented to the ED as a result of stroke-like symptoms.  Patient has a past medical history of acute respiratory failure with hypoxia, hypertension, hypothyroidism, hypovitaminosis D, iron deficiency anemia, and type 2 diabetes. Patient was found at the side of a sidewalk not responding and also not moving her right hand and right arm with a left gaze deviation.  Given the symptoms, a pre-hospital stroke alert was initiated.  As per patient's group home, patient was conversing with another resident around 6-6:30 PM and was her normal self prior to leaving the group home to cash a check.     Initial NIHSS 27  Presenting deficits were: R-sided weakness, L gaze preference  Interventions: After a discussion of risks, benefits and alternatives reviewing inclusion and exclusion criteria the decision was made to proceed with thrombolytic therapy. Specifically discussed were the potential benefits, risks, and side effects of the proposed stroke intervention(s) and care; the likelihood of the patient achieving their goals; and any potential problems that might occur as a result of the intervention(s); reasonable alternatives to the proposed stroke intervention(s) and care. The discussion encompasses risks, benefits and side effects related to the alternatives and the risks related to not receiving the proposed stroke intervention(s) and care. Given the critical condition of the patient, the ED team and the neurology team together conversed in regards to the patient's condition taking into consideration the group home's decision of doing everything to save the patient.  The decision was to administer TNK.  There was a delay in administering TNK with no family members  able to consent for TNK but it was still administered within the appropriate timeframe. Verbal consent was obtained from conferring with Dr. Juárez and Dr. Treadwell, they were in agreement to administer the medication since no surrogate decision maker was available.  Consent was obtained by Dr. Mary Hyman.     Workup:  Lab Results   Component Value Date    HGBA1C 8.8 (H) 10/26/2024    CHOLESTEROL 113 10/26/2024    LDLCALC 45 10/26/2024    TRIG 125 10/26/2024    INR 0.94 10/25/2024      CTH: Early findings of ischemia in the left MCA territory. No hemorrhage seen.   CTA:  Occluded left M2 segment. The M1 segments are patent. Severe bilateral ICA stenosis with pronounced noncalcified plaque proximally on the left. Additional stenoses are seen throughout the left CCA, both intracranial internal carotid arteries, and the left intracranial vertebral artery.  CTP: Infinite mismatch ratio  CTH at 24 hours: Evolving left MCA infarcts, possible infarction of the left cerebral hemisphere, patient also has new evidence of infarctions in the watershed distribution.  MRI: Pending  TTE/WILL: Pending  Video EEG 10/27:  No seizures. Intermittent right hemisphere polymorphic delta slowing. Intermittent right frontal sharp waves. Mild/moderate encephalopathy.     Pertinent scores as of 10/27/24:  - NIHSS: 27    Impression: Patient is a 74 year old female presented to the ED as a result of strokelike symptoms. CTH showed a possible subacute infarct and CTA showed a left M2 occlusion.  Patient received TNK at 2111.  Patient was sent for CTP and an endovascular alert was initiated.  S/p thrombectomy post-TICI score of 2C. Patient's stroke seems to be embolic in nature but source unknown. LDL 45, A1C 8.8.  Complicated by concern for seizure-like activity, loaded with Keppra 1.5 g and Ativan 4 mg and on video EEG.  Further workup pending.    Plan:  Case discussed with Dr. Lynn  AP/AC: Hold all AP/AC secondary to TNK administration  Defer  to neurosurgery status post mechanical thrombectomy  Cholesterol med: Recommend Lipitor 40 mg  Healthy diet encouraged  MRI Brain wo contrast recommended  Echocardiogram recommended  Telemetry  Continue video EEG  Keppra 750 mg twice daily  PT/OT versus active exercise discussed  BP management and HTN med compliance discussed, defer to neurosurgical team for SBP goals s/p mechanical thrombectomy  Rest of care as per primary care team  Aortic stenosis, moderate    Elevated troponin      Recommendations for outpatient neurological follow up have yet to be determined.      Subjective   Patient was seen and evaluated, no obvious seizure-like activity seen.  Patient did withdrawn to noxious stim however did not open eyes or follow commands.    Review of Systems   Unable to perform ROS: Intubated       Objective :  Temp:  [98.4 °F (36.9 °C)-100.4 °F (38 °C)] 100.4 °F (38 °C)  HR:  [] 96  BP: (112-160)/(66-90) 154/82  Resp:  [14-26] 23  SpO2:  [97 %-100 %] 100 %  O2 Device: Ventilator  FiO2 (%):  [30] 30    Physical Exam  Vitals reviewed.   Constitutional:       Appearance: She is ill-appearing.   HENT:      Head: Normocephalic.   Eyes:      General: Lids are normal.      Pupils: Pupils are equal, round, and reactive to light.     Neurological Exam  Mental Status  Responsive to painful stimuli.  Intubated.    Cranial Nerves  CN III, IV, VI: Normal lids and orbits bilaterally. Pupils equal round and reactive to light bilaterally.  Intubated  Gaze midline  Corneals intact on the left  Intact cough.    Motor  Normal muscle bulk throughout. Normal muscle tone.  Localizes to noxious stim of the left upper extremity, after testing, for reflex patient did have some movement to the bilateral lower extremities, left greater than right..    Sensory  Intubated.    Reflexes  Deep tendon reflexes are 2+ and symmetric except as noted.  Right Plantar: upgoing  Left Plantar: mute  Symmetric,  dimminished.    Coordination    Intubated.    Gait    Intubated.        Lab Results: I have reviewed the following results:  Imaging Results Review: I personally reviewed the following image studies in PACS and associated radiology reports: CT head. My interpretation of the radiology images/reports is: Evolving left MCA infarcts, possible infarction of the left cerebral hemisphere, patient also has new evidence of infarctions in the watershed distribution..  Other Study Results Review: No additional pertinent studies reviewed.    VTE Pharmacologic Prophylaxis: Per primary

## 2024-10-27 NOTE — ASSESSMENT & PLAN NOTE
-Found unresponsive on left side walk with right sided weakness and left sided gaze.  -CT of the head showed early findings of ischemia of left MCA.  CTA showed occluded left M2 segment with a patent M1 segment  -TNK given and  status post mechanical thrombectomy, ICA stent (10/25)  She remains intubated.  Continues to be on Integrilin.

## 2024-10-27 NOTE — PROCEDURES
Central Line Insertion    Date/Time: 10/26/2024 8:41 PM    Performed by: YUMI Marx  Authorized by: YUMI Marx    Patient location:  ICU  Consent:     Consent obtained:  Emergent situation  Universal protocol:     Immediately prior to procedure, a time out was called: yes      Patient identity confirmed:  Hospital-assigned identification number  Pre-procedure details:     Hand hygiene: Hand hygiene performed prior to insertion      Sterile barrier technique: All elements of maximal sterile technique followed      Skin preparation:  2% chlorhexidine    Skin preparation agent: Skin preparation agent completely dried prior to procedure    Indications:     Central line indications: medications requiring central line    Anesthesia (see MAR for exact dosages):     Anesthesia method:  Local infiltration    Local anesthetic:  Lidocaine 1% w/o epi  Procedure details:     Location:  Right internal jugular    Patient position:  Trendelenburg    Catheter type:  Triple lumen 16cm    Landmarks identified: yes      Ultrasound guidance: yes      Ultrasound image availability:  Images available in PACS    Sterile ultrasound techniques: Sterile gel and sterile probe covers were used      Manometry confirmation: yes      Number of attempts:  1    Successful placement: yes      Catheter tip vessel location: atriocaval junction    Post-procedure details:     Post-procedure:  Dressing applied and line sutured    Assessment:  Blood return through all ports, no pneumothorax on x-ray, free fluid flow and placement verified by x-ray    Post-procedure complications: none      Patient tolerance of procedure:  Tolerated well, no immediate complications

## 2024-10-28 NOTE — ASSESSMENT & PLAN NOTE
Assessment:  Joslyn Cantu is a 74 y.o. female who presented to the ED as a result of stroke-like symptoms.  Patient has a past medical history of acute respiratory failure with hypoxia, hypertension, hypothyroidism, hypovitaminosis D, iron deficiency anemia, and type 2 diabetes. Patient was found at the side of a sidewalk not responding and also not moving her right hand and right arm with a left gaze deviation.  Given the symptoms, a pre-hospital stroke alert was initiated.  As per patient's group home, patient was conversing with another resident around 6-6:30 PM and was her normal self prior to leaving the group home to cash a check.     Initial NIHSS 27  Presenting deficits were: R-sided weakness, L gaze preference  Interventions: After a discussion of risks, benefits and alternatives reviewing inclusion and exclusion criteria the decision was made to proceed with thrombolytic therapy. Specifically discussed were the potential benefits, risks, and side effects of the proposed stroke intervention(s) and care; the likelihood of the patient achieving their goals; and any potential problems that might occur as a result of the intervention(s); reasonable alternatives to the proposed stroke intervention(s) and care. The discussion encompasses risks, benefits and side effects related to the alternatives and the risks related to not receiving the proposed stroke intervention(s) and care. Given the critical condition of the patient, the ED team and the neurology team together conversed in regards to the patient's condition taking into consideration the group home's decision of doing everything to save the patient.  The decision was to administer TNK.  There was a delay in administering TNK with no family members able to consent for TNK but it was still administered within the appropriate timeframe. Verbal consent was obtained from conferring with Dr. Juárez and Dr. Treadwell, they were in agreement to administer the  medication since no surrogate decision maker was available.  Consent was obtained by Dr. Mary Hyman.     Workup:  Lab Results   Component Value Date    HGBA1C 8.8 (H) 10/26/2024    CHOLESTEROL 113 10/26/2024    LDLCALC 45 10/26/2024    TRIG 125 10/26/2024    INR 0.94 10/25/2024      CTH: Early findings of ischemia in the left MCA territory. No hemorrhage seen.   CTA:  Occluded left M2 segment. The M1 segments are patent. Severe bilateral ICA stenosis with pronounced noncalcified plaque proximally on the left. Additional stenoses are seen throughout the left CCA, both intracranial internal carotid arteries, and the left intracranial vertebral artery.  CTP: Infinite mismatch ratio  CTH at 24 hours: Evolving left MCA infarcts, possible infarction of the left cerebral hemisphere, patient also has new evidence of infarctions in the watershed distribution.  MRI: Pending  TTE/WILL: hypokinetic / akkinetic wall segs, decreased EF to 25%  Video EEG 10/27:  No seizures. Intermittent right hemisphere polymorphic delta slowing. Intermittent right frontal sharp waves. Mild/moderate encephalopathy.   10/28 VEEG Update: No seizures. 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 Mild/moderate encephalopathy.     Pertinent scores as of 10/28/24:  - NIHSS: 27    Impression: Patient is a 74 year old female presented to the ED as a result of strokelike symptoms. CTH showed a possible subacute infarct and CTA showed a left M2 occlusion.  Patient received TNK at 2111.  Patient was sent for CTP and an endovascular alert was initiated.  S/p thrombectomy post-TICI score of 2C. Patient's stroke seems to be embolic in nature but source unknown. LDL 45, A1C 8.8.  Complicated by concern for seizure-like activity, loaded with Keppra 1.5 g and Ativan 4 mg and on video EEG.  Further workup pending.    Plan:  Case discussed with Dr. Villalobos  AP/AC:  Defer to neurosurgery status post mechanical thrombectomy  Recommend Lipitor 40 mg  MRI Brain wo contrast recommended  Telemetry  Continuing video EEG not warranted from neuro standpoint, see above  Keppra 750 mg twice daily  PT/OT when appropriate  BP management and HTN med compliance discussed, defer to neurosurgical team for SBP goals s/p mechanical thrombectomy  Rest of care as per primary care team

## 2024-10-28 NOTE — ED PROVIDER NOTES
Time reflects when diagnosis was documented in both MDM as applicable and the Disposition within this note       Time User Action Codes Description Comment    10/25/2024  8:11 PM CarrieDeonna machado CRISTINA Add [I63.9] Stroke (HCC)     10/26/2024 12:52 AM Suzanne Puente Add [R94.31] ST elevation     10/26/2024  4:47 PM Joann Daily Add [F20.9] Schizophrenia, unspecified type (HCC)           ED Disposition       ED Disposition   Admit    Condition   Stable    Date/Time   Fri Oct 25, 2024  9:42 PM    Comment                  Assessment & Plan       Medical Decision Making  Patient is 74-year-old female presenting as a prehospital stroke alert.  DDx: CVA versus ICH, hyperosmolar hyperglycemic syndrome, encephalitis versus meningitis  Based on patient presentation and physical exam findings, primary concern is for evaluation of possible CVA.  Stroke alert called, patient directed CT per protocol, neurology bedside and evaluated.  Lab workup obtained.  Concerns for acute infarcts on CT, after discussion with neurology and attempt to contact group home, patient does not have any POA.  After discussion with myself, Dr. Juárez and Dr. Hyman, TNK given, patient taken directly to IR for possible interventional procedure.  Neurosurgical team aware and bedside and IR.  Plan admit to critical care who is aware of situation as well.  Rest of care per neurosurgical and critical care team.    Problems Addressed:  Stroke (HCC): acute illness or injury    Amount and/or Complexity of Data Reviewed  Labs: ordered.  Radiology: ordered.    Risk  Prescription drug management.  Decision regarding hospitalization.             Medications   iohexol (OMNIPAQUE) 350 MG/ML injection (SINGLE-DOSE) 75 mL (75 mL Intravenous Given 10/25/24 2050)   iohexol (OMNIPAQUE) 350 MG/ML injection (SINGLE-DOSE) 75 mL (75 mL Intravenous Given 10/25/24 2052)   tenecteplase (TNKase) injection 16 mg (16 mg Intravenous Given by Other 10/25/24 2111)   iohexol  (OMNIPAQUE) 350 MG/ML injection (MULTI-DOSE) 100 mL (40 mL Intravenous Given 10/25/24 2132)   lidocaine (PF) (XYLOCAINE-MPF) 1 % injection (10 mL Infiltration Given 10/25/24 2151)   eptifibatide (INTEGRILIN) 20 MG/10ML (premix) (11,466 mcg Intravenous New Bag 10/25/24 2214)   eptifibatide (INTEGRILIN) 75 mg in 100 mL infusion (premix) (0.5 mcg/kg/min × 63.7 kg Intravenous New Bag 10/25/24 2218)       ED Risk Strat Scores                                              History of Present Illness       Chief Complaint   Patient presents with    CVA/TIA-like Symptoms     Pt found on the street unresponsive. Per ems able to move left side, no movement on R side.       Past Medical History:   Diagnosis Date    Acute respiratory failure with hypoxia (HCC) 03/24/2024    Hypertension     Hypothyroid 05/08/2016    Hypovitaminosis D 05/10/2016    Iron deficiency anemia 05/11/2016    Type 2 diabetes mellitus without complication (HCC) 05/08/2016      Past Surgical History:   Procedure Laterality Date    IR STROKE ALERT  10/25/2024      Family History   Family history unknown: Yes      Social History     Tobacco Use    Smoking status: Every Day     Types: Cigarettes    Smokeless tobacco: Current   Vaping Use    Vaping status: Unknown   Substance Use Topics    Alcohol use: No    Drug use: No      E-Cigarette/Vaping    E-Cigarette Use Unknown If Ever Used       E-Cigarette/Vaping Substances      I have reviewed and agree with the history as documented.     HPI  Patient is 74-year-old female presenting as a prehospital stroke alert.  Patient unable to provide any history secondary to altered mental status.  Patient has a leftward gaze preference, is not moving her right upper extremity.  Was found stumbling outside by EMS.  Reportedly per EMS, patient became less responsive and no longer verbal en route.  Concern for possible STEMI per EMS well.  On initial presentation, patient altered and unable respond to any questions however is  protecting her airway, vital stable.  Review of Systems   Unable to perform ROS: Mental status change           Objective       ED Triage Vitals   Temperature Pulse Blood Pressure Respirations SpO2 Patient Position - Orthostatic VS   10/26/24 0000 10/25/24 2023 10/25/24 2023 10/25/24 2023 10/25/24 2023 10/27/24 0400   (!) 97.4 °F (36.3 °C) 93 154/87 16 99 % Lying      Temp Source Heart Rate Source BP Location FiO2 (%) Pain Score    10/26/24 0000 10/25/24 2023 10/27/24 0400 10/25/24 2342 10/26/24 2117    Rectal Monitor Left arm 55 Med Not Given for Pain - for MAR use only      Vitals      Date and Time Temp Pulse SpO2 Resp BP Pain Score FACES Pain Rating User   10/28/24 1500 100.8 °F (38.2 °C) 107 100 % 21 133/65 -- -- MP   10/28/24 1400 100.8 °F (38.2 °C) 94 100 % 22 128/63 -- -- MP   10/28/24 1348 -- -- 100 % -- -- -- -- PMG   10/28/24 1200 100 °F (37.8 °C) 80 100 % 22 131/64 -- -- LO   10/28/24 1119 -- -- 100 % -- -- -- -- PMG   10/28/24 1100 100 °F (37.8 °C) 90 100 % 24 119/60 -- -- MP   10/28/24 1000 100.4 °F (38 °C) 94 100 % 23 132/60 -- -- MP   10/28/24 0900 100.4 °F (38 °C) 104 99 % 24 123/68 -- -- MP   10/28/24 0800 100.9 °F (38.3 °C) 114 100 % 28 137/68 -- -- MP   10/28/24 0707 -- -- 100 % -- -- -- -- PMG   10/28/24 0700 100.8 °F (38.2 °C) 94 100 % 26 125/65 -- -- OY   10/28/24 0600 100.8 °F (38.2 °C) 108 100 % 28 122/61 -- -- OY   10/28/24 0500 100.8 °F (38.2 °C) 106 99 % 27 135/66 -- -- OY   10/28/24 0403 100.8 °F (38.2 °C) 104 99 % 25 111/63 -- -- OY   10/28/24 0400 100.8 °F (38.2 °C) 108 99 % 26 111/63 -- -- OY   10/28/24 0303 101.1 °F (38.4 °C) 124 99 % 25 -- Med Not Given for Pain - for MAR use only -- OY   10/28/24 0300 101.1 °F (38.4 °C) 122 99 % 25 124/71 -- -- OY   10/28/24 0200 101.1 °F (38.4 °C) 128 98 % 23 131/64 -- -- OY   10/28/24 0119 -- -- 99 % -- -- -- -- PS   10/28/24 0100 100.8 °F (38.2 °C) 100 100 % 21 119/58 -- -- OY   10/27/24 2300 100.4 °F (38 °C) 104 100 % 25 104/52 -- -- OY    10/27/24 2255 -- -- 99 % -- -- -- -- PS   10/27/24 2200 100.8 °F (38.2 °C) 118 100 % 24 111/63 -- -- OY   10/27/24 2100 101.1 °F (38.4 °C) 108 99 % 25 115/61 -- -- OY   10/27/24 1958 -- -- -- -- -- Med Not Given for Pain - for MAR use only -- OY   10/27/24 1914 -- -- 99 % -- -- -- -- PS   10/27/24 1900 101.1 °F (38.4 °C) 96 100 % 22 -- -- -- JMB   10/27/24 1800 101.1 °F (38.4 °C) 108 100 % 22 -- -- -- JMB   10/27/24 1700 101.1 °F (38.4 °C) 112 99 % 22 -- -- -- JMB   10/27/24 1614 -- -- 99 % -- -- -- -- PMG   10/27/24 1600 101.1 °F (38.4 °C) 88 100 % 22 -- -- -- JMB   10/27/24 1500 101.1 °F (38.4 °C) 126 98 % 29 -- -- -- JMB   10/27/24 1400 100.8 °F (38.2 °C) 106 100 % 23 -- -- -- JMB   10/27/24 1400 -- -- -- -- 154/82 -- -- JG   10/27/24 1340 -- -- 100 % -- -- -- -- PMG   10/27/24 1300 100.8 °F (38.2 °C) 112 99 % 30 -- -- -- JMB   10/27/24 1200 100.4 °F (38 °C) 96 98 % 23 -- -- -- JMB   10/27/24 1115 -- -- 99 % -- -- -- -- PMG   10/27/24 1115 100.4 °F (38 °C) 86 -- 23 -- -- -- JMB   10/27/24 1100 100.4 °F (38 °C) 96 100 % 24 -- -- -- JMB   10/27/24 1000 100.4 °F (38 °C) 90 100 % 24 154/82 -- -- JMB   10/27/24 0900 100.4 °F (38 °C) 98 99 % 26 136/73 -- -- JMB   10/27/24 0800 100 °F (37.8 °C) 102 98 % 24 160/90 -- -- JMB   10/27/24 0757 -- -- 98 % -- -- -- -- PMG   10/27/24 0700 100 °F (37.8 °C) 102 97 % 23 154/81 -- -- KL   10/27/24 0600 100 °F (37.8 °C) 102 99 % 22 140/88 -- -- KL   10/27/24 0500 -- 86 99 % 20 112/66 -- -- KL   10/27/24 0445 -- -- -- -- -- Med Not Given for Pain - for MAR use only -- KL   10/27/24 0400 99.7 °F (37.6 °C) 98 100 % 21 160/86 -- -- KL   10/27/24 0300 -- 84 99 % 24 -- -- -- KL   10/27/24 0200 -- 84 100 % 22 135/79 -- -- KL   10/27/24 0100 -- 108 99 % 22 -- -- -- KL   10/27/24 0000 -- 104 100 % 23 -- -- -- KL   10/26/24 2308 99.1 °F (37.3 °C) -- -- -- -- -- -- SL   10/26/24 2308 -- 118 100 % 26 -- -- -- KL   10/26/24 2300 -- 102 100 % 20 -- -- -- KL   10/26/24 2200 -- 74 100 % 18 -- --  -- KL   10/26/24 2117 -- -- -- -- -- Med Not Given for Pain - for MAR use only -- KL   10/26/24 2100 -- 74 100 % 14 -- -- -- KL   10/26/24 2052 98.4 °F (36.9 °C) -- -- -- -- -- -- SL   10/26/24 2000 -- 76 -- 18 -- -- -- KL   10/26/24 1900 -- 86 100 % 16 -- -- -- FO   10/26/24 1800 -- 84 100 % 18 -- -- -- FO   10/26/24 1700 -- 84 100 % 16 -- -- -- FO   10/26/24 1600 99.2 °F (37.3 °C) 86 100 % 16 -- -- -- FO   10/26/24 1518 -- -- 100 % -- -- -- -- PMG   10/26/24 1500 -- 88 100 % 18 -- -- -- FO   10/26/24 1400 -- 88 99 % 11 -- -- -- FO   10/26/24 1300 99.3 °F (37.4 °C) 94 100 % 22 103/68 -- -- FO   10/26/24 1200 -- 76 100 % 20 -- -- -- FO   10/26/24 1100 -- 68 100 % 18 -- -- -- FO   10/26/24 1000 -- 80 100 % 20 -- -- -- FO   10/26/24 0900 -- 78 99 % 16 -- -- -- FO   10/26/24 0834 -- 90 100 % 19 -- -- -- FO   10/26/24 0800 -- 76 100 % 16 -- -- -- FO   10/26/24 0600 -- 66 100 % 14 -- -- -- KL   10/26/24 0530 -- 66 -- 15 75/51 -- -- KL   10/26/24 0500 -- 74 100 % 19 -- -- -- KL   10/26/24 0430 -- 76 -- 17 -- -- -- KL   10/26/24 0400 97 °F (36.1 °C) 74 100 % 20 -- -- -- KL   10/26/24 0330 -- 76 -- 19 -- -- -- KL   10/26/24 0300 -- 82 -- 20 -- -- -- KL   10/26/24 0230 -- 84 -- 18 -- -- -- KL   10/26/24 0200 -- 80 -- 16 -- -- -- KL   10/26/24 0145 -- 84 -- 16 -- -- -- KL   10/26/24 0130 -- 78 -- 16 -- -- -- KL   10/26/24 0115 -- 70 -- 17 -- -- -- KL   10/26/24 0100 -- 62 -- 15 -- -- -- KL   10/26/24 0045 -- 90 -- 27 -- -- -- KL   10/26/24 0030 -- 76 -- 35 -- -- -- KL   10/26/24 0015 -- 90 -- 21 -- -- -- KL   10/26/24 0000 -- 88 100 % 22 -- -- -- KL   10/26/24 0000 97.4 °F (36.3 °C) -- -- -- -- -- -- JI   10/25/24 2342 -- -- 96 % -- -- -- -- BH   10/25/24 2141 -- -- -- -- 157/91 -- -- AS   10/25/24 2133 -- 102 -- 40 -- -- -- AS   10/25/24 2130 -- 104 96 % 28 -- -- -- AS   10/25/24 2125 -- 113 95 % 34 -- -- -- AS   10/25/24 2123 -- 96 96 % 16 154/89 -- -- AS   10/25/24 2120 -- 111 96 % 43 -- -- -- AS   10/25/24 2115 -- 119 96  % 50 -- -- -- AS   10/25/24 2110 -- 98 97 % 31 -- -- -- AS   10/25/24 2108 -- 116 98 % 18 165/87 -- -- AS   10/25/24 2105 -- 92 96 % -- -- -- -- AS   10/25/24 2104 -- -- -- 24 -- -- -- AS   10/25/24 2100 -- 117 96 % 26 165/87 -- -- AS   10/25/24 2055 -- 112 95 % 30 -- -- -- AS   10/25/24 2053 -- 116 98 % 19 165/87 -- -- AS   10/25/24 2050 -- 112 95 % 42 -- -- -- AS   10/25/24 2046 -- -- -- 45 -- -- -- AS   10/25/24 2045 -- 118 96 % -- 157/91 -- -- AS   10/25/24 2038 -- 118 96 % 17 157/91 -- -- AS   10/25/24 2023 -- 93 99 % 16 154/87 -- -- AS            Physical Exam  Vitals and nursing note reviewed.   Constitutional:       General: She is in acute distress.      Appearance: She is ill-appearing.   HENT:      Head: Normocephalic and atraumatic.      Right Ear: External ear normal.      Left Ear: External ear normal.      Nose: Nose normal.      Mouth/Throat:      Mouth: Mucous membranes are moist.      Pharynx: Oropharynx is clear.   Eyes:      Comments: Leftward gaze preference, unable to follow commands for full EOM evaluation.  Conjunctival normal.  Pupils equal and reactive   Cardiovascular:      Rate and Rhythm: Regular rhythm. Tachycardia present.      Pulses: Normal pulses.      Heart sounds: Normal heart sounds.   Pulmonary:      Effort: Pulmonary effort is normal.      Breath sounds: Normal breath sounds.      Comments: Not responding to any verbal stimuli however protecting her airway, tolerating secretions.  Acute bilateral breath sounds.  Abdominal:      General: Abdomen is flat.      Palpations: Abdomen is soft.   Musculoskeletal:      Comments: Patient not moving her right upper extremity, appears to be moving left upper extremity and bilateral lower extremities.  Not following any commands.   Skin:     General: Skin is warm and dry.      Capillary Refill: Capillary refill takes less than 2 seconds.      Comments: Patient noted to have large amount of stool, appears to have lost control of her bowels.    Neurological:      Comments: Patient altered, not responding to any verbal or physical stimuli.  Intermittently moves her left upper extremity or bilateral lower extremities but difficult to assess strength.  Not moving her right upper extremity.  Difficult to assess sensory deficits secondary to AMS, appears to respond to noxious stimuli.  Patient has leftward gaze preference, does not appear to cross midline.         Results Reviewed       Procedure Component Value Units Date/Time    TSH, 3rd generation with Free T4 reflex [[id number]]  (Abnormal) Collected: 10/26/24 0001    Lab Status: Final result Specimen: Blood from Arm, Left Updated: 10/26/24 0055     TSH 3RD GENERATON 0.015 uIU/mL     CK [[id number]]  (Normal) Collected: 10/26/24 0001    Lab Status: Final result Specimen: Blood from Arm, Left Updated: 10/26/24 0044     Total  U/L     Hepatic function panel [[id number]]  (Abnormal) Collected: 10/26/24 0001    Lab Status: Final result Specimen: Blood from Arm, Left Updated: 10/26/24 0044     Total Bilirubin 0.31 mg/dL      Bilirubin, Direct 0.11 mg/dL      Alkaline Phosphatase 83 U/L      AST 37 U/L      ALT 27 U/L      Total Protein 5.7 g/dL      Albumin 3.5 g/dL     HS Troponin I 2hr [[id number]]  (Abnormal) Collected: 10/26/24 0001    Lab Status: Final result Specimen: Blood from Arm, Left Updated: 10/26/24 0043     hs TnI 2hr 2,643 ng/L      Delta 2hr hsTnI 2,029 ng/L     Lactic acid, plasma (w/reflex if result > 2.0) [[id number]]  (Abnormal) Collected: 10/26/24 0001    Lab Status: Final result Specimen: Blood from Arm, Left Updated: 10/26/24 0042     LACTIC ACID 3.0 mmol/L     Narrative:      Result may be elevated if tourniquet was used during collection.    Basic metabolic panel [[id number]]  (Abnormal) Collected: 10/25/24 2028    Lab Status: Final result Specimen: Blood from Arm, Left Updated: 10/25/24 2146     Sodium 132 mmol/L      Potassium 4.3 mmol/L      Chloride 98 mmol/L      CO2 21 mmol/L       ANION GAP 13 mmol/L      BUN 17 mg/dL      Creatinine 0.78 mg/dL      Glucose 505 mg/dL      Calcium 9.2 mg/dL      eGFR 76 ml/min/1.73sq m     Narrative:      [employer] guidelines for Chronic Kidney Disease (CKD):     Stage 1 with normal or high GFR (GFR > 90 mL/min/1.73 square meters)    Stage 2 Mild CKD (GFR = 60-89 mL/min/1.73 square meters)    Stage 3A Moderate CKD (GFR = 45-59 mL/min/1.73 square meters)    Stage 3B Moderate CKD (GFR = 30-44 mL/min/1.73 square meters)    Stage 4 Severe CKD (GFR = 15-29 mL/min/1.73 square meters)    Stage 5 End Stage CKD (GFR <15 mL/min/1.73 square meters)  Note: GFR calculation is accurate only with a steady state creatinine    HS Troponin 0hr (reflex protocol) [[id number]]  (Abnormal) Collected: 10/25/24 2028    Lab Status: Final result Specimen: Blood from Arm, Left Updated: 10/25/24 2104     hs TnI 0hr 614 ng/L     Protime-INR [[id number]]  (Normal) Collected: 10/25/24 2028    Lab Status: Final result Specimen: Blood from Arm, Left Updated: 10/25/24 2058     Protime 12.9 seconds      INR 0.94    Narrative:      INR Therapeutic Range    Indication                                             INR Range      Atrial Fibrillation                                               2.0-3.0  Hypercoagulable State                                    2.0.2.3  Left Ventricular Asist Device                            2.0-3.0  Mechanical Heart Valve                                  -    Aortic(with afib, MI, embolism, HF, LA enlargement,    and/or coagulopathy)                                     2.0-3.0 (2.5-3.5)     Mitral                                                             2.5-3.5  Prosthetic/Bioprosthetic Heart Valve               2.0-3.0  Venous thromboembolism (VTE: VT, PE        2.0-3.0    APTT [[id number]]  (Abnormal) Collected: 10/25/24 2028    Lab Status: Final result Specimen: Blood from Arm, Left Updated: 10/25/24 2058     PTT 21 seconds      Fingerstick Glucose (POCT) [[id number]]  (Abnormal) Collected: 10/25/24 2043    Lab Status: Final result Specimen: Blood Updated: 10/25/24 2044     POC Glucose 409 mg/dl     CBC and Platelet [[id number]]  (Abnormal) Collected: 10/25/24 2028    Lab Status: Final result Specimen: Blood from Arm, Left Updated: 10/25/24 2039     WBC 14.77 Thousand/uL      RBC 4.17 Million/uL      Hemoglobin 12.6 g/dL      Hematocrit 40.0 %      MCV 96 fL      MCH 30.2 pg      MCHC 31.5 g/dL      RDW 13.2 %      Platelets 243 Thousands/uL      MPV 10.4 fL             CT head wo contrast   Final Interpretation by Michael Franklin MD (10/27 7262)      The examination is limited/degraded secondary to streak artifact from the scalp leads. Within these confines:      1. Evolving known recent left MCA territory infarct with multifocal left cerebral hemispheric contrast staining.      2. Additionally, there are multifocal recent infarcts across multiple vascular territories with new hyperattenuation that is distinct from the previously ascertained contrast staining, which is indeterminate but suspicious for associated hemorrhagic    associated with these infarcts. Possible recent infarct of the left cerebellar hemisphere. See narrative above for full details.      Given their distribution, findings may reflect watershed ischemia/sequela of hypoxic-ischemic injury due to global cerebral hypoperfusion, in the appropriate clinical setting.      I personally discussed this study with JOSE ROBERTO CROWLEY on 10/27/2024 6:35 AM.                           Workstation performed: [id number]         XR chest portable ICU   Final Interpretation by Dannie Harris MD (10/27 9943)      1.  Mild bibasilar atelectasis, superimposed on emphysema.   2.  Lines and tubes, as described.            Workstation performed: [id number]         CT head wo contrast   Final Interpretation by Pallav N Shah, MD (10/27 2265)      Interval evolution of the hemorrhagic left MCA  territory infarction. The extent of the infarct is greater and the area of hemorrhagic infarction is slightly more conspicuous. This has not resulted in greater mass effect on the ventricular system or    herniation however, there is greater left-sided cerebral sulcal effacement.      More defined areas of acute to early subacute right HIEN, left PCA, right MCA/PCA watershed infarction.         I personally discussed this study with JUSTIN ESTRELLA on 10/26/2024 at 2:00 p.m.                        Workstation performed: [id number]         CT head wo contrast   Final Interpretation by Michael Franklin MD (10/26 4889)      1. Evolving recent left MCA territory infarct with multifocal left cerebral hemispheric contrast staining. Mild local mass effect. No significant midline shift.      2. Development of subtle loss of gray-white matter differentiation as well as sulcal effacement involving the right posterior frontal lobe and right parietal lobe, suspicious for recent ischemia.      Recommendation: Brain MRI      I personally discussed this study with EMILY COATES on 10/26/2024 8:39 AM.                  Workstation performed: [id number]         X-ray chest 1 view   Final Interpretation by Santiago Garcia MD (10/26 0961)      Improved bilateral pulmonary opacities.            Workstation performed: [id number]         IR stroke alert   Final Interpretation by ANTIONE CANO (10/25 2325)      CT cerebral perfusion   Final Interpretation by Joshua Hickey MD (10/25 2155)      CT perfusion performed.  Data available on PACS.   There is significant motion degradation.         Workstation performed: [id number]         CTA dissection protocol chest abdomen pelvis w wo contrast   Final Interpretation by Dannie Harris MD (10/25 2115)      1.  No identifiable acute abnormality to account for the patient's clinical presentation. There is no evidence of an aortic dissection. There is an  occlusion of the left femoral artery, which is incompletely assessed on this exam.   2.  Advanced centrilobular emphysema.   3.  Nonspecific mildly enlarged right hilar lymph node. A 3-month contrast-enhanced chest CT follow-up should be considered for further evaluation.      The study was marked in EPIC for significant notification.         Workstation performed: [id number]         CTA stroke alert (head/neck)   Final Interpretation by Joshua Hickey MD (10/25 2058)      1. Occluded left M2 segment. The M1 segments are patent.   2. Severe bilateral ICA stenosis with pronounced noncalcified plaque proximally on the left.   3. Additional stenoses are seen throughout the left CCA, both intracranial internal carotid arteries, and the left intracranial vertebral artery.      Findings were directly discussed with Donita Beyer at 8:45 p.m. on 10/25/2024.      Workstation performed: [id number]         CT stroke alert brain   Final Interpretation by Joshua Hickey MD (10/25 2050)   Early findings of ischemia in the left MCA territory. No hemorrhage seen.      Findings were directly discussed with Dr. Mary Hyman at approximately 8:40 p.m. on 10/25/2024.      Workstation performed: [id number]         MRI brain wo contrast    (Results Pending)       ECG 12 Lead Documentation Only    Date/Time: 10/25/2024 8:28 PM    Performed by: Joshua Treadwell MD  Authorized by: Joshua Treadwell MD    Indications / Diagnosis:  Stroke alert  ECG reviewed by me, the ED Provider: yes    Patient location:  ED  Interpretation:     Interpretation: non-specific    Rate:     ECG rate assessment: tachycardic    Rhythm:     Rhythm: sinus rhythm    Ectopy:     Ectopy: PAC    QRS:     QRS axis:  Normal    QRS intervals:  Normal  Conduction:     Conduction: normal    ST segments:     ST segments:  Non-specific  T waves:     T waves: normal        ED Medication and Procedure Management   Prior to Admission Medications   Prescriptions Last Dose  Informant Patient Reported? Taking?   Acetaminophen Extra Strength 500 MG TABS  Care Giver Yes No   Alcohol Swabs PADS  Care Giver Yes No   Sig: Use as directed for blood glucose testing   Cholecalciferol 125 MCG (5000 UT) capsule  Care Giver Yes No   Sig: Take 5,000 Units by mouth daily   Patient not taking: Reported on 10/2/2024   FeroSul 325 (65 Fe) MG tablet Not Taking Care Giver Yes No   Patient not taking: Reported on 2024   Lancets 28G MISC  Care Giver Yes No   Si (two) times a day   QUEtiapine (SEROquel) 200 mg tablet Not Taking Care Giver Yes No   Patient not taking: Reported on 2024   Valbenazine Tosylate 80 MG CAPS Not Taking Care Giver Yes No   Sig: Take 80 mg by mouth   Patient not taking: Reported on 2024   ferrous sulfate 324 (65 Fe) mg Past Week Care Giver No Yes   Sig: Take 1 tablet (324 mg total) by mouth 2 (two) times a day before meals   furosemide (LASIX) 20 mg tablet Past Week Care Giver, Outside Facility (Specify) No Yes   Sig: Take 2 tablets (40 mg total) by mouth daily   Patient taking differently: Take 20 mg by mouth daily   haloperidol (HALDOL) 5 mg tablet Past Week Care Giver, Outside Facility (Specify) No Yes   Sig: Take 1 tablet (5 mg total) by mouth daily at bedtime for 3 days   ibuprofen (MOTRIN) 200 mg tablet Not Taking Care Giver Yes No   Sig: Take 200 mg by mouth every 6 (six) hours as needed   Patient not taking: Reported on 2024   levothyroxine 100 mcg tablet Not Taking Care Giver No No   Sig: Take 1.5 tablets (150 mcg total) by mouth daily in the early morning   Patient not taking: Reported on 10/28/2024   levothyroxine 150 mcg tablet Past Week Care Giver Yes Yes   lidocaine (LIDODERM) 5 %  Care Giver Yes No   Sig: Place 1 patch on the skin every 12 (twelve) hours as needed   Patient not taking: Reported on 2024   lisinopril (ZESTRIL) 10 mg tablet Past Week Care Giver No Yes   Sig: Take 1 tablet (10 mg total) by mouth daily   Patient taking  differently: Take 5 mg by mouth daily   metFORMIN (GLUCOPHAGE) 1000 MG tablet Past Week Care Giver No Yes   Sig: Take 1 tablet (1,000 mg total) by mouth 2 (two) times a day with meals Indications: Type 2 Diabetes.   metFORMIN (GLUCOPHAGE-XR) 500 mg 24 hr tablet Not Taking Care Giver Yes No   Patient not taking: Reported on 6/25/2024   metoprolol succinate (TOPROL-XL) 25 mg 24 hr tablet Past Week Care Giver, Outside Facility (Specify) No Yes   Sig: Take 0.5 tablets (12.5 mg total) by mouth daily Do not start before April 3, 2024.      Facility-Administered Medications: None     Current Discharge Medication List        CONTINUE these medications which have NOT CHANGED    Details   ferrous sulfate 324 (65 Fe) mg Take 1 tablet (324 mg total) by mouth 2 (two) times a day before meals  Qty: 60 tablet, Refills: 0    Associated Diagnoses: Iron deficiency anemia, unspecified iron deficiency anemia type      furosemide (LASIX) 20 mg tablet Take 2 tablets (40 mg total) by mouth daily  Qty: 60 tablet, Refills: 0    Associated Diagnoses: Acute diastolic CHF (congestive heart failure) (HCC)      haloperidol (HALDOL) 5 mg tablet Take 1 tablet (5 mg total) by mouth daily at bedtime for 3 days  Qty: 3 tablet, Refills: 0    Associated Diagnoses: Schizophrenia, unspecified type (HCC)      !! levothyroxine 150 mcg tablet       lisinopril (ZESTRIL) 10 mg tablet Take 1 tablet (10 mg total) by mouth daily  Qty: 90 tablet, Refills: 1    Associated Diagnoses: Acute diastolic CHF (congestive heart failure) (HCC); Primary hypertension      metFORMIN (GLUCOPHAGE) 1000 MG tablet Take 1 tablet (1,000 mg total) by mouth 2 (two) times a day with meals Indications: Type 2 Diabetes.  Qty: 60 tablet, Refills: 0    Associated Diagnoses: Type 2 diabetes mellitus without complication (HCC)      metoprolol succinate (TOPROL-XL) 25 mg 24 hr tablet Take 0.5 tablets (12.5 mg total) by mouth daily Do not start before April 3, 2024.  Qty: 15 tablet, Refills:  0    Associated Diagnoses: Acute diastolic CHF (congestive heart failure) (HCC)      Acetaminophen Extra Strength 500 MG TABS       Alcohol Swabs PADS Use as directed for blood glucose testing      Cholecalciferol 125 MCG (5000 UT) capsule Take 5,000 Units by mouth daily      FeroSul 325 (65 Fe) MG tablet       ibuprofen (MOTRIN) 200 mg tablet Take 200 mg by mouth every 6 (six) hours as needed      Lancets 28G MISC 2 (two) times a day      !! levothyroxine 100 mcg tablet Take 1.5 tablets (150 mcg total) by mouth daily in the early morning    Associated Diagnoses: Hypothyroid      lidocaine (LIDODERM) 5 % Place 1 patch on the skin every 12 (twelve) hours as needed      metFORMIN (GLUCOPHAGE-XR) 500 mg 24 hr tablet       QUEtiapine (SEROquel) 200 mg tablet       Valbenazine Tosylate 80 MG CAPS Take 80 mg by mouth       !! - Potential duplicate medications found. Please discuss with provider.        No discharge procedures on file.  ED SEPSIS DOCUMENTATION   Time reflects when diagnosis was documented in both MDM as applicable and the Disposition within this note       Time User Action Codes Description Comment    10/25/2024  8:11 PM Deonna Steven Add [I63.9] Stroke (HCC)     10/26/2024 12:52 AM Suzanne Puente Add [R94.31] ST elevation     10/26/2024  4:47 PM Joann Daily Add [F20.9] Schizophrenia, unspecified type (HCC)            Initial Sepsis Screening       Row Name 10/27/24 0355 10/26/24 1309 10/26/24 0054          Is the patient's history suggestive of a new or worsening infection? No  -LB Yes (Proceed)  -SV No  -LB      Suspected source of infection -- suspect infection, source unknown  -SV --      Indicate SIRS criteria -- Hyperthemia > 38.3C (100.9F) OR Hypothermia <36C (96.8F);Leukocytosis (WBC > 09886 IJL) OR Leukopenia (WBC <4000 IJL) OR Bandemia (WBC >10% bands)  -SV --      Are two or more of the above signs & symptoms of infection both present and new to the patient? -- Yes (Proceed)  -SV --       Assess for evidence of organ dysfunction: Are any of the below criteria present within 6 hours of suspected infection and SIRS criteria that are NOT considered to be chronic conditions? -- -- --                User Key  (r) = Recorded By, (t) = Taken By, (c) = Cosigned By      Initials Name Provider Type    YUMI Johnson Nurse Practitioner    KIRILL Daily MD Resident                       Joshua Treadwell MD  10/28/24 5147

## 2024-10-28 NOTE — RESPIRATORY THERAPY NOTE
Respiratory Vent Note   10/28/24 0242   Respiratory Assessment   Assessment Type Assess only   General Appearance Sedated   Respiratory Pattern Spontaneous;Assisted   Chest Assessment Chest expansion symmetrical   Bilateral Breath Sounds Clear   Resp Comments Pt conts on PS 4/6 30% vent settings as ordered. No vent changes at this time, will cont to yasmine pt per resp prot order.   O2 Device G5 vent   Vent Information   Vent ID [id number]   Vent type Harris G5   Harris Vent Mode SPONT   $ Vent Daily Charge-Subsequent Yes   $ Pulse Oximetry Spot Check Charge Completed   SPONT Settings   FIO2 (%) 30 %   PEEP (cmH2O) 6 cmH2O   Pressure Support (cmH2O) 4 cmH20   Flow Trigger (LPM) 5 LPM   P-ramp (ms) 100 ms   ETS  (%) 25 %   Humidification Heater   Heater Temp 95 °F (35 °C)   SPONT Actuals   Resp Rate (BPM) 23 BPM   VT (mL) 401 mL   MV (Obs) 9.9   MAP (cmH2O) 8 cmH2O   Peak Pressure (cmH2O) 12 cmH2O   I:E Ratio (Obs) 1:1.7   RSBI (f/vt) 57 f/Vt   Heater Temperature (Obs) 95 °F (35 °C)   SPONT Alarms   High Peak Pressure (cmH20) 40 cmH2O   High Resp Rate (BPM) 40 BPM   High MV (L/min) 20 L/min   Low MV (L/min) 3 L/min   High Spont VTE (mL) 1000 mL   Low Spont VTE (mL) 250 mL   Apnea Time (S) 20 S   SPONT Apnea Settings   Resp Rate (BPM) 14 BPM  (PC 15)   FIO2 (%) 30 %   %TI (%) 1 %   Apnea Time (S) 30 S   Maintenance   Alarm (pink) cable attached No   Resuscitation bag with peep valve at bedside Yes   Water bag changed No   Circuit changed No   ETT  Cuffed;Oral;Pre-curved;Inflated 8 mm   Placement Date/Time: 10/25/24 3174   Mask Ventilation: Mask ventilation not attempted (0)  Preoxygenated: Yes  Technique: Direct laryngoscopy;Rapid sequence;Stylet  Type: Cuffed;Oral;Pre-curved;Inflated  Tube Size: 8 mm  Laryngoscope: Mac  Blade Size:...   Secured at (cm) 20   Measured from Lips

## 2024-10-28 NOTE — UTILIZATION REVIEW
Initial Clinical Review    Admission: Date/Time/Statement:   Admission Orders (From admission, onward)       Ordered        10/25/24 2200  INPATIENT ADMISSION  Once                          Orders Placed This Encounter   Procedures    INPATIENT ADMISSION     Standing Status:   Standing     Number of Occurrences:   1     Order Specific Question:   Level of Care     Answer:   Critical Care [15]     Order Specific Question:   Estimated length of stay     Answer:   More than 2 Midnights     Order Specific Question:   Certification     Answer:   I certify that inpatient services are medically necessary for this patient for a duration of greater than two midnights. See H&P and MD Progress Notes for additional information about the patient's course of treatment.     ED Arrival Information       Expected   -    Arrival   10/25/2024 20:19    Acuity   Emergent              Means of arrival   Ambulance    Escorted by   [facility]    Service   Critical Care/ICU    Admission type   Emergency              Arrival complaint   STROKE Alert             Chief Complaint   Patient presents with    CVA/TIA-like Symptoms     Pt found on the street unresponsive. Per ems able to move left side, no movement on R side.       Initial Presentation: 74 y.o. female who presented by EMS to [facility] ED. Admitted as Inpatient for evaluation and treatment of stroke. PMHx: CKD S3, HFrEF, HTN, hypothyroidism, anemia, T2DM. schizophrenia. Presented after being found on the street w/ AMS, pt resides at group home. Noted to have R-sided weakness, L gaze preference. LKW 1830 today.   EXAM: aphasic, not moving RUE or RLE, L gaze, neglect, covered in stool. NIHSS 27. Imaging: CT L M2 occlusion. Trop 614, , WBC 14.77  PLAN: given TNK at 2111, sent for CT perfusion - endovascular alerted and sent for thrombectomy; CT perfusion showed large penumbra. NPO, telemetry. Neurosurgery, Neurology, Cardiology  consulted.    Neurosurgery: Pt w/ L severe stenosis and M2 occlusion. Unable to obtain verbal consent from pt, two physician consent was used. To OR for ateriogram and angioplasty.    10/25 IR Procedure w/ Neurosurgery  Procedure:   1.  Left common carotid Arteriogram  2.  Left ICA angioplasty  3.  Left internal carotid arteriogram  4.  Intra-arterial Integrilin administered for thrombolysis  5.  Stenting of left internal carotid artery without distal protection device  6.  Postprocedural arteriography  7.  Limited right femoral arteriogram  8.  Hope CT of the head with postprocessing images reviewed at a separate workstation  Indication: Stroke, left ICA stenosis and M2 occlusion   Anesthesia: General  ASA Score: 4 - Emergent  Findings: TICI 2c revascularization of a left M2/3 occlusion and critical left ICA stenosis     Neurology: Pt w/ NIHSS 27. Given TNK. CTP and an endovascular alert was initiated. S/p thrombectomy post-TICI score of 2C. Patient's stroke seems to be embolic in nature but source unknown. Hold AC/AP, repeat CTH in 24 hours, start statin, MRI brain, echo, check HgbA1c, and lipid panel, telemetry.    Critical Care: Pt arrives post arteriogram intubated/sedated. Exam: dry mucous membranes, does not open eyes, spontaneously move LUE/LLE, withdraws from pain in RUE/RLE, external catheter. Plan: 24h post-TNK CTH, MRI brain, Integrilin gtt, hold home haldol s/t prolonged QTc 535, echo, telemetry, continue lisinopril/lasix/metoprolol, trend trop, wean vent as able, I&O, Trend labs, replete electrolytes as needed. IVF, NPO, check HgbA1c and TSH.       Date: 10/26   Day 2: Pt hypotensive this morning, requiring levo gtt. Exam: ill-appearing, intubated, spontaneous movement on L side, withdraws on R side; L gaze preference, did not blink to threat. Off sedation, pt had seizure-like event w/ rhythmic movement of LUE/LLE. Ativan 2mg x2, propofol gtt started. Keppra 1500 mg x1 and start keppra BID. Plan:  continue Integrilin gtt, insulin gtt, propofol gtt, IVF, wean levo gtt as able, wean vent as able, maintain R radial arterial line, hilton inserted for close I&O monitoring, continue statin, keppra IV BID, continuous vEEG monitoring. Trend lactic, follow blood cultures, start tube feeds, q2h blood glucose checks, check respiratory panel and cortisol. Later in the day, CT showed evolving stroke w/ increased cerebral edema and mass effect, new hypodensity in watershed areas on the R concerning for more ischemic events. hypertonic saline gtt, continuous vEEG, wean propofol gtt, trend trop to peak, IV ABX. Follow cultures. Group home contracted regarding decision making; noted that there is no identified decision maker or family for pt; case management involved to assist in navigating decision making and possible guardianship. IV albumin 25 g x1. IV isolyte bolus 2L x1. IV NSS 1L x1. IV mag 2 g x1. IV fentanyl 50 mcg x1.      Date: 10/27  Day 3: Has surpassed a 2nd midnight with active treatments and services. EEG with no events overnight. Noted to have significant EKG changes w/ new T wave inversions. Tmax 100. Exam: tachypnea. unresponsive on vent, tachycardic, rhonchorous coarse breath sounds that do not clear with suctioning, copious white/clear secretions; hilton, spontaneously move LUE & LLE, withdraws RLE, no withdrawal of RUE; L gaze preference. Plan: goal -160 and MAP > 65, IV norepi gtt, maintain vent, I&O, maintain central line, R radial arterial line, hilton; IV ceftriaxone started (1 of 2 blood cultures positive for gram positive cocci and 1 of 2 positive for gram positive rods. Hypertonic saline gtt, Integrilin gtt, keppra IV q12h, insulin gtt. Q2h neuro checks. Nebs, check sputum culture. Discontinue Integrilin gtt mid-evening, start Plavix 300 x1,  x1, then daily ASA & Plavix starting tomorrow. Trend labs, replete electrolytes as needed. Hypertonic bolus 250 mL x2. IV mag 2g x1. IV sodium  phosphate 30 mmol x1. IV fentanyl 50 mcg x1.      ED Treatment-Medication Administration from 10/25/2024 2010 to 10/25/2024 2309         Date/Time Order Dose Route Action     10/25/2024 2050 iohexol (OMNIPAQUE) 350 MG/ML injection (SINGLE-DOSE) 75 mL 75 mL Intravenous Given     10/25/2024 2052 iohexol (OMNIPAQUE) 350 MG/ML injection (SINGLE-DOSE) 75 mL 75 mL Intravenous Given     10/25/2024 2111 tenecteplase (TNKase) injection 16 mg 16 mg Intravenous Given by Other     10/25/2024 2132 iohexol (OMNIPAQUE) 350 MG/ML injection (MULTI-DOSE) 100 mL 40 mL Intravenous Given     10/25/2024 2151 lidocaine (PF) (XYLOCAINE-MPF) 1 % injection 10 mL Infiltration Given     10/25/2024 2214 eptifibatide (INTEGRILIN) 20 MG/10ML (premix) 11,466 mcg Intravenous New Bag     10/25/2024 2218 eptifibatide (INTEGRILIN) 75 mg in 100 mL infusion (premix) 0.5 mcg/kg/min Intravenous New Bag            Scheduled Medications:  atorvastatin, 40 mg, Oral, QPM  cefTRIAXone, 1,000 mg, Intravenous, Q24H  chlorhexidine, 15 mL, Mouth/Throat, Q12H JORGE LUIS  heparin (porcine), 5,000 Units, Subcutaneous, Q8H JORGE LUIS  insulin glargine, 20 Units, Subcutaneous, QAM  insulin lispro, 2-12 Units, Subcutaneous, Q6H JORGE LUIS  ipratropium, 0.5 mg, Nebulization, Q6H  levalbuterol, 1.25 mg, Nebulization, Q6H  levETIRAcetam, 750 mg, Intravenous, Q12H  levothyroxine, 150 mcg, Oral, Early Morning  senna-docusate sodium, 2 tablet, Oral, BID    Continuous IV Infusions:  eptifibatide, 0.5 mcg/kg/min, Intravenous, Continuous  insulin regular (HumuLIN R,NovoLIN R) 1 Units/mL in sodium chloride 0.9 % 100 mL infusion, 0.3-21 Units/hr, Intravenous, Titrated  norepinephrine, 1-30 mcg/min, Intravenous, Titrated  propofol, 5-50 mcg/kg/min, Intravenous, Titrated  sodium chloride 3% (HYPERTONIC), 40 mL/hr, Intravenous, Continuous  sodium chloride 0.9 %, 75 mL/hr, Intravenous, Continuous; End: 10/26/24 1321     PRN Meds:  fentaNYL, 50 mcg, Intravenous, Q1H PRN; 10/26 x1, 10/27 x1  labetalol, 10  mg, Intravenous, Q4H PRN  LORazepam, 2 mg, Intravenous, Q30 Min PRN; 10/26 x2  magnesium sulfate, 2 g, Intravenous; 10/26 x1, 10/27 x1  ondansetron, 4 mg, Intravenous, Q6H PRN  potassium chloride, 40 mEq, NG Tube; 10/27 x1    albumin human (FLEXBUMIN) 5 % injection 25 g  Dose: 25 g  Freq: Once Route: IV  Last Dose: Stopped (10/26/24 2232)  Start: 10/26/24 0545 End: 10/26/24 2232  aspirin tablet 325 mg  Dose: 325 mg  Freq: Daily Route: PO  Start: 10/27/24 1845  clopidogrel (PLAVIX) tablet 300 mg  Dose: 300 mg  Freq: Once Route: PO  Start: 10/27/24 1845 End: 10/27/24 1923  levETIRAcetam (KEPPRA) injection 1,500 mg  Dose: 1,500 mg  Freq: Once Route: IV  Start: 10/26/24 1245 End: 10/26/24 1250  multi-electrolyte (ISOLYTE-S PH 7.4) bolus 2,000 mL  Dose: 2,000 mL  Freq: Once Route: IV  Last Dose: Stopped (10/26/24 2232)  Start: 10/26/24 1315 End: 10/26/24 2232  sodium chloride (HYPERTONIC) 3 % bolus 250 mL  Dose: 250 mL  Freq: Once Route: IV  Last Dose: Stopped (10/27/24 1437)  Start: 10/27/24 1300 End: 10/27/24 1437  sodium chloride (HYPERTONIC) 3 % bolus 250 mL  Dose: 250 mL  Freq: Once Route: IV  Start: 10/27/24 0315 End: 10/27/24 0329  sodium chloride 0.9 % bolus 1,000 mL  Dose: 1,000 mL;  Freq: Once Route: IV  Last Dose: Stopped (10/26/24 2232)  Start: 10/25/24 2330 End: 10/26/24 2232  sodium phosphate 30 mmol in dextrose 5 % 250 mL Infusion  Dose: 30 mmol  Freq: Once Route: IV  Last Dose: Stopped (10/27/24 1407)  Start: 10/27/24 0800 End: 10/27/24 1407      ED Triage Vitals   Temperature Pulse Respirations Blood Pressure SpO2 Pain Score   10/26/24 0000 10/25/24 2023 10/25/24 2023 10/25/24 2023 10/25/24 2023 10/26/24 2117   (!) 97.4 °F (36.3 °C) 93 16 154/87 99 % Med Not Given for Pain - for MAR use only     Weight (last 2 days)       Date/Time Weight    10/28/24 0600 61.9 (136.47)    10/27/24 1400 59.9 (132)    10/27/24 0557 60.2 (132.72)            Vital Signs (last 3 days)       Date/Time Temp Pulse Resp BP MAP  (mmHg) Arterial Line BP MAP SpO2 FiO2 (%) O2 Device GCS Pain    10/27/24 2300 100.4 °F (38 °C) 104 25 104/52 79 108/44 68 mmHg 100 % -- -- 9 --    10/27/24 2255 -- -- -- -- -- -- -- 99 % -- -- -- --    10/27/24 2200 100.8 °F (38.2 °C) 118 24 111/63 86 106/42 66 mmHg 100 % -- -- 9 --    10/27/24 2100 101.1 °F (38.4 °C) 108 25 115/61 78 110/42 66 mmHg 99 % -- -- 9 --    10/27/24 2000 -- -- -- -- -- -- -- -- -- Ventilator 9 --    10/27/24 1914 -- -- -- -- -- -- -- 99 % -- -- -- --    10/27/24 1900 101.1 °F (38.4 °C) 96 22 -- -- 130/48 78 mmHg 100 % -- -- 9 --    10/27/24 1800 101.1 °F (38.4 °C) 108 22 -- -- 140/52 84 mmHg 100 % -- -- 9 --    10/27/24 1700 101.1 °F (38.4 °C) 112 22 -- -- 140/52 84 mmHg 99 % -- -- 9 --    10/27/24 1634 -- -- -- -- -- -- -- -- -- -- 9 --    10/27/24 1614 -- -- -- -- -- -- -- 99 % -- -- -- --    10/27/24 1600 101.1 °F (38.4 °C) 88 22 -- -- 110/40 64 mmHg 100 % -- Ventilator -- --    10/27/24 1500 101.1 °F (38.4 °C) 126 29 -- -- 142/52 84 mmHg 98 % -- -- 9 --    10/27/24 1400 100.8 °F (38.2 °C) 106 23 154/82 -- 168/58 94 mmHg 100 % -- -- 9 --    10/27/24 1340 -- -- -- -- -- -- -- 100 % -- -- -- --    10/27/24 1300 100.8 °F (38.2 °C) 112 30 -- -- 146/58 90 mmHg 99 % -- -- 9 --    10/27/24 1200 100.4 °F (38 °C) 96 23 -- -- 144/62 94 mmHg 98 % -- Ventilator 9 --    10/27/24 1115 100.4 °F (38 °C) 86 23 -- -- 140/60 90 mmHg 99 % -- -- -- --    10/27/24 1100 100.4 °F (38 °C) 96 24 -- -- 144/62 94 mmHg 100 % -- -- 9 --    10/27/24 1000 100.4 °F (38 °C) 90 24 154/82 106 140/60 92 mmHg 100 % -- -- 9 --    10/27/24 0900 100.4 °F (38 °C) 98 26 136/73 89 122/50 80 mmHg 99 % -- -- 9 --    10/27/24 0800 100 °F (37.8 °C) 102 24 160/90 113 146/64 98 mmHg 98 % 30 Ventilator 9 --    10/27/24 0757 -- -- -- -- -- -- -- 98 % -- -- -- --    10/27/24 0700 100 °F (37.8 °C) 102 23 154/81 103 134/58 88 mmHg 97 % -- -- 9 --    10/27/24 0600 100 °F (37.8 °C) 102 22 140/88 104 118/74 96 mmHg 99 % -- -- 9 --    10/27/24  0500 -- 86 20 112/66 84 104/44 66 mmHg 99 % -- -- 9 --    10/27/24 0400 99.7 °F (37.6 °C) 98 21 160/86 108 106/98 102 mmHg 100 % -- Ventilator 9 --    10/27/24 0300 -- 84 24 -- -- 144/56 90 mmHg 99 % -- -- 9 --    10/27/24 0200 -- 84 22 135/79 103 126/48 78 mmHg 100 % -- -- 9 --    10/27/24 0100 -- 108 22 -- -- 130/46 76 mmHg 99 % -- -- 9 --    10/27/24 0000 -- 104 23 -- -- 148/140 142 mmHg 100 % -- -- 9 --    10/26/24 2308 99.1 °F (37.3 °C) 118 26 -- -- 92/82 86 mmHg 100 % -- -- -- --    10/26/24 2300 -- 102 20 -- -- 104/94 98 mmHg 100 % -- -- 9 --    10/26/24 2200 -- 74 18 -- -- 116/66 86 mmHg 100 % -- -- 9 --    10/26/24 2100 -- 74 14 -- -- 90/68 76 mmHg 100 % -- -- 9 --    10/26/24 2052 98.4 °F (36.9 °C) -- -- -- -- -- -- -- -- -- -- --    10/26/24 2000 -- 76 18 -- -- 120/50 76 mmHg -- -- -- 9 --    10/26/24 1930 -- -- -- -- -- -- -- -- -- -- 9 --    10/26/24 1900 -- 86 16 -- -- 144/54 80 mmHg 100 % -- -- 8 --    10/26/24 1800 -- 84 18 -- -- 108/50 70 mmHg 100 % -- -- 8 --    10/26/24 1700 -- 84 16 -- -- 112/48 70 mmHg 100 % -- -- 8 --    10/26/24 1600 99.2 °F (37.3 °C) 86 16 -- -- 140/52 78 mmHg 100 % -- -- 8 --    10/26/24 1518 -- -- -- -- -- -- -- 100 % -- -- -- --    10/26/24 1500 -- 88 18 -- -- 130/48 74 mmHg 100 % -- -- 8 --    10/26/24 1400 -- 88 11 -- -- 102/44 60 mmHg 99 % -- -- 8 --    10/26/24 1300 99.3 °F (37.4 °C) 94 22 103/68 73 122/42 66 mmHg 100 % -- -- 8 --    10/26/24 1200 -- 76 20 -- -- 134/46 72 mmHg 100 % -- -- 8 --    10/26/24 1100 -- 68 18 -- -- 108/38 60 mmHg 100 % -- -- 8 --    10/26/24 1000 -- 80 20 -- -- 134/50 82 mmHg 100 % -- -- 8 --    10/26/24 0900 -- 78 16 -- -- 118/42 68 mmHg 99 % -- -- 8 --    10/26/24 0834 -- 90 19 -- -- 132/50 82 mmHg 100 % -- -- -- --    10/26/24 0800 -- 76 16 -- -- 94/38 58 mmHg 100 % -- -- 8 --    10/26/24 0730 -- -- -- -- -- -- -- -- -- -- 9 --    10/26/24 0600 -- 66 14 -- -- 128/52 80 mmHg 100 % -- -- 9 --    10/26/24 0530 -- 66 15 75/51 58 98/42 62 mmHg  -- -- -- 9 --    10/26/24 0500 -- 74 19 -- -- -- -- 100 % -- -- 9 --    10/26/24 0430 -- 76 17 -- -- 126/65 -- -- -- -- 9 --    10/26/24 0400 97 °F (36.1 °C) 74 20 -- -- 118/54 80 mmHg 100 % -- -- -- --    10/26/24 0330 -- 76 19 -- -- 110/68 88 mmHg -- -- -- -- --    10/26/24 0300 -- 82 20 -- -- 94/80 88 mmHg -- -- -- -- --    10/26/24 0230 -- 84 18 -- -- 100/82 92 mmHg -- -- -- -- --    10/26/24 0200 -- 80 16 -- -- 116/56 82 mmHg -- -- -- -- --    10/26/24 0145 -- 84 16 -- -- 100/52 72 mmHg -- -- -- -- --    10/26/24 0130 -- 78 16 -- -- 92/46 66 mmHg -- -- -- -- --    10/26/24 0115 -- 70 17 -- -- 96/44 66 mmHg -- -- -- -- --    10/26/24 0100 -- 62 15 -- -- -- -- -- -- -- -- --    10/26/24 0045 -- 90 27 -- -- 120/52 78 mmHg -- -- -- -- --    10/26/24 0030 -- 76 35 -- -- 140/64 96 mmHg -- -- -- -- --    10/26/24 0015 -- 90 21 -- -- 114/94 104 mmHg -- -- -- -- --    10/26/24 0000 97.4 °F (36.3 °C) 88 22 -- -- 142/66 100 mmHg 100 % -- -- 3 --    10/25/24 2342 -- -- -- -- -- -- -- 96 % 55 Ventilator -- --    10/25/24 2141 -- -- -- 157/91 -- -- -- -- -- -- -- --    10/25/24 2133 -- 102 40 -- -- -- -- -- -- -- -- --    10/25/24 2130 -- 104 28 -- -- -- -- 96 % -- -- -- --    10/25/24 2125 -- 113 34 -- -- -- -- 95 % -- -- -- --    10/25/24 21:23:49 -- 96 16 154/89 -- -- -- 96 % -- Nasal cannula 6 --    10/25/24 2120 -- 111 43 -- -- -- -- 96 % -- -- -- --    10/25/24 2115 -- 119 50 -- -- -- -- 96 % -- -- -- --    10/25/24 2110 -- 98 31 -- -- -- -- 97 % -- -- -- --    10/25/24 21:08:06 -- 116 18 165/87 -- -- -- 98 % -- Nasal cannula 6 --    10/25/24 2105 -- 92 -- -- -- -- -- 96 % -- -- -- --    10/25/24 2104 -- -- 24 -- -- -- -- -- -- -- -- --    10/25/24 2100 -- 117 26 165/87 -- -- -- 96 % -- -- -- --    10/25/24 2055 -- 112 30 -- -- -- -- 95 % -- -- -- --    10/25/24 20:53:11 -- 116 19 165/87 -- -- -- 98 % -- Nasal cannula 6 --    10/25/24 2050 -- 112 42 -- -- -- -- 95 % -- -- -- --    10/25/24 2046 -- -- 45 -- -- -- -- --  -- -- -- --    10/25/24 2045 -- 118 -- 157/91 -- -- -- 96 % -- -- -- --    10/25/24 20:38:11 -- 118 17 157/91 -- -- -- 96 % -- Nasal cannula 6 --    10/25/24 20:23:30 -- 93 16 154/87 -- -- -- 99 % -- -- 6 --              Pertinent Labs/Diagnostic Test Results:   Radiology:  CT head wo contrast   Final Interpretation by Michael Franklin MD (10/27 5527)      The examination is limited/degraded secondary to streak artifact from the scalp leads. Within these confines:      1. Evolving known recent left MCA territory infarct with multifocal left cerebral hemispheric contrast staining.      2. Additionally, there are multifocal recent infarcts across multiple vascular territories with new hyperattenuation that is distinct from the previously ascertained contrast staining, which is indeterminate but suspicious for associated hemorrhagic    associated with these infarcts. Possible recent infarct of the left cerebellar hemisphere. See narrative above for full details.      Given their distribution, findings may reflect watershed ischemia/sequela of hypoxic-ischemic injury due to global cerebral hypoperfusion, in the appropriate clinical setting.      I personally discussed this study with JOSE ROBERTO OROPEZAOBDULIOLA on 10/27/2024 6:35 AM.                           Workstation performed: [id number]         XR chest portable ICU   Final Interpretation by Dannie Harris MD (10/27 0763)      1.  Mild bibasilar atelectasis, superimposed on emphysema.   2.  Lines and tubes, as described.            Workstation performed: [id number]         CT head wo contrast   Final Interpretation by Pallav N Shah, MD (10/27 1685)      Interval evolution of the hemorrhagic left MCA territory infarction. The extent of the infarct is greater and the area of hemorrhagic infarction is slightly more conspicuous. This has not resulted in greater mass effect on the ventricular system or    herniation however, there is greater left-sided cerebral sulcal  effacement.      More defined areas of acute to early subacute right HIEN, left PCA, right MCA/PCA watershed infarction.         I personally discussed this study with JUSTIN ESTRELLA on 10/26/2024 at 2:00 p.m.                        Workstation performed: [id number]         CT head wo contrast   Final Interpretation by Michael Franklin MD (10/26 2728)      1. Evolving recent left MCA territory infarct with multifocal left cerebral hemispheric contrast staining. Mild local mass effect. No significant midline shift.      2. Development of subtle loss of gray-white matter differentiation as well as sulcal effacement involving the right posterior frontal lobe and right parietal lobe, suspicious for recent ischemia.      Recommendation: Brain MRI      I personally discussed this study with EMILY COATES on 10/26/2024 8:39 AM.                  Workstation performed: [id number]         X-ray chest 1 view   Final Interpretation by Santiago Garcia MD (10/26 0918)      Improved bilateral pulmonary opacities.            Workstation performed: [id number]         IR stroke alert   Final Interpretation by ANTIONE CANO (10/25 2325)      CT cerebral perfusion   Final Interpretation by Joshua Hickey MD (10/25 2155)      CT perfusion performed.  Data available on PACS.   There is significant motion degradation.         Workstation performed: [id number]         CTA dissection protocol chest abdomen pelvis w wo contrast   Final Interpretation by Dannie Harris MD (10/25 2115)      1.  No identifiable acute abnormality to account for the patient's clinical presentation. There is no evidence of an aortic dissection. There is an occlusion of the left femoral artery, which is incompletely assessed on this exam.   2.  Advanced centrilobular emphysema.   3.  Nonspecific mildly enlarged right hilar lymph node. A 3-month contrast-enhanced chest CT follow-up should be considered for further  evaluation.      The study was marked in EPIC for significant notification.         Workstation performed: [id number]         CTA stroke alert (head/neck)   Final Interpretation by Joshua Hickey MD (10/25 2058)      1. Occluded left M2 segment. The M1 segments are patent.   2. Severe bilateral ICA stenosis with pronounced noncalcified plaque proximally on the left.   3. Additional stenoses are seen throughout the left CCA, both intracranial internal carotid arteries, and the left intracranial vertebral artery.      Findings were directly discussed with Donita Beyer at 8:45 p.m. on 10/25/2024.      Workstation performed: [id number]         CT stroke alert brain   Final Interpretation by Joshua Hickey MD (10/25 2050)   Early findings of ischemia in the left MCA territory. No hemorrhage seen.      Findings were directly discussed with Dr. Mary Hyman at approximately 8:40 p.m. on 10/25/2024.      Workstation performed: [id number]         MRI brain wo contrast    (Results Pending)     Cardiology:  Echo complete w/ contrast if indicated   Final Result by Chris Hobbs MD (10/27 1612)        Left Ventricle: Left ventricular cavity size is normal. Wall thickness    is normal. The left ventricular ejection fraction is 25- 30% by visual    estimation. Systolic function is moderately reduced. There is mild global    hypokinesis with regional variation.     The following segments are akinetic: apical anterior, apical septal,    apical inferior, apical lateral and apex.     The following segments are hypokinetic: mid anterior, mid anteroseptal,    mid inferoseptal, mid inferior, mid inferolateral and mid anterolateral.     Right Ventricle: Right ventricular cavity size is normal. Systolic    function is normal.     Aortic Valve: There is mild stenosis. The aortic valve mean gradient is    6 mmHg. The dimensionless velocity index is 0.51. The aortic valve area is    1.76 cm2.     Pericardium: There is no pericardial  effusion.     Prior TTE study available for comparison. Prior study date: 3/26/2024.    Changes noted when compared to prior study. Changes include: New segmental    wall motion abnormalities and decline in LVEF noted..     Findings suggestive of stress induced cardiomyopathy.         ECG 12 lead   Final Result by Chris Hobbs MD (10/26 8141)    Poor data quality, interpretation may be adversely affected   Sinus tachycardia with Premature atrial complexes   Poor R Wave Progression Cannot rule out Anterior infarct , age    undetermined   Nonspecific ST abnormality   Abnormal ECG   Confirmed by Chris Hobbs ([id number]) on 10/26/2024 10:23:40 AM          Results from last 7 days   Lab Units 10/26/24  1312   SARS-COV-2  Negative     Results from last 7 days   Lab Units 10/28/24  0553 10/27/24  0550 10/26/24  0455 10/25/24  2028   WBC Thousand/uL 12.74* 17.04* 20.95* 14.77*   HEMOGLOBIN g/dL 8.7* 9.6* 10.5* 12.6   HEMATOCRIT % 27.7* 31.1* 32.9* 40.0   PLATELETS Thousands/uL 132* 171 178 243   TOTAL NEUT ABS Thousands/µL  --  13.30*  --   --          Results from last 7 days   Lab Units 10/28/24  0553 10/28/24  0001 10/27/24  1916 10/27/24  1246 10/27/24  0450 10/26/24  2344 10/26/24  1854 10/26/24  1456 10/26/24  0455 10/25/24  2028   SODIUM mmol/L 156* 154* 150* 147 146 142 142 139 140 132*   POTASSIUM mmol/L 3.6 3.5 3.7 3.9 4.1 3.6 3.5 3.8 3.9 4.3   CHLORIDE mmol/L 127* 126* 124* 122* 119* 111* 113* 112* 111* 98   CO2 mmol/L 22 21 22 21 21 21 22 22 20* 21   ANION GAP mmol/L 7 7 4 4 6 10 7 5 9 13   BUN mg/dL 8 8 9 7 7 8 7 9 11 17   CREATININE mg/dL 0.47* 0.47* 0.52* 0.42* 0.45* 0.43* 0.35* 0.41* 0.50* 0.78   EGFR ml/min/1.73sq m 98 98 95 102 100 101 109 103 97 76   CALCIUM mg/dL 7.9* 7.7* 7.7* 7.7* 7.6* 7.8* 7.5* 7.3* 8.0* 9.2   CALCIUM, IONIZED mmol/L  --  1.11*  --   --   --   --   --   --   --   --    MAGNESIUM mg/dL 2.1  --   --  2.0 1.9  --   --   --  2.3  --    PHOSPHORUS mg/dL 2.6  --   --  3.3 2.0*  --   --   --   2.2*  --      Results from last 7 days   Lab Units 10/26/24  0455 10/26/24  0001   AST U/L 36 37   ALT U/L 26 27   ALK PHOS U/L 70 83   TOTAL PROTEIN g/dL 5.4* 5.7*   ALBUMIN g/dL 3.3* 3.5   TOTAL BILIRUBIN mg/dL 0.25 0.31   BILIRUBIN DIRECT mg/dL  --  0.11   AMMONIA umol/L  --  40     Results from last 7 days   Lab Units 10/28/24  0950 10/28/24  0819 10/28/24  0552 10/28/24  0406 10/28/24  0155 10/28/24  0000 10/27/24  2208 10/27/24  1956 10/27/24  1805 10/27/24  1346 10/27/24  1150 10/27/24  1022 10/27/24  0807 10/27/24  0604 10/27/24  0416 10/27/24  0134   POC GLUCOSE mg/dl 156* 180* 179* 150* 141* 147* 169* 242* 249* 136 91 96 163* 184* 213* 234*     Lab Units 10/26/24  2207 10/26/24  1957 10/26/24  1801 10/26/24  1559 10/26/24  1455 10/26/24  1359 10/26/24  1210 10/26/24  1028 10/26/24  0907 10/26/24  0548 10/26/24  0502 10/26/24  0156 10/25/24  2322 10/25/24  2043   POC GLUCOSE mg/dl 148* 79 82 100 106 39* 106 216* 186* 121 161* 254* 321* 409*     Results from last 7 days   Lab Units 10/28/24  0553 10/28/24  0001 10/27/24  1916 10/27/24  1246 10/27/24  0450 10/26/24  2344 10/26/24  1854 10/26/24  1456 10/26/24  0455 10/25/24  2028   GLUCOSE RANDOM mg/dL 190* 157* 285* 87 220* 210* 93 104 153* 505*     Results from last 7 days   Lab Units 10/28/24  0553 10/28/24  0001 10/27/24  1916 10/27/24  1246 10/27/24  0450 10/26/24  2344 10/26/24  1854 10/26/24  1456   OSMOLALITY, SERUM mmol/* 316* 317* 302* 304* 295 288 288     Results from last 7 days   Lab Units 10/26/24  0455   HEMOGLOBIN A1C % 8.8*   EAG mg/dl 206     Results from last 7 days   Lab Units 10/26/24  0502 10/26/24  0105 10/26/24  0002   PH ART  7.353 7.294* 7.218*   PCO2 ART mm Hg 33.8* 38.5 50.3*   PO2 ART mm Hg 126.0 100.1 113.0   HCO3 ART mmol/L 18.4* 18.3* 20.0*   BASE EXC ART mmol/L -6.3 -7.6 -7.8   O2 CONTENT ART mL/dL 17.4 16.2 17.3   O2 HGB, ARTERIAL % 97.9* 96.1 96.5   ABG SOURCE  Line, Arterial Line, Arterial Line, Arterial     Results  from last 7 days   Lab Units 10/27/24  1916   PH JACKSON  7.381   PCO2 JACKSON mm Hg 35.7*   PO2 JACKSON mm Hg 40.1   HCO3 JACKSON mmol/L 20.7*   BASE EXC JACKSON mmol/L -3.9   O2 CONTENT JACKSON ml/dL 10.6   O2 HGB, VENOUS % 75.0         Results from last 7 days   Lab Units 10/26/24  0001   CK TOTAL U/L 172     Results from last 7 days   Lab Units 10/27/24  0140 10/26/24  2344 10/26/24  1024 10/26/24  0730 10/26/24  0543 10/26/24  0001 10/25/24  2028   HS TNI 0HR ng/L  --  1,487*  --   --  3,206*  --  614*   HS TNI 2HR ng/L 1,373*  --   --  3,723*  --  2,643*  --    HSTNI D2 ng/L -114  --   --  517*  --  2,029*  --    HS TNI 4HR ng/L  --   --  4,050*  --   --   --   --    HSTNI D4 ng/L  --   --  844*  --   --   --   --          Results from last 7 days   Lab Units 10/25/24  2028   PROTIME seconds 12.9   INR  0.94   PTT seconds 21*     Results from last 7 days   Lab Units 10/26/24  0001   TSH 3RD GENERATON uIU/mL 0.015*         Results from last 7 days   Lab Units 10/26/24  1312 10/26/24  0321 10/26/24  0001   LACTIC ACID mmol/L 1.3 3.2* 3.0*     Results from last 7 days   Lab Units 10/28/24  0553 10/28/24  0001 10/27/24  1916 10/27/24  1246 10/27/24  0450 10/26/24  2344 10/26/24  1854   OSMOLALITY, SERUM mmol/* 316* 317* 302* 304* 295 288     Results from last 7 days   Lab Units 10/26/24  0136   CLARITY UA  Clear   COLOR UA  Light Yellow   SPEC GRAV UA  >=1.050*   PH UA  6.0   GLUCOSE UA mg/dl >=1000 (1%)*   KETONES UA mg/dl Negative   BLOOD UA  Negative   PROTEIN UA mg/dl Trace*   NITRITE UA  Negative   BILIRUBIN UA  Negative   UROBILINOGEN UA (BE) mg/dl <2.0   LEUKOCYTES UA  Elevated glucose may cause decreased leukocyte values. See urine microscopic for UWBC result*   WBC UA /hpf None Seen   RBC UA /hpf None Seen   BACTERIA UA /hpf None Seen   EPITHELIAL CELLS WET PREP /hpf None Seen     Results from last 7 days   Lab Units 10/26/24  1312   INFLUENZA A PCR  Negative   INFLUENZA B PCR  Negative   RSV PCR  Negative     Results from  last 7 days   Lab Units 10/26/24  0113   ETHANOL LVL mg/dL <10     Results from last 7 days   Lab Units 10/27/24  1527 10/27/24  1521 10/27/24  1400 10/26/24  0000   BLOOD CULTURE  Received in Microbiology Lab. Culture in Progress. Received in Microbiology Lab. Culture in Progress.  --   --    SPUTUM CULTURE   --   --  Culture too young- will reincubate  --    GRAM STAIN RESULT   --   --  No Polys or Bacteria seen Gram positive cocci in pairs and chains*  Gram positive rods*           Past Medical History:   Diagnosis Date    Acute respiratory failure with hypoxia (HCC) 03/24/2024    Hypertension     Hypothyroid 05/08/2016    Hypovitaminosis D 05/10/2016    Iron deficiency anemia 05/11/2016    Type 2 diabetes mellitus without complication ([facility]) 05/08/2016     Present on Admission:   Stroke (HCC)   Hypothyroid   Hypertension   Type 2 diabetes mellitus without complication (HCC)   Chronic HFrEF (heart failure with reduced ejection fraction) ([facility])   Stage 3a chronic kidney disease (HCC)   Schizophrenia ([facility])      Admitting Diagnosis: Stroke (HCC) [I63.9]  Age/Sex: 74 y.o. female    Network Utilization Review Department  ATTENTION: Please call with any questions or concerns to [phone] and carefully listen to the prompts so that you are directed to the right person. All voicemails are confidential.   For Discharge needs, contact Care Management DC Support Team at [phone] opt. [phone]  Send all requests for admission clinical reviews, approved or denied determinations and any other requests to dedicated fax number below belonging to the campus where the patient is receiving treatment. List of dedicated fax numbers for the Facilities:  FACILITY NAME UR FAX NUMBER   ADMISSION DENIALS (Administrative/Medical Necessity) [phone]   DISCHARGE SUPPORT TEAM (NETWORK) [phone]   PARENT CHILD HEALTH (Maternity/NICU/Pediatrics) [phone]   [facility] [phone]   [facility]  [facility] [phone]   [facility] [phone]   [facility] [phone]   [facility] [phone]   [facility] [phone]   [facility] [phone]   [facility] [phone]   [facility] [phone]   [facility] [phone]   [facility] [phone]   [facility] [phone]

## 2024-10-28 NOTE — PROCEDURES
Lumbar Puncture Procedure Note    Pre-operative Diagnosis: Status epilepticus    Post-operative Diagnosis: Status epilepticus    Indications: Diagnostic    Procedure Details     Consent: Informed consent was obtained. Risks of the procedure were discussed including: infection, bleeding, pain and headache.    The patient was positioned under sterile conditions. Betadine solution and sterile drapes were utilized. A spinal needle was inserted at the L3 - L4 interspace.   Spinal fluid was obtained and sent to the laboratory.    Findings  32 mL of clear spinal fluid was obtained.  Opening Pressure: 18cm H2O pressure.      Complications:  None; patient tolerated the procedure well.          Condition: stable    Plan  Bed rest for 1 hours.  Tylenol 650 mg for pain.  Call office if you develop a severe headache, nausea, vomiting, or fever greater than 100.5 F.

## 2024-10-28 NOTE — PLAN OF CARE
Problem: Prexisting or High Potential for Compromised Skin Integrity  Goal: Skin integrity is maintained or improved  Description: INTERVENTIONS:  - Identify patients at risk for skin breakdown  - Assess and monitor skin integrity  - Assess and monitor nutrition and hydration status  - Monitor labs   - Assess for incontinence   - Turn and reposition patient  - Assist with mobility/ambulation  - Relieve pressure over bony prominences  - Avoid friction and shearing  - Provide appropriate hygiene as needed including keeping skin clean and dry  - Evaluate need for skin moisturizer/barrier cream  - Collaborate with interdisciplinary team   - Patient/family teaching  - Consider wound care consult   Outcome: Progressing     Problem: SAFETY,RESTRAINT: NV/NON-SELF DESTRUCTIVE BEHAVIOR  Goal: Remains free of harm/injury (restraint for non violent/non self-detsructive behavior)  Description: INTERVENTIONS:  - Instruct patient/family regarding restraint use   - Assess and monitor physiologic and psychological status   - Provide interventions and comfort measures to meet assessed patient needs   - Identify and implement measures to help patient regain control  - Assess readiness for release of restraint   Outcome: Progressing  Goal: Returns to optimal restraint-free functioning  Description: INTERVENTIONS:  - Assess the patient's behavior and symptoms that indicate continued need for restraint  - Identify and implement measures to help patient regain control  - Assess readiness for release of restraint   Outcome: Progressing     Problem: Neurological Deficit  Goal: Neurological status is stable or improving  Description: Interventions:  - Monitor and assess patient's level of consciousness, motor function, sensory function, and level of assistance needed for ADLs.   - Monitor and report changes from baseline. Collaborate with interdisciplinary team to initiate plan and implement interventions as ordered.   - Provide and  maintain a safe environment.  - Consider seizure precautions.  - Consider fall precautions.  - Consider aspiration precautions.  - Consider bleeding precautions.  Outcome: Progressing     Problem: Activity Intolerance/Impaired Mobility  Goal: Mobility/activity is maintained at optimum level for patient  Description: Interventions:  - Assess and monitor patient  barriers to mobility and need for assistive/adaptive devices.  - Assess patient's emotional response to limitations.  - Collaborate with interdisciplinary team and initiate plans and interventions as ordered.  - Encourage independent activity per ability.  - Maintain proper body alignment.  - Perform active/passive rom as tolerated/ordered.  - Plan activities to conserve energy.  - Turn patient as appropriate  Outcome: Progressing     Problem: Communication Impairment  Goal: Ability to express needs and understand communication  Description: Assess patient's communication skills and ability to understand information.  Patient will demonstrate use of effective communication techniques, alternative methods of communication and understanding even if not able to speak.     - Encourage communication and provide alternate methods of communication as needed.  - Collaborate with case management/ for discharge needs.  - Include patient/family/caregiver in decisions related to communication.  Outcome: Progressing     Problem: Potential for Aspiration  Goal: Non-ventilated patient's risk of aspiration is minimized  Description: Assess and monitor vital signs, respiratory status, and labs (WBC).  Monitor for signs of aspiration (tachypnea, cough, rales, wheezing, cyanosis, fever).    - Assess and monitor patient's ability to swallow.  - Place patient up in chair to eat if possible.  - HOB up at 90 degrees to eat if unable to get patient up into chair.  - Supervise patient during oral intake.   - Instruct patient/ family to take small bites.  - Instruct  patient/ family to take small single sips when taking liquids.  - Follow patient-specific strategies generated by speech pathologist.  Outcome: Progressing  Goal: Ventilated patient's risk of aspiration is minimized  Description: Assess and monitor vital signs, respiratory status, airway cuff pressure, and labs (WBC).  Monitor for signs of aspiration (tachypnea, cough, rales, wheezing, cyanosis, fever).    - Elevate head of bed 30 degrees if patient has tube feeding.  - Monitor tube feeding.  Outcome: Progressing     Problem: Nutrition  Goal: Nutrition/Hydration status is improving  Description: Monitor and assess patient's nutrition/hydration status for malnutrition (ex- brittle hair, bruises, dry skin, pale skin and conjunctiva, muscle wasting, smooth red tongue, and disorientation). Collaborate with interdisciplinary team and initiate plan and interventions as ordered.  Monitor patient's weight and dietary intake as ordered or per policy. Utilize nutrition screening tool and intervene per policy. Determine patient's food preferences and provide high-protein, high-caloric foods as appropriate.     - Assist patient with eating.  - Allow adequate time for meals.  - Encourage patient to take dietary supplement as ordered.  - Collaborate with clinical nutritionist.  - Include patient/family/caregiver in decisions related to nutrition.  Outcome: Progressing     Problem: NEUROSENSORY - ADULT  Goal: Achieves stable or improved neurological status  Description: INTERVENTIONS  - Monitor and report changes in neurological status  - Monitor vital signs such as temperature, blood pressure, glucose, and any other labs ordered   - Initiate measures to prevent increased intracranial pressure  - Monitor for seizure activity and implement precautions if appropriate      Outcome: Progressing  Goal: Remains free of injury related to seizures activity  Description: INTERVENTIONS  - Maintain airway, patient safety  and administer  oxygen as ordered  - Monitor patient for seizure activity, document and report duration and description of seizure to physician/advanced practitioner  - If seizure occurs,  ensure patient safety during seizure  - Reorient patient post seizure  - Seizure pads on all 4 side rails  - Instruct patient/family to notify RN of any seizure activity including if an aura is experienced  - Instruct patient/family to call for assistance with activity based on nursing assessment  - Administer anti-seizure medications if ordered    Outcome: Progressing  Goal: Achieves maximal functionality and self care  Description: INTERVENTIONS  - Monitor swallowing and airway patency with patient fatigue and changes in neurological status  - Encourage and assist patient to increase activity and self care.   - Encourage visually impaired, hearing impaired and aphasic patients to use assistive/communication devices  Outcome: Progressing     Problem: CARDIOVASCULAR - ADULT  Goal: Maintains optimal cardiac output and hemodynamic stability  Description: INTERVENTIONS:  - Monitor I/O, vital signs and rhythm  - Monitor for S/S and trends of decreased cardiac output  - Administer and titrate ordered vasoactive medications to optimize hemodynamic stability  - Assess quality of pulses, skin color and temperature  - Assess for signs of decreased coronary artery perfusion  - Instruct patient to report change in severity of symptoms  Outcome: Progressing  Goal: Absence of cardiac dysrhythmias or at baseline rhythm  Description: INTERVENTIONS:  - Continuous cardiac monitoring, vital signs, obtain 12 lead EKG if ordered  - Administer antiarrhythmic and heart rate control medications as ordered  - Monitor electrolytes and administer replacement therapy as ordered  Outcome: Progressing     Problem: RESPIRATORY - ADULT  Goal: Achieves optimal ventilation and oxygenation  Description: INTERVENTIONS:  - Assess for changes in respiratory status  - Assess for  changes in mentation and behavior  - Position to facilitate oxygenation and minimize respiratory effort  - Oxygen administered by appropriate delivery if ordered  - Initiate smoking cessation education as indicated  - Encourage broncho-pulmonary hygiene including cough, deep breathe, Incentive Spirometry  - Assess the need for suctioning and aspirate as needed  - Assess and instruct to report SOB or any respiratory difficulty  - Respiratory Therapy support as indicated  Outcome: Progressing     Problem: GASTROINTESTINAL - ADULT  Goal: Minimal or absence of nausea and/or vomiting  Description: INTERVENTIONS:  - Administer IV fluids if ordered to ensure adequate hydration  - Maintain NPO status until nausea and vomiting are resolved  - Nasogastric tube if ordered  - Administer ordered antiemetic medications as needed  - Provide nonpharmacologic comfort measures as appropriate  - Advance diet as tolerated, if ordered  - Consider nutrition services referral to assist patient with adequate nutrition and appropriate food choices  Outcome: Progressing  Goal: Maintains or returns to baseline bowel function  Description: INTERVENTIONS:  - Assess bowel function  - Encourage oral fluids to ensure adequate hydration  - Administer IV fluids if ordered to ensure adequate hydration  - Administer ordered medications as needed  - Encourage mobilization and activity  - Consider nutritional services referral to assist patient with adequate nutrition and appropriate food choices  Outcome: Progressing  Goal: Maintains adequate nutritional intake  Description: INTERVENTIONS:  - Monitor percentage of each meal consumed  - Identify factors contributing to decreased intake, treat as appropriate  - Assist with meals as needed  - Monitor I&O, weight, and lab values if indicated  - Obtain nutrition services referral as needed  Outcome: Progressing  Goal: Establish and maintain optimal ostomy function  Description: INTERVENTIONS:  - Assess  bowel function  - Encourage oral fluids to ensure adequate hydration  - Administer IV fluids if ordered to ensure adequate hydration   - Administer ordered medications as needed  - Encourage mobilization and activity  - Nutrition services referral to assist patient with appropriate food choices  - Assess stoma site  - Consider wound care consult   Outcome: Progressing  Goal: Oral mucous membranes remain intact  Description: INTERVENTIONS  - Assess oral mucosa and hygiene practices  - Implement preventative oral hygiene regimen  - Implement oral medicated treatments as ordered  - Initiate Nutrition services referral as needed  Outcome: Progressing     Problem: GENITOURINARY - ADULT  Goal: Maintains or returns to baseline urinary function  Description: INTERVENTIONS:  - Assess urinary function  - Encourage oral fluids to ensure adequate hydration if ordered  - Administer IV fluids as ordered to ensure adequate hydration  - Administer ordered medications as needed  - Offer frequent toileting  - Follow urinary retention protocol if ordered  Outcome: Progressing  Goal: Absence of urinary retention  Description: INTERVENTIONS:  - Assess patient’s ability to void and empty bladder  - Monitor I/O  - Bladder scan as needed  - Discuss with physician/AP medications to alleviate retention as needed  - Discuss catheterization for long term situations as appropriate  Outcome: Progressing  Goal: Urinary catheter remains patent  Description: INTERVENTIONS:  - Assess patency of urinary catheter  - If patient has a chronic hilton, consider changing catheter if non-functioning  - Follow guidelines for intermittent irrigation of non-functioning urinary catheter  Outcome: Progressing     Problem: METABOLIC, FLUID AND ELECTROLYTES - ADULT  Goal: Electrolytes maintained within normal limits  Description: INTERVENTIONS:  - Monitor labs and assess patient for signs and symptoms of electrolyte imbalances  - Administer electrolyte replacement  as ordered  - Monitor response to electrolyte replacements, including repeat lab results as appropriate  - Instruct patient on fluid and nutrition as appropriate  Outcome: Progressing  Goal: Fluid balance maintained  Description: INTERVENTIONS:  - Monitor labs   - Monitor I/O and WT  - Instruct patient on fluid and nutrition as appropriate  - Assess for signs & symptoms of volume excess or deficit  Outcome: Progressing  Goal: Glucose maintained within target range  Description: INTERVENTIONS:  - Monitor Blood Glucose as ordered  - Assess for signs and symptoms of hyperglycemia and hypoglycemia  - Administer ordered medications to maintain glucose within target range  - Assess nutritional intake and initiate nutrition service referral as needed  Outcome: Progressing     Problem: SKIN/TISSUE INTEGRITY - ADULT  Goal: Skin Integrity remains intact(Skin Breakdown Prevention)  Description: Assess:  -Perform Lazaro assessment  -Clean and moisturize skin  -Inspect skin when repositioning, toileting, and assisting with ADLS  -Assess extremities for adequate circulation and sensation     Bed Management:  -Have minimal linens on bed & keep smooth, unwrinkled  -Change linens as needed when moist or perspiring    Toileting:  -Offer bedside commode  -Assess for incontinence  -Use incontinent care products after each incontinent episode      Skin Care:  -Avoid use of baby powder, tape, friction and shearing, hot water or constrictive clothing  -Relieve pressure over bony prominences   -Do not massage red bony areas    Outcome: Progressing  Goal: Incision(s), wounds(s) or drain site(s) healing without S/S of infection  Description: INTERVENTIONS  - Assess and document dressing, incision, wound bed, drain sites and surrounding tissue  - Provide patient and family education  - Perform skin care/dressing changes  Outcome: Progressing  Goal: Pressure injury heals and does not worsen  Description: Interventions:  - Implement low air  loss mattress or specialty surface (Criteria met)  - Apply silicone foam dressing  - Consider nutrition services referral as needed  Outcome: Progressing     Problem: HEMATOLOGIC - ADULT  Goal: Maintains hematologic stability  Description: INTERVENTIONS  - Assess for signs and symptoms of bleeding or hemorrhage  - Monitor labs  - Administer supportive blood products/factors as ordered and appropriate  Outcome: Progressing     Problem: MUSCULOSKELETAL - ADULT  Goal: Maintain or return mobility to safest level of function  Description: INTERVENTIONS:  - Assess patient's ability to carry out ADLs; assess patient's baseline for ADL function and identify physical deficits which impact ability to perform ADLs (bathing, care of mouth/teeth, toileting, grooming, dressing, etc.)  - Assess/evaluate cause of self-care deficits   - Assess range of motion  - Assess patient's mobility  - Assess patient's need for assistive devices and provide as appropriate  - Encourage maximum independence but intervene and supervise when necessary  - Involve family in performance of ADLs  - Assess for home care needs following discharge   - Consider OT consult to assist with ADL evaluation and planning for discharge  - Provide patient education as appropriate  Outcome: Progressing  Goal: Maintain proper alignment of affected body part  Description: INTERVENTIONS:  - Support, maintain and protect limb and body alignment  - Provide patient/ family with appropriate education  Outcome: Progressing     Problem: Nutrition/Hydration-ADULT  Goal: Nutrient/Hydration intake appropriate for improving, restoring or maintaining nutritional needs  Description: Monitor and assess patient's nutrition/hydration status for malnutrition. Collaborate with interdisciplinary team and initiate plan and interventions as ordered.  Monitor patient's weight and dietary intake as ordered or per policy. Utilize nutrition screening tool and intervene as necessary. Determine  patient's food preferences and provide high-protein, high-caloric foods as appropriate.     INTERVENTIONS:  - Monitor oral intake, urinary output, labs, and treatment plans  - Assess nutrition and hydration status and recommend course of action  - Evaluate amount of meals eaten  - Assist patient with eating if necessary   - Allow adequate time for meals  - Recommend/ encourage appropriate diets, oral nutritional supplements, and vitamin/mineral supplements  - Order, calculate, and assess calorie counts as needed  - Recommend, monitor, and adjust tube feedings and TPN/PPN based on assessed needs  - Assess need for intravenous fluids  - Provide specific nutrition/hydration education as appropriate  - Include patient/family/caregiver in decisions related to nutrition  Outcome: Progressing

## 2024-10-28 NOTE — PLAN OF CARE
Problem: Prexisting or High Potential for Compromised Skin Integrity  Goal: Skin integrity is maintained or improved  Description: INTERVENTIONS:  - Identify patients at risk for skin breakdown  - Assess and monitor skin integrity  - Assess and monitor nutrition and hydration status  - Monitor labs   - Assess for incontinence   - Turn and reposition patient  - Assist with mobility/ambulation  - Relieve pressure over bony prominences  - Avoid friction and shearing  - Provide appropriate hygiene as needed including keeping skin clean and dry  - Evaluate need for skin moisturizer/barrier cream  - Collaborate with interdisciplinary team   - Patient/family teaching  - Consider wound care consult   Outcome: Progressing     Problem: SAFETY,RESTRAINT: NV/NON-SELF DESTRUCTIVE BEHAVIOR  Goal: Remains free of harm/injury (restraint for non violent/non self-detsructive behavior)  Description: INTERVENTIONS:  - Instruct patient/family regarding restraint use   - Assess and monitor physiologic and psychological status   - Provide interventions and comfort measures to meet assessed patient needs   - Identify and implement measures to help patient regain control  - Assess readiness for release of restraint   Outcome: Progressing     Problem: Neurological Deficit  Goal: Neurological status is stable or improving  Description: Interventions:  - Monitor and assess patient's level of consciousness, motor function, sensory function, and level of assistance needed for ADLs.   - Monitor and report changes from baseline. Collaborate with interdisciplinary team to initiate plan and implement interventions as ordered.   - Provide and maintain a safe environment.  - Consider seizure precautions.  - Consider fall precautions.  - Consider aspiration precautions.  - Consider bleeding precautions.  Outcome: Progressing     Problem: Potential for Aspiration  Goal: Non-ventilated patient's risk of aspiration is minimized  Description: Assess and  monitor vital signs, respiratory status, and labs (WBC).  Monitor for signs of aspiration (tachypnea, cough, rales, wheezing, cyanosis, fever).    - Assess and monitor patient's ability to swallow.  - Place patient up in chair to eat if possible.  - HOB up at 90 degrees to eat if unable to get patient up into chair.  - Supervise patient during oral intake.   - Instruct patient/ family to take small bites.  - Instruct patient/ family to take small single sips when taking liquids.  - Follow patient-specific strategies generated by speech pathologist.  Outcome: Progressing     Problem: Nutrition  Goal: Nutrition/Hydration status is improving  Description: Monitor and assess patient's nutrition/hydration status for malnutrition (ex- brittle hair, bruises, dry skin, pale skin and conjunctiva, muscle wasting, smooth red tongue, and disorientation). Collaborate with interdisciplinary team and initiate plan and interventions as ordered.  Monitor patient's weight and dietary intake as ordered or per policy. Utilize nutrition screening tool and intervene per policy. Determine patient's food preferences and provide high-protein, high-caloric foods as appropriate.     - Assist patient with eating.  - Allow adequate time for meals.  - Encourage patient to take dietary supplement as ordered.  - Collaborate with clinical nutritionist.  - Include patient/family/caregiver in decisions related to nutrition.  Outcome: Progressing

## 2024-10-28 NOTE — PROGRESS NOTES
Progress Note - Neurology   Name: Joslyn Cantu 74 y.o. female I MRN: [id number]  Unit/Bed#: ICU 09 I Date of Admission: 10/25/2024   Date of Service: 10/28/2024 I Hospital Day: 3    Assessment & Plan  Stroke ([facility])  Assessment:  Joslyn Cantu is a 74 y.o. female who presented to the ED as a result of stroke-like symptoms.  Patient has a past medical history of acute respiratory failure with hypoxia, hypertension, hypothyroidism, hypovitaminosis D, iron deficiency anemia, and type 2 diabetes. Patient was found at the side of a sidewalk not responding and also not moving her right hand and right arm with a left gaze deviation.  Given the symptoms, a pre-hospital stroke alert was initiated.  As per patient's group home, patient was conversing with another resident around 6-6:30 PM and was her normal self prior to leaving the group home to cash a check.     Initial NIHSS 27  Presenting deficits were: R-sided weakness, L gaze preference  Interventions: After a discussion of risks, benefits and alternatives reviewing inclusion and exclusion criteria the decision was made to proceed with thrombolytic therapy. Specifically discussed were the potential benefits, risks, and side effects of the proposed stroke intervention(s) and care; the likelihood of the patient achieving their goals; and any potential problems that might occur as a result of the intervention(s); reasonable alternatives to the proposed stroke intervention(s) and care. The discussion encompasses risks, benefits and side effects related to the alternatives and the risks related to not receiving the proposed stroke intervention(s) and care. Given the critical condition of the patient, the ED team and the neurology team together conversed in regards to the patient's condition taking into consideration the group home's decision of doing everything to save the patient.  The decision was to administer TNK.  There was a delay in administering TNK with no family members  able to consent for TNK but it was still administered within the appropriate timeframe. Verbal consent was obtained from conferring with Dr. Juárez and Dr. Treadwell, they were in agreement to administer the medication since no surrogate decision maker was available.  Consent was obtained by Dr. Mary Hyman.     Workup:  Lab Results   Component Value Date    HGBA1C 8.8 (H) 10/26/2024    CHOLESTEROL 113 10/26/2024    LDLCALC 45 10/26/2024    TRIG 125 10/26/2024    INR 0.94 10/25/2024      CTH: Early findings of ischemia in the left MCA territory. No hemorrhage seen.   CTA:  Occluded left M2 segment. The M1 segments are patent. Severe bilateral ICA stenosis with pronounced noncalcified plaque proximally on the left. Additional stenoses are seen throughout the left CCA, both intracranial internal carotid arteries, and the left intracranial vertebral artery.  CTP: Infinite mismatch ratio  CTH at 24 hours: Evolving left MCA infarcts, possible infarction of the left cerebral hemisphere, patient also has new evidence of infarctions in the watershed distribution.  MRI: Pending  TTE/WILL: hypokinetic / akkinetic wall segs, decreased EF to 25%  Video EEG 10/27:  No seizures. Intermittent right hemisphere polymorphic delta slowing. Intermittent right frontal sharp waves. Mild/moderate encephalopathy.   10/28 VEEG Update: No seizures. 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 Mild/moderate encephalopathy.     Pertinent scores as of 10/28/24:  - NIHSS: 27    Impression: Patient is a 74 year old female presented to the ED as a result of strokelike symptoms. CTH showed a possible subacute infarct and CTA showed a left M2 occlusion.  Patient received TNK at 2111.  Patient was sent for CTP and an endovascular alert was initiated.  S/p thrombectomy post-TICI score of 2C. Patient's stroke seems to be embolic in nature but source  unknown. LDL 45, A1C 8.8.  Complicated by concern for seizure-like activity, loaded with Keppra 1.5 g and Ativan 4 mg and on video EEG.  Further workup pending.    Plan:  Case discussed with Dr. Villalobos  AP/AC: Defer to neurosurgery status post mechanical thrombectomy  Recommend Lipitor 40 mg  MRI Brain wo contrast recommended  Telemetry  Continuing video EEG not warranted from neuro standpoint, see above  Keppra 750 mg twice daily  PT/OT when appropriate  BP management and HTN med compliance discussed, defer to neurosurgical team for SBP goals s/p mechanical thrombectomy  Rest of care as per primary care team    Recommendations for outpatient neurological follow up have yet to be determined.  I have discussed with Dr Villalobos the above plan to treat pt. He agrees with the plan.  Neurology service will follow.  Please contact the SecureChat role for the Neurology service with any questions/concerns.    Subjective   Remains w/o ability to follow commands, intubated, disconjugate gaze, moves spontaneously L side extremity.    Review of Systems   Unable to perform ROS: Intubated         Objective :  Temp:  [100.4 °F (38 °C)-101.1 °F (38.4 °C)] 100.4 °F (38 °C)  HR:  [] 94  BP: (104-154)/(52-82) 117/73  Resp:  [21-30] 23  SpO2:  [98 %-100 %] 100 %  O2 Device: Ventilator    Physical ExamNeurological Exam      Lab Results: I have reviewed the following results:  Imaging Results Review: I personally reviewed the following image studies in PACS and associated radiology reports: No new tudies. My interpretation of the radiology images/reports is: NA.      VTE Pharmacologic Prophylaxis: VTE covered by:  heparin (porcine), Subcutaneous, 5,000 Units at 10/28/24 8579       Administrative Statements   I have spent a total time of 40 minutes in caring for this patient on the day of the visit/encounter including Impressions, Counseling / Coordination of care, Documenting in the medical record, Reviewing / ordering tests,  medicine, procedures  , Obtaining or reviewing history  , and Communicating with other healthcare professionals .

## 2024-10-28 NOTE — UTILIZATION REVIEW
"NOTIFICATION OF INPATIENT ADMISSION   AUTHORIZATION REQUEST   SERVICING FACILITY:   [facility]  Address: [address] [place] [address]  Tax ID: [id number]  NPI: [id number] ATTENDING PROVIDER:  Attending Name and NPI#: Suzanne Puente Md [[id number]]  Address: [address] [place] [address]  Phone: [phone]   ADMISSION INFORMATION:  Place of Service: Inpatient [facility]  Place of Service Code: 21  Inpatient Admission Date/Time: 10/25/24 10:00 PM  Discharge Date/Time: No discharge date for patient encounter.  Admitting Diagnosis Code/Description:  Stroke (HCC) [I63.9]     UTILIZATION REVIEW CONTACT:  Natalia \"Theresa\"Estuardo Utilization   Network Utilization Review Department  Phone: [phone]  Fax: [phone]  Email: [email or web address]  Contact for approvals/pending authorizations, clinical reviews, and discharge.     PHYSICIAN ADVISORY SERVICES:  Medical Necessity Denial & Isgi-au-Prsf Review  Phone: [phone]  Fax: [phone]  Email: [email or web address]     DISCHARGE SUPPORT TEAM:  For Patients Discharge Needs & Updates  Phone: [phone] opt. 2 Fax: [phone]  Email: [email or web address]     "

## 2024-10-28 NOTE — PHYSICAL THERAPY NOTE
Physical Therapy Cancellation Note    PT orders received chart review completed. Pt is currently intubated/sedated and not appropriate to participate in skilled PT at this time. PT will follow and eval as medically appropriate.     10/28/24 0900   Note Type   Note type Cancelled Session   Cancel Reasons Intubated/sedated       Shelia Rivero, PT

## 2024-10-28 NOTE — PROGRESS NOTES
"Cardiology Progress Note - Joslyn aCntu 74 y.o. female MRN: [id number]    Unit/Bed#: ICU 09 Encounter: [id number]      Assessment:  Principal Problem:    Stroke (HCC)  Active Problems:    Schizophrenia (HCC)    Hypothyroid    Hypertension    Type 2 diabetes mellitus without complication (HCC)    Chronic HFrEF (heart failure with reduced ejection fraction) ([facility])    Stage 3a chronic kidney disease ([facility])    Aortic stenosis, moderate    Elevated troponin      Plan:  Patient continues on a ventilator.  Patient on intravenous insulin, antibiotic, pressor and hypertonic saline.  Sinus tachycardia noted on telemetry.  Echocardiogram 10/27/2024 with reduction in LVEF to 25 to 30%.  Patient has wall motion abnormalities suggestive of stress-induced cardiomyopathy.  Patient was mild AV stenosis.  Compared to 3/26/2024 LVEF is reduced.  BMP today with serum sodium of 158, potassium of 3.6 and creatinine of 0.47.  Hemoglobin 8.7.  Patient typically on metoprolol succinate as an outpatient.  Patient previously on lisinopril.  Will hold on GDMT in reference to cardiomyopathy given present blood pressure on pressor.    Subjective:   Patient seen and examined.     Objective:     Vitals: Blood pressure 125/65, pulse 94, temperature (!) 100.8 °F (38.2 °C), resp. rate (!) 26, height 5' 2\" (1.575 m), weight 61.9 kg (136 lb 7.4 oz), SpO2 100%., Body mass index is 24.96 kg/m².,   Orthostatic Blood Pressures      Flowsheet Row Most Recent Value   Blood Pressure 125/65 filed at 10/28/2024 0700   Patient Position - Orthostatic VS Lying filed at 10/28/2024 0403        ,      Intake/Output Summary (Last 24 hours) at 10/28/2024 0917  Last data filed at 10/28/2024 0642  Gross per 24 hour   Intake 3236.39 ml   Output 1745 ml   Net 1491.39 ml       No significant arrhythmias seen on telemetry review.  Sinus tachycardia      Physical Exam:    GEN: Joslyn Cantu   HEART: normal rate, regular rhythm, normal S1 and S2, no murmurs, clicks, gallops or rubs "   LUNGS: clear to auscultation bilaterally in anterior lung fields  ABDOMEN: no distention  EXTREMITIES:edema  SKIN: no suspicious lesions on exposed skin    Labs & Results:    No results displayed because visit has over 200 results.          Echo complete w/ contrast if indicated    Result Date: 10/27/2024  Narrative:   Left Ventricle: Left ventricular cavity size is normal. Wall thickness is normal. The left ventricular ejection fraction is 25- 30% by visual estimation. Systolic function is moderately reduced. There is mild global hypokinesis with regional variation.   The following segments are akinetic: apical anterior, apical septal, apical inferior, apical lateral and apex.   The following segments are hypokinetic: mid anterior, mid anteroseptal, mid inferoseptal, mid inferior, mid inferolateral and mid anterolateral.   Right Ventricle: Right ventricular cavity size is normal. Systolic function is normal.   Aortic Valve: There is mild stenosis. The aortic valve mean gradient is 6 mmHg. The dimensionless velocity index is 0.51. The aortic valve area is 1.76 cm2.   Pericardium: There is no pericardial effusion.   Prior TTE study available for comparison. Prior study date: 3/26/2024. Changes noted when compared to prior study. Changes include: New segmental wall motion abnormalities and decline in LVEF noted..   Findings suggestive of stress induced cardiomyopathy.     XR chest portable ICU    Result Date: 10/27/2024  Narrative: XR CHEST PORTABLE ICU INDICATION: central line placement. COMPARISON: Radiograph 10/26/2024, earlier same day and chest CTA 10/25/2024 FINDINGS: Mild bibasilar atelectasis. Upper lobe increased lucency. No pneumothorax or pleural effusion. Normal cardiomediastinal silhouette. The endotracheal tube terminates 3.6 cm above the level of the tacso. A right IJ central venous catheter terminates overlying the distal SVC. The enteric tube tip terminates overlying the left upper quadrant of the  abdomen. Bones are unremarkable for age. Normal upper abdomen.     Impression: 1.  Mild bibasilar atelectasis, superimposed on emphysema. 2.  Lines and tubes, as described. Workstation performed: [id number]     CT head wo contrast    Result Date: 10/27/2024  Narrative: CT BRAIN - WITHOUT CONTRAST INDICATION:   new focal deficits. COMPARISON: Head CT from October 26, 2024 TECHNIQUE:  CT examination of the brain was performed.  Multiplanar 2D reformatted images were created from the source data. Radiation dose length product (DLP) for this visit:  1099 mGy-cm .  This examination, like all CT scans performed in the [facility], was performed utilizing techniques to minimize radiation dose exposure, including the use of iterative reconstruction and automated exposure control. IMAGE QUALITY:  Diagnostic. FINDINGS: PARENCHYMA: Continued evolution of the acute to subacute left MCA territory infarction. There are greater areas of ischemia noted On the prior study of October 26, 2024. Previously noted hemorrhagic areas are also slightly more pronounced. There is diffuse left cerebral sulcal effacement and suggestion of more defined acute to early subacute infarction in the parasagittal parietal lobe worsening PCA territory infarction. There are also areas of more defined right posterior parietal and occipital nonhemorrhagic cortical infarction. Subtle areas of cytotoxic edema also suggested in the right parasagittal frontal lobe which may represent new HIEN territory infarction. There is greater sulcal crowding in these regions. No new or acute posterior fossa infarction. VENTRICLES AND EXTRA-AXIAL SPACES: Mild mass effect on the left lateral ventricle. Basal cisterns remain patent. No uncal herniation. VISUALIZED ORBITS: Normal visualized orbits. PARANASAL SINUSES: Normal visualized paranasal sinuses. CALVARIUM AND EXTRACRANIAL SOFT TISSUES: Normal.     Impression: Interval evolution of the hemorrhagic left  MCA territory infarction. The extent of the infarct is greater and the area of hemorrhagic infarction is slightly more conspicuous. This has not resulted in greater mass effect on the ventricular system or herniation however, there is greater left-sided cerebral sulcal effacement. More defined areas of acute to early subacute right HIEN, left PCA, right MCA/PCA watershed infarction. I personally discussed this study with JUSTIN ESTRELLA on 10/26/2024 at 2:00 p.m. Workstation performed: [id number]     CT head wo contrast    Result Date: 10/27/2024  Narrative: CT BRAIN - WITHOUT CONTRAST INDICATION:   24 hours post thrombolytic administration. COMPARISON: CT head 10/26/2024 at 1:33 p.m. TECHNIQUE:  CT examination of the brain was performed.  Multiplanar 2D reformatted images were created from the source data. Radiation dose length product (DLP) for this visit:  1022 mGy-cm .  This examination, like all CT scans performed in the [facility], was performed utilizing techniques to minimize radiation dose exposure, including the use of iterative reconstruction and automated exposure control. IMAGE QUALITY: Examination is degraded by streak artifact from the scalp leads. FINDINGS: PARENCHYMA: Evolving known left MCA territory infarct with multifocal areas of left cerebral hemispheric hyperattenuation, which reflected contrast staining on the dual-energy CT dated 10/26/2024 at 4:39 a.m. However, in the interval, there are new and evolving infarcts involving the bilateral cerebral hemispheres, noting involvement of the right frontal lobe, right parieto-occipital lobe, left frontal lobe, left greater than right gangliocapsular regions, left parietal, left occipital and left temporal lobes. Possible asymmetric hypoattenuation involving the left cerebellar hemisphere seen on series 2, image 11. There is new hyperattenuation that appears to involve the hippocampi bilaterally, also noting progressive/increasingly  conspicuous hyperattenuation involving the left parietal lobe. VENTRICLES AND EXTRA-AXIAL SPACES:  Normal for the patient's age. VISUALIZED ORBITS: No acute abnormality. PARANASAL SINUSES: Trace mucosal thickening. CALVARIUM AND EXTRACRANIAL SOFT TISSUES: No acute abnormality. Probable cerumen in the bilateral external auditory canals.     Impression: The examination is limited/degraded secondary to streak artifact from the scalp leads. Within these confines: 1. Evolving known recent left MCA territory infarct with multifocal left cerebral hemispheric contrast staining. 2. Additionally, there are multifocal recent infarcts across multiple vascular territories with new hyperattenuation that is distinct from the previously ascertained contrast staining, which is indeterminate but suspicious for associated hemorrhagic associated with these infarcts. Possible recent infarct of the left cerebellar hemisphere. See narrative above for full details. Given their distribution, findings may reflect watershed ischemia/sequela of hypoxic-ischemic injury due to global cerebral hypoperfusion, in the appropriate clinical setting. I personally discussed this study with JOSE ROBERTO CROWLEY on 10/27/2024 6:35 AM. Workstation performed: [id number]     X-ray chest 1 view    Result Date: 10/26/2024  Narrative: XR CHEST 1 VIEW INDICATION: Endotracheal tube positioning. COMPARISON: 3/30/2024. FINDINGS: ET tube tip is located 3.7 cm above the tacos. Enteric tube courses inferior to the diaphragm. Improved bilateral pulmonary opacities no pneumothorax or pleural effusion. Normal cardiomediastinal silhouette. Bones are unremarkable for age. Normal upper abdomen.     Impression: Improved bilateral pulmonary opacities. Workstation performed: [id number]     CT head wo contrast    Result Date: 10/26/2024  Narrative: CT BRAIN - WITHOUT CONTRAST INDICATION:   Dual energy CT head. COMPARISON: CT head and CTA head and neck 10/25/2024. TECHNIQUE:  CT  examination of the brain was performed.  Multiplanar 2D reformatted images were created from the source data. Radiation dose length product (DLP) for this visit:  1155.17 mGy-cm .  This examination, like all CT scans performed in the [facility], was performed utilizing techniques to minimize radiation dose exposure, including the use of iterative reconstruction and automated exposure control. IMAGE QUALITY:  Diagnostic. FINDINGS: PARENCHYMA: Evolving recent left MCA territory infarct, noting multifocal hyperattenuation involving the left frontal and parietal lobes, in addition to the gangliocapsular regions, which on the virtual noncontrast series 302 are not apparent but are conspicuous on the iodine map series 301, suggestive of contrast staining. In the interval, there has been development of subtle loss of gray-white matter differentiation as well as sulcal effacement involving the right posterior frontal lobe and right parietal lobe seen for example on series 2, image 45. VENTRICLES AND EXTRA-AXIAL SPACES: No hydrocephalus. VISUALIZED ORBITS: No acute abnormality. PARANASAL SINUSES: Trace mucosal thickening. CALVARIUM AND EXTRACRANIAL SOFT TISSUES: No acute abnormality.     Impression: 1. Evolving recent left MCA territory infarct with multifocal left cerebral hemispheric contrast staining. Mild local mass effect. No significant midline shift. 2. Development of subtle loss of gray-white matter differentiation as well as sulcal effacement involving the right posterior frontal lobe and right parietal lobe, suspicious for recent ischemia. Recommendation: Brain MRI I personally discussed this study with EMILY COATES on 10/26/2024 8:39 AM. Workstation performed: [id number]     IR stroke alert    Result Date: 10/25/2024  Narrative: Please refer to the Operative Note for procedure dictation.    CT cerebral perfusion    Result Date: 10/25/2024  Narrative: CT PERFUSION INDICATION:  Stroke Alert.  COMPARISON: CTA from the same day TECHNIQUE: CT perfusion study was performed.  A total of 8 cm of brain tissue was evaluated in two different volumetric acquisitions.  Source[employer] RAPID software was utilized to calculate cerebral blood flow, cerebral blood volume, mean transit time, and  Tmax. Radiation dose length product (DLP) for this visit:  2115 mGy-cm .  This examination, like all CT scans performed in the [facility], was performed utilizing techniques to minimize radiation dose exposure, including the use of iterative reconstruction and automated exposure control. IV Contrast:  40 mL of iohexol (OMNIPAQUE) IMAGE QUALITY: The exam is significantly motion degraded with up to 10 mm of translation in the X and Y axis. FINDINGS: Hemisphere affected:  Left. Total CBF<30% volume: 0 mL Total Tmax>6.0s volume:  90 mL Total Mismatch difference: 90 mL Total Mismatch ratio: Infinite. Hypoperfusion Index (Tmax>10s/Tmax.6s): 0.1 Additional comments: Images are significantly motion degraded. Within this limitation however, there is relative hypoenhancement in the left MCA territory on series 242. This corresponds with perfusion findings and findings on earlier noncontrast head CT     Impression: CT perfusion performed.  Data available on PACS. There is significant motion degradation. Workstation performed: [id number]     CTA dissection protocol chest abdomen pelvis w wo contrast    Result Date: 10/25/2024  Narrative: CTA - CHEST, ABDOMEN AND PELVIS - WITHOUT AND WITH IV CONTRAST INDICATION: Altered mental status, focal deficits. COMPARISON: Chest radiograph 3/30/2024 TECHNIQUE: CT examination of the chest, abdomen and pelvis was performed both prior to and after the administration of intravenous contrast. The noncontrast portion of this examination was performed utilizing low radiation dose technique. Thin section angiographic arterial phase post contrast technique was used in order to evaluate for aortic  dissection. 3D reformatted images and volume rendering were performed on an independent workstation. Additionally, axial, sagittal, and coronal 2D reformatted images were created from the source data and submitted for interpretation. Radiation dose length product (DLP) for this visit: 1496.47 mGy-cm . This examination, like all CT scans performed in the [facility], was performed utilizing techniques to minimize radiation dose exposure, including the use of iterative reconstruction and automated exposure control. IV Contrast: 75 mL of iohexol (OMNIPAQUE) Enteric Contrast: Not administered. FINDINGS: The noncontrast series of this exam is limited, due to prior contrast administration from the CTA head and neck. Image quality is moderately degraded, due to patient motion artifact. This can decrease the sensitivity for detecting and characterizing subtle abnormalities. AORTA: No aortic dissection or intramural hematoma. No aortic aneurysm. Moderate diffuse atherosclerotic changes, with an occlusion of the left femoral (superficial) artery. This is incompletely imaged. CHEST LUNGS: There is advanced centrilobular emphysema throughout both lungs, with dependent bibasilar atelectasis. No lobar consolidation. There are numerous punctate calcified granulomas in the right greater than left upper lobe PLEURA: Unremarkable. HEART/PULMONARY ARTERIAL TREE: The heart is not grossly enlarged, with prominent multivessel coronary artery calcifications. The pericardium is unremarkable. MEDIASTINUM AND EVERTON: There is a mildly enlarged (1.6 x 1.2 cm) nonspecific right hilar lymph node. There is no generalized mediastinal lymphadenopathy. The esophagus and central airways are grossly unremarkable. CHEST WALL AND LOWER NECK: Unremarkable. ABDOMEN Assessment of the upper abdomen is suboptimal due to artifact from the patient's arms and motion artifact. LIVER/BILIARY TREE: Unremarkable. GALLBLADDER: No calcified gallstones.  No pericholecystic inflammatory change. SPLEEN: Unremarkable. PANCREAS: Unremarkable. ADRENAL GLANDS: Unremarkable. KIDNEYS/URETERS: Unremarkable. No hydronephrosis. STOMACH AND BOWEL: Unremarkable. APPENDIX: No findings to suggest appendicitis. ABDOMINOPELVIC CAVITY: No ascites. No pneumoperitoneum. No lymphadenopathy. PELVIS REPRODUCTIVE ORGANS: Unremarkable for patient's age. URINARY BLADDER: Unremarkable. ABDOMINAL WALL/INGUINAL REGIONS: Unremarkable. BONES: No acute fracture or suspicious osseous lesion.     Impression: 1.  No identifiable acute abnormality to account for the patient's clinical presentation. There is no evidence of an aortic dissection. There is an occlusion of the left femoral artery, which is incompletely assessed on this exam. 2.  Advanced centrilobular emphysema. 3.  Nonspecific mildly enlarged right hilar lymph node. A 3-month contrast-enhanced chest CT follow-up should be considered for further evaluation. The study was marked in EPIC for significant notification. Workstation performed: [id number]     CTA stroke alert (head/neck)    Result Date: 10/25/2024  Narrative: CTA NECK AND BRAIN WITH AND WITHOUT CONTRAST INDICATION: Stroke Alert COMPARISON:   None. TECHNIQUE:  Post contrast imaging was performed after administration of iodinated contrast through the neck and brain. Post contrast axial 0.625 mm images timed to opacify the arterial system.  3D rendering was performed on an independent workstation.   MIP reconstructions performed. Coronal and sagittal reconstructions were performed of the non contrast portion of the brain. Radiation dose length product (DLP) for this visit:  515.51 mGy-cm .  This examination, like all CT scans performed in the [facility], was performed utilizing techniques to minimize radiation dose exposure, including the use of iterative  reconstruction and automated exposure control. IV Contrast:  75 mL of iohexol (OMNIPAQUE) IMAGE QUALITY:    Diagnostic FINDINGS: CTA NECK ARCH AND GREAT VESSELS: Mild atherosclerotic changes with no severe stenosis. VERTEBRAL ARTERIES: Patent extracranial segments. Mild right origin stenosis due to calcified plaque RIGHT CAROTID: Extensive calcified plaque. 70-99% stenosis.   No dissection. LEFT CAROTID: Extensive, mixed plaque. There is predominantly soft plaque along the lateral margin of the proximal ICA just after the bifurcation, best seen on image 162 of series 2. There is no definite contrast surrounding this soft tissue density to suggest an intraluminal thrombus. 70-99% stenosis.   No dissection. There is moderate stenosis of the left common carotid artery at its origin and throughout its proximal to mid course due to mixed plaque. NASCET criteria was used to determine the degree of internal carotid artery diameter stenosis. CTA BRAIN: There is mild diffuse intracranial vessel irregularity suggesting intracranial atherosclerosis. INTERNAL CAROTID ARTERIES: Moderate bilateral stenosis due to calcified plaque. ANTERIOR CEREBRAL ARTERY CIRCULATION:  No occlusion. There is prominent calcified plaque along the right A2 A3 junction. MIDDLE CEREBRAL ARTERY CIRCULATION: There is an occluded left M2 branch on image numbers 78 and 79 of series 2. The additional M2 segments and left M1 segment appear normal. DISTAL VERTEBRAL ARTERIES: Moderate stenosis intracranially on the left due to soft plaque. BASILAR ARTERY:  No stenosis or occlusion. POSTERIOR CEREBRAL ARTERIES: No stenosis or occlusion. VENOUS STRUCTURES:  Normal. NON VASCULAR ANATOMY BONY STRUCTURES:  No acute osseous abnormality. SOFT TISSUES OF THE NECK:  Normal. THORACIC INLET: Moderate emphysema.     Impression: 1. Occluded left M2 segment. The M1 segments are patent. 2. Severe bilateral ICA stenosis with pronounced noncalcified plaque proximally on the left. 3. Additional stenoses are seen throughout the left CCA, both intracranial internal carotid arteries,  and the left intracranial vertebral artery. Findings were directly discussed with Donita Beyer at 8:45 p.m. on 10/25/2024. Workstation performed: [id number]     CT stroke alert brain    Result Date: 10/25/2024  Narrative: CT BRAIN - STROKE ALERT PROTOCOL INDICATION:   Stroke Alert. COMPARISON: 12/18/2023 TECHNIQUE:  CT examination of the brain was performed.  In addition to axial images, coronal reformatted images were created and submitted for interpretation. Radiation dose length product (DLP) for this visit:  908.17 mGy-cm .  This examination, like all CT scans performed in the [facility], was performed utilizing techniques to minimize radiation dose exposure, including the use of iterative  reconstruction and automated exposure control. IMAGE QUALITY:  Diagnostic. FINDINGS: PARENCHYMA: There are findings of early ischemia in the left MCA territory superiorly with loss of gray-white differentiation for instance on image 34 of series 2. There is also loss of gray-white differentiation in the left inferior frontal lobe which is new in the interim but may be more subacute. No hemorrhage seen. Normal intracranial vasculature. VENTRICLES AND EXTRA-AXIAL SPACES:  Normal for the patient's age. VISUALIZED ORBITS: Normal visualized orbits. PARANASAL SINUSES: Normal visualized paranasal sinuses. CALVARIUM AND EXTRACRANIAL SOFT TISSUES:   Normal.     Impression: Early findings of ischemia in the left MCA territory. No hemorrhage seen. Findings were directly discussed with Dr. Mary Hyman at approximately 8:40 p.m. on 10/25/2024. Workstation performed: [id number]     XR chest pa and lateral    Result Date: 10/24/2024  Narrative: XR CHEST 2 VIEWS (PA AND LAT) HISTORY:Upper respiratory tract infection COMPARISON: 11/25/21. FINDINGS: The cardiomediastinal silhouette is unremarkable.   There is probable pulmonary emphysema. No focal consolidation is seen. There is no pleural effusion or pneumothorax. The visualized  upper abdomen is unremarkable. There is scoliosis, osteopenia and multilevel thoracic compression deformities which are similar to the prior examination.     Impression: IMPRESSION: Probable pulmonary emphysema. No focal consolidation. Workstation:[id number]      EKG personally reviewed by Gama Lombardo MD.     Counseling / Coordination of Care  Total floor / unit time spent today 30 minutes.  Greater than 50% of total time was spent with the patient and / or family counseling and / or coordination of care.

## 2024-10-28 NOTE — PROGRESS NOTES
Progress Note - Critical Care/ICU   Name: Joslyn Cantu 74 y.o. female I MRN: [id number]  Unit/Bed#: ICU 09 I Date of Admission: 10/25/2024   Date of Service: 10/28/2024 I Hospital Day: 3       Assessment & Plan   Neuro:   Diagnosis: Stroke  Plan: 10/25/2024 CTA-occluded left M2, severe bilateral ICA stenosis  Status post TNK 2100  NIH on admission 27  10/26 CTH: evolving left MCA with multifocal watershed sequela  Status post mechanical thrombectomy, ICA stent 10/25  Continue ASA/plavix  Check p2y12 am 10/29  Post thrombectomy CT head showing contrast staining  Follow-up MRI  Diagnosis: Schizophrenia  Plan: restart home haldol 5 mg     CV:   Diagnosis: Chronic heart failure, hypertension,  Plan: 3/26/2024 echo-EF 40%, systolic function moderately reduced, moderate global hypokinesis, diastolic function moderately abnormal grade 2  Follow-up repeat echo  Monitor on telemetry  Hold home dose lisinopril, Lasix, and metoprolol when able  Diagnosis: Elevated troponin, suspect type II, hypotension   Plan: Initial troponin 614, downtrending     Pulm:  Diagnosis: Respiratory insufficiency, advanced centrilobular emphysema  Plan: 10/25/2024 CTA chest abdomen pelvis-advanced setrobuvir of emphysema, mildly enlarged right hilar lymph node  Remained intubated post IR  Wean vent as able     GI:   Diagnosis: Dysphagia  Plan: Speech evaluation when able     :   Diagnosis: CKD 3  Plan: Creatinine on admission 0.78  Strict intake and output  Trend BUN and creatinine     F/E/N:   Hypertonic saline drip held  Replete electrolytes as needed. Na goal 145-155  Tube feeds     Heme/Onc:   Diagnosis: DVT prophylaxis  Plan: continue SQ heparin and SCDs     Endo:   Diagnosis: DM 2, hypothyroidism  Plan: 3/5/2024 hemoglobin A1c 9.2  Insulin gtt d/c started on basal and SSI  Continue home dose levothyroxine     ID:   Diagnosis: Leukocytosis  Plan: Suspect reactive  Trend fever curve and WBC  Blood cultures gram-positive rods -suspect  contaminant   F/u Blood cultures, UA     MSK/Skin:   Diagnosis: Ambulatory dysfunction  Plan: PT/OT     Disposition: Critical care    ICU Core Measures     Vented Patient  VAP Bundle  VAP bundle ordered     A: Assess, Prevent, and Manage Pain Has pain been assessed? Yes  Need for changes to pain regimen? No   B: Both Spontaneous Awakening Trials (SATs) and Spontaneous Breathing Trials (SBTs) Plan to perform spontaneous awakening trial today? Yes   Plan to perform spontaneous breathing trial today? Yes   Obvious barriers to extubation? Yes   C: Choice of Sedation RASS Goal: 0 Alert and Calm  Need for changes to sedation or analgesia regimen? No   D: Delirium CAM-ICU: Unable to perform secondary to Acute cognitive dysfunction   E: Early Mobility  Plan for early mobility? No   F: Family Engagement Plan for family engagement today? NA       Antibiotic Review: Awaiting culture results.     Review of Invasive Devices:    Crawley Plan: Voiding trial after improvement in ambulation   Central access plan: Medications requiring central line      Prophylaxis:  VTE VTE covered by:  heparin (porcine), Subcutaneous, 5,000 Units at 10/28/24 0537       Stress Ulcer  not ordered         24 Hour Events : Patient febrile overnight. Off insulin and levo drips. Hypertonic saline held.  Subjective   Review of Systems: Review of Systems not obtainable due to Altered mental status  Spoke with Deonna from group [employer], who has worked with patient for 10 years and considers her family. Medications confirms. Patient loves coffee. Deonna would be open to being patient's decision maker and will speak with management regarding this. In the meantime, case management working on guardianship.    Objective :                   Vitals I/O      Most Recent Min/Max in 24hrs   Temp (!) 100.8 °F (38.2 °C) Temp  Min: 100 °F (37.8 °C)  Max: 101.1 °F (38.4 °C)   Pulse (!) 108 Pulse  Min: 86  Max: 128   Resp (!) 28 Resp  Min: 21  Max: 30   /61 BP  Min:  104/52  Max: 160/90   O2 Sat 100 % SpO2  Min: 97 %  Max: 100 %      Intake/Output Summary (Last 24 hours) at 10/28/2024 0654  Last data filed at 10/28/2024 0642  Gross per 24 hour   Intake 3509.31 ml   Output 1885 ml   Net 1624.31 ml       Diet Enteral/Parenteral; Tube Feeding No Oral Diet; Osmolite 1.0; Cyclic; 55; 22 hours; Prosource Protein Liquid - Two Packets; OD; 30; Water; Every 4 hours    Invasive Monitoring           Physical Exam   Physical Exam  Cardiovascular:      Rate and Rhythm: Regular rhythm. Tachycardia present.   Musculoskeletal:      Right lower leg: No edema.      Left lower leg: No edema.   Pulmonary:      Comments: intubated  Neurological:      Mental Status: She is unresponsive.      Cranial Nerves: No facial asymmetry.        Corneal reflex present, cough reflex, gag reflex intact and no clonus.      Comments: Left gaze preference  Sluggish pupils  LUE and LLE withdraw  RLE minimal withdrawal  RUE no withdrawal   Genitourinary/Anorectal:  Crawley present.        Diagnostic Studies        Lab Results: I have reviewed the following results:     Medications:  Scheduled PRN   aspirin, 325 mg, Daily  atorvastatin, 40 mg, QPM  cefTRIAXone, 1,000 mg, Q24H  chlorhexidine, 15 mL, Q12H JORGE LUIS  clopidogrel, 75 mg, Daily  heparin (porcine), 5,000 Units, Q8H JORGE LUIS  ipratropium, 0.5 mg, Q6H  levalbuterol, 1.25 mg, Q6H  levETIRAcetam, 750 mg, Q12H  levothyroxine, 150 mcg, Early Morning  [Held by provider] lisinopril, 10 mg, Daily  senna-docusate sodium, 2 tablet, BID      acetaminophen, 650 mg, Q6H PRN  fentaNYL, 50 mcg, Q1H PRN  labetalol, 10 mg, Q4H PRN  LORazepam, 2 mg, Q30 Min PRN  ondansetron, 4 mg, Q6H PRN       Continuous    insulin regular (HumuLIN R,NovoLIN R) 1 Units/mL in sodium chloride 0.9 % 100 mL infusion, 0.3-21 Units/hr, Last Rate: 4 Units/hr (10/28/24 0554)  norepinephrine, 1-30 mcg/min, Last Rate: Stopped (10/27/24 1400)  sodium chloride, 40 mL/hr, Last Rate: 40 mL/hr (10/28/24 0547)          Labs:   CBC    Recent Labs     10/27/24  0550 10/28/24  0553   WBC 17.04* 12.74*   HGB 9.6* 8.7*   HCT 31.1* 27.7*    132*     BMP    Recent Labs     10/28/24  0001 10/28/24  0553   SODIUM 154* 156*   K 3.5 3.6   * 127*   CO2 21 22   AGAP 7 7   BUN 8 8   CREATININE 0.47* 0.47*   CALCIUM 7.7* 7.9*       Coags    No recent results     Additional Electrolytes  Recent Labs     10/27/24  0450 10/27/24  1246 10/28/24  0001   MG 1.9 2.0  --    PHOS 2.0* 3.3  --    CAIONIZED  --   --  1.11*          Blood Gas    No recent results  Recent Labs     10/27/24  1916   PHVEN 7.381   MOB2KWF 35.7*   PO2VEN 40.1   LWA0TTA 20.7*   BEVEN -3.9   I2JDKUS 75.0    LFTs  No recent results    Infectious  No recent results  Glucose  Recent Labs     10/27/24  1246 10/27/24  1916 10/28/24  0001 10/28/24  0553   GLUC 87 285* 157* 190*

## 2024-10-28 NOTE — OCCUPATIONAL THERAPY NOTE
Occupational Therapy         Patient Name: Joslyn Cantu  Today's Date: 10/28/2024         10/28/24 1000   OT Last Visit   OT Visit Date 10/28/24   Note Type   Note type Cancelled Session   Cancel Reasons Intubated/sedated   Additional Comments will defer         Dionna Patino

## 2024-10-29 NOTE — PROGRESS NOTES
Progress Note - Neurosurgery   Name: Joslyn Cantu 74 y.o. female I MRN: [id number]  Unit/Bed#: ICU 09 I Date of Admission: 10/25/2024   Date of Service: 10/29/2024 I Hospital Day: 4    Assessment & Plan  Stroke ([facility])  PPD 4 angioplasty and stenting left M2/3 occlusion and critical left ICA stenosis (Dr. Rosales, 10/25)  Presented 10/25 NIHSS 26 from group [facility].  TNK administered.    Imaging:  CT head wo, 10/28/2024: 1. Significant increase in vasogenic edema and mass effect in the left MCA and HIEN territories. 2. New left right midline shift of 10 mm. Mild left transtentorial herniation in the temporal region. 3. Near complete effacement of the left lateral and third ventricles. No hydrocephalus. 4. Interval resolution of density visualized in the left MCA territory infarct suggesting washout of contrast. No evidence of acute hemorrhage.    Plan:  Continue to monitor neuro exam closely.  STAT CT head with decline in GCS > 2 pts in 1 hour.  Continue  mg and Plavix 75 mg daily.  P2Y12 186.  Significant increase in vasogenic edema and mass effect since procedure. Would not entertain [facility] due to multiple comorbidities, active sepsis, dominant hemisphere stroke and age.  Further management/treatment per CCM/neurology.  Mobilize as tolerated.  DVT ppx: SCDs, heparin SQ.    Neurosurgery will sign off. Plan for outpatient follow up in 6 weeks with dopplers. Call with questions.    Schizophrenia ([facility])    Hypothyroid    Hypertension    Type 2 diabetes mellitus without complication ([facility])  Lab Results   Component Value Date    HGBA1C 8.8 (H) 10/26/2024       Recent Labs     10/28/24  1808 10/29/24  0001 10/29/24  0603 10/29/24  0828   POCGLU 276* 266* 294* 327*       Blood Sugar Average: Last 72 hrs:  (P) [id number]    Chronic HFrEF (heart failure with reduced ejection fraction) ([facility])  Wt Readings from Last 3 Encounters:   10/28/24 61.9 kg (136 lb 7.4 oz)   10/02/24 54.4 kg (120 lb)   06/25/24 55.5 kg (122 lb 6.4  oz)             Stage 3a chronic kidney disease (HCC)  Lab Results   Component Value Date    EGFR 92 10/29/2024    EGFR 95 10/29/2024    EGFR 95 10/28/2024    CREATININE 0.57 (L) 10/29/2024    CREATININE 0.53 (L) 10/29/2024    CREATININE 0.53 (L) 10/28/2024     Aortic stenosis, moderate    Elevated troponin      Neurosurgery service signing off.  Please contact the SecureChat role for the Neurosurgery service with any questions/concerns.    Subjective   Remains critically ill with poor exam.  OD 3 mm sluggish, OS 5 mm fixed.  +cough/gag, no corneals  Postures to pain on left, no response on right.  Mechanically ventilated via ETT.    Objective :  Temp:  [99.3 °F (37.4 °C)-101.8 °F (38.8 °C)] 100.8 °F (38.2 °C)  HR:  [] 88  BP: (111-138)/(55-81) 115/62  Resp:  [18-26] 22  SpO2:  [100 %] 100 %  O2 Device: Venturi mask  FiO2 (%):  [30] 30    I/O         10/27 0701  10/28 0700 10/28 0701  10/29 0700 10/29 0701  10/30 0700    P.O.   0    I.V. (mL/kg) 1509.3 (24.4) 19 (0.3)     NG/ 715 80    IV Piggyback 450      Feedings 1070 1300 110    Total Intake(mL/kg) 3509.3 (56.7) 2034 (32.9) 190 (3.1)    Urine (mL/kg/hr) 1885 (1.3) 1012 (0.7) 275 (1.4)    Emesis/NG output 0  0    Total Output 1885 1012 275    Net +1624.3 +1022 -85                 Physical Exam  Constitutional:       Appearance: She is ill-appearing.      Neurological Exam  Mental Status  Responsive to painful stimuli.    Cranial Nerves  CN III, IV, VI:   Right pupil: 3 mm. Round. Abnormal accommodation:   Left pupil: 5 mm. Abnormal accommodation:  +cough/gag, negative corneal response bilaterally.        Lab Results: I have reviewed the following results:  Recent Labs     10/26/24  1312 10/26/24  1456 10/26/24  2344 10/27/24  0140 10/27/24  0450 10/28/24  0001 10/28/24  0553 10/28/24  1156 10/29/24  0536 10/29/24  0539   WBC  --   --   --   --    < >  --  12.74*  --  16.79*  --    HGB  --   --   --   --    < >  --  8.7*  --  9.3*  --    HCT  --   --    --   --    < >  --  27.7*  --  30.8*  --    PLT  --   --   --   --    < >  --  132*  --  134*  --    SODIUM  --    < > 142  --    < > 154* 156*   < >  --  152*   K  --    < > 3.6  --    < > 3.5 3.6   < >  --  4.8   CL  --    < > 111*  --    < > 126* 127*   < >  --  120*   CO2  --    < > 21  --    < > 21 22   < >  --  24   BUN  --    < > 8  --    < > 8 8   < >  --  9   CREATININE  --    < > 0.43*  --    < > 0.47* 0.47*   < >  --  0.57*   GLUC  --    < > 210*  --    < > 157* 190*   < >  --  325*   CAIONIZED  --   --   --   --   --  1.11*  --   --   --   --    MG  --   --   --   --    < >  --  2.1  --   --   --    PHOS  --   --   --   --    < >  --  2.6  --   --   --    HSTNI0  --   --  1,487*  --   --   --   --   --   --   --    HSTNI2  --   --   --  1,373*  --   --   --   --   --   --    LACTICACID 1.3  --   --   --   --   --   --   --   --   --     < > = values in this interval not displayed.       Imaging Results Review: I personally reviewed the following image studies in PACS and associated radiology reports: CT head. My interpretation of the radiology images/reports is: as above.  Other Study Results Review: No additional pertinent studies reviewed.    VTE Pharmacologic Prophylaxis: VTE covered by:  heparin (porcine), Subcutaneous, 5,000 Units at 10/29/24 0530    and Sequential compression device (Venodyne)

## 2024-10-29 NOTE — PHYSICAL THERAPY NOTE
Physical Therapy Cancellation Note    PT orders received chart review completed. Pt is currently intubated/sedated and not appropriate to participate in skilled PT at this time. PT will follow and eval as medically appropriate.     10/29/24 1400   Note Type   Note type Cancelled Session   Cancel Reasons Intubated/sedated       Shelia Rivero, PT

## 2024-10-29 NOTE — ACP (ADVANCE CARE PLANNING)
Discussed on the phone with Kadi patient's daughter and then with Antwon patient's nephew and 4th year medical student. Based on current updates, family is leaning towards comfort care, but patient remains level 1. They will discuss and contact us with a decision. If patient deteriorates overnight, will contact Antwon for a decision.

## 2024-10-29 NOTE — RESPIRATORY THERAPY NOTE
RT Ventilator Management Note  Joslyn Cantu 74 y.o. female MRN: [id number]  Unit/Bed#: ICU 09 Encounter: [id number]      Daily Screen         10/27/2024  0757 10/28/2024  0707          Patient safety screen outcome:: Passed Passed      Spont breathing trial outcome:: Passed Passed                Physical Exam:   Assessment Type: Pre-treatment  General Appearance: Sedated  Respiratory Pattern: Assisted  Chest Assessment: Chest expansion symmetrical      Resp Comments: pt remained on listed spont settings through the night with no vent changes     10/29/24 0425   Respiratory Assessment   Respiratory Pattern Assisted   Chest Assessment Chest expansion symmetrical   Resp Comments pt remained on listed spont settings through the night with no vent changes   Vent Information   Vent ID [id number]   Vent type Harris G5   Harris Vent Mode SPONT   $ Vent Daily Charge-Subsequent Yes   $ Pulse Oximetry Spot Check Charge Completed   SPONT Settings   FIO2 (%) 30 %   PEEP (cmH2O) 6 cmH2O   Pressure Support (cmH2O) 4 cmH20   Flow Trigger (LPM) 5 LPM   P-ramp (ms) 100 ms   ETS  (%) 25 %   Humidification Heater   Heater Temp 95 °F (35 °C)   SPONT Actuals   Resp Rate (BPM) 20 BPM   VT (mL) 429 mL   MV (Obs) 9   MAP (cmH2O) 7 cmH2O   Peak Pressure (cmH2O) 16 cmH2O   I:E Ratio (Obs) 1/2.5   RSBI (f/vt) 51 f/Vt   Heater Temperature (Obs) 95 °F (35 °C)   SPONT Alarms   High Peak Pressure (cmH20) 40 cmH2O   High Resp Rate (BPM) 40 BPM   High MV (L/min) 20 L/min   Low MV (L/min) 3 L/min   High Spont VTE (mL) 1000 mL   Low Spont VTE (mL) 250 mL   Apnea Time (S) 20 S   SPONT Apnea Settings   Resp Rate (BPM) 14 BPM   FIO2 (%) 30 %   %TI (%) 1 %   Apnea Time (S) 30 S   Maintenance   Alarm (pink) cable attached No   Resuscitation bag with peep valve at bedside Yes   Water bag changed No   Circuit changed No   IHI Ventilator Associated Pneumonia Bundle   Head of Bed Elevated HOB 30   ETT  Cuffed;Oral;Pre-curved;Inflated 8 mm   Placement Date/Time:  10/25/24 2141   Mask Ventilation: Mask ventilation not attempted (0)  Preoxygenated: Yes  Technique: Direct laryngoscopy;Rapid sequence;Stylet  Type: Cuffed;Oral;Pre-curved;Inflated  Tube Size: 8 mm  Laryngoscope: Mac  Blade Size:...   Secured at (cm) 20   Measured from Lips   Secured Location Center

## 2024-10-29 NOTE — PROGRESS NOTES
Progress Note - Neurology   Name: Joslyn Cantu 74 y.o. female I MRN: [id number]  Unit/Bed#: ICU 09 I Date of Admission: 10/25/2024   Date of Service: 10/29/2024 I Hospital Day: 4    Assessment & Plan  Stroke ([facility])  Assessment:  Joslyn Cantu is a 74 y.o. female who presented to the ED as a result of stroke-like symptoms.  Patient has a past medical history of acute respiratory failure with hypoxia, hypertension, hypothyroidism, hypovitaminosis D, iron deficiency anemia, and type 2 diabetes. Patient was found at the side of a sidewalk not responding and also not moving her right hand and right arm with a left gaze deviation.  Given the symptoms, a pre-hospital stroke alert was initiated.  As per patient's group home, patient was conversing with another resident around 6-6:30 PM and was her normal self prior to leaving the group home to cash a check.     Initial NIHSS 27  Presenting deficits were: R-sided weakness, L gaze preference  Interventions: After a discussion of risks, benefits and alternatives reviewing inclusion and exclusion criteria the decision was made to proceed with thrombolytic therapy. Specifically discussed were the potential benefits, risks, and side effects of the proposed stroke intervention(s) and care; the likelihood of the patient achieving their goals; and any potential problems that might occur as a result of the intervention(s); reasonable alternatives to the proposed stroke intervention(s) and care. The discussion encompasses risks, benefits and side effects related to the alternatives and the risks related to not receiving the proposed stroke intervention(s) and care. Given the critical condition of the patient, the ED team and the neurology team together conversed in regards to the patient's condition taking into consideration the group home's decision of doing everything to save the patient.  The decision was to administer TNK.  There was a delay in administering TNK with no family members  able to consent for TNK but it was still administered within the appropriate timeframe. Verbal consent was obtained from conferring with Dr. Juárez and Dr. Treadwell, they were in agreement to administer the medication since no surrogate decision maker was available.  Consent was obtained by Dr. Mary Hyman.     Workup:  Lab Results   Component Value Date    HGBA1C 8.8 (H) 10/26/2024    CHOLESTEROL 113 10/26/2024    LDLCALC 45 10/26/2024    TRIG 125 10/26/2024    INR 0.94 10/25/2024      CTH: Early findings of ischemia in the left MCA territory. No hemorrhage seen.   CTA:  Occluded left M2 segment. The M1 segments are patent. Severe bilateral ICA stenosis with pronounced noncalcified plaque proximally on the left. Additional stenoses are seen throughout the left CCA, both intracranial internal carotid arteries, and the left intracranial vertebral artery.  CTP: Infinite mismatch ratio  CTH at 24 hours: Evolving left MCA infarcts, possible infarction of the left cerebral hemisphere, patient also has new evidence of infarctions in the watershed distribution.  MRI: Pending  TTE/WILL: hypokinetic / akkinetic wall segs, decreased EF to 25%  Video EEG 10/27:  No seizures. Intermittent right hemisphere polymorphic delta slowing. Intermittent right frontal sharp waves. Mild/moderate encephalopathy.   10/28 VEEG Update: No seizures. 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 Mild/moderate encephalopathy.     Pertinent scores as of 10/29/24:  - NIHSS: 27    Impression: Patient is a 74 year old female presented to the ED as a result of strokelike symptoms. CTH showed a possible subacute infarct and CTA showed a left M2 occlusion. Patient's stroke seems to be embolic in nature but source unknown. LDL 45, A1C 8.8. Complicated by concern for seizure-like activity, loaded with Keppra 1.5 g and Ativan 4 mg and on video EEG, no sz  seen thus removed. Pt worsened late 10/28 to exam leading to CTH w/ midline shift & transtentorial herniation noted.    Plan:  Case discussed with Dr. Villalobos  AP/AC: Defer to neurosurgery status post mechanical thrombectomy  Recommend Lipitor 40 mg  MRI Brain no longer indicated from neuro as condition worsened, CTH+  Telemetry  Video EEG not warranted further  Keppra 750 mg twice daily  PT/OT when appropriate  BP management and HTN med compliance discussed, defer to neurosurgical team for SBP goals s/p mechanical thrombectomy  Rest of care as per primary care team    Recommendations for outpatient neurological follow up have yet to be determined.  I have discussed with Dr. Villalobos the above plan to treat pt. He agrees with the plan.  Neurology service will follow.  Please contact the SecureChat role for the Neurology service with any questions/concerns.    Subjective   Pt has had worsening overnight in condition, did not w/draw most limbs to pain including the spontaneously moving LUE from yesterday, only LLE mild movements to pain. Remains on vent, pupils asymmetric, CTH gives poor prognosis at this time.    Review of Systems   Unable to perform ROS: Patient unresponsive         Objective :  Temp:  [99.3 °F (37.4 °C)-101.8 °F (38.8 °C)] 100.8 °F (38.2 °C)  HR:  [] 88  BP: (111-138)/(55-81) 115/62  Resp:  [18-26] 22  SpO2:  [100 %] 100 %  O2 Device: Venturi mask  FiO2 (%):  [30] 30    Physical Exam  Constitutional:       Appearance: She is toxic-appearing.   HENT:      Head: Normocephalic.   Eyes:      Extraocular Movements: EOM normal  Neurological:      Cranial Nerves: Cranial nerve deficit present.      Motor: Weakness present.      Deep Tendon Reflexes: Reflexes abnormal.     Neurological Exam  Mental Status    Responsive to painful stimuli only to LLE, all other unresponsive, intubated.    Cranial Nerves  CN III, IV, VI: Abnormal extraocular movements:   Right pupil: 1 mm. Abnormal reactivity:   Left  pupil: 4 mm. Abnormal reactivity:  CN V:  Right: Absent corneal reflex.  Left: Absent corneal reflex.  CN IX, X:  Right: Gag is absent.  Left: Gag is absent.    Motor  Normal muscle bulk throughout. No fasciculations present. Normal muscle tone. No abnormal involuntary movements.    Sensory  Unable to assess.    Reflexes  Deep tendon reflexes are 2+ and symmetric except as noted.  Right Plantar: upgoing  Left Plantar: upgoing    Coordination    Defer.    Gait    Defer.        Lab Results: I have reviewed the following results:  Imaging Results Review: I personally reviewed the following image studies in PACS and associated radiology reports: CT head. My interpretation of the radiology images/reports is: worsening prognosis.      VTE Pharmacologic Prophylaxis: VTE covered by:  heparin (porcine), Subcutaneous, 5,000 Units at 10/29/24 1428       Administrative Statements   I have spent a total time of 40 minutes in caring for this patient on the day of the visit/encounter including Diagnostic results, Prognosis, Counseling / Coordination of care, Documenting in the medical record, Reviewing / ordering tests, medicine, procedures  , Obtaining or reviewing history  , and Communicating with other healthcare professionals .

## 2024-10-29 NOTE — ASSESSMENT & PLAN NOTE
Lab Results   Component Value Date    EGFR 92 10/29/2024    EGFR 95 10/29/2024    EGFR 95 10/28/2024    CREATININE 0.57 (L) 10/29/2024    CREATININE 0.53 (L) 10/29/2024    CREATININE 0.53 (L) 10/28/2024

## 2024-10-29 NOTE — PROGRESS NOTES
Progress Note - Critical Care/ICU   Name: Joslyn Cantu 74 y.o. female I MRN: [id number]  Unit/Bed#: ICU 09 I Date of Admission: 10/25/2024   Date of Service: 10/29/2024 I Hospital Day: 4       Assessment & Plan   Neuro:   Diagnosis: Stroke  Plan: 10/25/2024 CTA-occluded left M2, severe bilateral ICA stenosis  Status post TNK 2100  NIH on admission 27  10/26 CTH: evolving left MCA with multifocal watershed sequela  Status post mechanical thrombectomy, ICA stent 10/25  Continue ASA/plavix  P2y12 am 10/29 186  10/29 vEEG: Right frontal seizure tendency and a right>left hemispheric dysfunction. No seizures seen. The seizure tendency improved overnight.   10/28 CTH:   Significant increase in vasogenic edema and mass effect in the left MCA and HIEN territories.   New left right midline shift of 10 mm. Mild left transtentorial herniation in the temporal region.   Near complete effacement of the left lateral and third ventricles. No hydrocephalus.   Interval resolution of density visualized in the left MCA territory infarct suggesting washout of contrast. No evidence of acute hemorrhage.   Follow-up MRI  D/c vEEG  Diagnosis: Schizophrenia  Plan: home haldol 5 mg     CV:   Diagnosis: Chronic heart failure, hypertension,  Plan: 3/26/2024 echo-EF 40%, systolic function moderately reduced, moderate global hypokinesis, diastolic function moderately abnormal grade 2  Follow-up repeat echo  Monitor on telemetry  Hold home dose lisinopril, Lasix, and metoprolol   Diagnosis: Elevated troponin, suspect type II, hypotension   Plan: Initial troponin 614, downtrending     Pulm:  Diagnosis: Respiratory insufficiency, advanced centrilobular emphysema  Plan: 10/25/2024 CTA chest abdomen pelvis-advanced setrobuvir of emphysema, mildly enlarged right hilar lymph node  Remained intubated post IR  Wean vent as able     GI:   Diagnosis: Dysphagia  Plan: Speech evaluation when able     :   Diagnosis: CKD 3  Plan: Creatinine on admission  0.78  Strict intake and output  Trend BUN and creatinine     F/E/N:   Hypertonic saline drip held  Replete electrolytes as needed. Na goal 145-155  Tube feeds     Heme/Onc:   Diagnosis: DVT prophylaxis  Plan: continue SQ heparin and SCDs     Endo:   Diagnosis: DM 2, hypothyroidism  Plan: 3/5/2024 hemoglobin A1c 9.2  Back on insulin gtt as glucose not well controlled on basal and SSI  Continue home dose levothyroxine     ID:   Diagnosis: Leukocytosis  Plan: Suspect reactive  Trend fever curve and WBC  Blood cultures gram-positive rods -suspect contaminant, no growth on repeat  F/u Blood cultures, UA     MSK/Skin:   Diagnosis: Ambulatory dysfunction  Plan: PT/OT     Disposition: Critical care    ICU Core Measures     Vented Patient  VAP Bundle  VAP bundle ordered     A: Assess, Prevent, and Manage Pain Has pain been assessed? Yes  Need for changes to pain regimen? No   B: Both Spontaneous Awakening Trials (SATs) and Spontaneous Breathing Trials (SBTs) Plan to perform spontaneous awakening trial today? Yes   Plan to perform spontaneous breathing trial today? Yes   Obvious barriers to extubation? Yes   C: Choice of Sedation RASS Goal: 0 Alert and Calm  Need for changes to sedation or analgesia regimen? No   D: Delirium CAM-ICU: Unable to perform secondary to Acute cognitive dysfunction   E: Early Mobility  Plan for early mobility? Yes   F: Family Engagement Plan for family engagement today? Yes       Antibiotic Review: Antibiotics to be discontinued today    Review of Invasive Devices:    [place] Plan: Continue for accurate I/O monitoring for 48 hours  Central access plan: Medications requiring central line      Prophylaxis:  VTE VTE covered by:  heparin (porcine), Subcutaneous, 5,000 Units at 10/29/24 0530       Stress Ulcer  not ordered         24 Hour Events : worsening exam overnight with imaging showing progression. Patient off vEEG. Was able to contact patient's daughter Kadi. She is aware of situation and will  discuss with patient's sister and cousins to make a decision for going forward. Options of comfort care vs trach/peg if no improvement were addressed.    Subjective   Review of Systems: Review of Systems not obtainable due to Altered mental status    Objective :                   Vitals I/O      Most Recent Min/Max in 24hrs   Temp 99.3 °F (37.4 °C) Temp  Min: 99.3 °F (37.4 °C)  Max: 101.8 °F (38.8 °C)   Pulse 76 Pulse  Min: 76  Max: 114   Resp 21 Resp  Min: 20  Max: 28   /65 BP  Min: 111/62  Max: 138/77   O2 Sat 100 % SpO2  Min: 99 %  Max: 100 %      Intake/Output Summary (Last 24 hours) at 10/29/2024 0608  Last data filed at 10/29/2024 0200  Gross per 24 hour   Intake 1854.03 ml   Output 937 ml   Net 917.03 ml       Diet Enteral/Parenteral; Tube Feeding No Oral Diet; Osmolite 1.0; Cyclic; 55; 22 hours; Prosource Protein Liquid - Two Packets; OD; 30; Water; Every 4 hours    Invasive Monitoring           Physical Exam   Physical Exam  Eyes:      General: No dysconjugate gazeNo scleral icterus.        Right eye: No discharge.         Left eye: No discharge.   Skin:     Coloration: Skin is not jaundiced or pale.   Cardiovascular:      Rate and Rhythm: Normal rate and regular rhythm.   Constitutional:       Appearance: She is ill-appearing.   Pulmonary:      Comments: intubated  Neurological:      Mental Status: She is unresponsive.        Corneal reflex absent, no cough reflex, gag reflex not intact and no clonus.      Comments: No opening eyes to name or noxious stim  Minimal WD LUE, none RUE, WD in BLE  Eyes fixed, pupils non reactive R 2mm L 4mm            Diagnostic Studies        Lab Results: I have reviewed the following results:     Medications:  Scheduled PRN   aspirin, 325 mg, Daily  atorvastatin, 40 mg, QPM  cefTRIAXone, 1,000 mg, Q24H  chlorhexidine, 15 mL, Q12H JORGE LUIS  clopidogrel, 75 mg, Daily  haloperidol, 5 mg, HS  heparin (porcine), 5,000 Units, Q8H JORGE LUIS  insulin glargine, 20 Units, QAM  insulin  lispro, 2-12 Units, Q6H JORGE LUIS  ipratropium, 0.5 mg, Q6H  levalbuterol, 1.25 mg, Q6H  levETIRAcetam, 750 mg, Q12H  levothyroxine, 150 mcg, Early Morning  [Held by provider] lisinopril, 10 mg, Daily  metoprolol succinate, 12.5 mg, Daily  senna-docusate sodium, 2 tablet, BID      acetaminophen, 650 mg, Q6H PRN  fentaNYL, 50 mcg, Q1H PRN  labetalol, 10 mg, Q4H PRN  LORazepam, 2 mg, Q30 Min PRN  ondansetron, 4 mg, Q6H PRN       Continuous          Labs:   CBC    Recent Labs     10/28/24  0553   WBC 12.74*   HGB 8.7*   HCT 27.7*   *     BMP    Recent Labs     10/28/24  1752 10/29/24  0018   SODIUM 152* 149*   K 3.7 4.5   * 121*   CO2 23 23   AGAP 7 5   BUN 9 9   CREATININE 0.53* 0.53*   CALCIUM 8.4 8.5       Coags    No recent results     Additional Electrolytes  Recent Labs     10/27/24  1246 10/28/24  0001 10/28/24  0553   MG 2.0  --  2.1   PHOS 3.3  --  2.6   CAIONIZED  --  1.11*  --           Blood Gas    No recent results  Recent Labs     10/27/24  1916   PHVEN 7.381   BXO2TVA 35.7*   PO2VEN 40.1   NRD4RJK 20.7*   BEVEN -3.9   Z0OJNEJ 75.0    LFTs  No recent results    Infectious  No recent results  Glucose  Recent Labs     10/28/24  0553 10/28/24  1156 10/28/24  1752 10/29/24  0018   GLUC 190* 132 276* 318*

## 2024-10-29 NOTE — PLAN OF CARE
Problem: Prexisting or High Potential for Compromised Skin Integrity  Goal: Skin integrity is maintained or improved  Description: INTERVENTIONS:  - Identify patients at risk for skin breakdown  - Assess and monitor skin integrity  - Assess and monitor nutrition and hydration status  - Monitor labs   - Assess for incontinence   - Turn and reposition patient  - Assist with mobility/ambulation  - Relieve pressure over bony prominences  - Avoid friction and shearing  - Provide appropriate hygiene as needed including keeping skin clean and dry  - Evaluate need for skin moisturizer/barrier cream  - Collaborate with interdisciplinary team   - Patient/family teaching  - Consider wound care consult   Outcome: Progressing     Problem: SAFETY,RESTRAINT: NV/NON-SELF DESTRUCTIVE BEHAVIOR  Goal: Remains free of harm/injury (restraint for non violent/non self-detsructive behavior)  Description: INTERVENTIONS:  - Instruct patient/family regarding restraint use   - Assess and monitor physiologic and psychological status   - Provide interventions and comfort measures to meet assessed patient needs   - Identify and implement measures to help patient regain control  - Assess readiness for release of restraint   Outcome: Progressing  Goal: Returns to optimal restraint-free functioning  Description: INTERVENTIONS:  - Assess the patient's behavior and symptoms that indicate continued need for restraint  - Identify and implement measures to help patient regain control  - Assess readiness for release of restraint   Outcome: Progressing     Problem: Neurological Deficit  Goal: Neurological status is stable or improving  Description: Interventions:  - Monitor and assess patient's level of consciousness, motor function, sensory function, and level of assistance needed for ADLs.   - Monitor and report changes from baseline. Collaborate with interdisciplinary team to initiate plan and implement interventions as ordered.   - Provide and  maintain a safe environment.  - Consider seizure precautions.  - Consider fall precautions.  - Consider aspiration precautions.  - Consider bleeding precautions.  Outcome: Progressing     Problem: Activity Intolerance/Impaired Mobility  Goal: Mobility/activity is maintained at optimum level for patient  Description: Interventions:  - Assess and monitor patient  barriers to mobility and need for assistive/adaptive devices.  - Assess patient's emotional response to limitations.  - Collaborate with interdisciplinary team and initiate plans and interventions as ordered.  - Encourage independent activity per ability.  - Maintain proper body alignment.  - Perform active/passive rom as tolerated/ordered.  - Plan activities to conserve energy.  - Turn patient as appropriate  Outcome: Progressing     Problem: Communication Impairment  Goal: Ability to express needs and understand communication  Description: Assess patient's communication skills and ability to understand information.  Patient will demonstrate use of effective communication techniques, alternative methods of communication and understanding even if not able to speak.     - Encourage communication and provide alternate methods of communication as needed.  - Collaborate with case management/ for discharge needs.  - Include patient/family/caregiver in decisions related to communication.  Outcome: Progressing     Problem: Potential for Aspiration  Goal: Non-ventilated patient's risk of aspiration is minimized  Description: Assess and monitor vital signs, respiratory status, and labs (WBC).  Monitor for signs of aspiration (tachypnea, cough, rales, wheezing, cyanosis, fever).    - Assess and monitor patient's ability to swallow.  - Place patient up in chair to eat if possible.  - HOB up at 90 degrees to eat if unable to get patient up into chair.  - Supervise patient during oral intake.   - Instruct patient/ family to take small bites.  - Instruct  patient/ family to take small single sips when taking liquids.  - Follow patient-specific strategies generated by speech pathologist.  Outcome: Progressing  Goal: Ventilated patient's risk of aspiration is minimized  Description: Assess and monitor vital signs, respiratory status, airway cuff pressure, and labs (WBC).  Monitor for signs of aspiration (tachypnea, cough, rales, wheezing, cyanosis, fever).    - Elevate head of bed 30 degrees if patient has tube feeding.  - Monitor tube feeding.  Outcome: Progressing     Problem: Nutrition  Goal: Nutrition/Hydration status is improving  Description: Monitor and assess patient's nutrition/hydration status for malnutrition (ex- brittle hair, bruises, dry skin, pale skin and conjunctiva, muscle wasting, smooth red tongue, and disorientation). Collaborate with interdisciplinary team and initiate plan and interventions as ordered.  Monitor patient's weight and dietary intake as ordered or per policy. Utilize nutrition screening tool and intervene per policy. Determine patient's food preferences and provide high-protein, high-caloric foods as appropriate.     - Assist patient with eating.  - Allow adequate time for meals.  - Encourage patient to take dietary supplement as ordered.  - Collaborate with clinical nutritionist.  - Include patient/family/caregiver in decisions related to nutrition.  Outcome: Progressing     Problem: NEUROSENSORY - ADULT  Goal: Achieves stable or improved neurological status  Description: INTERVENTIONS  - Monitor and report changes in neurological status  - Monitor vital signs such as temperature, blood pressure, glucose, and any other labs ordered   - Initiate measures to prevent increased intracranial pressure  - Monitor for seizure activity and implement precautions if appropriate      Outcome: Progressing  Goal: Remains free of injury related to seizures activity  Description: INTERVENTIONS  - Maintain airway, patient safety  and administer  oxygen as ordered  - Monitor patient for seizure activity, document and report duration and description of seizure to physician/advanced practitioner  - If seizure occurs,  ensure patient safety during seizure  - Reorient patient post seizure  - Seizure pads on all 4 side rails  - Instruct patient/family to notify RN of any seizure activity including if an aura is experienced  - Instruct patient/family to call for assistance with activity based on nursing assessment  - Administer anti-seizure medications if ordered    Outcome: Progressing  Goal: Achieves maximal functionality and self care  Description: INTERVENTIONS  - Monitor swallowing and airway patency with patient fatigue and changes in neurological status  - Encourage and assist patient to increase activity and self care.   - Encourage visually impaired, hearing impaired and aphasic patients to use assistive/communication devices  Outcome: Progressing     Problem: CARDIOVASCULAR - ADULT  Goal: Maintains optimal cardiac output and hemodynamic stability  Description: INTERVENTIONS:  - Monitor I/O, vital signs and rhythm  - Monitor for S/S and trends of decreased cardiac output  - Administer and titrate ordered vasoactive medications to optimize hemodynamic stability  - Assess quality of pulses, skin color and temperature  - Assess for signs of decreased coronary artery perfusion  - Instruct patient to report change in severity of symptoms  Outcome: Progressing  Goal: Absence of cardiac dysrhythmias or at baseline rhythm  Description: INTERVENTIONS:  - Continuous cardiac monitoring, vital signs, obtain 12 lead EKG if ordered  - Administer antiarrhythmic and heart rate control medications as ordered  - Monitor electrolytes and administer replacement therapy as ordered  Outcome: Progressing     Problem: RESPIRATORY - ADULT  Goal: Achieves optimal ventilation and oxygenation  Description: INTERVENTIONS:  - Assess for changes in respiratory status  - Assess for  changes in mentation and behavior  - Position to facilitate oxygenation and minimize respiratory effort  - Oxygen administered by appropriate delivery if ordered  - Initiate smoking cessation education as indicated  - Encourage broncho-pulmonary hygiene including cough, deep breathe, Incentive Spirometry  - Assess the need for suctioning and aspirate as needed  - Assess and instruct to report SOB or any respiratory difficulty  - Respiratory Therapy support as indicated  Outcome: Progressing     Problem: GASTROINTESTINAL - ADULT  Goal: Minimal or absence of nausea and/or vomiting  Description: INTERVENTIONS:  - Administer IV fluids if ordered to ensure adequate hydration  - Maintain NPO status until nausea and vomiting are resolved  - Nasogastric tube if ordered  - Administer ordered antiemetic medications as needed  - Provide nonpharmacologic comfort measures as appropriate  - Advance diet as tolerated, if ordered  - Consider nutrition services referral to assist patient with adequate nutrition and appropriate food choices  Outcome: Progressing  Goal: Maintains or returns to baseline bowel function  Description: INTERVENTIONS:  - Assess bowel function  - Encourage oral fluids to ensure adequate hydration  - Administer IV fluids if ordered to ensure adequate hydration  - Administer ordered medications as needed  - Encourage mobilization and activity  - Consider nutritional services referral to assist patient with adequate nutrition and appropriate food choices  Outcome: Progressing  Goal: Maintains adequate nutritional intake  Description: INTERVENTIONS:  - Monitor percentage of each meal consumed  - Identify factors contributing to decreased intake, treat as appropriate  - Assist with meals as needed  - Monitor I&O, weight, and lab values if indicated  - Obtain nutrition services referral as needed  Outcome: Progressing  Goal: Establish and maintain optimal ostomy function  Description: INTERVENTIONS:  - Assess  bowel function  - Encourage oral fluids to ensure adequate hydration  - Administer IV fluids if ordered to ensure adequate hydration   - Administer ordered medications as needed  - Encourage mobilization and activity  - Nutrition services referral to assist patient with appropriate food choices  - Assess stoma site  - Consider wound care consult   Outcome: Progressing  Goal: Oral mucous membranes remain intact  Description: INTERVENTIONS  - Assess oral mucosa and hygiene practices  - Implement preventative oral hygiene regimen  - Implement oral medicated treatments as ordered  - Initiate Nutrition services referral as needed  Outcome: Progressing     Problem: GENITOURINARY - ADULT  Goal: Maintains or returns to baseline urinary function  Description: INTERVENTIONS:  - Assess urinary function  - Encourage oral fluids to ensure adequate hydration if ordered  - Administer IV fluids as ordered to ensure adequate hydration  - Administer ordered medications as needed  - Offer frequent toileting  - Follow urinary retention protocol if ordered  Outcome: Progressing  Goal: Absence of urinary retention  Description: INTERVENTIONS:  - Assess patient’s ability to void and empty bladder  - Monitor I/O  - Bladder scan as needed  - Discuss with physician/AP medications to alleviate retention as needed  - Discuss catheterization for long term situations as appropriate  Outcome: Progressing  Goal: Urinary catheter remains patent  Description: INTERVENTIONS:  - Assess patency of urinary catheter  - If patient has a chronic hilton, consider changing catheter if non-functioning  - Follow guidelines for intermittent irrigation of non-functioning urinary catheter  Outcome: Progressing     Problem: METABOLIC, FLUID AND ELECTROLYTES - ADULT  Goal: Electrolytes maintained within normal limits  Description: INTERVENTIONS:  - Monitor labs and assess patient for signs and symptoms of electrolyte imbalances  - Administer electrolyte replacement  as ordered  - Monitor response to electrolyte replacements, including repeat lab results as appropriate  - Instruct patient on fluid and nutrition as appropriate  Outcome: Progressing  Goal: Fluid balance maintained  Description: INTERVENTIONS:  - Monitor labs   - Monitor I/O and WT  - Instruct patient on fluid and nutrition as appropriate  - Assess for signs & symptoms of volume excess or deficit  Outcome: Progressing  Goal: Glucose maintained within target range  Description: INTERVENTIONS:  - Monitor Blood Glucose as ordered  - Assess for signs and symptoms of hyperglycemia and hypoglycemia  - Administer ordered medications to maintain glucose within target range  - Assess nutritional intake and initiate nutrition service referral as needed  Outcome: Progressing     Problem: SKIN/TISSUE INTEGRITY - ADULT  Goal: Skin Integrity remains intact(Skin Breakdown Prevention)  Description: Assess:  -Perform Lazaro assessment every day  -Clean and moisturize skin every 24hrs  -Inspect skin when repositioning, toileting, and assisting with ADLS  -Assess extremities for adequate circulation and sensation     Bed Management:  -Have minimal linens on bed & keep smooth, unwrinkled  -Change linens as needed when moist or perspiring  -Avoid sitting or lying in one position for more than 2 hours while in bed  -Keep HOB at 30 degrees     Toileting:  -Offer bedside commode  -Assess for incontinence every 2 hrs    Activity:  -Mobilize patient 3 times a day  -Encourage activity and walks on unit  -Encourage or provide ROM exercises   -Turn and reposition patient every 2 Hours  -Use appropriate equipment to lift or move patient in bed    Skin Care:  -Avoid use of baby powder, tape, friction and shearing, hot water or constrictive clothing  -Relieve pressure over bony prominences using allyven  -Do not massage red bony areas    Outcome: Progressing  Goal: Incision(s), wounds(s) or drain site(s) healing without S/S of  infection  Description: INTERVENTIONS  - Assess and document dressing, incision, wound bed, drain sites and surrounding tissue  - Provide patient and family education  - Perform skin care/dressing changes every day  Outcome: Progressing  Goal: Pressure injury heals and does not worsen  Description: Interventions:  - Implement low air loss mattress or specialty surface (Criteria met)  - Apply silicone foam dressing  - Instruct/assist with weight shifting every  minutes when in chair   - Consider nutrition services referral as needed  Outcome: Progressing     Problem: HEMATOLOGIC - ADULT  Goal: Maintains hematologic stability  Description: INTERVENTIONS  - Assess for signs and symptoms of bleeding or hemorrhage  - Monitor labs  - Administer supportive blood products/factors as ordered and appropriate  Outcome: Progressing     Problem: MUSCULOSKELETAL - ADULT  Goal: Maintain or return mobility to safest level of function  Description: INTERVENTIONS:  - Assess patient's ability to carry out ADLs; assess patient's baseline for ADL function and identify physical deficits which impact ability to perform ADLs (bathing, care of mouth/teeth, toileting, grooming, dressing, etc.)  - Assess/evaluate cause of self-care deficits   - Assess range of motion  - Assess patient's mobility  - Assess patient's need for assistive devices and provide as appropriate  - Encourage maximum independence but intervene and supervise when necessary  - Involve family in performance of ADLs  - Assess for home care needs following discharge   - Consider OT consult to assist with ADL evaluation and planning for discharge  - Provide patient education as appropriate  Outcome: Progressing  Goal: Maintain proper alignment of affected body part  Description: INTERVENTIONS:  - Support, maintain and protect limb and body alignment  - Provide patient/ family with appropriate education  Outcome: Progressing     Problem: Nutrition/Hydration-ADULT  Goal:  Nutrient/Hydration intake appropriate for improving, restoring or maintaining nutritional needs  Description: Monitor and assess patient's nutrition/hydration status for malnutrition. Collaborate with interdisciplinary team and initiate plan and interventions as ordered.  Monitor patient's weight and dietary intake as ordered or per policy. Utilize nutrition screening tool and intervene as necessary. Determine patient's food preferences and provide high-protein, high-caloric foods as appropriate.     INTERVENTIONS:  - Monitor oral intake, urinary output, labs, and treatment plans  - Assess nutrition and hydration status and recommend course of action  - Evaluate amount of meals eaten  - Assist patient with eating if necessary   - Allow adequate time for meals  - Recommend/ encourage appropriate diets, oral nutritional supplements, and vitamin/mineral supplements  - Order, calculate, and assess calorie counts as needed  - Recommend, monitor, and adjust tube feedings and TPN/PPN based on assessed needs  - Assess need for intravenous fluids  - Provide specific nutrition/hydration education as appropriate  - Include patient/family/caregiver in decisions related to nutrition  Outcome: Progressing

## 2024-10-29 NOTE — PLAN OF CARE
Problem: Prexisting or High Potential for Compromised Skin Integrity  Goal: Skin integrity is maintained or improved  Description: INTERVENTIONS:  - Identify patients at risk for skin breakdown  - Assess and monitor skin integrity  - Assess and monitor nutrition and hydration status  - Monitor labs   - Assess for incontinence   - Turn and reposition patient  - Assist with mobility/ambulation  - Relieve pressure over bony prominences  - Avoid friction and shearing  - Provide appropriate hygiene as needed including keeping skin clean and dry  - Evaluate need for skin moisturizer/barrier cream  - Collaborate with interdisciplinary team   - Patient/family teaching  - Consider wound care consult   Outcome: Progressing     Problem: SAFETY,RESTRAINT: NV/NON-SELF DESTRUCTIVE BEHAVIOR  Goal: Remains free of harm/injury (restraint for non violent/non self-detsructive behavior)  Description: INTERVENTIONS:  - Instruct patient/family regarding restraint use   - Assess and monitor physiologic and psychological status   - Provide interventions and comfort measures to meet assessed patient needs   - Identify and implement measures to help patient regain control  - Assess readiness for release of restraint   Outcome: Progressing     Problem: Neurological Deficit  Goal: Neurological status is stable or improving  Description: Interventions:  - Monitor and assess patient's level of consciousness, motor function, sensory function, and level of assistance needed for ADLs.   - Monitor and report changes from baseline. Collaborate with interdisciplinary team to initiate plan and implement interventions as ordered.   - Provide and maintain a safe environment.  - Consider seizure precautions.  - Consider fall precautions.  - Consider aspiration precautions.  - Consider bleeding precautions.  Outcome: Progressing

## 2024-10-29 NOTE — PROGRESS NOTES
"Cardiology Progress Note - Joslyn Cantu 74 y.o. female MRN: [id number]    Unit/Bed#: ICU 09 Encounter: [id number]      Assessment:  Principal Problem:    Stroke (HCC)  Active Problems:    Schizophrenia (HCC)    Hypothyroid    Hypertension    Type 2 diabetes mellitus without complication (HCC)    Chronic HFrEF (heart failure with reduced ejection fraction) ([facility])    Stage 3a chronic kidney disease (HCC)    Aortic stenosis, moderate    Elevated troponin      Plan:  She continues on ventilator care.  Critical care note appreciated.  Blood pressure and heart rate is stable.  Patient with temp.  Patient's echocardiogram demonstrated stress-induced cardiomyopathy.  Patient on low-dose metoprolol succinate.  BMP today with potassium of 4.8 and creatinine of 0.57.  Serum sodium 152.  Will monitor electrolytes and renal function.    Subjective:   Patient seen and examined.     Objective:     Vitals: Blood pressure 125/64, pulse 80, temperature (!) 100.8 °F (38.2 °C), resp. rate 18, height 5' 2\" (1.575 m), weight 61.9 kg (136 lb 7.4 oz), SpO2 100%., Body mass index is 24.96 kg/m².,   Orthostatic Blood Pressures      Flowsheet Row Most Recent Value   Blood Pressure 125/64 filed at 10/29/2024 0600   Patient Position - Orthostatic VS Lying filed at 10/28/2024 1800        ,      Intake/Output Summary (Last 24 hours) at 10/29/2024 0820  Last data filed at 10/29/2024 0600  Gross per 24 hour   Intake 1756.03 ml   Output 887 ml   Net 869.03 ml       No significant arrhythmias seen on telemetry review.       Physical Exam:    GEN: Joslyn Cantu on vent   HEART: normal rate, regular rhythm, normal S1 and S2, systolic murmur  LUNGS: clear in anterior lung fields  ABDOMEN: no distention  EXTREMITIES: edema  SKIN: no suspicious lesions on exposed skin    Labs & Results:    No results displayed because visit has over 200 results.          CT head wo contrast    Result Date: 10/28/2024  Narrative: CT BRAIN - WITHOUT CONTRAST INDICATION:   Acute " pupillary change. COMPARISON: 10/26/2024. TECHNIQUE:  CT examination of the brain was performed.  Multiplanar 2D reformatted images were created from the source data. Radiation dose length product (DLP) for this visit:  1025 mGy-cm .  This examination, like all CT scans performed in the [facility], was performed utilizing techniques to minimize radiation dose exposure, including the use of iterative reconstruction and automated exposure control. IMAGE QUALITY:  Diagnostic. FINDINGS: PARENCHYMA: Increasing vasogenic edema throughout the left MCA and HIEN territory. Stable edema in the right parietal temporal region. Near complete resolution of densities previously demonstrated in the left frontoparietal temporal region suggesting washout of contrast. No evidence of acute hemorrhage. Left right midline shift of 1 cm, new since the prior exam. Mild left transtentorial herniation in the temporal region. VENTRICLES AND EXTRA-AXIAL SPACES: Effacement of the left lateral third ventricles. No hydrocephalus. VISUALIZED ORBITS: Intact. PARANASAL SINUSES: Clear. CALVARIUM AND EXTRACRANIAL SOFT TISSUES: No lytic or blastic lesion.     Impression: 1. Significant increase in vasogenic edema and mass effect in the left MCA and HIEN territories. 2. New left right midline shift of 10 mm. Mild left transtentorial herniation in the temporal region. 3. Near complete effacement of the left lateral and third ventricles. No hydrocephalus. 4. Interval resolution of density visualized in the left MCA territory infarct suggesting washout of contrast. No evidence of acute hemorrhage. The study was marked in EPIC for immediate notification. Workstation performed: [id number]     EEG Video Monitoring 24 Hour    Result Date: 10/28/2024  Narrative: Table formatting from the original result was not included. Continuous Video EEG Monitoring Patient Name:  Joslyn Cantu  MRN: [id number] :  1950 File #: [id number] Age: 74 y.o.  Ordering Provider: Cullen Chowdhury MD  Study Start: 10/27/2024 1351 Study End: 10/28/2024 1531            Study type: Continuous video EEG ICD 10 diagnosis: Other Epilepsy G40.909 ------------------------------------------------- Patient History: This recording was observed in a 74 y.o. female with a PMH of hypertension, hypothyroidism, hypovitaminosis D, iron deficiency anemia, and type 2 diabetes who presented with seizure-like activity. Imaging shows a hemorrhagic left MCA territory infarction. The continuous EEG recording was ordered to monitor in the setting of status epilepticus or recurrent seizures. Medications include: Facility-Administered Medications Ordered in Other Visits Medication Dose Route Frequency Provider Last Rate  acetaminophen  650 mg Oral Q6H PRN Manpreet Puri MD    aspirin  325 mg Oral Daily Aida Victor DO    atorvastatin  40 mg Oral QPM YUMI Marx    cefTRIAXone  1,000 mg Intravenous Q24H Lakesha Antoine DO 1,000 mg (10/28/24 1329)  chlorhexidine  15 mL Mouth/Throat Q12H [facility] YUMI Marx    clopidogrel  75 mg Oral Daily Aida Victor DO    fentaNYL  50 mcg Intravenous Q1H PRN YUMI Marx    heparin (porcine)  5,000 Units Subcutaneous Q8H JORGE LUIS Puri MD    insulin glargine  20 Units Subcutaneous [facility] Manpreet Puri MD    insulin lispro  2-12 Units Subcutaneous Q6H JORGE LUIS Puri MD    ipratropium  0.5 mg Nebulization Q6H Manpreet Puri MD    labetalol  10 mg Intravenous Q4H PRN Manpreet Puri MD    levalbuterol  1.25 mg Nebulization Q6H Manpreet Puri MD    levETIRAcetam  750 mg Intravenous Q12H Joann Daily MD    levothyroxine  150 mcg Oral Early Morning YUMI Marx    [Held by provider] lisinopril  10 mg Oral Daily YUMI Marx    LORazepam  2 mg Intravenous Q30 Min PRN Joann Daily MD    ondansetron  4 mg Intravenous Q6H PRN YUMI Marx    senna-docusate  sodium  2 tablet Oral BID YUMI Marx    sodium chloride  40 mL/hr Intravenous Continuous JoseYUMI Buckley Stopped (10/28/24 0701) Sedation: No Intubated: Yes Paralyzed: No ------------------------------------------------- 32 channel digital recording with electrodes placed according to the International 10-20 system with continuous video, ECG, EOG and the possible addition of T1/T2 electrodes. This study was monitored intermittently by a monitoring technologist.  The physician interpreting the study had access to the data throughout the recording.   The recording was technically satisfactory. ------------------------------------------------- Description: Background: The background lacked organization or clearly defined anterior-posterior and frequency gradients. There was no discernible posterior dominant rhythm. The predominant background activity was asymmetric in frequency: The left hemisphere was 3-6 Hz mixed delta/theta activity. The right hemisphere was 2-5 Hz mixed delta/theta activity. Sleep: There were no well-formed sleep structures. Reactivity: The background was poorly reactive to external stimuli. No formal activation procedures (hyperventilation/photic stimulation) were performed during this segment of monitoring. Interictal abnormalities: Abundant right frontal sharp waves, at times semi-rhythmic Rhythmic/Periodic patterns: Frequent right frontal 1 Hz lateralized periodic discharges. Seizures: None Events: No push buttons were activated. Other findings: Samples of the single channel ECG demonstrated a regular rhythm. ------------------------------------------------- EEG impression: This is an abnormal continuous EEG recording due to: Right frontal LPDs Right frontal sharp waves Right hemispheric delta>theta activity Left hemispheric delta/theta activity Absent PDR Clinical Interpretation: This 24 hour abnormal study is consistent with high seizure tendency from the right frontal  region. This was appreciated in the setting of a moderate to severe encephalopathy and more significant dysfunction of the right hemisphere. No electrographic seizures were seen. No diagnostic clinical events were captured. Manolo Hagan MD [facility]      US bedside procedure    Result Date: 10/28/2024  Narrative: [id number]    US bedside procedure    Result Date: 10/28/2024  Narrative: [id number]    Echo complete w/ contrast if indicated    Result Date: 10/27/2024  Narrative:   Left Ventricle: Left ventricular cavity size is normal. Wall thickness is normal. The left ventricular ejection fraction is 25- 30% by visual estimation. Systolic function is moderately reduced. There is mild global hypokinesis with regional variation.   The following segments are akinetic: apical anterior, apical septal, apical inferior, apical lateral and apex.   The following segments are hypokinetic: mid anterior, mid anteroseptal, mid inferoseptal, mid inferior, mid inferolateral and mid anterolateral.   Right Ventricle: Right ventricular cavity size is normal. Systolic function is normal.   Aortic Valve: There is mild stenosis. The aortic valve mean gradient is 6 mmHg. The dimensionless velocity index is 0.51. The aortic valve area is 1.76 cm2.   Pericardium: There is no pericardial effusion.   Prior TTE study available for comparison. Prior study date: 3/26/2024. Changes noted when compared to prior study. Changes include: New segmental wall motion abnormalities and decline in LVEF noted..   Findings suggestive of stress induced cardiomyopathy.     XR chest portable ICU    Result Date: 10/27/2024  Narrative: XR CHEST PORTABLE ICU INDICATION: central line placement. COMPARISON: Radiograph 10/26/2024, earlier same day and chest CTA 10/25/2024 FINDINGS: Mild bibasilar atelectasis. Upper lobe increased lucency. No pneumothorax  or pleural effusion. Normal cardiomediastinal silhouette. The endotracheal tube terminates 3.6 cm above the level of the tacos. A right IJ central venous catheter terminates overlying the distal SVC. The enteric tube tip terminates overlying the left upper quadrant of the abdomen. Bones are unremarkable for age. Normal upper abdomen.     Impression: 1.  Mild bibasilar atelectasis, superimposed on emphysema. 2.  Lines and tubes, as described. Workstation performed: [id number]     CT head wo contrast    Result Date: 10/27/2024  Narrative: CT BRAIN - WITHOUT CONTRAST INDICATION:   new focal deficits. COMPARISON: Head CT from October 26, 2024 TECHNIQUE:  CT examination of the brain was performed.  Multiplanar 2D reformatted images were created from the source data. Radiation dose length product (DLP) for this visit:  1099 mGy-cm .  This examination, like all CT scans performed in the [facility], was performed utilizing techniques to minimize radiation dose exposure, including the use of iterative reconstruction and automated exposure control. IMAGE QUALITY:  Diagnostic. FINDINGS: PARENCHYMA: Continued evolution of the acute to subacute left MCA territory infarction. There are greater areas of ischemia noted On the prior study of October 26, 2024. Previously noted hemorrhagic areas are also slightly more pronounced. There is diffuse left cerebral sulcal effacement and suggestion of more defined acute to early subacute infarction in the parasagittal parietal lobe worsening PCA territory infarction. There are also areas of more defined right posterior parietal and occipital nonhemorrhagic cortical infarction. Subtle areas of cytotoxic edema also suggested in the right parasagittal frontal lobe which may represent new HIEN territory infarction. There is greater sulcal crowding in these regions. No new or acute posterior fossa infarction. VENTRICLES AND EXTRA-AXIAL SPACES: Mild mass effect on the left lateral  ventricle. Basal cisterns remain patent. No uncal herniation. VISUALIZED ORBITS: Normal visualized orbits. PARANASAL SINUSES: Normal visualized paranasal sinuses. CALVARIUM AND EXTRACRANIAL SOFT TISSUES: Normal.     Impression: Interval evolution of the hemorrhagic left MCA territory infarction. The extent of the infarct is greater and the area of hemorrhagic infarction is slightly more conspicuous. This has not resulted in greater mass effect on the ventricular system or herniation however, there is greater left-sided cerebral sulcal effacement. More defined areas of acute to early subacute right HIEN, left PCA, right MCA/PCA watershed infarction. I personally discussed this study with JUSTIN ESTRELLA on 10/26/2024 at 2:00 p.m. Workstation performed: [id number]     CT head wo contrast    Result Date: 10/27/2024  Narrative: CT BRAIN - WITHOUT CONTRAST INDICATION:   24 hours post thrombolytic administration. COMPARISON: CT head 10/26/2024 at 1:33 p.m. TECHNIQUE:  CT examination of the brain was performed.  Multiplanar 2D reformatted images were created from the source data. Radiation dose length product (DLP) for this visit:  1022 mGy-cm .  This examination, like all CT scans performed in the [facility], was performed utilizing techniques to minimize radiation dose exposure, including the use of iterative reconstruction and automated exposure control. IMAGE QUALITY: Examination is degraded by streak artifact from the scalp leads. FINDINGS: PARENCHYMA: Evolving known left MCA territory infarct with multifocal areas of left cerebral hemispheric hyperattenuation, which reflected contrast staining on the dual-energy CT dated 10/26/2024 at 4:39 a.m. However, in the interval, there are new and evolving infarcts involving the bilateral cerebral hemispheres, noting involvement of the right frontal lobe, right parieto-occipital lobe, left frontal lobe, left greater than right gangliocapsular regions, left  parietal, left occipital and left temporal lobes. Possible asymmetric hypoattenuation involving the left cerebellar hemisphere seen on series 2, image 11. There is new hyperattenuation that appears to involve the hippocampi bilaterally, also noting progressive/increasingly conspicuous hyperattenuation involving the left parietal lobe. VENTRICLES AND EXTRA-AXIAL SPACES:  Normal for the patient's age. VISUALIZED ORBITS: No acute abnormality. PARANASAL SINUSES: Trace mucosal thickening. CALVARIUM AND EXTRACRANIAL SOFT TISSUES: No acute abnormality. Probable cerumen in the bilateral external auditory canals.     Impression: The examination is limited/degraded secondary to streak artifact from the scalp leads. Within these confines: 1. Evolving known recent left MCA territory infarct with multifocal left cerebral hemispheric contrast staining. 2. Additionally, there are multifocal recent infarcts across multiple vascular territories with new hyperattenuation that is distinct from the previously ascertained contrast staining, which is indeterminate but suspicious for associated hemorrhagic associated with these infarcts. Possible recent infarct of the left cerebellar hemisphere. See narrative above for full details. Given their distribution, findings may reflect watershed ischemia/sequela of hypoxic-ischemic injury due to global cerebral hypoperfusion, in the appropriate clinical setting. I personally discussed this study with JOSE ROBERTO CROWLEY on 10/27/2024 6:35 AM. Workstation performed: [id number]     X-ray chest 1 view    Result Date: 10/26/2024  Narrative: XR CHEST 1 VIEW INDICATION: Endotracheal tube positioning. COMPARISON: 3/30/2024. FINDINGS: ET tube tip is located 3.7 cm above the tacos. Enteric tube courses inferior to the diaphragm. Improved bilateral pulmonary opacities no pneumothorax or pleural effusion. Normal cardiomediastinal silhouette. Bones are unremarkable for age. Normal upper abdomen.     Impression:  Improved bilateral pulmonary opacities. Workstation performed: [id number]     CT head wo contrast    Result Date: 10/26/2024  Narrative: CT BRAIN - WITHOUT CONTRAST INDICATION:   Dual energy CT head. COMPARISON: CT head and CTA head and neck 10/25/2024. TECHNIQUE:  CT examination of the brain was performed.  Multiplanar 2D reformatted images were created from the source data. Radiation dose length product (DLP) for this visit:  1155.17 mGy-cm .  This examination, like all CT scans performed in the [facility], was performed utilizing techniques to minimize radiation dose exposure, including the use of iterative reconstruction and automated exposure control. IMAGE QUALITY:  Diagnostic. FINDINGS: PARENCHYMA: Evolving recent left MCA territory infarct, noting multifocal hyperattenuation involving the left frontal and parietal lobes, in addition to the gangliocapsular regions, which on the virtual noncontrast series 302 are not apparent but are conspicuous on the iodine map series 301, suggestive of contrast staining. In the interval, there has been development of subtle loss of gray-white matter differentiation as well as sulcal effacement involving the right posterior frontal lobe and right parietal lobe seen for example on series 2, image 45. VENTRICLES AND EXTRA-AXIAL SPACES: No hydrocephalus. VISUALIZED ORBITS: No acute abnormality. PARANASAL SINUSES: Trace mucosal thickening. CALVARIUM AND EXTRACRANIAL SOFT TISSUES: No acute abnormality.     Impression: 1. Evolving recent left MCA territory infarct with multifocal left cerebral hemispheric contrast staining. Mild local mass effect. No significant midline shift. 2. Development of subtle loss of gray-white matter differentiation as well as sulcal effacement involving the right posterior frontal lobe and right parietal lobe, suspicious for recent ischemia. Recommendation: Brain MRI I personally discussed this study with EMILY COATES on 10/26/2024  8:39 AM. Workstation performed: [id number]     IR stroke alert    Result Date: 10/25/2024  Narrative: Please refer to the Operative Note for procedure dictation.    CT cerebral perfusion    Result Date: 10/25/2024  Narrative: CT PERFUSION INDICATION:  Stroke Alert. COMPARISON: CTA from the same day TECHNIQUE: CT perfusion study was performed.  A total of 8 cm of brain tissue was evaluated in two different volumetric acquisitions.  SNAPCARD RAPID software was utilized to calculate cerebral blood flow, cerebral blood volume, mean transit time, and  Tmax. Radiation dose length product (DLP) for this visit:  2115 mGy-cm .  This examination, like all CT scans performed in the [facility], was performed utilizing techniques to minimize radiation dose exposure, including the use of iterative reconstruction and automated exposure control. IV Contrast:  40 mL of iohexol (OMNIPAQUE) IMAGE QUALITY: The exam is significantly motion degraded with up to 10 mm of translation in the X and Y axis. FINDINGS: Hemisphere affected:  Left. Total CBF<30% volume: 0 mL Total Tmax>6.0s volume:  90 mL Total Mismatch difference: 90 mL Total Mismatch ratio: Infinite. Hypoperfusion Index (Tmax>10s/Tmax.6s): 0.1 Additional comments: Images are significantly motion degraded. Within this limitation however, there is relative hypoenhancement in the left MCA territory on series 242. This corresponds with perfusion findings and findings on earlier noncontrast head CT     Impression: CT perfusion performed.  Data available on PACS. There is significant motion degradation. Workstation performed: [id number]     CTA dissection protocol chest abdomen pelvis w wo contrast    Result Date: 10/25/2024  Narrative: CTA - CHEST, ABDOMEN AND PELVIS - WITHOUT AND WITH IV CONTRAST INDICATION: Altered mental status, focal deficits. COMPARISON: Chest radiograph 3/30/2024 TECHNIQUE: CT examination of the chest, abdomen and pelvis was performed both  prior to and after the administration of intravenous contrast. The noncontrast portion of this examination was performed utilizing low radiation dose technique. Thin section angiographic arterial phase post contrast technique was used in order to evaluate for aortic dissection. 3D reformatted images and volume rendering were performed on an independent workstation. Additionally, axial, sagittal, and coronal 2D reformatted images were created from the source data and submitted for interpretation. Radiation dose length product (DLP) for this visit: 1496.47 mGy-cm . This examination, like all CT scans performed in the [facility], was performed utilizing techniques to minimize radiation dose exposure, including the use of iterative reconstruction and automated exposure control. IV Contrast: 75 mL of iohexol (OMNIPAQUE) Enteric Contrast: Not administered. FINDINGS: The noncontrast series of this exam is limited, due to prior contrast administration from the CTA head and neck. Image quality is moderately degraded, due to patient motion artifact. This can decrease the sensitivity for detecting and characterizing subtle abnormalities. AORTA: No aortic dissection or intramural hematoma. No aortic aneurysm. Moderate diffuse atherosclerotic changes, with an occlusion of the left femoral (superficial) artery. This is incompletely imaged. CHEST LUNGS: There is advanced centrilobular emphysema throughout both lungs, with dependent bibasilar atelectasis. No lobar consolidation. There are numerous punctate calcified granulomas in the right greater than left upper lobe PLEURA: Unremarkable. HEART/PULMONARY ARTERIAL TREE: The heart is not grossly enlarged, with prominent multivessel coronary artery calcifications. The pericardium is unremarkable. MEDIASTINUM AND EVERTON: There is a mildly enlarged (1.6 x 1.2 cm) nonspecific right hilar lymph node. There is no generalized mediastinal lymphadenopathy. The esophagus and  central airways are grossly unremarkable. CHEST WALL AND LOWER NECK: Unremarkable. ABDOMEN Assessment of the upper abdomen is suboptimal due to artifact from the patient's arms and motion artifact. LIVER/BILIARY TREE: Unremarkable. GALLBLADDER: No calcified gallstones. No pericholecystic inflammatory change. SPLEEN: Unremarkable. PANCREAS: Unremarkable. ADRENAL GLANDS: Unremarkable. KIDNEYS/URETERS: Unremarkable. No hydronephrosis. STOMACH AND BOWEL: Unremarkable. APPENDIX: No findings to suggest appendicitis. ABDOMINOPELVIC CAVITY: No ascites. No pneumoperitoneum. No lymphadenopathy. PELVIS REPRODUCTIVE ORGANS: Unremarkable for patient's age. URINARY BLADDER: Unremarkable. ABDOMINAL WALL/INGUINAL REGIONS: Unremarkable. BONES: No acute fracture or suspicious osseous lesion.     Impression: 1.  No identifiable acute abnormality to account for the patient's clinical presentation. There is no evidence of an aortic dissection. There is an occlusion of the left femoral artery, which is incompletely assessed on this exam. 2.  Advanced centrilobular emphysema. 3.  Nonspecific mildly enlarged right hilar lymph node. A 3-month contrast-enhanced chest CT follow-up should be considered for further evaluation. The study was marked in EPIC for significant notification. Workstation performed: [id number]     CTA stroke alert (head/neck)    Result Date: 10/25/2024  Narrative: CTA NECK AND BRAIN WITH AND WITHOUT CONTRAST INDICATION: Stroke Alert COMPARISON:   None. TECHNIQUE:  Post contrast imaging was performed after administration of iodinated contrast through the neck and brain. Post contrast axial 0.625 mm images timed to opacify the arterial system.  3D rendering was performed on an independent workstation.   MIP reconstructions performed. Coronal and sagittal reconstructions were performed of the non contrast portion of the brain. Radiation dose length product (DLP) for this visit:  515.51 mGy-cm .  This examination, like all  CT scans performed in the [facility], was performed utilizing techniques to minimize radiation dose exposure, including the use of iterative  reconstruction and automated exposure control. IV Contrast:  75 mL of iohexol (OMNIPAQUE) IMAGE QUALITY:   Diagnostic FINDINGS: CTA NECK ARCH AND GREAT VESSELS: Mild atherosclerotic changes with no severe stenosis. VERTEBRAL ARTERIES: Patent extracranial segments. Mild right origin stenosis due to calcified plaque RIGHT CAROTID: Extensive calcified plaque. 70-99% stenosis.   No dissection. LEFT CAROTID: Extensive, mixed plaque. There is predominantly soft plaque along the lateral margin of the proximal ICA just after the bifurcation, best seen on image 162 of series 2. There is no definite contrast surrounding this soft tissue density to suggest an intraluminal thrombus. 70-99% stenosis.   No dissection. There is moderate stenosis of the left common carotid artery at its origin and throughout its proximal to mid course due to mixed plaque. NASCET criteria was used to determine the degree of internal carotid artery diameter stenosis. CTA BRAIN: There is mild diffuse intracranial vessel irregularity suggesting intracranial atherosclerosis. INTERNAL CAROTID ARTERIES: Moderate bilateral stenosis due to calcified plaque. ANTERIOR CEREBRAL ARTERY CIRCULATION:  No occlusion. There is prominent calcified plaque along the right A2 A3 junction. MIDDLE CEREBRAL ARTERY CIRCULATION: There is an occluded left M2 branch on image numbers 78 and 79 of series 2. The additional M2 segments and left M1 segment appear normal. DISTAL VERTEBRAL ARTERIES: Moderate stenosis intracranially on the left due to soft plaque. BASILAR ARTERY:  No stenosis or occlusion. POSTERIOR CEREBRAL ARTERIES: No stenosis or occlusion. VENOUS STRUCTURES:  Normal. NON VASCULAR ANATOMY BONY STRUCTURES:  No acute osseous abnormality. SOFT TISSUES OF THE NECK:  Normal. THORACIC INLET: Moderate emphysema.      Impression: 1. Occluded left M2 segment. The M1 segments are patent. 2. Severe bilateral ICA stenosis with pronounced noncalcified plaque proximally on the left. 3. Additional stenoses are seen throughout the left CCA, both intracranial internal carotid arteries, and the left intracranial vertebral artery. Findings were directly discussed with Donita Beyer at 8:45 p.m. on 10/25/2024. Workstation performed: [id number]     CT stroke alert brain    Result Date: 10/25/2024  Narrative: CT BRAIN - STROKE ALERT PROTOCOL INDICATION:   Stroke Alert. COMPARISON: 12/18/2023 TECHNIQUE:  CT examination of the brain was performed.  In addition to axial images, coronal reformatted images were created and submitted for interpretation. Radiation dose length product (DLP) for this visit:  908.17 mGy-cm .  This examination, like all CT scans performed in the [facility], was performed utilizing techniques to minimize radiation dose exposure, including the use of iterative  reconstruction and automated exposure control. IMAGE QUALITY:  Diagnostic. FINDINGS: PARENCHYMA: There are findings of early ischemia in the left MCA territory superiorly with loss of gray-white differentiation for instance on image 34 of series 2. There is also loss of gray-white differentiation in the left inferior frontal lobe which is new in the interim but may be more subacute. No hemorrhage seen. Normal intracranial vasculature. VENTRICLES AND EXTRA-AXIAL SPACES:  Normal for the patient's age. VISUALIZED ORBITS: Normal visualized orbits. PARANASAL SINUSES: Normal visualized paranasal sinuses. CALVARIUM AND EXTRACRANIAL SOFT TISSUES:   Normal.     Impression: Early findings of ischemia in the left MCA territory. No hemorrhage seen. Findings were directly discussed with Dr. Mary Hyman at approximately 8:40 p.m. on 10/25/2024. Workstation performed: [id number]     XR chest pa and lateral    Result Date: 10/24/2024  Narrative: XR CHEST 2 VIEWS (PA  AND LAT) HISTORY:Upper respiratory tract infection COMPARISON: 11/25/21. FINDINGS: The cardiomediastinal silhouette is unremarkable.   There is probable pulmonary emphysema. No focal consolidation is seen. There is no pleural effusion or pneumothorax. The visualized upper abdomen is unremarkable. There is scoliosis, osteopenia and multilevel thoracic compression deformities which are similar to the prior examination.     Impression: IMPRESSION: Probable pulmonary emphysema. No focal consolidation. Workstation:[id number]      EKG personally reviewed by Gama Lombardo MD.     Counseling / Coordination of Care  Total floor / unit time spent today 30 minutes.  Greater than 50% of total time was spent with the patient and / or family counseling and / or coordination of care.

## 2024-10-29 NOTE — WOUND OSTOMY CARE
Consult Note - Wound   Joslyn Cantu 74 y.o. female MRN: [id number]  Unit/Bed#: ICU 09 Encounter: [id number]        Assessment :   Patient admitted to [facility] due to stroke. History of HTN, diabetes. Wound care consulted for sacral wound. Patient is in ICU, intubated, on a critical care low air loss mattress, heels elevated, is dependent for care, hilton catheter in place for urine management, incontinent of bowel, assist of 2 to turn for assessment.    1. MASD/IAD sacrum and left buttock- Wounds are scattered, irregular in shape, partial thickness, 100% blanchable pink tissue, with scant amount of sanguineous drainage noted. Albina-wound is dry, intact, fragile, blanchable.     2. Bilateral elbows, hips, right buttock, and heels- Skin is dry, intact, blanchable.     Educated patient on importance of frequent offloading of pressure via turning, repositioning, and weight-shifting. Verbalized understanding of plan of care.    No induration, fluctuance, odor, warmth, or purulence noted to the above noted wound. Patient tolerated well. Primary nurse aware of plan of care. See flow sheets for more detailed assessment findings. Will follow along.    Skin care plans:  1-Apply thin layer of Calazime/Protective Zinc Oxide paste to sacrum and buttocks TID and PRN.  2-Hydraguard/Silicone Cream to bilateral heels BID and PRN.  3-Elevate heels to offload pressure.  4-Ehob cushion when out of bed.  5-Turn/reposition q2h or when medically stable for pressure re-distribution on skin.  6-Moisturize skin daily with skin nourishing cream.      Wound 10/29/24 Buttocks (Active)   Wound Image   10/29/24 1046   Wound Description Pink 10/29/24 1046   Albina-wound Assessment Fragile 10/29/24 1046   Wound Length (cm) 5 cm 10/29/24 1046   Wound Width (cm) 2 cm 10/29/24 1046   Wound Depth (cm) 0.1 cm 10/29/24 1046   Wound Surface Area (cm^2) 10 cm^2 10/29/24 1046   Wound Volume (cm^3) 1 cm^3 10/29/24 1046   Calculated Wound Volume (cm^3) 1 cm^3 10/29/24  1046   Drainage Amount Scant 10/29/24 1046   Drainage Description Sanguineous 10/29/24 1046   Non-staged Wound Description Partial thickness 10/29/24 1046   Treatments Cleansed;Site care 10/29/24 1046   Dressing Protective barrier 10/29/24 1046   Dressing Changed New 10/29/24 1046   Patient Tolerance Tolerated well 10/29/24 1046   Dressing Status Dry;Intact;Clean 10/29/24 1046       Contact through [employer] Secure Chat with any questions  Wound Care will continue to follow while inpatient    Eve JUSTINN RN CWON  Wound and Ostomy care

## 2024-10-29 NOTE — RESPIRATORY THERAPY NOTE
Resp care   10/29/24 0738   Respiratory Assessment   Assessment Type During-treatment   General Appearance Sleeping   Respiratory Pattern Assisted   Chest Assessment Chest expansion symmetrical   Bilateral Breath Sounds Diminished   Cough Productive   Suction ET Tube   Resp Comments pt found on documented vent settings, no changes made at this time, spo2 is 100%, bs are diminished, udn tx given via aerogen, will cont to monitor per resp protocol.   Vent Information   Vent ID [id number]   Vent type Harris G5   Harris Vent Mode SPONT   $ Vent Daily Charge-Subsequent Yes   $ Pulse Oximetry Spot Check Charge Completed   SPONT Settings   FIO2 (%) 30 %   PEEP (cmH2O) 6 cmH2O   Pressure Support (cmH2O) 4 cmH20   Flow Trigger (LPM) 5 LPM   P-ramp (ms) 100 ms   ETS  (%) 25 %   Humidification Heater   Heater Temp 95 °F (35 °C)   SPONT Actuals   Resp Rate (BPM) 21 BPM   VT (mL) 461 mL   MV (Obs) 9   MAP (cmH2O) 7.8 cmH2O   Peak Pressure (cmH2O) 12 cmH2O   I:E Ratio (Obs) 1/1.6   Heater Temperature (Obs) 95 °F (35 °C)   SPONT Alarms   High Peak Pressure (cmH20) 40 cmH2O   High Resp Rate (BPM) 40 BPM   High MV (L/min) 20 L/min   Low MV (L/min) 3 L/min   High Spont VTE (mL) 1000 mL   Low Spont VTE (mL) 250 mL   Apnea Time (S) 20 S   SPONT Apnea Settings   Resp Rate (BPM) 14 BPM   FIO2 (%) 30 %   %TI (%) 1 %   Apnea Time (S) 30 S   Maintenance   Alarm (pink) cable attached No   Resuscitation bag with peep valve at bedside Yes   Water bag changed No   Circuit changed No   IHI Ventilator Associated Pneumonia Bundle   Daily Assessment of Readiness to Extubate Yes   Head of Bed Elevated HOB 30   ETT  Cuffed;Oral;Pre-curved;Inflated 8 mm   Placement Date/Time: 10/25/24 5592   Mask Ventilation: Mask ventilation not attempted (0)  Preoxygenated: Yes  Technique: Direct laryngoscopy;Rapid sequence;Stylet  Type: Cuffed;Oral;Pre-curved;Inflated  Tube Size: 8 mm  Laryngoscope: Mac  Blade Size:...   Secured at (cm) 20   Measured from Lips    Secured Location Right   Repositioned Center to Right   Secured by Commercial tube oneill   Site Condition Dry   Cuff Pressure (color) Green

## 2024-10-29 NOTE — OCCUPATIONAL THERAPY NOTE
Occupational Therapy         Patient Name: Joslyn Cantu  Today's Date: 10/29/2024         10/29/24 0900   OT Last Visit   OT Visit Date 10/29/24   Note Type   Note type Cancelled Session   Cancel Reasons Intubated/sedated   Additional Comments Pt. with change in neuro exam - pending GOL - will sign off at this time       Dionna Patino

## 2024-10-29 NOTE — ASSESSMENT & PLAN NOTE
PPD 4 angioplasty and stenting left M2/3 occlusion and critical left ICA stenosis (Dr. Rosales, 10/25)  Presented 10/25 NIHSS 26 from group [facility].  TNK administered.    Imaging:  CT head wo, 10/28/2024: 1. Significant increase in vasogenic edema and mass effect in the left MCA and HIEN territories. 2. New left right midline shift of 10 mm. Mild left transtentorial herniation in the temporal region. 3. Near complete effacement of the left lateral and third ventricles. No hydrocephalus. 4. Interval resolution of density visualized in the left MCA territory infarct suggesting washout of contrast. No evidence of acute hemorrhage.    Plan:  Continue to monitor neuro exam closely.  STAT CT head with decline in GCS > 2 pts in 1 hour.  Continue  mg and Plavix 75 mg daily.  P2Y12 186.  Significant increase in vasogenic edema and mass effect since procedure. Would not entertain [facility] due to multiple comorbidities, active sepsis, dominant hemisphere stroke and age.  Further management/treatment per CCM/neurology.  Mobilize as tolerated.  DVT ppx: SCDs, heparin SQ.    Neurosurgery will sign off. Plan for outpatient follow up in 6 weeks with dopplers. Call with questions.

## 2024-10-29 NOTE — ASSESSMENT & PLAN NOTE
Wt Readings from Last 3 Encounters:   10/28/24 61.9 kg (136 lb 7.4 oz)   10/02/24 54.4 kg (120 lb)   06/25/24 55.5 kg (122 lb 6.4 oz)

## 2024-10-29 NOTE — ASSESSMENT & PLAN NOTE
Lab Results   Component Value Date    HGBA1C 8.8 (H) 10/26/2024       Recent Labs     10/28/24  1808 10/29/24  0001 10/29/24  0603 10/29/24  0828   POCGLU 276* 266* 294* 327*       Blood Sugar Average: Last 72 hrs:  (P) [id number]

## 2024-10-29 NOTE — CASE MANAGEMENT
Case Management Discharge Planning Note    Patient name Joslyn Cantu  Location ICU /ICU  MRN [id number]  : 1950 Date 10/29/2024       Current Admission Date: 10/25/2024  Current Admission Diagnosis:Stroke ([facility])   Patient Active Problem List    Diagnosis Date Noted Date Diagnosed    Aortic stenosis, moderate 10/26/2024     Elevated troponin 10/26/2024     Stroke (HCC) 10/25/2024     Cardiomyopathy, unspecified (HCC) 2024     Left ventricular diastolic dysfunction 2024     Stage 3a chronic kidney disease (HCC) 2024     Leukocytosis 2024     Uncontrolled type 2 diabetes mellitus with hypoglycemia without coma ([facility]) 2024     Chronic HFrEF (heart failure with reduced ejection fraction) ([facility]) 2024     Iron deficiency anemia 2016     Hypovitaminosis D 05/10/2016     Hypothyroid 2016     Hypertension 2016     Type 2 diabetes mellitus without complication ([facility]) 2016     Schizophrenia ([facility]) 2016       LOS (days): 4  Geometric Mean LOS (GMLOS) (days):   Days to GMLOS:     OBJECTIVE:  Risk of Unplanned Readmission Score: 20.44         Current admission status: Inpatient   Preferred Pharmacy:   [facility] [address]  [address] [facility] [address]  Phone: [phone] Fax: [phone]    [facility] [address]  [address] [address]  Phone: [phone] Fax: [phone]    Primary Care Provider: Natalia Elizondo MD    Primary Insurance: [employer] MEDICARE [facility]  Secondary Insurance: [place] [employer] AND [employer] [facility]    DISCHARGE DETAILS:      Family notified:: Phone message to pt daughter Kadi Robertson [phone]  requested a return call

## 2024-10-29 NOTE — TELEPHONE ENCOUNTER
10/31/24 - PT . ALL FUTURE APT'S CX'D    10/30/24 - PT IN [facility]  24 6 WK HFU W/EM W/CAROTID DOPPLER     10/29/24 - PT IN [facility]  6 WK HFU W/EM W/CAROTID DOPPLER  **WORKING ON SCHEDULING APT**    YUMI Krishnamurthy Clerical  Good morning!    Patient needs appointment with Connie in 6 weeks. I've ordered carotid duplex. Thanks.
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
## 2024-10-29 NOTE — CONSULTS
Patient with poor prognosis at this time and being assessed by GOL. No formal rehabilitation consult needed at this time.

## 2024-10-29 NOTE — ASSESSMENT & PLAN NOTE
Assessment:  Joslyn Cantu is a 74 y.o. female who presented to the ED as a result of stroke-like symptoms.  Patient has a past medical history of acute respiratory failure with hypoxia, hypertension, hypothyroidism, hypovitaminosis D, iron deficiency anemia, and type 2 diabetes. Patient was found at the side of a sidewalk not responding and also not moving her right hand and right arm with a left gaze deviation.  Given the symptoms, a pre-hospital stroke alert was initiated.  As per patient's group home, patient was conversing with another resident around 6-6:30 PM and was her normal self prior to leaving the group home to cash a check.     Initial NIHSS 27  Presenting deficits were: R-sided weakness, L gaze preference  Interventions: After a discussion of risks, benefits and alternatives reviewing inclusion and exclusion criteria the decision was made to proceed with thrombolytic therapy. Specifically discussed were the potential benefits, risks, and side effects of the proposed stroke intervention(s) and care; the likelihood of the patient achieving their goals; and any potential problems that might occur as a result of the intervention(s); reasonable alternatives to the proposed stroke intervention(s) and care. The discussion encompasses risks, benefits and side effects related to the alternatives and the risks related to not receiving the proposed stroke intervention(s) and care. Given the critical condition of the patient, the ED team and the neurology team together conversed in regards to the patient's condition taking into consideration the group home's decision of doing everything to save the patient.  The decision was to administer TNK.  There was a delay in administering TNK with no family members able to consent for TNK but it was still administered within the appropriate timeframe. Verbal consent was obtained from conferring with Dr. Juárez and Dr. Treadwell, they were in agreement to administer the  medication since no surrogate decision maker was available.  Consent was obtained by Dr. Mary Hyman.     Workup:  Lab Results   Component Value Date    HGBA1C 8.8 (H) 10/26/2024    CHOLESTEROL 113 10/26/2024    LDLCALC 45 10/26/2024    TRIG 125 10/26/2024    INR 0.94 10/25/2024      CTH: Early findings of ischemia in the left MCA territory. No hemorrhage seen.   CTA:  Occluded left M2 segment. The M1 segments are patent. Severe bilateral ICA stenosis with pronounced noncalcified plaque proximally on the left. Additional stenoses are seen throughout the left CCA, both intracranial internal carotid arteries, and the left intracranial vertebral artery.  CTP: Infinite mismatch ratio  CTH at 24 hours: Evolving left MCA infarcts, possible infarction of the left cerebral hemisphere, patient also has new evidence of infarctions in the watershed distribution.  MRI: Pending  TTE/WILL: hypokinetic / akkinetic wall segs, decreased EF to 25%  Video EEG 10/27:  No seizures. Intermittent right hemisphere polymorphic delta slowing. Intermittent right frontal sharp waves. Mild/moderate encephalopathy.   10/28 VEEG Update: No seizures. 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 Mild/moderate encephalopathy.     Pertinent scores as of 10/29/24:  - NIHSS: 27    Impression: Patient is a 74 year old female presented to the ED as a result of strokelike symptoms. CTH showed a possible subacute infarct and CTA showed a left M2 occlusion. Patient's stroke seems to be embolic in nature but source unknown. LDL 45, A1C 8.8. Complicated by concern for seizure-like activity, loaded with Keppra 1.5 g and Ativan 4 mg and on video EEG, no sz seen thus removed. Pt worsened late 10/28 to exam leading to CTH w/ midline shift & transtentorial herniation noted.    Plan:  Case discussed with Dr. Villalobos  AP/AC: Defer to neurosurgery status post mechanical  thrombectomy  Recommend Lipitor 40 mg  MRI Brain no longer indicated from neuro as condition worsened, CTH+  Telemetry  Video EEG not warranted further  Keppra 750 mg twice daily  PT/OT when appropriate  BP management and HTN med compliance discussed, defer to neurosurgical team for SBP goals s/p mechanical thrombectomy  Rest of care as per primary care team

## 2024-10-29 NOTE — PROGRESS NOTES
Patient had a change in her clinical exam around 9:50 PM.  She was noted to not be withdrawing on her left upper or lower extremity and had unequal pupils.  This is a new exam change for the patient.  CT head was obtained emergently patient has significant cerebral edema with stroke involving the entire left hemisphere and significant degree of brain compression and mass effect.  Patient also has hypodensity in the right parieto-occipital area watershed strokes with cerebral edema.  Total shift measured left to right is 10 mm with mild left transtentorial herniation.  Patient had been on hypertonic saline, her osmolarity is 329 and her serum sodium is 149.  She has been maximized on her osmolarity and hypertonic saline and subsequently been held.  Patient is not a candidate for neurosurgical intervention.  There is concern she will continue to progress with her cerebral edema despite maximal medical management and will likely herniate.  An attempt was made to contact her daughter which was unsuccessful.  Continue with supportive care at this time.  Patient has a poor prognosis for survival.    Critical care time does not include procedures or family update.  Critical care time 32 minutes

## 2024-10-29 NOTE — DISCHARGE INSTR - OTHER ORDERS
Skin care plans:  1-Apply thin layer of Calazime/Protective Zinc Oxide paste to sacrum and buttocks TID and PRN.  2-Hydraguard/Silicone Cream to bilateral heels BID and PRN.  3-Elevate heels to offload pressure.  4-Ehob cushion when out of bed.  5-Turn/reposition q2h or when medically stable for pressure re-distribution on skin.  6-Moisturize skin daily with skin nourishing cream.

## 2024-10-30 NOTE — PROGRESS NOTES
Progress Note - Critical Care/ICU   Name: Joslyn Cantu 74 y.o. female I MRN: [id number]  Unit/Bed#: ICU 09 I Date of Admission: 10/25/2024   Date of Service: 10/30/2024 I Hospital Day: 5       Assessment & Plan   Neuro:   Diagnosis: Stroke  Plan: 10/25/2024 CTA-occluded left M2, severe bilateral ICA stenosis  Status post TNK 2100  NIH on admission 27  10/26 CTH: evolving left MCA with multifocal watershed sequela  Status post mechanical thrombectomy, ICA stent 10/25  Continue ASA/plavix  P2y12 am 10/29 186  10/29 vEEG: Right frontal seizure tendency and a right>left hemispheric dysfunction. No seizures seen. The seizure tendency improved overnight.   10/28 CTH:   Significant increase in vasogenic edema and mass effect in the left MCA and HIEN territories.   New left right midline shift of 10 mm. Mild left transtentorial herniation in the temporal region.   Near complete effacement of the left lateral and third ventricles. No hydrocephalus.   Interval resolution of density visualized in the left MCA territory infarct suggesting washout of contrast. No evidence of acute hemorrhage.   Follow-up MRI  Off vEEG  Patient with high risk of continued herniation, will continue close monitoring  Diagnosis: Schizophrenia  Plan: home haldol 5 mg     CV:   Diagnosis: Chronic heart failure, hypertension,  Monitor on telemetry  Hold home dose lisinopril, Lasix, and metoprolol   Hypotensive, requiring levophed gtt to maintain MAP>65  New Afib with RVR requiring amiodarone gtt     Pulm:  Diagnosis: Respiratory insufficiency, advanced centrilobular emphysema  Plan: 10/25/2024 CTA chest abdomen pelvis-advanced emphysema, mildly enlarged right hilar lymph node  Remained intubated post IR  Wean vent as able     GI:   Diagnosis: Dysphagia  Plan: Speech evaluation when able     :   Diagnosis: CKD 3  Plan: Creatinine on admission 0.78  Strict intake and output  Trend BUN and creatinine     F/E/N:   Currently in DI, hypernatremic above goal,  utilizing D5W bolus  Replete electrolytes as needed. Na goal 145-155  Tube feeds     Heme/Onc:   Diagnosis: DVT prophylaxis  Plan: continue SQ heparin and SCDs     Endo:   Diagnosis: DM 2, hypothyroidism  Plan: 3/5/2024 hemoglobin A1c 9.2  Back on insulin gtt as glucose not well controlled on basal and SSI  Continue home dose levothyroxine     ID:   Diagnosis: Leukocytosis  Plan: Suspect reactive  Trend fever curve and WBC  Blood cultures gram-positive rods -suspect contaminant, no growth on repeat  F/u Blood cultures, UA     MSK/Skin:   Diagnosis: Ambulatory dysfunction  Plan: PT/OT     Disposition: Critical care    ICU Core Measures     Vented Patient  VAP Bundle  VAP bundle ordered     A: Assess, Prevent, and Manage Pain Has pain been assessed? Yes  Need for changes to pain regimen? No   B: Both Spontaneous Awakening Trials (SATs) and Spontaneous Breathing Trials (SBTs) Plan to perform spontaneous awakening trial today? Yes   Plan to perform spontaneous breathing trial today? Yes   Obvious barriers to extubation? Yes   C: Choice of Sedation RASS Goal: 0 Alert and Calm  Need for changes to sedation or analgesia regimen? No   D: Delirium CAM-ICU: Unable to perform secondary to Acute cognitive dysfunction   E: Early Mobility  Plan for early mobility? NA   F: Family Engagement Plan for family engagement today? Yes       Review of Invasive Devices:    Crawley Plan: Continue for accurate I/O monitoring for 48 hours  Central access plan: Medications requiring central line      Prophylaxis:  VTE VTE covered by:  heparin (porcine), Subcutaneous, 5,000 Units at 10/30/24 0528       Stress Ulcer  not ordered         24 Hour Events : Overnight, patient continued to have excess urine output and has received D5W for diabetes insipidus. Continuing to monitor electrolytes.  Unable to reach family overnight. Started on amiodarone for new atrial fibrillation. Voicemail left for family.    Subjective   Review of Systems: Review of  Systems not obtainable due to Clinical Condition    Objective :                   Vitals I/O      Most Recent Min/Max in 24hrs   Temp 97.9 °F (36.6 °C) Temp  Min: 97.9 °F (36.6 °C)  Max: 101.1 °F (38.4 °C)   Pulse (!) 140 Pulse  Min: 66  Max: 140   Resp 13 Resp  Min: 13  Max: 40   /79 BP  Min: 82/42  Max: 131/62   O2 Sat 100 % SpO2  Min: 100 %  Max: 100 %      Intake/Output Summary (Last 24 hours) at 10/30/2024 0616  Last data filed at 10/30/2024 0606  Gross per 24 hour   Intake 5656.91 ml   Output 6550 ml   Net -893.09 ml       Diet Enteral/Parenteral; Tube Feeding No Oral Diet; Osmolite 1.0; Cyclic; 55; 22 hours; Prosource Protein Liquid - Two Packets; OD; 250; Water; Every 4 hours    Invasive Monitoring           Physical Exam   Physical Exam  Skin:     Coloration: Skin is not jaundiced or pale.   Cardiovascular:      Rate and Rhythm: Tachycardia present. Rhythm irregular.   Constitutional:       Appearance: She is ill-appearing.      Interventions: She is intubated. She is not sedated.  Pulmonary:      Effort: No respiratory distress. She is intubated.      Breath sounds: No wheezing.   Neurological:      Mental Status: She is unresponsive.      Cranial Nerves: No facial asymmetry.        Corneal reflex absent, cough reflex, gag reflex not intact and no clonus.      Comments: No response to noise or noxious stimuli  No withdrawal all extremities  Positive cough  Negative gag and corneal  4 mm fixed pupils  No oculocephalic    Genitourinary/Anorectal:  Crawley present.        Diagnostic Studies        Lab Results: I have reviewed the following results:     Medications:  Scheduled PRN   aspirin, 325 mg, Daily  atorvastatin, 40 mg, QPM  chlorhexidine, 15 mL, Q12H JORGE LUIS  clopidogrel, 75 mg, Daily  haloperidol, 5 mg, HS  heparin (porcine), 5,000 Units, Q8H JORGE LUIS  ipratropium, 0.5 mg, Q6H  levalbuterol, 1.25 mg, Q6H  levETIRAcetam, 750 mg, Q12H  levothyroxine, 150 mcg, Early Morning  [Held by provider] lisinopril, 10  mg, Daily  magnesium sulfate, ,   metoprolol succinate, 12.5 mg, Daily  potassium chloride, ,   potassium chloride, 20 mEq, Once  potassium chloride, ,   senna-docusate sodium, 2 tablet, BID      acetaminophen, 650 mg, Q6H PRN  fentaNYL, 50 mcg, Q1H PRN  labetalol, 10 mg, Q4H PRN  LORazepam, 2 mg, Q30 Min PRN  magnesium sulfate, ,   ondansetron, 4 mg, Q6H PRN  potassium chloride, ,   potassium chloride, ,        Continuous    amiodarone (CORDARONE) 900 mg in dextrose 5 % 500 mL infusion, 1 mg/min, Last Rate: 1 mg/min (10/30/24 0552)   Followed by  amiodarone (CORDARONE) 900 mg in dextrose 5 % 500 mL infusion, 0.5 mg/min  insulin regular (HumuLIN R,NovoLIN R) 1 Units/mL in sodium chloride 0.9 % 100 mL infusion, 0.3-21 Units/hr, Last Rate: 8 Units/hr (10/30/24 0606)  norepinephrine, 1-30 mcg/min, Last Rate: 7 mcg/min (10/30/24 2195)         Labs:   CBC    Recent Labs     10/29/24  0536 10/29/24  2046   WBC 16.79*  --    HGB 9.3* 9.2*   HCT 30.8* 27*   *  --      BMP    Recent Labs     10/29/24  2154 10/30/24  0014   SODIUM 173* 161*   K 3.3* 2.9*   * 131*   CO2 28 25   AGAP 4 5   BUN 12 10   CREATININE 0.62 0.60   CALCIUM 9.0 8.1*       Coags    No recent results     Additional Electrolytes  Recent Labs     10/29/24  2046   CAIONIZED 1.40*          Blood Gas    No recent results  No recent results LFTs  No recent results    Infectious  No recent results  Glucose  Recent Labs     10/29/24  1246 10/29/24  1836 10/29/24  2154 10/30/24  0014   GLUC 310* 182* 167* 485*

## 2024-10-30 NOTE — PLAN OF CARE
Problem: Prexisting or High Potential for Compromised Skin Integrity  Goal: Skin integrity is maintained or improved  Description: INTERVENTIONS:  - Identify patients at risk for skin breakdown  - Assess and monitor skin integrity  - Assess and monitor nutrition and hydration status  - Monitor labs   - Assess for incontinence   - Turn and reposition patient  - Assist with mobility/ambulation  - Relieve pressure over bony prominences  - Avoid friction and shearing  - Provide appropriate hygiene as needed including keeping skin clean and dry  - Evaluate need for skin moisturizer/barrier cream  - Collaborate with interdisciplinary team   - Patient/family teaching  - Consider wound care consult   Outcome: Progressing     Problem: SAFETY,RESTRAINT: NV/NON-SELF DESTRUCTIVE BEHAVIOR  Goal: Remains free of harm/injury (restraint for non violent/non self-detsructive behavior)  Description: INTERVENTIONS:  - Instruct patient/family regarding restraint use   - Assess and monitor physiologic and psychological status   - Provide interventions and comfort measures to meet assessed patient needs   - Identify and implement measures to help patient regain control  - Assess readiness for release of restraint   Outcome: Progressing  Goal: Returns to optimal restraint-free functioning  Description: INTERVENTIONS:  - Assess the patient's behavior and symptoms that indicate continued need for restraint  - Identify and implement measures to help patient regain control  - Assess readiness for release of restraint   Outcome: Progressing     Problem: Neurological Deficit  Goal: Neurological status is stable or improving  Description: Interventions:  - Monitor and assess patient's level of consciousness, motor function, sensory function, and level of assistance needed for ADLs.   - Monitor and report changes from baseline. Collaborate with interdisciplinary team to initiate plan and implement interventions as ordered.   - Provide and  maintain a safe environment.  - Consider seizure precautions.  - Consider fall precautions.  - Consider aspiration precautions.  - Consider bleeding precautions.  Outcome: Progressing     Problem: Activity Intolerance/Impaired Mobility  Goal: Mobility/activity is maintained at optimum level for patient  Description: Interventions:  - Assess and monitor patient  barriers to mobility and need for assistive/adaptive devices.  - Assess patient's emotional response to limitations.  - Collaborate with interdisciplinary team and initiate plans and interventions as ordered.  - Encourage independent activity per ability.  - Maintain proper body alignment.  - Perform active/passive rom as tolerated/ordered.  - Plan activities to conserve energy.  - Turn patient as appropriate  Outcome: Progressing     Problem: Communication Impairment  Goal: Ability to express needs and understand communication  Description: Assess patient's communication skills and ability to understand information.  Patient will demonstrate use of effective communication techniques, alternative methods of communication and understanding even if not able to speak.     - Encourage communication and provide alternate methods of communication as needed.  - Collaborate with case management/ for discharge needs.  - Include patient/family/caregiver in decisions related to communication.  Outcome: Progressing     Problem: Potential for Aspiration  Goal: Ventilated patient's risk of aspiration is minimized  Description: Assess and monitor vital signs, respiratory status, airway cuff pressure, and labs (WBC).  Monitor for signs of aspiration (tachypnea, cough, rales, wheezing, cyanosis, fever).    - Elevate head of bed 30 degrees if patient has tube feeding.  - Monitor tube feeding.  Outcome: Progressing     Problem: Nutrition  Goal: Nutrition/Hydration status is improving  Description: Monitor and assess patient's nutrition/hydration status for  malnutrition (ex- brittle hair, bruises, dry skin, pale skin and conjunctiva, muscle wasting, smooth red tongue, and disorientation). Collaborate with interdisciplinary team and initiate plan and interventions as ordered.  Monitor patient's weight and dietary intake as ordered or per policy. Utilize nutrition screening tool and intervene per policy. Determine patient's food preferences and provide high-protein, high-caloric foods as appropriate.     - Assist patient with eating.  - Allow adequate time for meals.  - Encourage patient to take dietary supplement as ordered.  - Collaborate with clinical nutritionist.  - Include patient/family/caregiver in decisions related to nutrition.  Outcome: Progressing     Problem: NEUROSENSORY - ADULT  Goal: Achieves stable or improved neurological status  Description: INTERVENTIONS  - Monitor and report changes in neurological status  - Monitor vital signs such as temperature, blood pressure, glucose, and any other labs ordered   - Initiate measures to prevent increased intracranial pressure  - Monitor for seizure activity and implement precautions if appropriate      Outcome: Progressing  Goal: Remains free of injury related to seizures activity  Description: INTERVENTIONS  - Maintain airway, patient safety  and administer oxygen as ordered  - Monitor patient for seizure activity, document and report duration and description of seizure to physician/advanced practitioner  - If seizure occurs,  ensure patient safety during seizure  - Reorient patient post seizure  - Seizure pads on all 4 side rails  - Instruct patient/family to notify RN of any seizure activity including if an aura is experienced  - Instruct patient/family to call for assistance with activity based on nursing assessment  - Administer anti-seizure medications if ordered    Outcome: Progressing  Goal: Achieves maximal functionality and self care  Description: INTERVENTIONS  - Monitor swallowing and airway patency  with patient fatigue and changes in neurological status  - Encourage and assist patient to increase activity and self care.   - Encourage visually impaired, hearing impaired and aphasic patients to use assistive/communication devices  Outcome: Progressing     Problem: CARDIOVASCULAR - ADULT  Goal: Maintains optimal cardiac output and hemodynamic stability  Description: INTERVENTIONS:  - Monitor I/O, vital signs and rhythm  - Monitor for S/S and trends of decreased cardiac output  - Administer and titrate ordered vasoactive medications to optimize hemodynamic stability  - Assess quality of pulses, skin color and temperature  - Assess for signs of decreased coronary artery perfusion  - Instruct patient to report change in severity of symptoms  Outcome: Progressing  Goal: Absence of cardiac dysrhythmias or at baseline rhythm  Description: INTERVENTIONS:  - Continuous cardiac monitoring, vital signs, obtain 12 lead EKG if ordered  - Administer antiarrhythmic and heart rate control medications as ordered  - Monitor electrolytes and administer replacement therapy as ordered  Outcome: Progressing     Problem: RESPIRATORY - ADULT  Goal: Achieves optimal ventilation and oxygenation  Description: INTERVENTIONS:  - Assess for changes in respiratory status  - Assess for changes in mentation and behavior  - Position to facilitate oxygenation and minimize respiratory effort  - Oxygen administered by appropriate delivery if ordered  - Initiate smoking cessation education as indicated  - Encourage broncho-pulmonary hygiene including cough, deep breathe, Incentive Spirometry  - Assess the need for suctioning and aspirate as needed  - Assess and instruct to report SOB or any respiratory difficulty  - Respiratory Therapy support as indicated  Outcome: Progressing     Problem: GASTROINTESTINAL - ADULT  Goal: Minimal or absence of nausea and/or vomiting  Description: INTERVENTIONS:  - Administer IV fluids if ordered to ensure adequate  hydration  - Maintain NPO status until nausea and vomiting are resolved  - Nasogastric tube if ordered  - Administer ordered antiemetic medications as needed  - Provide nonpharmacologic comfort measures as appropriate  - Advance diet as tolerated, if ordered  - Consider nutrition services referral to assist patient with adequate nutrition and appropriate food choices  Outcome: Progressing  Goal: Maintains or returns to baseline bowel function  Description: INTERVENTIONS:  - Assess bowel function  - Encourage oral fluids to ensure adequate hydration  - Administer IV fluids if ordered to ensure adequate hydration  - Administer ordered medications as needed  - Encourage mobilization and activity  - Consider nutritional services referral to assist patient with adequate nutrition and appropriate food choices  Outcome: Progressing  Goal: Maintains adequate nutritional intake  Description: INTERVENTIONS:  - Monitor percentage of each meal consumed  - Identify factors contributing to decreased intake, treat as appropriate  - Assist with meals as needed  - Monitor I&O, weight, and lab values if indicated  - Obtain nutrition services referral as needed  Outcome: Progressing  Goal: Establish and maintain optimal ostomy function  Description: INTERVENTIONS:  - Assess bowel function  - Encourage oral fluids to ensure adequate hydration  - Administer IV fluids if ordered to ensure adequate hydration   - Administer ordered medications as needed  - Encourage mobilization and activity  - Nutrition services referral to assist patient with appropriate food choices  - Assess stoma site  - Consider wound care consult   Outcome: Progressing  Goal: Oral mucous membranes remain intact  Description: INTERVENTIONS  - Assess oral mucosa and hygiene practices  - Implement preventative oral hygiene regimen  - Implement oral medicated treatments as ordered  - Initiate Nutrition services referral as needed  Outcome: Progressing     Problem:  GENITOURINARY - ADULT  Goal: Maintains or returns to baseline urinary function  Description: INTERVENTIONS:  - Assess urinary function  - Encourage oral fluids to ensure adequate hydration if ordered  - Administer IV fluids as ordered to ensure adequate hydration  - Administer ordered medications as needed  - Offer frequent toileting  - Follow urinary retention protocol if ordered  Outcome: Progressing  Goal: Absence of urinary retention  Description: INTERVENTIONS:  - Assess patient’s ability to void and empty bladder  - Monitor I/O  - Bladder scan as needed  - Discuss with physician/AP medications to alleviate retention as needed  - Discuss catheterization for long term situations as appropriate  Outcome: Progressing  Goal: Urinary catheter remains patent  Description: INTERVENTIONS:  - Assess patency of urinary catheter  - If patient has a chronic hilton, consider changing catheter if non-functioning  - Follow guidelines for intermittent irrigation of non-functioning urinary catheter  Outcome: Progressing     Problem: METABOLIC, FLUID AND ELECTROLYTES - ADULT  Goal: Electrolytes maintained within normal limits  Description: INTERVENTIONS:  - Monitor labs and assess patient for signs and symptoms of electrolyte imbalances  - Administer electrolyte replacement as ordered  - Monitor response to electrolyte replacements, including repeat lab results as appropriate  - Instruct patient on fluid and nutrition as appropriate  Outcome: Progressing  Goal: Fluid balance maintained  Description: INTERVENTIONS:  - Monitor labs   - Monitor I/O and WT  - Instruct patient on fluid and nutrition as appropriate  - Assess for signs & symptoms of volume excess or deficit  Outcome: Progressing  Goal: Glucose maintained within target range  Description: INTERVENTIONS:  - Monitor Blood Glucose as ordered  - Assess for signs and symptoms of hyperglycemia and hypoglycemia  - Administer ordered medications to maintain glucose within target  range  - Assess nutritional intake and initiate nutrition service referral as needed  Outcome: Progressing     Problem: SKIN/TISSUE INTEGRITY - ADULT  Goal: Skin Integrity remains intact(Skin Breakdown Prevention)  Description: Assess:  -Perform Lazaro assessment   -Clean and moisturize skin  -Inspect skin when repositioning, toileting, and assisting with ADLS  -Assess under medical devices such  -Assess extremities for adequate circulation and sensation     Bed Management:  -Have minimal linens on bed & keep smooth, unwrinkled  -Change linens as needed when moist or perspiring  -Avoid sitting or lying in one position for more than 2 hours while in bed  -Keep HOB at 30 degrees     Toileting:  -Offer bedside commode  -Assess for incontinence   -Use incontinent care products after each incontinent episode     Activity:  -Encourage activity and walks on unit  -Encourage or provide ROM exercises   -Turn and reposition patient every 2   Hours  -Use appropriate equipment to lift or move patient in bed  -Instruct/ Assist with weight shifting every 2 when out of bed in chair  =Skin Care:  -Avoid use of baby powder, tape, friction and shearing, hot water or constrictive clothing  -Relieve pressure over bony prominences   -Do not massage red bony areas    Next Steps:  -Teach patient strategies to minimize risks   -Consider consults to  interdisciplinary teams such   Outcome: Progressing  Goal: Incision(s), wounds(s) or drain site(s) healing without S/S of infection  Description: INTERVENTIONS  - Assess and document dressing, incision, wound bed, drain sites and surrounding tissue  - Provide patient and family education  - Perform skin care/dressing changes every  Outcome: Progressing  Goal: Pressure injury heals and does not worsen  Description: Interventions:  - Implement low air loss mattress or specialty surface (Criteria met)  - Apply silicone foam dressing  - Instruct/assist with weight shifting every  minutes when in  chair   - Limit chair time to hour intervals  - Use special pressure reducing interventions such when in chair   - Apply fecal or urinary incontinence containment device   - Perform passive or active ROM   - Turn and reposition patient & offload bony prominences every 2    hours   - Utilize friction reducing device or surface for transfers   - Consider consults to  interdisciplinary teams   - Use incontinent care products after each incontinent episode such   - Consider nutrition services referral as needed  Outcome: Progressing     Problem: HEMATOLOGIC - ADULT  Goal: Maintains hematologic stability  Description: INTERVENTIONS  - Assess for signs and symptoms of bleeding or hemorrhage  - Monitor labs  - Administer supportive blood products/factors as ordered and appropriate  Outcome: Progressing     Problem: MUSCULOSKELETAL - ADULT  Goal: Maintain or return mobility to safest level of function  Description: INTERVENTIONS:  - Assess patient's ability to carry out ADLs; assess patient's baseline for ADL function and identify physical deficits which impact ability to perform ADLs (bathing, care of mouth/teeth, toileting, grooming, dressing, etc.)  - Assess/evaluate cause of self-care deficits   - Assess range of motion  - Assess patient's mobility  - Assess patient's need for assistive devices and provide as appropriate  - Encourage maximum independence but intervene and supervise when necessary  - Involve family in performance of ADLs  - Assess for home care needs following discharge   - Consider OT consult to assist with ADL evaluation and planning for discharge  - Provide patient education as appropriate  Outcome: Progressing  Goal: Maintain proper alignment of affected body part  Description: INTERVENTIONS:  - Support, maintain and protect limb and body alignment  - Provide patient/ family with appropriate education  Outcome: Progressing     Problem: Nutrition/Hydration-ADULT  Goal: Nutrient/Hydration intake  appropriate for improving, restoring or maintaining nutritional needs  Description: Monitor and assess patient's nutrition/hydration status for malnutrition. Collaborate with interdisciplinary team and initiate plan and interventions as ordered.  Monitor patient's weight and dietary intake as ordered or per policy. Utilize nutrition screening tool and intervene as necessary. Determine patient's food preferences and provide high-protein, high-caloric foods as appropriate.     INTERVENTIONS:  - Monitor oral intake, urinary output, labs, and treatment plans  - Assess nutrition and hydration status and recommend course of action  - Evaluate amount of meals eaten  - Assist patient with eating if necessary   - Allow adequate time for meals  - Recommend/ encourage appropriate diets, oral nutritional supplements, and vitamin/mineral supplements  - Order, calculate, and assess calorie counts as needed  - Recommend, monitor, and adjust tube feedings and TPN/PPN based on assessed needs  - Assess need for intravenous fluids  - Provide specific nutrition/hydration education as appropriate  - Include patient/family/caregiver in decisions related to nutrition  Outcome: Progressing

## 2024-10-30 NOTE — PROGRESS NOTES
"Cardiology Progress Note - Joslyn Cantu 74 y.o. female MRN: [id number]    Unit/Bed#: ICU 09 Encounter: [id number]      Assessment:  Principal Problem:    Stroke (HCC)  Active Problems:    Schizophrenia (HCC)    Hypothyroid    Hypertension    Type 2 diabetes mellitus without complication (HCC)    Chronic HFrEF (heart failure with reduced ejection fraction) ([facility])    Stage 3a chronic kidney disease (HCC)    Aortic stenosis, moderate    Elevated troponin      Plan:  Patient continues on ventilator care.  Telemetry demonstrates atrial fibrillation with RVR.  Patient on intravenous Amiodarone.  Critical care notes reviewed.  Patient on IV pressor and insulin.  BMP with sodium of 163.  Potassium was 4.5 with a creatinine of 0.57.  No change in cardiac management.    Subjective:   Patient seen and examined.     Objective:     Vitals: Blood pressure 140/86, pulse (!) 150, temperature 97.9 °F (36.6 °C), resp. rate 14, height 5' 2\" (1.575 m), weight 61.9 kg (136 lb 7.4 oz), SpO2 100%., Body mass index is 24.96 kg/m².,   Orthostatic Blood Pressures      Flowsheet Row Most Recent Value   Blood Pressure 140/86 filed at 10/30/2024 0701   Patient Position - Orthostatic VS Lying filed at 10/28/2024 1800        ,      Intake/Output Summary (Last 24 hours) at 10/30/2024 0823  Last data filed at 10/30/2024 0606  Gross per 24 hour   Intake 5466.91 ml   Output 6275 ml   Net -808.09 ml         Physical Exam:    GEN: Joslyn Cantu on vent  HEART: normal rate, irregular rhythm, normal S1 and S2, systolic murmur  LUNGS: clear to auscultation bilaterally in anterior lung fields  ABDOMEN: no distention  EXTREMITIES: edema  SKIN: no suspicious lesions on exposed skin    Labs & Results:    No results displayed because visit has over 200 results.          EEG Video Monitoring 24 Hour    Result Date: 10/30/2024  Narrative: Table formatting from the original result was not included. Continuous Video EEG Monitoring Patient Name:  oJslyn Cantu  MRN: " [id number] :  1950 File #: [id number] Age: 74 y.o. Ordering Provider: Manolo Hagan MD  Study Start: 10/29/2024 1352 Study End: 10/29/2024 1621            Study type: Continuous video EEG ICD 10 diagnosis: Seizure R56.9  ------------------------------------------------- Patient History: This recording was observed in a 74 y.o. female with a PMH of hypertension, hypothyroidism, hypovitaminosis D, iron deficiency anemia, and type 2 diabetes who presented with seizure-like activity. Imaging shows a hemorrhagic left MCA territory infarction. The continuous EEG recording was ordered to monitor in the setting of status epilepticus or recurrent seizures. Medications include: Facility-Administered Medications Ordered in Other Visits Medication Dose Route Frequency Provider Last Rate  acetaminophen  650 mg Oral Q6H PRN Suzanne Puente MD    amiodarone (CORDARONE) 900 mg in dextrose 5 % 500 mL infusion  1 mg/min Intravenous Continuous YUMI Acevedo 1 mg/min (10/30/24 0552)  Followed by  amiodarone (CORDARONE) 900 mg in dextrose 5 % 500 mL infusion  0.5 mg/min Intravenous Continuous YUMI Acevedo    aspirin  325 mg Oral Daily Aida Victor, DO    atorvastatin  40 mg Oral QPM YUMI Marx    chlorhexidine  15 mL Mouth/Throat Q12H [facility] YUMI Marx    clopidogrel  75 mg Oral Daily Aida Victor,     dextrose  1,000 mL Intravenous Once Joann Daily MD    fentaNYL  50 mcg Intravenous Q1H PRN YUMI Marx    haloperidol  5 mg Oral HS Joann Daily MD    heparin (porcine)  5,000 Units Subcutaneous Q8H [facility] Manpreet Puri MD    insulin regular (HumuLIN R,NovoLIN R) 1 Units/mL in sodium chloride 0.9 % 100 mL infusion  0.3-21 Units/hr Intravenous Titrated Manpreet Puri MD 8 Units/hr (10/30/24 0606)  ipratropium  0.5 mg Nebulization Q6H Manpreet Puri MD    labetalol  10 mg Intravenous Q4H PRN Manpreet Puri MD    levalbuterol  1.25 mg Nebulization Q6H  Manpreet Puri MD    levETIRAcetam  750 mg Intravenous Q12H Joann Daily MD    levothyroxine  150 mcg Oral Early Morning YUMI Marx    [Held by provider] lisinopril  10 mg Oral Daily YUMI Marx    LORazepam  2 mg Intravenous Q30 Min PRN Joann Daily MD    magnesium sulfate         metoprolol succinate  12.5 mg Oral Daily Joann Daily MD    norepinephrine  1-30 mcg/min Intravenous Titrated Suzanne Puente MD 7 mcg/min (10/30/24 5698)  ondansetron  4 mg Intravenous Q6H PRN YUMI Marx    potassium chloride         potassium chloride         senna-docusate sodium  2 tablet Oral BID YUMI Marx   Sedation: No Intubated: Yes Paralyzed: No ------------------------------------------------- 32 channel digital recording with electrodes placed according to the International 10-20 system with continuous video, ECG, EOG and the possible addition of T1/T2 electrodes. This study was monitored intermittently by a monitoring technologist.  The physician interpreting the study had access to the data throughout the recording.   The recording was technically satisfactory. ------------------------------------------------- Description: Background: The background lacked organization or clearly defined anterior-posterior and frequency gradients. There was no discernible posterior dominant rhythm.  The predominant background activity was asymmetric in frequency and voltage: The left hemisphere was 4-7 Hz mixed delta/theta activity. The right hemisphere was higher voltage 2-6 Hz mixed delta/theta activity.  Sleep: There were no well-formed sleep structures.  Reactivity: The background was poorly reactive to external stimuli. No formal activation procedures (hyperventilation/photic stimulation) were performed during this segment of monitoring.  Interictal abnormalities: Occasional right frontal sharp waves  Rhythmic/Periodic patterns: None  Seizures: None  Events:  No push buttons were activated. Other findings: Samples of the single channel ECG demonstrated a regular rhythm. ------------------------------------------------- EEG impression: This is an abnormal continuous EEG recording due to: Right frontal sharp waves Right hemispheric theta>delta activity Left hemispheric delta/theta activity Absent PDR Clinical Interpretation: This 2.5 hour abnormal study is consistent with right frontal seizure tendency. This was appreciated in the setting of a moderate encephalopathy with more significant right hemispheric dysfunction. No electrographic seizures were seen. No diagnostic clinical events were captured. LTM Summary: Approximately 72.5 hours of video EEG were captured showing right frontal seizure tendency and right hemispheric dysfunction in a setting of moderate encephalopathy. Manolo Hagan MD [facility]      EEG Video Monitoring 24 Hour    Addendum Date: 10/29/2024 Addendum:   Corrected study time: Study Start: 10/27/2024 1351 Study End: 10/28/2024 1351    Result Date: 10/29/2024  Narrative: Table formatting from the original result was not included. Continuous Video EEG Monitoring Patient Name:  Joslyn Cantu  MRN: [id number] :  1950 File #: [id number] Age: 74 y.o. Ordering Provider: Cullen Chowdhury MD  Study Start: 10/27/2024 1351 Study End: 10/28/2024 1531            Study type: Continuous video EEG ICD 10 diagnosis: Other Epilepsy G40.909 ------------------------------------------------- Patient History: This recording was observed in a 74 y.o. female with a PMH of hypertension, hypothyroidism, hypovitaminosis D, iron deficiency anemia, and type 2 diabetes who presented with seizure-like activity. Imaging shows a hemorrhagic left MCA territory infarction. The continuous EEG recording was ordered to monitor in the setting of status epilepticus or recurrent seizures. Medications include: Facility-Administered  Medications Ordered in Other Visits Medication Dose Route Frequency Provider Last Rate  acetaminophen  650 mg Oral Q6H PRN Manpreet Puri MD    aspirin  325 mg Oral Daily Aida Victor DO    atorvastatin  40 mg Oral QPM YUMI Marx    cefTRIAXone  1,000 mg Intravenous Q24H Lakesha Antoine DO 1,000 mg (10/28/24 1329)  chlorhexidine  15 mL Mouth/Throat Q12H [facility] YUMI Marx    clopidogrel  75 mg Oral Daily Aida Victor DO    fentaNYL  50 mcg Intravenous Q1H PRN YUMI Marx    heparin (porcine)  5,000 Units Subcutaneous Q8H JORGE LUIS Puri MD    insulin glargine  20 Units Subcutaneous [facility] Manpreet Puri MD    insulin lispro  2-12 Units Subcutaneous Q6H JORGE LUIS Puri MD    ipratropium  0.5 mg Nebulization Q6H Manpreet Puri MD    labetalol  10 mg Intravenous Q4H PRN Manpreet Puri MD    levalbuterol  1.25 mg Nebulization Q6H Manpreet Puri MD    levETIRAcetam  750 mg Intravenous Q12H Joann Daily MD    levothyroxine  150 mcg Oral Early Morning YUMI Marx    [Held by provider] lisinopril  10 mg Oral Daily YUMI Marx    LORazepam  2 mg Intravenous Q30 Min PRN Joann Daily MD    ondansetron  4 mg Intravenous Q6H PRN YUMI Marx    senna-docusate sodium  2 tablet Oral BID YUMI Marx    sodium chloride  40 mL/hr Intravenous Continuous YUMI Acevedo Stopped (10/28/24 0701) Sedation: No Intubated: Yes Paralyzed: No ------------------------------------------------- 32 channel digital recording with electrodes placed according to the International 10-20 system with continuous video, ECG, EOG and the possible addition of T1/T2 electrodes. This study was monitored intermittently by a monitoring technologist.  The physician interpreting the study had access to the data throughout the recording.   The recording was technically satisfactory.  ------------------------------------------------- Description: Background: The background lacked organization or clearly defined anterior-posterior and frequency gradients. There was no discernible posterior dominant rhythm. The predominant background activity was asymmetric in frequency: The left hemisphere was 3-6 Hz mixed delta/theta activity. The right hemisphere was 2-5 Hz mixed delta/theta activity. Sleep: There were no well-formed sleep structures. Reactivity: The background was poorly reactive to external stimuli. No formal activation procedures (hyperventilation/photic stimulation) were performed during this segment of monitoring. Interictal abnormalities: Abundant right frontal sharp waves, at times semi-rhythmic Rhythmic/Periodic patterns: Frequent right frontal 1 Hz lateralized periodic discharges. Seizures: None Events: No push buttons were activated. Other findings: Samples of the single channel ECG demonstrated a regular rhythm. ------------------------------------------------- EEG impression: This is an abnormal continuous EEG recording due to: Right frontal LPDs Right frontal sharp waves Right hemispheric delta>theta activity Left hemispheric delta/theta activity Absent PDR Clinical Interpretation: This 24 hour abnormal study is consistent with high seizure tendency from the right frontal region. This was appreciated in the setting of a moderate to severe encephalopathy and more significant dysfunction of the right hemisphere. No electrographic seizures were seen. No diagnostic clinical events were captured. Manolo Hagan MD [facility]      EEG Video Monitoring 24 Hour    Result Date: 10/29/2024  Narrative: Table formatting from the original result was not included. Continuous Video EEG Monitoring Patient Name:  Joslyn Cantu  MRN: [id number] :  1950 File #: [id number] Age: 74 y.o. Ordering Provider: Manolo Hagan MD  Study Start:  10/28/2024 1351 Study End: 10/29/2024 1352            Study type: Continuous video EEG ICD 10 diagnosis: Seizure R56.9 ------------------------------------------------- Patient History: This recording was observed in a 74 y.o. female with a PMH of hypertension, hypothyroidism, hypovitaminosis D, iron deficiency anemia, and type 2 diabetes who presented with seizure-like activity. Imaging shows a hemorrhagic left MCA territory infarction. The continuous EEG recording was ordered to monitor in the setting of status epilepticus or recurrent seizures. Medications include: Facility-Administered Medications Ordered in Other Visits Medication Dose Route Frequency Provider Last Rate  acetaminophen  650 mg Oral Q6H PRN Suzanne Puente MD    aspirin  325 mg Oral Daily Aida Victor DO    atorvastatin  40 mg Oral QPM YUMI Marx    cefTRIAXone  1,000 mg Intravenous Q24H Lakesha Antoine DO Stopped (10/28/24 1400)  chlorhexidine  15 mL Mouth/Throat Q12H [facility] YUMI Marx    clopidogrel  75 mg Oral Daily Aida Victor DO    fentaNYL  50 mcg Intravenous Q1H PRN YUMI Marx    haloperidol  5 mg Oral HS Joann Daily MD    heparin (porcine)  5,000 Units Subcutaneous Q8H [facility] Manpreet Puri MD    insulin regular (HumuLIN R,NovoLIN R) 1 Units/mL in sodium chloride 0.9 % 100 mL infusion  0.3-21 Units/hr Intravenous Titrated Manpreet Puri MD 4 Units/hr (10/29/24 1226)  ipratropium  0.5 mg Nebulization Q6H Manpreet Puri MD    labetalol  10 mg Intravenous Q4H PRN Manpreet Puri MD    levalbuterol  1.25 mg Nebulization Q6H Manpreet Puri MD    levETIRAcetam  750 mg Intravenous Q12H Joann Daily MD    levothyroxine  150 mcg Oral Early Morning YUMI Marx    [Held by provider] lisinopril  10 mg Oral Daily Diane Yue Bekesy, CRNP    LORazepam  2 mg Intravenous Q30 Min PRN Joann Daily MD    metoprolol succinate  12.5 mg Oral Daily Joann Daily MD     ondansetron  4 mg Intravenous Q6H PRN YUMI Marx    senna-docusate sodium  2 tablet Oral BID YUMI Marx   Sedation: No Intubated: Yes Paralyzed: No ------------------------------------------------- 32 channel digital recording with electrodes placed according to the International 10-20 system with continuous video, ECG, EOG and the possible addition of T1/T2 electrodes. This study was monitored intermittently by a monitoring technologist.  The physician interpreting the study had access to the data throughout the recording.   The recording was technically satisfactory. ------------------------------------------------- Description: Background: The background lacked organization or clearly defined anterior-posterior and frequency gradients. There was no discernible posterior dominant rhythm.  The predominant background activity was asymmetric in frequency: The left hemisphere was 4-7 Hz mixed delta/theta activity. The right hemisphere was 2-6 Hz mixed delta/theta activity.  Sleep: There were no well-formed sleep structures.  Reactivity: The background was poorly reactive to external stimuli. No formal activation procedures (hyperventilation/photic stimulation) were performed during this segment of monitoring.  Interictal abnormalities: Occasional right frontal sharp waves, at times semi-periodic  Rhythmic/Periodic patterns: Occasional right frontal 1 Hz lateralized periodic discharges.  Seizures: None  Events: No push buttons were activated.  Other findings: Samples of the single channel ECG demonstrated a regular rhythm. -------------------------------------------------  EEG impression: This is an abnormal continuous EEG recording due to: Right frontal LPDs Right frontal sharp waves Right hemispheric irregular delta/theta activity Left hemispheric irregular theta activity Absent PDR  Clinical Interpretation: This 24 hour abnormal study is consistent with high seizure tendency from  the right frontal region. This was appreciated in the setting of a moderate encephalopathy and more significant dysfunction of the right hemisphere. No electrographic seizures were seen. No diagnostic clinical events were captured.  Compared to the previous day of recording, both the degree of seizure tendency and encephalopathy improved. Manolo Hagan MD [facility]      CT head wo contrast    Result Date: 10/28/2024  Narrative: CT BRAIN - WITHOUT CONTRAST INDICATION:   Acute pupillary change. COMPARISON: 10/26/2024. TECHNIQUE:  CT examination of the brain was performed.  Multiplanar 2D reformatted images were created from the source data. Radiation dose length product (DLP) for this visit:  1025 mGy-cm .  This examination, like all CT scans performed in the [facility], was performed utilizing techniques to minimize radiation dose exposure, including the use of iterative reconstruction and automated exposure control. IMAGE QUALITY:  Diagnostic. FINDINGS: PARENCHYMA: Increasing vasogenic edema throughout the left MCA and HIEN territory. Stable edema in the right parietal temporal region. Near complete resolution of densities previously demonstrated in the left frontoparietal temporal region suggesting washout of contrast. No evidence of acute hemorrhage. Left right midline shift of 1 cm, new since the prior exam. Mild left transtentorial herniation in the temporal region. VENTRICLES AND EXTRA-AXIAL SPACES: Effacement of the left lateral third ventricles. No hydrocephalus. VISUALIZED ORBITS: Intact. PARANASAL SINUSES: Clear. CALVARIUM AND EXTRACRANIAL SOFT TISSUES: No lytic or blastic lesion.     Impression: 1. Significant increase in vasogenic edema and mass effect in the left MCA and HIEN territories. 2. New left right midline shift of 10 mm. Mild left transtentorial herniation in the temporal region. 3. Near complete effacement of the left lateral  and third ventricles. No hydrocephalus. 4. Interval resolution of density visualized in the left MCA territory infarct suggesting washout of contrast. No evidence of acute hemorrhage. The study was marked in EPIC for immediate notification. Workstation performed: [id number]     US bedside procedure    Result Date: 10/28/2024  Narrative: [id number]    US bedside procedure    Result Date: 10/28/2024  Narrative: [id number]    Echo complete w/ contrast if indicated    Result Date: 10/27/2024  Narrative:   Left Ventricle: Left ventricular cavity size is normal. Wall thickness is normal. The left ventricular ejection fraction is 25- 30% by visual estimation. Systolic function is moderately reduced. There is mild global hypokinesis with regional variation.   The following segments are akinetic: apical anterior, apical septal, apical inferior, apical lateral and apex.   The following segments are hypokinetic: mid anterior, mid anteroseptal, mid inferoseptal, mid inferior, mid inferolateral and mid anterolateral.   Right Ventricle: Right ventricular cavity size is normal. Systolic function is normal.   Aortic Valve: There is mild stenosis. The aortic valve mean gradient is 6 mmHg. The dimensionless velocity index is 0.51. The aortic valve area is 1.76 cm2.   Pericardium: There is no pericardial effusion.   Prior TTE study available for comparison. Prior study date: 3/26/2024. Changes noted when compared to prior study. Changes include: New segmental wall motion abnormalities and decline in LVEF noted..   Findings suggestive of stress induced cardiomyopathy.     XR chest portable ICU    Result Date: 10/27/2024  Narrative: XR CHEST PORTABLE ICU INDICATION: central line placement. COMPARISON: Radiograph 10/26/2024, earlier same day and chest CTA 10/25/2024 FINDINGS: Mild bibasilar atelectasis. Upper lobe increased lucency. No pneumothorax or pleural effusion. Normal  cardiomediastinal silhouette. The endotracheal tube terminates 3.6 cm above the level of the tacos. A right IJ central venous catheter terminates overlying the distal SVC. The enteric tube tip terminates overlying the left upper quadrant of the abdomen. Bones are unremarkable for age. Normal upper abdomen.     Impression: 1.  Mild bibasilar atelectasis, superimposed on emphysema. 2.  Lines and tubes, as described. Workstation performed: [id number]     CT head wo contrast    Result Date: 10/27/2024  Narrative: CT BRAIN - WITHOUT CONTRAST INDICATION:   new focal deficits. COMPARISON: Head CT from October 26, 2024 TECHNIQUE:  CT examination of the brain was performed.  Multiplanar 2D reformatted images were created from the source data. Radiation dose length product (DLP) for this visit:  1099 mGy-cm .  This examination, like all CT scans performed in the [facility], was performed utilizing techniques to minimize radiation dose exposure, including the use of iterative reconstruction and automated exposure control. IMAGE QUALITY:  Diagnostic. FINDINGS: PARENCHYMA: Continued evolution of the acute to subacute left MCA territory infarction. There are greater areas of ischemia noted On the prior study of October 26, 2024. Previously noted hemorrhagic areas are also slightly more pronounced. There is diffuse left cerebral sulcal effacement and suggestion of more defined acute to early subacute infarction in the parasagittal parietal lobe worsening PCA territory infarction. There are also areas of more defined right posterior parietal and occipital nonhemorrhagic cortical infarction. Subtle areas of cytotoxic edema also suggested in the right parasagittal frontal lobe which may represent new HIEN territory infarction. There is greater sulcal crowding in these regions. No new or acute posterior fossa infarction. VENTRICLES AND EXTRA-AXIAL SPACES: Mild mass effect on the left lateral ventricle. Basal cisterns  remain patent. No uncal herniation. VISUALIZED ORBITS: Normal visualized orbits. PARANASAL SINUSES: Normal visualized paranasal sinuses. CALVARIUM AND EXTRACRANIAL SOFT TISSUES: Normal.     Impression: Interval evolution of the hemorrhagic left MCA territory infarction. The extent of the infarct is greater and the area of hemorrhagic infarction is slightly more conspicuous. This has not resulted in greater mass effect on the ventricular system or herniation however, there is greater left-sided cerebral sulcal effacement. More defined areas of acute to early subacute right HIEN, left PCA, right MCA/PCA watershed infarction. I personally discussed this study with JUSTIN ESTRELLA on 10/26/2024 at 2:00 p.m. Workstation performed: [id number]     CT head wo contrast    Result Date: 10/27/2024  Narrative: CT BRAIN - WITHOUT CONTRAST INDICATION:   24 hours post thrombolytic administration. COMPARISON: CT head 10/26/2024 at 1:33 p.m. TECHNIQUE:  CT examination of the brain was performed.  Multiplanar 2D reformatted images were created from the source data. Radiation dose length product (DLP) for this visit:  1022 mGy-cm .  This examination, like all CT scans performed in the [facility], was performed utilizing techniques to minimize radiation dose exposure, including the use of iterative reconstruction and automated exposure control. IMAGE QUALITY: Examination is degraded by streak artifact from the scalp leads. FINDINGS: PARENCHYMA: Evolving known left MCA territory infarct with multifocal areas of left cerebral hemispheric hyperattenuation, which reflected contrast staining on the dual-energy CT dated 10/26/2024 at 4:39 a.m. However, in the interval, there are new and evolving infarcts involving the bilateral cerebral hemispheres, noting involvement of the right frontal lobe, right parieto-occipital lobe, left frontal lobe, left greater than right gangliocapsular regions, left parietal, left occipital and  left temporal lobes. Possible asymmetric hypoattenuation involving the left cerebellar hemisphere seen on series 2, image 11. There is new hyperattenuation that appears to involve the hippocampi bilaterally, also noting progressive/increasingly conspicuous hyperattenuation involving the left parietal lobe. VENTRICLES AND EXTRA-AXIAL SPACES:  Normal for the patient's age. VISUALIZED ORBITS: No acute abnormality. PARANASAL SINUSES: Trace mucosal thickening. CALVARIUM AND EXTRACRANIAL SOFT TISSUES: No acute abnormality. Probable cerumen in the bilateral external auditory canals.     Impression: The examination is limited/degraded secondary to streak artifact from the scalp leads. Within these confines: 1. Evolving known recent left MCA territory infarct with multifocal left cerebral hemispheric contrast staining. 2. Additionally, there are multifocal recent infarcts across multiple vascular territories with new hyperattenuation that is distinct from the previously ascertained contrast staining, which is indeterminate but suspicious for associated hemorrhagic associated with these infarcts. Possible recent infarct of the left cerebellar hemisphere. See narrative above for full details. Given their distribution, findings may reflect watershed ischemia/sequela of hypoxic-ischemic injury due to global cerebral hypoperfusion, in the appropriate clinical setting. I personally discussed this study with JOSE ROBERTO CROWLEY on 10/27/2024 6:35 AM. Workstation performed: [id number]     X-ray chest 1 view    Result Date: 10/26/2024  Narrative: XR CHEST 1 VIEW INDICATION: Endotracheal tube positioning. COMPARISON: 3/30/2024. FINDINGS: ET tube tip is located 3.7 cm above the tacos. Enteric tube courses inferior to the diaphragm. Improved bilateral pulmonary opacities no pneumothorax or pleural effusion. Normal cardiomediastinal silhouette. Bones are unremarkable for age. Normal upper abdomen.     Impression: Improved bilateral pulmonary  opacities. Workstation performed: [id number]     CT head wo contrast    Result Date: 10/26/2024  Narrative: CT BRAIN - WITHOUT CONTRAST INDICATION:   Dual energy CT head. COMPARISON: CT head and CTA head and neck 10/25/2024. TECHNIQUE:  CT examination of the brain was performed.  Multiplanar 2D reformatted images were created from the source data. Radiation dose length product (DLP) for this visit:  1155.17 mGy-cm .  This examination, like all CT scans performed in the [facility], was performed utilizing techniques to minimize radiation dose exposure, including the use of iterative reconstruction and automated exposure control. IMAGE QUALITY:  Diagnostic. FINDINGS: PARENCHYMA: Evolving recent left MCA territory infarct, noting multifocal hyperattenuation involving the left frontal and parietal lobes, in addition to the gangliocapsular regions, which on the virtual noncontrast series 302 are not apparent but are conspicuous on the iodine map series 301, suggestive of contrast staining. In the interval, there has been development of subtle loss of gray-white matter differentiation as well as sulcal effacement involving the right posterior frontal lobe and right parietal lobe seen for example on series 2, image 45. VENTRICLES AND EXTRA-AXIAL SPACES: No hydrocephalus. VISUALIZED ORBITS: No acute abnormality. PARANASAL SINUSES: Trace mucosal thickening. CALVARIUM AND EXTRACRANIAL SOFT TISSUES: No acute abnormality.     Impression: 1. Evolving recent left MCA territory infarct with multifocal left cerebral hemispheric contrast staining. Mild local mass effect. No significant midline shift. 2. Development of subtle loss of gray-white matter differentiation as well as sulcal effacement involving the right posterior frontal lobe and right parietal lobe, suspicious for recent ischemia. Recommendation: Brain MRI I personally discussed this study with EMILY COATES on 10/26/2024 8:39 AM. Workstation performed:  [id number]     IR stroke alert    Result Date: 10/25/2024  Narrative: Please refer to the Operative Note for procedure dictation.    CT cerebral perfusion    Result Date: 10/25/2024  Narrative: CT PERFUSION INDICATION:  Stroke Alert. COMPARISON: CTA from the same day TECHNIQUE: CT perfusion study was performed.  A total of 8 cm of brain tissue was evaluated in two different volumetric acquisitions.  [employer] RAPID software was utilized to calculate cerebral blood flow, cerebral blood volume, mean transit time, and  Tmax. Radiation dose length product (DLP) for this visit:  2115 mGy-cm .  This examination, like all CT scans performed in the [facility], was performed utilizing techniques to minimize radiation dose exposure, including the use of iterative reconstruction and automated exposure control. IV Contrast:  40 mL of iohexol (OMNIPAQUE) IMAGE QUALITY: The exam is significantly motion degraded with up to 10 mm of translation in the X and Y axis. FINDINGS: Hemisphere affected:  Left. Total CBF<30% volume: 0 mL Total Tmax>6.0s volume:  90 mL Total Mismatch difference: 90 mL Total Mismatch ratio: Infinite. Hypoperfusion Index (Tmax>10s/Tmax.6s): 0.1 Additional comments: Images are significantly motion degraded. Within this limitation however, there is relative hypoenhancement in the left MCA territory on series 242. This corresponds with perfusion findings and findings on earlier noncontrast head CT     Impression: CT perfusion performed.  Data available on PACS. There is significant motion degradation. Workstation performed: [id number]     CTA dissection protocol chest abdomen pelvis w wo contrast    Result Date: 10/25/2024  Narrative: CTA - CHEST, ABDOMEN AND PELVIS - WITHOUT AND WITH IV CONTRAST INDICATION: Altered mental status, focal deficits. COMPARISON: Chest radiograph 3/30/2024 TECHNIQUE: CT examination of the chest, abdomen and pelvis was performed both prior to and after the  administration of intravenous contrast. The noncontrast portion of this examination was performed utilizing low radiation dose technique. Thin section angiographic arterial phase post contrast technique was used in order to evaluate for aortic dissection. 3D reformatted images and volume rendering were performed on an independent workstation. Additionally, axial, sagittal, and coronal 2D reformatted images were created from the source data and submitted for interpretation. Radiation dose length product (DLP) for this visit: 1496.47 mGy-cm . This examination, like all CT scans performed in the [facility], was performed utilizing techniques to minimize radiation dose exposure, including the use of iterative reconstruction and automated exposure control. IV Contrast: 75 mL of iohexol (OMNIPAQUE) Enteric Contrast: Not administered. FINDINGS: The noncontrast series of this exam is limited, due to prior contrast administration from the CTA head and neck. Image quality is moderately degraded, due to patient motion artifact. This can decrease the sensitivity for detecting and characterizing subtle abnormalities. AORTA: No aortic dissection or intramural hematoma. No aortic aneurysm. Moderate diffuse atherosclerotic changes, with an occlusion of the left femoral (superficial) artery. This is incompletely imaged. CHEST LUNGS: There is advanced centrilobular emphysema throughout both lungs, with dependent bibasilar atelectasis. No lobar consolidation. There are numerous punctate calcified granulomas in the right greater than left upper lobe PLEURA: Unremarkable. HEART/PULMONARY ARTERIAL TREE: The heart is not grossly enlarged, with prominent multivessel coronary artery calcifications. The pericardium is unremarkable. MEDIASTINUM AND EVERTON: There is a mildly enlarged (1.6 x 1.2 cm) nonspecific right hilar lymph node. There is no generalized mediastinal lymphadenopathy. The esophagus and central airways are  grossly unremarkable. CHEST WALL AND LOWER NECK: Unremarkable. ABDOMEN Assessment of the upper abdomen is suboptimal due to artifact from the patient's arms and motion artifact. LIVER/BILIARY TREE: Unremarkable. GALLBLADDER: No calcified gallstones. No pericholecystic inflammatory change. SPLEEN: Unremarkable. PANCREAS: Unremarkable. ADRENAL GLANDS: Unremarkable. KIDNEYS/URETERS: Unremarkable. No hydronephrosis. STOMACH AND BOWEL: Unremarkable. APPENDIX: No findings to suggest appendicitis. ABDOMINOPELVIC CAVITY: No ascites. No pneumoperitoneum. No lymphadenopathy. PELVIS REPRODUCTIVE ORGANS: Unremarkable for patient's age. URINARY BLADDER: Unremarkable. ABDOMINAL WALL/INGUINAL REGIONS: Unremarkable. BONES: No acute fracture or suspicious osseous lesion.     Impression: 1.  No identifiable acute abnormality to account for the patient's clinical presentation. There is no evidence of an aortic dissection. There is an occlusion of the left femoral artery, which is incompletely assessed on this exam. 2.  Advanced centrilobular emphysema. 3.  Nonspecific mildly enlarged right hilar lymph node. A 3-month contrast-enhanced chest CT follow-up should be considered for further evaluation. The study was marked in EPIC for significant notification. Workstation performed: [id number]     CTA stroke alert (head/neck)    Result Date: 10/25/2024  Narrative: CTA NECK AND BRAIN WITH AND WITHOUT CONTRAST INDICATION: Stroke Alert COMPARISON:   None. TECHNIQUE:  Post contrast imaging was performed after administration of iodinated contrast through the neck and brain. Post contrast axial 0.625 mm images timed to opacify the arterial system.  3D rendering was performed on an independent workstation.   MIP reconstructions performed. Coronal and sagittal reconstructions were performed of the non contrast portion of the brain. Radiation dose length product (DLP) for this visit:  515.51 mGy-cm .  This examination, like all CT scans performed in  [facility], was performed utilizing techniques to minimize radiation dose exposure, including the use of iterative  reconstruction and automated exposure control. IV Contrast:  75 mL of iohexol (OMNIPAQUE) IMAGE QUALITY:   Diagnostic FINDINGS: CTA NECK ARCH AND GREAT VESSELS: Mild atherosclerotic changes with no severe stenosis. VERTEBRAL ARTERIES: Patent extracranial segments. Mild right origin stenosis due to calcified plaque RIGHT CAROTID: Extensive calcified plaque. 70-99% stenosis.   No dissection. LEFT CAROTID: Extensive, mixed plaque. There is predominantly soft plaque along the lateral margin of the proximal ICA just after the bifurcation, best seen on image 162 of series 2. There is no definite contrast surrounding this soft tissue density to suggest an intraluminal thrombus. 70-99% stenosis.   No dissection. There is moderate stenosis of the left common carotid artery at its origin and throughout its proximal to mid course due to mixed plaque. NASCET criteria was used to determine the degree of internal carotid artery diameter stenosis. CTA BRAIN: There is mild diffuse intracranial vessel irregularity suggesting intracranial atherosclerosis. INTERNAL CAROTID ARTERIES: Moderate bilateral stenosis due to calcified plaque. ANTERIOR CEREBRAL ARTERY CIRCULATION:  No occlusion. There is prominent calcified plaque along the right A2 A3 junction. MIDDLE CEREBRAL ARTERY CIRCULATION: There is an occluded left M2 branch on image numbers 78 and 79 of series 2. The additional M2 segments and left M1 segment appear normal. DISTAL VERTEBRAL ARTERIES: Moderate stenosis intracranially on the left due to soft plaque. BASILAR ARTERY:  No stenosis or occlusion. POSTERIOR CEREBRAL ARTERIES: No stenosis or occlusion. VENOUS STRUCTURES:  Normal. NON VASCULAR ANATOMY BONY STRUCTURES:  No acute osseous abnormality. SOFT TISSUES OF THE NECK:  Normal. THORACIC INLET: Moderate emphysema.     Impression: 1.  Occluded left M2 segment. The M1 segments are patent. 2. Severe bilateral ICA stenosis with pronounced noncalcified plaque proximally on the left. 3. Additional stenoses are seen throughout the left CCA, both intracranial internal carotid arteries, and the left intracranial vertebral artery. Findings were directly discussed with Donita Beyer at 8:45 p.m. on 10/25/2024. Workstation performed: [id number]     CT stroke alert brain    Result Date: 10/25/2024  Narrative: CT BRAIN - STROKE ALERT PROTOCOL INDICATION:   Stroke Alert. COMPARISON: 12/18/2023 TECHNIQUE:  CT examination of the brain was performed.  In addition to axial images, coronal reformatted images were created and submitted for interpretation. Radiation dose length product (DLP) for this visit:  908.17 mGy-cm .  This examination, like all CT scans performed in the [facility], was performed utilizing techniques to minimize radiation dose exposure, including the use of iterative  reconstruction and automated exposure control. IMAGE QUALITY:  Diagnostic. FINDINGS: PARENCHYMA: There are findings of early ischemia in the left MCA territory superiorly with loss of gray-white differentiation for instance on image 34 of series 2. There is also loss of gray-white differentiation in the left inferior frontal lobe which is new in the interim but may be more subacute. No hemorrhage seen. Normal intracranial vasculature. VENTRICLES AND EXTRA-AXIAL SPACES:  Normal for the patient's age. VISUALIZED ORBITS: Normal visualized orbits. PARANASAL SINUSES: Normal visualized paranasal sinuses. CALVARIUM AND EXTRACRANIAL SOFT TISSUES:   Normal.     Impression: Early findings of ischemia in the left MCA territory. No hemorrhage seen. Findings were directly discussed with Dr. Mary Hyman at approximately 8:40 p.m. on 10/25/2024. Workstation performed: [id number]     XR chest pa and lateral    Result Date: 10/24/2024  Narrative: XR CHEST 2 VIEWS (PA AND LAT)  HISTORY:Upper respiratory tract infection COMPARISON: 11/25/21. FINDINGS: The cardiomediastinal silhouette is unremarkable.   There is probable pulmonary emphysema. No focal consolidation is seen. There is no pleural effusion or pneumothorax. The visualized upper abdomen is unremarkable. There is scoliosis, osteopenia and multilevel thoracic compression deformities which are similar to the prior examination.     Impression: IMPRESSION: Probable pulmonary emphysema. No focal consolidation. Workstation:[id number]      EKG personally reviewed by Gama Lombardo MD.     Counseling / Coordination of Care  Total floor / unit time spent today 30 minutes.  Greater than 50% of total time was spent with the patient and / or family counseling and / or coordination of care.

## 2024-10-30 NOTE — CASE MANAGEMENT
Case Management Discharge Planning Note    Patient name Joslyn Cantu  Location ICU /ICU 09 MRN [id number]  : 1950 Date 10/30/2024       Current Admission Date: 10/25/2024  Current Admission Diagnosis:Stroke (HCC)   Patient Active Problem List    Diagnosis Date Noted Date Diagnosed    Aortic stenosis, moderate 10/26/2024     Elevated troponin 10/26/2024     Stroke (HCC) 10/25/2024     Cardiomyopathy, unspecified (HCC) 2024     Left ventricular diastolic dysfunction 2024     Stage 3a chronic kidney disease (HCC) 2024     Leukocytosis 2024     Uncontrolled type 2 diabetes mellitus with hypoglycemia without coma ([facility]) 2024     Chronic HFrEF (heart failure with reduced ejection fraction) ([facility]) 2024     Iron deficiency anemia 2016     Hypovitaminosis D 05/10/2016     Hypothyroid 2016     Hypertension 2016     Type 2 diabetes mellitus without complication ([facility]) 2016     Schizophrenia ([facility]) 2016       LOS (days): 5  Geometric Mean LOS (GMLOS) (days):   Days to GMLOS:     OBJECTIVE:  Risk of Unplanned Readmission Score: 24.39         Current admission status: Inpatient   Preferred Pharmacy:   [facility] [address]  [address] [facility] [address]  Phone: [phone] Fax: [phone]    [facility] [address]  [address] [address]  Phone: [phone] Fax: [phone]    Primary Care Provider: Natalia Elizondo MD    Primary Insurance: [employer]  REP  Secondary Insurance: [place] [employer] AND [email or web address] [facility]    DISCHARGE DETAILS:         Family notified:: CM notified pt condion is rapidly declining and need a decision from the family on transition. CM attempted phone call to Antwon Mendez [phone] phone went to a fast busy unable to leave a message. Attempted phone call to Kadi Robertson pt daughter [phone] left an  urgent message for a return call re: next steps requested a call back to CM or can call the unit directly at [phone]. ROSE will continue attempts to reach the family     10:00am  Left message with Kadi for return call    10:20am Phone call from Kadi, advised of patiet condition, unable to reach [facility]. Provided ph number for [facility] and requested that she call asap. ROSE notified provider Kadi will be calling

## 2024-10-30 NOTE — QUICK NOTE
NCC Event Note:    I attempted to call the patient's nephew at 0010 to discuss GOC in the setting of newly developed diabetes insipidus, hypotension, and concern for impending brain herniation, but the number immediately went to a dial tone signifying that the phone might be off the hook/disconnected, without the ability to leave a voicemail. I then attempted to call the patient's daughter, Kadi immediately after but there was no answer. I left a voicemail requesting she call the NCC team back and provided the phone number for the ICU . I will attempt to call again in 2-3 hours to continue engaging in GOC discussions.       ____________________  YUMI Acevedo  Neuro Critical Care

## 2024-10-30 NOTE — ACP (ADVANCE CARE PLANNING)
After phone call updates with patient's daughter and nephew yesterday, multiple attempts were made today to reach the daughter Kadi for final decision as patient continued to decline and become hemodynamically unstable. This morning, she wanted to see if any family would be able to make it to say goodbye before making patient comfort care, unfortunately, no family was close enough. During second phone call, she confirmed no compressions or aggressive treatment. [employer] representative spoke with patient's daughter who declined organ donation. At this time, Kadi agreed to go comfort care and to extubate patient as no one would make it on time. She said goodbye prior to extubation on speaker phone, and she has been connected with  for help with  home decisions.

## 2024-10-30 NOTE — PLAN OF CARE
Problem: Nutrition  Goal: Nutrition/Hydration status is improving  Description: Monitor and assess patient's nutrition/hydration status for malnutrition (ex- brittle hair, bruises, dry skin, pale skin and conjunctiva, muscle wasting, smooth red tongue, and disorientation). Collaborate with interdisciplinary team and initiate plan and interventions as ordered.  Monitor patient's weight and dietary intake as ordered or per policy. Utilize nutrition screening tool and intervene per policy. Determine patient's food preferences and provide high-protein, high-caloric foods as appropriate.     - Assist patient with eating.  - Allow adequate time for meals.  - Encourage patient to take dietary supplement as ordered.  - Collaborate with clinical nutritionist.  - Include patient/family/caregiver in decisions related to nutrition.  Outcome: Not Progressing   Patient has transitioned to level 4 comfort care measures, no further nutrition intervention required at this time.

## 2024-10-30 NOTE — DISCHARGE SUMMARY
Admission Date: 10/25/2024   Admitting Diagnosis: Stroke (HCC) [I63.9]  Discharge Date: 10/30/24    HPI:  Joslyn Cantu is a 74 y.o. with a past medical history of schizophrenia, DM 2, hypothyroidism, heart failure with reduced EF, CKD 3. Patient resides at group home, was found on the street [facility]. Presented with right-sided weakness, left gaze preference, NIH 27. CT completed showing left M2 occlusion given TNK and taken for mechanical thrombectomy.     Procedures Performed:   Orders Placed This Encounter   Procedures    Central Line    Critical Care    ED ECG Documentation Only    Lumbar puncture       Summary of Hospital Course:   Patient received TNK and mechanical thrombectomy, but she remained intubated on the vent. Patient developed abnormal movements and was bolused with Keppra and placed on continuous EEG monitoring. No seizures were captured. Repeat imaging revealed increased edema and mass effect in the brain with new watershed infarcts. Despite hypertonic saline to attempt to reduce cerebral edema, patient's exam continued to worsen. Imaging revealed progression and new transtentorial herniation. Patient developed diabetes insipidus and became hypotensive. Patient went into Afib with RVR. Drips for amiodarone and insulin were started. Levophed was not enough to maintain blood pressure. Despite the addition of vasopressin and hosea, blood pressure and pulse were undetectable. Dobutamine was added as well. Heart rate converted to SVT, but 2 doses of adenosine and 2 rounds of synchronized cardioversion were unsuccessful. Family made decision at this point to go comfort care. Decision made with patient's daughter Kadi over the phone at 1:45 pm.      Significant Findings, Care, Treatment and Services Provided:   10/25 CTA HN: Occluded left M2 segment. The M1 segments are patent. Severe bilateral ICA stenosis with pronounced noncalcified plaque proximally on the left. Additional stenoses are seen throughout the  left CCA, both intracranial internal carotid arteries, and the left intracranial vertebral artery.    Received TNK and IR mechanical thrombectomy 10/25 with TICI 2C    10/26 vEEG with no captured seizures    10/28 CTH: Significant increase in vasogenic edema and mass effect in the left MCA and HIEN territories. New left right midline shift of 10 mm. Mild left transtentorial herniation in the temporal region. Near complete effacement of the left lateral and third ventricles. No hydrocephalus. Interval resolution of density visualized in the left MCA territory infarct suggesting washout of contrast. No evidence of acute hemorrhage.    Complications:   Irreversible cerebral edema with midline shift and herniation    Disposition:      Final Diagnosis:   Stroke complicated by irreversible brain herniation and cardiac arrest    Medical Problems       Resolved Problems  Date Reviewed: 10/26/2024   None         Condition at Time of Death: Poor prognosis, progressive worsening with loss of brainstem reflexes and hemodynamic instability    Date, Time and Cause of Death    Date of Death: 10/30/24  Time of Death:  2:28 PM  Preliminary Cause of Death: Stroke  Entered by: Dr. Joann Daily[SV1.1]       Attribution       SV1.1 Joann Daily MD 10/30/24 14:31            Death Note:  INPATIENT DEATH NOTE  Joslyn Cantu 74 y.o. female MRN: [id number]  Unit/Bed#: ICU 09 Encounter: [id number]    Date, Time and Cause of Death    Date of Death: 10/30/24  Time of Death:  2:28 PM  Preliminary Cause of Death: Stroke  Entered by: Dr. Joann Daily[SV1.1]       Attribution       SV1.1 Joann Daily MD 10/30/24 14:31             Patient's Information  Did the patient's death occur in the ED?: No  Did the patient's death occur in the OR?: No  Did the patient's death occur within 24 hours of admission?: No  Was code status DNR at the time of death?: Yes    PHYSICAL EXAM:  Unresponsive to noxious stimuli, Spontaneous  respirations absent, Breath sounds absent, Carotid pulse absent, Heart sounds absent, Pupillary light reflex absent, and Corneal blink reflex absent    Medical Examiner notification criteria:  NONE APPLICABLE   Medical Examiner's office notified?:  No, does not meet ME notification criteria   Medical Examiner accepted case?:  No  Name of Medical Examiner: n/a    Family Notification  Was the family notified?: Yes   Relationship to Patient: Daughter  Family Notification Route: Telephone  Was the family told to contact a  home?: Yes    Autopsy Options:  Decision for post-mortem examination not yet made by next of kin.    Primary Service Attending Physician notified?:  yes - Attending:  Suzanne Puente MD    Physician/Resident responsible for completing Discharge Summary:  Joann Daily MD

## 2024-10-30 NOTE — NURSING NOTE
Patient with rapid breathing +40/min. Currently on P-CMV. Fentanyl given which initially helped to reduced the breathing rate. ABG, Urine Osomolarity sent.  Earlier BMP/Osmolarity sent due to  and Osm of 345 @ 18:36. Repeat NA was 173 and Osm of 364 @ 21:54.  K 3.3.  Patient being given DDAVP and 2 Liters of D5 boluses.  Urine was -2000 cc during day and continues dropping post 6 pm, currently -3843 since 7am.  Patient continues with fixed pupils, +corneal Left<Right, +cough/gag, zero withdrawal to pain.   Levophed increased to 6 mcg/min  GOL updated on the plan of ca

## 2024-10-30 NOTE — PROCEDURES
Arterial Line Insertion    Date/Time: 10/30/2024 2:31 PM    Performed by: Lakesha Antoine DO  Authorized by: Lakesha Antoine DO    Patient location:  ICU  Procedure performed by consultant: Dr espino.    Consent:     Consent obtained:  Emergent situation  Universal protocol:     Procedure explained and questions answered to patient or proxy's satisfaction: yes      Relevant documents present and verified: yes      Test results available and properly labeled: yes      Radiology Images displayed and confirmed.  If images not available, report reviewed: yes      Required blood products, implants, devices, and special equipment available: yes      Site/side marked: yes      Immediately prior to procedure a time out was called: yes      Patient identity confirmed:  Arm band and hospital-assigned identification number  Indications:     Indications: hemodynamic monitoring, multiple ABGs and continuous blood pressure monitoring    Pre-procedure details:     Skin preparation:  Chlorhexidine    Preparation: Patient was prepped and draped in sterile fashion    Anesthesia (see MAR for exact dosages):     Anesthesia method:  None  Procedure details:     Location / Tip of Catheter:  Radial    Laterality:  Left    Jaret's test performed: no      Needle gauge:  22 G    Placement technique:  Ultrasound guided    Ultrasound image availability:  Not saved    Sterile ultrasound techniques: Sterile gel and sterile probe covers were used      Number of attempts:  2    Successful placement: yes      Transducer: waveform confirmed    Post-procedure details:     Post-procedure:  Sterile dressing applied    CMS:  Normal    Patient tolerance of procedure:  Tolerated well, no immediate complications

## 2024-10-30 NOTE — PROGRESS NOTES
Patient transition to atrial fibrillation with RVR.  Plan to initiate amiodarone.  Patient is also hypotensive presently on norepinephrine at 4 mcg/min.  Sodium is slightly improved to 161.  Patient's blood glucose is elevated to 485.  Corrected sodium 167.  Continue with free water as well as additional D5 W in order to correct sodium to normal.  Patient's potassium is low at 2.9.  Plan to replete and recheck.    Patient was administered magnesium with transitions to atrial fibrillation with RVR.  With next electrolytes will also check magnesium.    Critical care time does not include procedures or family update.  Critical care time 32 minutes

## 2024-10-30 NOTE — RESPIRATORY THERAPY NOTE
RT Ventilator Management Note  Joslyn Cantu 74 y.o. female MRN: [id number]  Unit/Bed#: ICU 09 Encounter: [id number]      Daily Screen         10/28/2024  0707 10/29/2024  0738          Patient safety screen outcome:: Passed Passed      Spont breathing trial outcome:: Passed --                Physical Exam:   Assessment Type: During-treatment  General Appearance: Unresponsive  Respiratory Pattern: Assisted  Chest Assessment: Chest expansion symmetrical  Bilateral Breath Sounds: Diminished      Resp Comments: (P) Found pt on pcmv settings tolerating well at this time     10/30/24 0430   Respiratory Assessment   Resp Comments Found pt on pcmv settings tolerating well at this time   Vent Information   Vent type Harris G5   Harris Vent Mode P-CMV/PCV+   $ Vent Daily Charge-Subsequent Yes   $ Pulse Oximetry Spot Check Charge Completed   P-CMV/PCV+ Settings   Resp Rate (BPM) 14 BPM   Pressure Control/Pinsp (cmH20) 15 cmH20   Insp Time (S) 1 sec   FiO2 (%) 30 %   PEEP (cmH2O) 6 cmH2O   I:E Ratio 1/3.3   Insp Resistance 14   P-ramp (ms) 100 (ms)   Flow Trigger (LPM) 5 LPM   Humidification Heater   Heater Temp 95 °F (35 °C)   P-CMV/PCV+ Actuals   Resp Rate (BPM) 14 BPM   VT (mL) 703 mL   MV 9.8   MAP (cmH2O) 9.4 cmH2O   Peak Pressure (cmH2O) 22 cmH2O   I:E Ratio (Obs) 1/3.3   P-CMV/PCV+ Alarms    High Peak Pressure (cmH2O) 40 cmH2O   Low Pressure (cmH2O) 5 cm H2O   High Resp Rate (BPM) 50 BPM   Low Resp Rate (BPM) 8 BPM   High MV (L/min) 20 L/min   Low MV (L/min) 4 L/min   High Spont VTE (mL) 1000 mL   Low Spont VTE (mL) 250 mL   IHI Ventilator Associated Pneumonia Bundle   Head of Bed Elevated HOB 30   ETT  Cuffed;Oral;Pre-curved;Inflated 8 mm   Placement Date/Time: 10/25/24 2144   Mask Ventilation: Mask ventilation not attempted (0)  Preoxygenated: Yes  Technique: Direct laryngoscopy;Rapid sequence;Stylet  Type: Cuffed;Oral;Pre-curved;Inflated  Tube Size: 8 mm  Laryngoscope: Mac  Blade Size:...   Secured at (cm) 20    Measured from Lips   Secured Location Left

## 2024-10-30 NOTE — NURSING NOTE
Patient received following medications:   DDAVP for increased urine output 350-550 cc q2  Amiodarone bolus 150 mg for Afib/RVR  Magnesium 4 mg IV  Keppra 750 mg IV  Potassium 40 mg IV and 40 PO for K 3.3   2 Boluses D5 for increased sodium of 171  Levophed running at 6 mcg/min  Insulin drip running at 12 unit/hr post 2 liters of D5 given for sodium correction for a blood sugar of 465.

## 2024-10-30 NOTE — CASE MANAGEMENT
Case Management Discharge Planning Note    Patient name Joslyn Cantu  Location ICU /ICU 09 MRN [id number]  : 1950 Date 10/30/2024       Current Admission Date: 10/25/2024  Current Admission Diagnosis:Stroke ([facility])   Patient Active Problem List    Diagnosis Date Noted Date Diagnosed    Aortic stenosis, moderate 10/26/2024     Elevated troponin 10/26/2024     Stroke (HCC) 10/25/2024     Cardiomyopathy, unspecified (HCC) 2024     Left ventricular diastolic dysfunction 2024     Stage 3a chronic kidney disease (HCC) 2024     Leukocytosis 2024     Uncontrolled type 2 diabetes mellitus with hypoglycemia without coma ([facility]) 2024     Chronic HFrEF (heart failure with reduced ejection fraction) ([facility]) 2024     Iron deficiency anemia 2016     Hypovitaminosis D 05/10/2016     Hypothyroid 2016     Hypertension 2016     Type 2 diabetes mellitus without complication ([facility]) 2016     Schizophrenia ([facility]) 2016       LOS (days): 5  Geometric Mean LOS (GMLOS) (days):   Days to GMLOS:     OBJECTIVE:  Risk of Unplanned Readmission Score: 24.44         Current admission status: Inpatient   Preferred Pharmacy:   [facility] [address]  [address] [facility] [address]  Phone: [phone] Fax: [phone]    [facility] [address]   [address] [facility] [address]  Phone: [phone] Fax: [phone]    Primary Care Provider: Natalia Elizondo MD    Primary Insurance: [employer]  REP  Secondary Insurance:  [employer] AND [employer] [facility]    DISCHARGE DETAILS:    CM emailed the pt's dtr a list of local  homes. Pt's dtr will contact them shortly

## 2024-10-30 NOTE — DEATH NOTE
INPATIENT DEATH NOTE  Joslyn Cantu 74 y.o. female MRN: [id number]  Unit/Bed#: ICU 09 Encounter: [id number]    Date, Time and Cause of Death    Date of Death: 10/30/24  Time of Death:  2:28 PM  Preliminary Cause of Death: Stroke  Entered by: Dr. Joann Daily[[email or web address]]       Attribution       SV1.1 Joann Daily MD 10/30/24 14:31             Patient's Information  Did the patient's death occur in the ED?: No  Did the patient's death occur in the OR?: No  Did the patient's death occur within 24 hours of admission?: No  Was code status DNR at the time of death?: Yes    PHYSICAL EXAM:  Unresponsive to noxious stimuli, Spontaneous respirations absent, Breath sounds absent, Carotid pulse absent, Heart sounds absent, Pupillary light reflex absent, and Corneal blink reflex absent    Medical Examiner notification criteria:  NONE APPLICABLE   Medical Examiner's office notified?:  No, does not meet ME notification criteria   Medical Examiner accepted case?:  No  Name of Medical Examiner: n/a    Family Notification  Was the family notified?: Yes   Relationship to Patient: Daughter  Family Notification Route: Telephone  Was the family told to contact a  home?: Yes    Autopsy Options:  Decision for post-mortem examination not yet made by next of kin.    Primary Service Attending Physician notified?:  yes - Attending:  Suzanne Puente MD    Physician/Resident responsible for completing Discharge Summary:  Joann Daily MD

## 2024-11-01 LAB
BACTERIA BLD CULT: NORMAL
BACTERIA BLD CULT: NORMAL
